# Patient Record
Sex: MALE | Race: WHITE | NOT HISPANIC OR LATINO | URBAN - METROPOLITAN AREA
[De-identification: names, ages, dates, MRNs, and addresses within clinical notes are randomized per-mention and may not be internally consistent; named-entity substitution may affect disease eponyms.]

---

## 2017-07-16 ENCOUNTER — OUTPATIENT (OUTPATIENT)
Dept: OUTPATIENT SERVICES | Facility: HOSPITAL | Age: 72
LOS: 1 days | Discharge: HOME | End: 2017-07-16

## 2017-07-16 DIAGNOSIS — N28.1 CYST OF KIDNEY, ACQUIRED: ICD-10-CM

## 2019-10-11 ENCOUNTER — OUTPATIENT (OUTPATIENT)
Dept: OUTPATIENT SERVICES | Facility: HOSPITAL | Age: 74
LOS: 1 days | Discharge: HOME | End: 2019-10-11
Payer: MEDICARE

## 2019-10-11 DIAGNOSIS — M79.604 PAIN IN RIGHT LEG: ICD-10-CM

## 2019-10-11 DIAGNOSIS — S80.922A UNSPECIFIED SUPERFICIAL INJURY OF LEFT LOWER LEG, INITIAL ENCOUNTER: ICD-10-CM

## 2019-10-11 DIAGNOSIS — M79.605 PAIN IN LEFT LEG: ICD-10-CM

## 2019-10-11 DIAGNOSIS — M25.561 PAIN IN RIGHT KNEE: ICD-10-CM

## 2019-10-11 PROCEDURE — 93971 EXTREMITY STUDY: CPT | Mod: 26,LT

## 2019-11-11 ENCOUNTER — OUTPATIENT (OUTPATIENT)
Dept: OUTPATIENT SERVICES | Facility: HOSPITAL | Age: 74
LOS: 1 days | Discharge: HOME | End: 2019-11-11
Payer: MEDICARE

## 2019-11-11 DIAGNOSIS — M25.562 PAIN IN LEFT KNEE: ICD-10-CM

## 2019-11-11 PROCEDURE — 73721 MRI JNT OF LWR EXTRE W/O DYE: CPT | Mod: 26,LT

## 2019-11-27 ENCOUNTER — OUTPATIENT (OUTPATIENT)
Dept: OUTPATIENT SERVICES | Facility: HOSPITAL | Age: 74
LOS: 1 days | Discharge: HOME | End: 2019-11-27
Payer: MEDICARE

## 2019-11-27 DIAGNOSIS — R97.20 ELEVATED PROSTATE SPECIFIC ANTIGEN [PSA]: ICD-10-CM

## 2019-11-27 DIAGNOSIS — N28.1 CYST OF KIDNEY, ACQUIRED: ICD-10-CM

## 2019-11-27 PROCEDURE — 72196 MRI PELVIS W/DYE: CPT | Mod: 26

## 2019-11-27 PROCEDURE — 76775 US EXAM ABDO BACK WALL LIM: CPT | Mod: 26

## 2020-03-30 ENCOUNTER — INPATIENT (INPATIENT)
Facility: HOSPITAL | Age: 75
LOS: 35 days | Discharge: NURSING, REHAB OR LONG-TERM CARE | End: 2020-05-05
Attending: INTERNAL MEDICINE | Admitting: INTERNAL MEDICINE
Payer: MEDICARE

## 2020-03-30 VITALS
SYSTOLIC BLOOD PRESSURE: 128 MMHG | HEART RATE: 104 BPM | DIASTOLIC BLOOD PRESSURE: 58 MMHG | OXYGEN SATURATION: 95 % | TEMPERATURE: 99 F | RESPIRATION RATE: 20 BRPM

## 2020-03-30 DIAGNOSIS — R74.0 NONSPECIFIC ELEVATION OF LEVELS OF TRANSAMINASE AND LACTIC ACID DEHYDROGENASE [LDH]: ICD-10-CM

## 2020-03-30 DIAGNOSIS — Z88.1 ALLERGY STATUS TO OTHER ANTIBIOTIC AGENTS STATUS: ICD-10-CM

## 2020-03-30 LAB
ALBUMIN SERPL ELPH-MCNC: 3.8 G/DL — SIGNIFICANT CHANGE UP (ref 3.5–5.2)
ALP SERPL-CCNC: 73 U/L — SIGNIFICANT CHANGE UP (ref 30–115)
ALT FLD-CCNC: 109 U/L — HIGH (ref 0–41)
ANION GAP SERPL CALC-SCNC: 18 MMOL/L — HIGH (ref 7–14)
APPEARANCE UR: CLEAR — SIGNIFICANT CHANGE UP
AST SERPL-CCNC: 131 U/L — HIGH (ref 0–41)
BACTERIA # UR AUTO: NEGATIVE — SIGNIFICANT CHANGE UP
BASE EXCESS BLDV CALC-SCNC: -0.1 MMOL/L — SIGNIFICANT CHANGE UP (ref -2–2)
BASOPHILS # BLD AUTO: 0.01 K/UL — SIGNIFICANT CHANGE UP (ref 0–0.2)
BASOPHILS NFR BLD AUTO: 0.1 % — SIGNIFICANT CHANGE UP (ref 0–1)
BILIRUB SERPL-MCNC: 0.8 MG/DL — SIGNIFICANT CHANGE UP (ref 0.2–1.2)
BILIRUB UR-MCNC: NEGATIVE — SIGNIFICANT CHANGE UP
BUN SERPL-MCNC: 24 MG/DL — HIGH (ref 10–20)
CA-I SERPL-SCNC: 1.18 MMOL/L — SIGNIFICANT CHANGE UP (ref 1.12–1.3)
CALCIUM SERPL-MCNC: 9.2 MG/DL — SIGNIFICANT CHANGE UP (ref 8.5–10.1)
CHLORIDE SERPL-SCNC: 98 MMOL/L — SIGNIFICANT CHANGE UP (ref 98–110)
CO2 SERPL-SCNC: 21 MMOL/L — SIGNIFICANT CHANGE UP (ref 17–32)
COLOR SPEC: YELLOW — SIGNIFICANT CHANGE UP
CREAT SERPL-MCNC: 1.2 MG/DL — SIGNIFICANT CHANGE UP (ref 0.7–1.5)
DIFF PNL FLD: ABNORMAL
EOSINOPHIL # BLD AUTO: 0 K/UL — SIGNIFICANT CHANGE UP (ref 0–0.7)
EOSINOPHIL NFR BLD AUTO: 0 % — SIGNIFICANT CHANGE UP (ref 0–8)
EPI CELLS # UR: 22 /HPF — HIGH (ref 0–5)
GAS PNL BLDV: 136 MMOL/L — SIGNIFICANT CHANGE UP (ref 136–145)
GAS PNL BLDV: SIGNIFICANT CHANGE UP
GLUCOSE SERPL-MCNC: 169 MG/DL — HIGH (ref 70–99)
GLUCOSE UR QL: NEGATIVE — SIGNIFICANT CHANGE UP
HCO3 BLDV-SCNC: 25 MMOL/L — SIGNIFICANT CHANGE UP (ref 22–29)
HCT VFR BLD CALC: 50.2 % — SIGNIFICANT CHANGE UP (ref 42–52)
HCT VFR BLDA CALC: 51.9 % — HIGH (ref 34–44)
HGB BLD CALC-MCNC: 16.9 G/DL — SIGNIFICANT CHANGE UP (ref 14–18)
HGB BLD-MCNC: 16.6 G/DL — SIGNIFICANT CHANGE UP (ref 14–18)
HYALINE CASTS # UR AUTO: 24 /LPF — HIGH (ref 0–7)
IMM GRANULOCYTES NFR BLD AUTO: 0.4 % — HIGH (ref 0.1–0.3)
KETONES UR-MCNC: SIGNIFICANT CHANGE UP
LACTATE BLDV-MCNC: 3.1 MMOL/L — HIGH (ref 0.5–1.6)
LEUKOCYTE ESTERASE UR-ACNC: NEGATIVE — SIGNIFICANT CHANGE UP
LYMPHOCYTES # BLD AUTO: 0.92 K/UL — LOW (ref 1.2–3.4)
LYMPHOCYTES # BLD AUTO: 12.1 % — LOW (ref 20.5–51.1)
MAGNESIUM SERPL-MCNC: 2.1 MG/DL — SIGNIFICANT CHANGE UP (ref 1.8–2.4)
MCHC RBC-ENTMCNC: 29.1 PG — SIGNIFICANT CHANGE UP (ref 27–31)
MCHC RBC-ENTMCNC: 33.1 G/DL — SIGNIFICANT CHANGE UP (ref 32–37)
MCV RBC AUTO: 87.9 FL — SIGNIFICANT CHANGE UP (ref 80–94)
MONOCYTES # BLD AUTO: 0.52 K/UL — SIGNIFICANT CHANGE UP (ref 0.1–0.6)
MONOCYTES NFR BLD AUTO: 6.8 % — SIGNIFICANT CHANGE UP (ref 1.7–9.3)
NEUTROPHILS # BLD AUTO: 6.14 K/UL — SIGNIFICANT CHANGE UP (ref 1.4–6.5)
NEUTROPHILS NFR BLD AUTO: 80.6 % — HIGH (ref 42.2–75.2)
NITRITE UR-MCNC: NEGATIVE — SIGNIFICANT CHANGE UP
NRBC # BLD: 0 /100 WBCS — SIGNIFICANT CHANGE UP (ref 0–0)
PCO2 BLDV: 41 MMHG — SIGNIFICANT CHANGE UP (ref 41–51)
PH BLDV: 7.39 — SIGNIFICANT CHANGE UP (ref 7.26–7.43)
PH UR: 6 — SIGNIFICANT CHANGE UP (ref 5–8)
PLATELET # BLD AUTO: 262 K/UL — SIGNIFICANT CHANGE UP (ref 130–400)
PO2 BLDV: 20 MMHG — SIGNIFICANT CHANGE UP (ref 20–40)
POTASSIUM BLDV-SCNC: 4.2 MMOL/L — SIGNIFICANT CHANGE UP (ref 3.3–5.6)
POTASSIUM SERPL-MCNC: 4.4 MMOL/L — SIGNIFICANT CHANGE UP (ref 3.5–5)
POTASSIUM SERPL-SCNC: 4.4 MMOL/L — SIGNIFICANT CHANGE UP (ref 3.5–5)
PROT SERPL-MCNC: 7.1 G/DL — SIGNIFICANT CHANGE UP (ref 6–8)
PROT UR-MCNC: ABNORMAL
RBC # BLD: 5.71 M/UL — SIGNIFICANT CHANGE UP (ref 4.7–6.1)
RBC # FLD: 13.7 % — SIGNIFICANT CHANGE UP (ref 11.5–14.5)
RBC CASTS # UR COMP ASSIST: 10 /HPF — HIGH (ref 0–4)
SAO2 % BLDV: 29 % — SIGNIFICANT CHANGE UP
SODIUM SERPL-SCNC: 137 MMOL/L — SIGNIFICANT CHANGE UP (ref 135–146)
SP GR SPEC: 1.03 — HIGH (ref 1.01–1.02)
TROPONIN T SERPL-MCNC: <0.01 NG/ML — SIGNIFICANT CHANGE UP
UROBILINOGEN FLD QL: SIGNIFICANT CHANGE UP
WBC # BLD: 7.62 K/UL — SIGNIFICANT CHANGE UP (ref 4.8–10.8)
WBC # FLD AUTO: 7.62 K/UL — SIGNIFICANT CHANGE UP (ref 4.8–10.8)
WBC UR QL: 29 /HPF — HIGH (ref 0–5)

## 2020-03-30 PROCEDURE — 71045 X-RAY EXAM CHEST 1 VIEW: CPT | Mod: 26

## 2020-03-30 PROCEDURE — 99223 1ST HOSP IP/OBS HIGH 75: CPT

## 2020-03-30 PROCEDURE — 99497 ADVNCD CARE PLAN 30 MIN: CPT | Mod: 25

## 2020-03-30 PROCEDURE — 99285 EMERGENCY DEPT VISIT HI MDM: CPT | Mod: GC

## 2020-03-30 PROCEDURE — 93010 ELECTROCARDIOGRAM REPORT: CPT

## 2020-03-30 RX ORDER — ACETAMINOPHEN 500 MG
650 TABLET ORAL EVERY 6 HOURS
Refills: 0 | Status: DISCONTINUED | OUTPATIENT
Start: 2020-03-30 | End: 2020-03-31

## 2020-03-30 RX ORDER — GUAIFENESIN/DEXTROMETHORPHAN 600MG-30MG
10 TABLET, EXTENDED RELEASE 12 HR ORAL EVERY 4 HOURS
Refills: 0 | Status: DISCONTINUED | OUTPATIENT
Start: 2020-03-30 | End: 2020-04-06

## 2020-03-30 RX ORDER — APIXABAN 2.5 MG/1
5 TABLET, FILM COATED ORAL
Refills: 0 | Status: DISCONTINUED | OUTPATIENT
Start: 2020-03-30 | End: 2020-04-14

## 2020-03-30 RX ORDER — CHLORHEXIDINE GLUCONATE 213 G/1000ML
1 SOLUTION TOPICAL
Refills: 0 | Status: DISCONTINUED | OUTPATIENT
Start: 2020-03-30 | End: 2020-04-21

## 2020-03-30 RX ORDER — HYDROXYCHLOROQUINE SULFATE 200 MG
TABLET ORAL
Refills: 0 | Status: DISCONTINUED | OUTPATIENT
Start: 2020-03-30 | End: 2020-03-31

## 2020-03-30 RX ORDER — HYDROXYCHLOROQUINE SULFATE 200 MG
400 TABLET ORAL EVERY 12 HOURS
Refills: 0 | Status: DISCONTINUED | OUTPATIENT
Start: 2020-03-30 | End: 2020-03-30

## 2020-03-30 RX ORDER — HYDROXYCHLOROQUINE SULFATE 200 MG
400 TABLET ORAL EVERY 12 HOURS
Refills: 0 | Status: COMPLETED | OUTPATIENT
Start: 2020-03-30 | End: 2020-03-31

## 2020-03-30 RX ORDER — LISINOPRIL 2.5 MG/1
40 TABLET ORAL DAILY
Refills: 0 | Status: DISCONTINUED | OUTPATIENT
Start: 2020-03-30 | End: 2020-03-31

## 2020-03-30 RX ORDER — HYDROXYCHLOROQUINE SULFATE 200 MG
TABLET ORAL
Refills: 0 | Status: DISCONTINUED | OUTPATIENT
Start: 2020-03-30 | End: 2020-03-30

## 2020-03-30 RX ORDER — ZINC SULFATE TAB 220 MG (50 MG ZINC EQUIVALENT) 220 (50 ZN) MG
220 TAB ORAL DAILY
Refills: 0 | Status: DISCONTINUED | OUTPATIENT
Start: 2020-03-30 | End: 2020-04-20

## 2020-03-30 RX ORDER — SODIUM CHLORIDE 9 MG/ML
1000 INJECTION INTRAMUSCULAR; INTRAVENOUS; SUBCUTANEOUS
Refills: 0 | Status: DISCONTINUED | OUTPATIENT
Start: 2020-03-30 | End: 2020-03-31

## 2020-03-30 RX ORDER — DILTIAZEM HCL 120 MG
120 CAPSULE, EXT RELEASE 24 HR ORAL DAILY
Refills: 0 | Status: DISCONTINUED | OUTPATIENT
Start: 2020-03-30 | End: 2020-03-31

## 2020-03-30 RX ADMIN — SODIUM CHLORIDE 75 MILLILITER(S): 9 INJECTION INTRAMUSCULAR; INTRAVENOUS; SUBCUTANEOUS at 20:36

## 2020-03-30 RX ADMIN — Medication 400 MILLIGRAM(S): at 20:38

## 2020-03-30 NOTE — GOALS OF CARE CONVERSATION - ADVANCED CARE PLANNING - CONVERSATION DETAILS
Discussed diagnosis and treatment as well as GOC. Goals of care discussed in light of patient's presenting symptoms of comorbidities, patient wishes to be full code.

## 2020-03-30 NOTE — ED PROVIDER NOTE - NS ED ROS FT
Constitutional: (+) fever (-) vomiting  Eyes/ENT: (-) vision changes, (-) hearing changes  Cardiovascular: (-) chest pain, (+) sob  Respiratory: (+) cough, (+) shortness of breath  Gastrointestinal: (-) vomiting, (+) diarrhea, (-) abdominal pain  : (-) dysuria   Musculoskeletal: (-) back pain  Integumentary: (-) rash, (-) edema  Neurological: (-)loc  Allergic/Immunologic: (-) pruritus  Endocrine: No history of thyroid disease or diabetes.

## 2020-03-30 NOTE — H&P ADULT - NSHPPHYSICALEXAM_GEN_ALL_CORE
Vital Signs Last 24 Hrs  T(C): 37.2 (30 Mar 2020 14:08), Max: 37.2 (30 Mar 2020 14:08)  T(F): 98.9 (30 Mar 2020 14:08), Max: 98.9 (30 Mar 2020 14:08)  HR: 109 (30 Mar 2020 16:22) (98 - 109)  BP: 140/83 (30 Mar 2020 16:22) (118/56 - 148/78)  BP(mean): --  RR: 20 (30 Mar 2020 16:22) (20 - 22)  SpO2: 95% (30 Mar 2020 16:22) (94% - 96%) Vital Signs Last 24 Hrs  T(C): 37.2 (30 Mar 2020 14:08), Max: 37.2 (30 Mar 2020 14:08)  T(F): 98.9 (30 Mar 2020 14:08), Max: 98.9 (30 Mar 2020 14:08)  HR: 109 (30 Mar 2020 16:22) (98 - 109)  BP: 140/83 (30 Mar 2020 16:22) (118/56 - 148/78)  BP(mean): --  RR: 20 (30 Mar 2020 16:22) (20 - 22)  SpO2: 95% (30 Mar 2020 16:22) (94% - 96%)    GEN: NAD  HEENT: NCAT  CV: irregular  RESP: CTAB  ABD: NTND, soft  EXT: no C/C/E  NEURO: A&O x4

## 2020-03-30 NOTE — GOALS OF CARE CONVERSATION - ADVANCED CARE PLANNING - CONVERSATION/DISCUSSION
CARLOSST Discussed/Treatment Options/Diagnosis Diagnosis/MOLST Discussed/Treatment Options/Prognosis

## 2020-03-30 NOTE — H&P ADULT - ATTENDING COMMENTS
At patient's request, spoke in detail length of patient's case with wife, Rebecca at [phone] at 21:16.    PHYSICAL EXAM:    CONSTITUTIONAL: NAD   ENMT: EOMI, PERRLA, No tonsillar erythema, exudates, or enlargement, neck supple, No JVD  PSYCH: Alert & Oriented X3  RESPIRATORY: Decreased bilateral air entry, bibasilar rales   CARDIOVASCULAR: Regular rate and rhythm; No murmurs, rubs, or gallops, negative edema  GASTROINTESTINAL: Soft, Nontender, Nondistended; Bowel sounds present  EXTREMITIES:  2+ Peripheral Pulses, No clubbing, cyanosis  SKIN: No rashes or lesions    75 yo M with PMHx of AFIB on Eliquis, CAD s/p PCI, DM II, BPH, presented with complaint of fever, nonproductive cough and worsening shortness of breath for one week's duration. Patient endorses positive COVID-19 sick contact during family wedding 2 weeks ago, denies nausea, vomiting, chest pain, abdominal pain. In ED, patient complained of inability to urinate, santana placed with initial output of 600cc as per RN.     #Suspected Viral URI, Acute Hypoxemic Respiratory Failure: COVID-19 sent, patient initially sating in low 80s in field, currently 95% on 100% nonrebreather, patient initially resistant to start initial dose of  hydroxychlorquine, resident physician contacted and spoke with Cardiologist who agreed on initiation, f/u AM EKG before continuation of medication, QTc elevated at 499, awaiting ID evaluation, incentive spirometry, pulmonary toilet, antipyretic, f/u CRP, procalcitonin, CXR reviewed revealing of bilateral diffuse infiltrates    #AFIB/CAD: Continue home medications     #DM: Monitor fingersticks, start basal bolus insulin if greater than 180     #BPH/Urinary Retention: will proceed with trial of void, f/u UA, urine culture, will need follow up with Urology as outpatient    #Transaminitis likely secondary to viral infection: f/u repeat LFTs     Disposition: Home when medically stable.

## 2020-03-30 NOTE — ED PROVIDER NOTE - ATTENDING CONTRIBUTION TO CARE
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

## 2020-03-30 NOTE — ED PROVIDER NOTE - DISPOSITION TYPE
ADMIT Repair Performed By Another Provider Text (Leave Blank If You Do Not Want): After obtaining clear surgical margins the defect was repaired by another provider.

## 2020-03-30 NOTE — ED ADULT NURSE NOTE - OBJECTIVE STATEMENT
pt 73 y/o male presents to ED c/o cough, fever, and SOB . pt states at home his O2 was in the 80s on room air. pt denies pain. pt states he called 911 because of low O2 , pt currently on 6LNC .

## 2020-03-30 NOTE — ED PROVIDER NOTE - CARE PLAN
Principal Discharge DX:	Acute hypoxemic respiratory failure  Secondary Diagnosis:	Pulmonary infiltrates on CXR

## 2020-03-30 NOTE — H&P ADULT - NSICDXPASTMEDICALHX_GEN_ALL_CORE_FT
PAST MEDICAL HISTORY:  Afib     BPH (benign prostatic hyperplasia)     CAD (coronary artery disease)     DM (diabetes mellitus)

## 2020-03-30 NOTE — H&P ADULT - ASSESSMENT
AHRF r/o COVID  -f/u COVID PCR  -f/u BCX and UCX  -start HCQ  -QTc borderline, repeat in AM and monitor 73 yo M w/ hx of Afib on Eliquis, CAD s/p PCI, BPH, presents with 1 week of fever, cough, and progressive SOB.    AHRF r/o COVID  -f/u COVID PCR  -f/u BCX and UCX  -patient refusing Plaquenil for now, he would like to have okay from Dr. Stokes  -QTc borderline, repeat in AM and monitor  -check procalcitonin and CRP  -transaminitis possibly 2/2 COVID, monitor for now  -zinc  -antitussive prn    #Afib on Eliquis  #CAD s/p PCI  -continue CCB and Eliquis  -continue ACE-i  -ECG shows signs of ischemia, no prior ECG for comparison  -check trop  -hold atorvastatin for transaminitis    #DM - hold metformin, monitor blood glucose for now, start insulin if consistently >180  #BPH - s/p biopsy, follow up outpatient    #Misc  -full code, MOLST in chart needs attending signature 73 yo M w/ hx of Afib on Eliquis, CAD s/p PCI, DM, BPH, presents with 1 week of fever, cough, and progressive SOB.    AHRF r/o COVID  -f/u COVID PCR  -f/u BCX and UCX  -patient refusing Plaquenil for now, he would like to have okay from Dr. Stokes  -QTc borderline, repeat in AM and monitor  -check procalcitonin and CRP  -transaminitis possibly 2/2 COVID, monitor for now  -zinc  -antitussive prn  -IVF  -repeat lactate in AM    #Afib on Eliquis  #CAD s/p PCI  -continue CCB and Eliquis  -continue ACE-i  -ECG shows ischemic findings, no prior ECG for comparison, given patient asymptomatic and recent normal stress test, will monitor ECG for now  -maintain mag > 2  -check trop  -hold atorvastatin for transaminitis    #DM - hold metformin, monitor blood glucose for now, start insulin if consistently >180  #BPH - s/p biopsy, follow up outpatient    #Misc  -full code, MOLST in chart needs attending signature 73 yo M w/ hx of Afib on Eliquis, CAD s/p PCI, DM, BPH, presents with 1 week of fever, cough, and progressive SOB.    AHRF r/o COVID  -f/u COVID PCR  -f/u BCX and UCX  -patient refusing Plaquenil for now, he would like to have okay from Dr. Sotkes  -QTc borderline, repeat in AM and monitor  -check procalcitonin and CRP  -transaminitis possibly 2/2 COVID, monitor for now  -zinc  -antitussive prn  -IVF  -repeat lactate in AM    #Afib on Eliquis  #CAD s/p PCI  -continue CCB and Eliquis  -continue ACE-i  -ECG shows ischemic findings, no prior ECG for comparison, given patient asymptomatic and recent normal stress test, will check trop and monitor ECG for now  -maintain mag > 2  -hold atorvastatin for transaminitis    #DM - hold metformin, monitor blood glucose for now, start insulin if consistently >180  #BPH - s/p biopsy, follow up outpatient    #Misc  -full code, MOLST in chart needs attending signature 73 yo M w/ hx of Afib on Eliquis, CAD s/p PCI, DM, BPH, presents with 1 week of fever, cough, and progressive SOB.    AHRF r/o COVID  -f/u COVID PCR  -f/u BCX and UCX  -presentation consistent with COVID19, start Plaquenil, spoke with Dr. Stokes, who agrees  -QTc borderline, repeat in AM and monitor  -check procalcitonin and CRP  -transaminitis possibly 2/2 COVID, monitor for now  -zinc  -antitussive prn  -IVF  -repeat lactate in AM    #Afib on Eliquis  #CAD s/p PCI  -continue CCB and Eliquis  -continue ACE-i  -ECG shows ischemic findings, no prior ECG for comparison, given patient asymptomatic and recent normal stress test, will check trop and monitor ECG for now  -maintain mag > 2  -hold atorvastatin for transaminitis    #DM - hold metformin, monitor blood glucose for now, start insulin if consistently >180  #BPH - s/p biopsy, follow up outpatient, patient demands Evans removed, he prefers straight cath if needed    #Misc  -full code, MOLST in chart needs attending signature

## 2020-03-30 NOTE — H&P ADULT - NSHPLABSRESULTS_GEN_ALL_CORE
LABS                        16.6   7.62  )-----------( 262      ( 03-30 @ 11:33 )             50.2     03-30    137  |  98  |  24<H>  ----------------------------<  169<H>  4.4   |  21  |  1.2    Ca    9.2      30 Mar 2020 11:33    TPro  7.1  /  Alb  3.8  /  TBili  0.8  /  DBili  x   /  AST  131<H>  /  ALT  109<H>  /  AlkPhos  73  03-30

## 2020-03-30 NOTE — ED PROVIDER NOTE - OBJECTIVE STATEMENT
73yo M pmhx a fib on eiqlius and ccb, covid testing pending presents CC about 1 week of fever, cough, progressive sob. pt sating in 80s in the field per EMS, upon ed evaluation pt reports sob, slight tachypnea and is sating 90-95% on 6L NC. no chest pain. +diarrhea.

## 2020-03-30 NOTE — H&P ADULT - HISTORY OF PRESENT ILLNESS
75 yo M w/ hx of Afib on Eliqius, CAD, prostate CA, presents with 1 week of fever, cough, and progressive SOB. Admits to congestion and diarrhea. Denies chest pain. 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 Pt pulse ox in 80s in the field per EMS.    COVID PCR sent, labs show transaminitis, b/l lung opacity, satting 95% on 6L, needed NRB for standing up to urinate 75 yo M w/ hx of Afib on Eliquis, CAD s/p PCI, BPH, presents with 1 week of fever, cough, and progressive SOB. Admits to watery diarrhea for 3 days. Denies chest pain. 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 He has exposure to COVID positive family member at the wedding. No travel hx. Pt pulse ox in 80s in the field per EMS. Of note, per patient he had recent normal stress test recently.    COVID PCR sent, labs show transaminitis, CXR shows b/l lung opacity, requriring NRB 75 yo M w/ hx of Afib on Eliquis, CAD s/p PCI, DM, BPH, presents with 1 week of fever, cough, and progressive SOB. Admits to watery diarrhea for 3 days. Denies chest pain. 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 He has exposure to COVID positive family member at the wedding. No travel hx. Pt pulse ox in 80s in the field per EMS. Of note, per patient he had recent normal stress test recently.    COVID PCR sent, labs show transaminitis, CXR shows b/l lung opacity, requriring NRB 73 yo M w/ hx of Afib on Eliquis, CAD s/p PCI, DM, BPH, presents with 1 week of fever, cough, and progressive SOB. Admits to watery diarrhea for 3 days. Denies chest pain. 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 He has exposure to COVID positive family member at the wedding. No travel hx. Pt pulse ox in 80s in the field per EMS. Of note, per patient he had recent normal stress test recently.    COVID PCR sent, labs show lymphopenia and transaminitis, CXR shows b/l lung opacity, requiring NRB

## 2020-03-30 NOTE — ED PROVIDER NOTE - PHYSICAL EXAMINATION
CONSTITUTIONAL: Well-developed; well-nourished; in no acute distress, +tachypnea   SKIN: warm, dry  HEAD: Normocephalic; atraumatic  EYES: PERRL, EOMI, no conjunctival erythema  ENT: No nasal discharge; airway clear, mucous membranes moist  NECK: Supple; non tender, FROM  CARD: irreg irreg, radial 2+  RESP: +tachypnea, +bibasilar crackles  ABD: soft ntnd, no rebound, no guarding, no rigidity, neg murphys  EXT: moves all extremities, No clubbing, cyanosis or edema.   NEURO: Alert, oriented, grossly unremarkable, no focal deficits, cn ii-xii grossly intact  PSYCH: Cooperative, appropriate

## 2020-03-31 LAB
ALBUMIN SERPL ELPH-MCNC: 3 G/DL — LOW (ref 3.5–5.2)
ALP SERPL-CCNC: 73 U/L — SIGNIFICANT CHANGE UP (ref 30–115)
ALT FLD-CCNC: 167 U/L — HIGH (ref 0–41)
ANION GAP SERPL CALC-SCNC: 14 MMOL/L — SIGNIFICANT CHANGE UP (ref 7–14)
AST SERPL-CCNC: 214 U/L — HIGH (ref 0–41)
BASOPHILS # BLD AUTO: 0.01 K/UL — SIGNIFICANT CHANGE UP (ref 0–0.2)
BASOPHILS NFR BLD AUTO: 0.1 % — SIGNIFICANT CHANGE UP (ref 0–1)
BILIRUB SERPL-MCNC: 0.9 MG/DL — SIGNIFICANT CHANGE UP (ref 0.2–1.2)
BUN SERPL-MCNC: 23 MG/DL — HIGH (ref 10–20)
CALCIUM SERPL-MCNC: 8.2 MG/DL — LOW (ref 8.5–10.1)
CHLORIDE SERPL-SCNC: 105 MMOL/L — SIGNIFICANT CHANGE UP (ref 98–110)
CO2 SERPL-SCNC: 20 MMOL/L — SIGNIFICANT CHANGE UP (ref 17–32)
CREAT SERPL-MCNC: 0.9 MG/DL — SIGNIFICANT CHANGE UP (ref 0.7–1.5)
CRP SERPL-MCNC: 24.83 MG/DL — HIGH (ref 0–0.4)
CULTURE RESULTS: NO GROWTH — SIGNIFICANT CHANGE UP
CULTURE RESULTS: NO GROWTH — SIGNIFICANT CHANGE UP
EOSINOPHIL # BLD AUTO: 0 K/UL — SIGNIFICANT CHANGE UP (ref 0–0.7)
EOSINOPHIL NFR BLD AUTO: 0 % — SIGNIFICANT CHANGE UP (ref 0–8)
GLUCOSE BLDC GLUCOMTR-MCNC: 136 MG/DL — HIGH (ref 70–99)
GLUCOSE BLDC GLUCOMTR-MCNC: 177 MG/DL — HIGH (ref 70–99)
GLUCOSE SERPL-MCNC: 143 MG/DL — HIGH (ref 70–99)
HCT VFR BLD CALC: 44.5 % — SIGNIFICANT CHANGE UP (ref 42–52)
HCV AB S/CO SERPL IA: 0.03 COI — SIGNIFICANT CHANGE UP
HCV AB SERPL-IMP: SIGNIFICANT CHANGE UP
HGB BLD-MCNC: 15.1 G/DL — SIGNIFICANT CHANGE UP (ref 14–18)
IMM GRANULOCYTES NFR BLD AUTO: 0.5 % — HIGH (ref 0.1–0.3)
LACTATE SERPL-SCNC: 1.5 MMOL/L — SIGNIFICANT CHANGE UP (ref 0.7–2)
LYMPHOCYTES # BLD AUTO: 0.63 K/UL — LOW (ref 1.2–3.4)
LYMPHOCYTES # BLD AUTO: 7.6 % — LOW (ref 20.5–51.1)
MAGNESIUM SERPL-MCNC: 2.1 MG/DL — SIGNIFICANT CHANGE UP (ref 1.8–2.4)
MCHC RBC-ENTMCNC: 30.5 PG — SIGNIFICANT CHANGE UP (ref 27–31)
MCHC RBC-ENTMCNC: 33.9 G/DL — SIGNIFICANT CHANGE UP (ref 32–37)
MCV RBC AUTO: 89.9 FL — SIGNIFICANT CHANGE UP (ref 80–94)
MONOCYTES # BLD AUTO: 0.58 K/UL — SIGNIFICANT CHANGE UP (ref 0.1–0.6)
MONOCYTES NFR BLD AUTO: 7 % — SIGNIFICANT CHANGE UP (ref 1.7–9.3)
NEUTROPHILS # BLD AUTO: 7.03 K/UL — HIGH (ref 1.4–6.5)
NEUTROPHILS NFR BLD AUTO: 84.8 % — HIGH (ref 42.2–75.2)
NRBC # BLD: 0 /100 WBCS — SIGNIFICANT CHANGE UP (ref 0–0)
PLATELET # BLD AUTO: 262 K/UL — SIGNIFICANT CHANGE UP (ref 130–400)
POTASSIUM SERPL-MCNC: 4.9 MMOL/L — SIGNIFICANT CHANGE UP (ref 3.5–5)
POTASSIUM SERPL-SCNC: 4.9 MMOL/L — SIGNIFICANT CHANGE UP (ref 3.5–5)
PROCALCITONIN SERPL-MCNC: 0.23 NG/ML — HIGH (ref 0.02–0.1)
PROT SERPL-MCNC: 5.7 G/DL — LOW (ref 6–8)
RBC # BLD: 4.95 M/UL — SIGNIFICANT CHANGE UP (ref 4.7–6.1)
RBC # FLD: 13.9 % — SIGNIFICANT CHANGE UP (ref 11.5–14.5)
SARS-COV-2 RNA SPEC QL NAA+PROBE: SIGNIFICANT CHANGE UP
SODIUM SERPL-SCNC: 139 MMOL/L — SIGNIFICANT CHANGE UP (ref 135–146)
SPECIMEN SOURCE: SIGNIFICANT CHANGE UP
SPECIMEN SOURCE: SIGNIFICANT CHANGE UP
WBC # BLD: 8.29 K/UL — SIGNIFICANT CHANGE UP (ref 4.8–10.8)
WBC # FLD AUTO: 8.29 K/UL — SIGNIFICANT CHANGE UP (ref 4.8–10.8)

## 2020-03-31 PROCEDURE — 99291 CRITICAL CARE FIRST HOUR: CPT

## 2020-03-31 PROCEDURE — 99233 SBSQ HOSP IP/OBS HIGH 50: CPT

## 2020-03-31 PROCEDURE — 93010 ELECTROCARDIOGRAM REPORT: CPT

## 2020-03-31 PROCEDURE — 71045 X-RAY EXAM CHEST 1 VIEW: CPT | Mod: 26,76

## 2020-03-31 RX ORDER — AZITHROMYCIN 500 MG/1
250 TABLET, FILM COATED ORAL DAILY
Refills: 0 | Status: COMPLETED | OUTPATIENT
Start: 2020-04-01 | End: 2020-04-04

## 2020-03-31 RX ORDER — PROPOFOL 10 MG/ML
10 INJECTION, EMULSION INTRAVENOUS
Qty: 1000 | Refills: 0 | Status: DISCONTINUED | OUTPATIENT
Start: 2020-03-31 | End: 2020-04-01

## 2020-03-31 RX ORDER — INSULIN HUMAN 100 [IU]/ML
INJECTION, SOLUTION SUBCUTANEOUS EVERY 4 HOURS
Refills: 0 | Status: DISCONTINUED | OUTPATIENT
Start: 2020-03-31 | End: 2020-04-01

## 2020-03-31 RX ORDER — SODIUM CHLORIDE 9 MG/ML
1000 INJECTION, SOLUTION INTRAVENOUS
Refills: 0 | Status: DISCONTINUED | OUTPATIENT
Start: 2020-03-31 | End: 2020-04-06

## 2020-03-31 RX ORDER — NOREPINEPHRINE BITARTRATE/D5W 8 MG/250ML
0.05 PLASTIC BAG, INJECTION (ML) INTRAVENOUS
Qty: 8 | Refills: 0 | Status: DISCONTINUED | OUTPATIENT
Start: 2020-03-31 | End: 2020-04-01

## 2020-03-31 RX ORDER — DILTIAZEM HCL 120 MG
120 CAPSULE, EXT RELEASE 24 HR ORAL DAILY
Refills: 0 | Status: DISCONTINUED | OUTPATIENT
Start: 2020-03-31 | End: 2020-04-01

## 2020-03-31 RX ORDER — ACETAMINOPHEN 500 MG
650 TABLET ORAL EVERY 6 HOURS
Refills: 0 | Status: DISCONTINUED | OUTPATIENT
Start: 2020-03-31 | End: 2020-05-05

## 2020-03-31 RX ORDER — SENNA PLUS 8.6 MG/1
2 TABLET ORAL AT BEDTIME
Refills: 0 | Status: DISCONTINUED | OUTPATIENT
Start: 2020-03-31 | End: 2020-05-05

## 2020-03-31 RX ORDER — AZITHROMYCIN 500 MG/1
500 TABLET, FILM COATED ORAL ONCE
Refills: 0 | Status: COMPLETED | OUTPATIENT
Start: 2020-03-31 | End: 2020-04-01

## 2020-03-31 RX ORDER — PANTOPRAZOLE SODIUM 20 MG/1
40 TABLET, DELAYED RELEASE ORAL DAILY
Refills: 0 | Status: DISCONTINUED | OUTPATIENT
Start: 2020-03-31 | End: 2020-04-18

## 2020-03-31 RX ADMIN — PROPOFOL 5.7 MICROGRAM(S)/KG/MIN: 10 INJECTION, EMULSION INTRAVENOUS at 20:10

## 2020-03-31 RX ADMIN — Medication 120 MILLIGRAM(S): at 06:08

## 2020-03-31 RX ADMIN — Medication 8.91 MICROGRAM(S)/KG/MIN: at 14:15

## 2020-03-31 RX ADMIN — LISINOPRIL 40 MILLIGRAM(S): 2.5 TABLET ORAL at 06:08

## 2020-03-31 RX ADMIN — PROPOFOL 5.7 MICROGRAM(S)/KG/MIN: 10 INJECTION, EMULSION INTRAVENOUS at 14:55

## 2020-03-31 RX ADMIN — Medication 400 MILLIGRAM(S): at 06:06

## 2020-03-31 RX ADMIN — Medication 8.91 MICROGRAM(S)/KG/MIN: at 19:40

## 2020-03-31 RX ADMIN — Medication 650 MILLIGRAM(S): at 12:48

## 2020-03-31 RX ADMIN — Medication 60 MILLIGRAM(S): at 22:00

## 2020-03-31 RX ADMIN — CHLORHEXIDINE GLUCONATE 1 APPLICATION(S): 213 SOLUTION TOPICAL at 06:12

## 2020-03-31 RX ADMIN — SODIUM CHLORIDE 100 MILLILITER(S): 9 INJECTION, SOLUTION INTRAVENOUS at 22:01

## 2020-03-31 RX ADMIN — APIXABAN 5 MILLIGRAM(S): 2.5 TABLET, FILM COATED ORAL at 06:07

## 2020-03-31 RX ADMIN — Medication 650 MILLIGRAM(S): at 06:06

## 2020-03-31 NOTE — AIRWAY PLACEMENT NOTE ADULT - POST AIRWAY PLACEMENT ASSESSMENT:
breath sounds bilateral/positive end tidal CO2 noted/CXR pending/breath sounds equal/chest excursion noted/skin color improved
breath sounds bilateral/positive end tidal CO2 noted/skin color improved/breath sounds equal/chest excursion noted/CXR pending

## 2020-03-31 NOTE — CONSULT NOTE ADULT - ASSESSMENT
ASSESSMENT:  74 year old male with PMH of HTN DM2 CAD s/p PCI Afib presents with respiratory distress, suspected COVID    PLAN:     NEURO/PSYCH  Sedation;   - continue, Propofol gtt titrate to RASS -1  Pain Control;  - continue, Acetaminophen 650mg PO q6 PRN    RESP  Acute Hypoxemic Respiratory Failure;  Mode: AC/ CMV (Assist Control/ Continuous Mandatory Ventilation)  RR (machine): 18 TV (machine): 450 FiO2: 100 PEEP: 15 ITime: 1 MAP: 15 PIP: 18   - continue, ABG qd  COVID-19/Viral Pneumonia;  - negative SARS-CoV-2; lymphopenia, elevated CRP, fits COVID19 picture clinically  - possibly false negative, second nasal swab sent, may need bronchoscopy  - Pulmonary following, recommended:        - Methylprednisolone 60mg q12  - stable diffuse bilateral opacities  - continue, Guaifenisen/DM    CARD/VASC  HR: 74 (03-31-20 @ 18:00) (74 - 118)  BP: 109/56 (03-31-20 @ 18:00) (106/62 - 148/78)  AFib; chronic  - continue, Apixaban 5mg PO bid  - continue, Diltiazem 120mg PO  - conintue, EKG qd  CAD  - continue, Atorvastatin 40mg PO qhs  HTN  - hold, Lisinopril     GI/NUTR  - NPO    /Renal  BUN/Cr; stable  03-31 @ 08:29  BUN  23     Creatinine  0.9   03-30 @ 11:33  BUN  24     Creatinine  1.2   - monitor, I&Os  Fluids  - monitor I&Os  - santana in place  Electrolytes;  - monitor e-  BPH; chronic     HEME/ONC  - monitor H/H    ID  Fever;  - continue, Acetaminophen 650mg PO q6  COVID-19, suspected;  - suspect false negative, repeat swab pending  - ID following, recommended:       - Hydroxychloroquine 400mg PO q12h x 2 doses, then 200mg BID PO x 4 days       - Azithromycin 500mg PO once, then 250mg PO qd x 4 days       - SARS-CoV-2 (repeat), nasal MRSA, urine Legionella & Strep    ENDO  POCT Blood Glucose.: 177 mg/dL (03-31-20 @ 11:45)  POCT Blood Glucose.: 136 mg/dL (03-31-20 @ 07:55)  DM2;  - holding, Metformin  - continue, Insulin SS  - continue, POCT q1    MSK/DERM  - no active issues    DVT PPx  - on Apixaban     GI PPx  - continue, Pantoprazole 40mg PO qac  - continue, Senna 17.2mg PO qhs    Disposition  -  SICU    --------------------------------------------------------------------------------------    Critical Care Diagnoses:

## 2020-03-31 NOTE — PROGRESS NOTE ADULT - ASSESSMENT
73 yo M w/ hx of Afib on Eliquis, CAD s/p PCI, DM, BPH, presents with 1 week of fever, cough, and progressive SOB. Admits to watery diarrhea for 3 days. Denies chest pain. 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 He has exposure to COVID positive family member at the wedding. No travel hx. Pt pulse ox in 80s in the field per EMS. Of note, per patient he had recent normal stress test recently.    #Sepsis POA  # Acute Hypoxic respiratory failure sec to viral pneumonia probably sec to COVID -19  - positive contact history for COVID -19  -  Xray Chest 1 View-PORTABLE IMMEDIATE (03.30.20 @ 14:05) >Bilateral patchy airspace opacities.  - lymphopenia, transaminitis  - evaluated by ID:  Start PLAQUENIL 400mg PO q12h x 2 doses, then 200mg BID PO x 4 days (Pt is not to be discharged on plaquenil)  - Monitor QTC with EKG daily  -  Start Azithro 500mg PO x1, then 250mg PO daily x 4 days   -F/u COVID-19, procal, CRP, ferritin, LDH, D DIMERS  - symptomatic treatment  - maintain oxygen sat>95  - ICU eval    # Chronic afib, H/o CAD s/p PCI  - Troponin T, Serum: <0.01 ng/mL (03.30.20 @ 22:04)  - 12 Lead ECG (03.31.20 @ 08:36) >Atrial fibrillation. Left ventricular hypertrophy with QRS widening  - c/w diltiazem, eliquis  - c/w lisinopril  -cardiac eval  - statin held sec to transaminitis    # DM type 2  - monitor FS    # Full code    #Pending: ICU eval, cardiac eval  # Discussed plan of care with patient  # Disposition: ACute

## 2020-03-31 NOTE — CONSULT NOTE ADULT - ASSESSMENT
73 yo M w/ hx of Afib on Eliquis, CAD s/p PCI, DM, BPH, presents with 1 week of fever, cough, and progressive SOB. Admits to watery diarrhea for 3 days. Denies chest pain. 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 He has exposure to COVID positive family member at the wedding. No travel hx. Pt pulse ox in 80s in the field per EMS. Of note, per patient he had recent normal stress test recently.    IMPRESSION:  COVID-19 with sepsis ( T 101.4, /m ) with moderate illness  -given NRB pt could ewll progress in the inflammatory phase   -CXR with opacities bibasilar  -transaminitis secondary to COVID    RECOMMENDATIONS:  - Obtain EKG --> If QTC <500, Start PLAQUENIL 400mg PO q12h x 2 doses, then 200mg BID PO x 4 days (Pt is not to be discharged on plaquenil)  - Monitor QTC with EKG daily  - Supportive care - Start Azithro 500mg PO x1, then 250mg PO daily x 4 days   -procal, CRP, ferritin, LDH, D DIMERS

## 2020-03-31 NOTE — CONSULT NOTE ADULT - ASSESSMENT
Assessment:    Acute hypoxemic respiratory failure secondary to SARS-COV-2 infection requiring invasive mechanical ventilation  Bilateral pulmonary Infiltrates secondary to SARS-COV-2  Likely falsely -ve PCR     PLAN:    CNS: Sedation with   propofol   to RASS of - 2, morphine for pain    HEENT:  Oral care    PULMONARY:  HOB @ 45 degrees, Check RVP/FluA/FLUB/RSV, recheck COVID PCR(send endotracheal aspirate) trend procalcitonin, LDH/CK/ferritin/CRP  Start solumedrol 60q12(monitor FS), Vent setting Vt=6 /PBW      Peep=12     Yxv9=737%     RR=20  , monitor Plateau and Driving pressure          Check ABGs in 30 mins    CARDIOVASCULAR: Check 2d ECHO, LE duplex, trend trops, NT-probnp, Check QTC. keep i=os  Levophed to keep map>65, if tachy switch to vasopressin.  GI: GI prophylaxis Protonix                                         Feeding: Tube feeds    RENAL:  F/u  lytes.  Correct as needed. accurate I/O    INFECTIOUS DISEASE: IV abx ceftraixone/doxy, Continue HCQ(monitor Qtc), trend Prolcalcitonin, Check nasal MRSA,urine legionella, urine strept,     HEMATOLOGICAL:  DVT prophylaxis.    ENDOCRINE:  Follow up FS.  Insulin protocol if needed.    MUSCULOSKELETAL: Bed rest for now    CODE STATUS: FULL CODE    DISPOSITION: Patient require continued monitoring in MICU Assessment:    Acute hypoxemic respiratory failure   ARDS   First COVID test negative (False negative?)      PLAN:    CNS: Sedation with   propofol   to RASS of - 2, morphine for pain    HEENT:  Oral care    PULMONARY:  HOB @ 45 degrees, ARDS network MV.  FU PPL.  Wean O2.  Solumderol 60 mg Q12.  If repeat COVID negative again, might need bronchoscopy.      CARDIOVASCULAR: I=O.  BNP.      GI: GI prophylaxis Protonix                                         Feeding: Tube feeds    RENAL:  F/u  lytes.  Correct as needed. accurate I/O    INFECTIOUS DISEASE: IV ABX Ceftriaxone / Doxy, (monitor Qtc), Prolcalcitonin, Check nasal MRSA, urine legionella, urine strept,     HEMATOLOGICAL:  DVT prophylaxis.    ENDOCRINE:  Follow up FS.  Insulin protocol if needed.  Rheum screen if COVID negative     MUSCULOSKELETAL: Bed rest for now    CODE STATUS: FULL CODE    DISPOSITION: Patient require continued monitoring in MICU

## 2020-03-31 NOTE — CONSULT NOTE ADULT - SUBJECTIVE AND OBJECTIVE BOX
Patient is a 74y old  Male who presents with a chief complaint of suspected COVID-19 (31 Mar 2020 09:56)      HPI:  75 yo M w/ hx of Afib on Eliquis, CAD s/p PCI, DM, BPH, presents with 1 week of fever, cough, and progressive SOB. Admits to watery diarrhea for 3 days. Denies chest pain. 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 He has exposure to COVID positive family member at the wedding. No travel hx. Pt pulse ox in 80s in the field per EMS. Of note, per patient he had recent normal stress test recently.    COVID PCR sent, labs show lymphopenia and transaminitis, CXR shows b/l lung opacity, requiring NRB (30 Mar 2020 16:35)      PAST MEDICAL & SURGICAL HISTORY:  Afib  DM (diabetes mellitus)  BPH (benign prostatic hyperplasia)  CAD (coronary artery disease)      SOCIAL HX:   Smoking  -ve                       ETOH         -ve                   Other  -ve    FAMILY HISTORY:  :  No known cardiovacular family hisotry     Review Of Systems:     CONSTITUTIONAL:   +ve fever   no chills.  no weight gain   no weight loss    EYES:   no discharge,   no pain  no redness,   no visual changes.    ENT:   Ears: no ear pain and no hearing problems.  Nose: no nasal congestion and no nasal drainage.  Mouth/Throat: no dysphagia,  no hoarseness and no throat pain.  Neck: no lumps, no pain, no stiffness and no swollen glands.     CARDIOVASCULAR:   no chest pain,   no swelling  no palpitaions  no syncope    RESPIRATORY:  +Ve SOB   intubated    GASTROINTESTINAL:   no abdominal pain,   no constipation,   no diarrhea,   no vomiting.    GENITOURINARY:  no dysuria,   no frequency,   no urgency  no hematuria.    MUSCULOSKELETAL:   no back pain,   no musculoskeletal pain,  no weakness.    SKIN:   no jaundice,   no lesions,   no pruritis,   no rashes.    NEURO:   no loss of consciousness,   no gait abnormality,   no headache,   no sensory deficits,   no weakness.    PSYCHIATRIC:   no known mental health issues  no anxiety  no depression    ALLERGIC/IMMUNOLOGIC:   No active allergic or immunologic issues      Allergies    Bactrim (Unknown)    Intolerances          PHYSICAL EXAM    ICU Vital Signs Last 24 Hrs  T(C): 36.7 (31 Mar 2020 08:00), Max: 38.6 (31 Mar 2020 00:40)  T(F): 98 (31 Mar 2020 08:00), Max: 101.4 (31 Mar 2020 00:40)  HR: 104 (31 Mar 2020 08:00) (98 - 118)  BP: 106/62 (31 Mar 2020 08:00) (106/62 - 145/67)  RR: 20 (31 Mar 2020 08:00) (20 - 20)  SpO2: 92% (31 Mar 2020 08:00) (92% - 95%)      CONSTITUTIONAL:   Ill appearing.  Well nourished.  NAD    ENT:   + ET   Airway patent,   Mouth with normal mucosa.   No thrush    EYES:   pupils equal,   round and reactive to light.    CARDIAC:   tachy,   irregular rhythm.    Heart sounds S1, S2.   No edema      Vascular:   normal systolic impulse  no bruits    RESPIRATORY:   Intubated  B/l BS    GASTROINTESTINAL:  Abdomen soft   Non-tender,   No guarding,   + BS      MUSCULOSKELETAL:   Range of motion is not limited,  No clubbing, cyanosis    NEUROLOGICAL:   Alert and oriented   No motor or sensory deficits.  Pertinent DTRs normal    SKIN:   Skin normal color for race,   Warm and dry  No evidence of rash.    PSYCHIATRIC:   Normal mood and affect.   No apparent risk to self or others.    HEME LYMPH:   No splenomegaly.  No cervical  lymphadenopathy.  No inguinal lymphadenopathy              LABS:                          15.1   8.29  )-----------( 262      ( 31 Mar 2020 08:29 )             44.5                                               03-31    139  |  105  |  23<H>  ----------------------------<  143<H>  4.9   |  20  |  0.9    Ca    8.2<L>      31 Mar 2020 08:29  Mg     2.1     0331    TPro  5.7<L>  /  Alb  3.0<L>  /  TBili  0.9  /  DBili  x   /  AST  214<H>  /  ALT  167<H>  /  AlkPhos  73  03-31                                             Urinalysis Basic - ( 30 Mar 2020 22:00 )    Color: Yellow / Appearance: Clear / S.031 / pH: x  Gluc: x / Ketone: Trace  / Bili: Negative / Urobili: <2 mg/dL   Blood: x / Protein: 300 mg/dL / Nitrite: Negative   Leuk Esterase: Negative / RBC: 10 /HPF / WBC 29 /HPF   Sq Epi: x / Non Sq Epi: 22 /HPF / Bacteria: Negative        CARDIAC MARKERS ( 30 Mar 2020 22:04 )  x     / <0.01 ng/mL / x     / x     / x                                                LIVER FUNCTIONS - ( 31 Mar 2020 08:29 )  Alb: 3.0 g/dL / Pro: 5.7 g/dL / ALK PHOS: 73 U/L / ALT: 167 U/L / AST: 214 U/L / GGT: x                                                                                                                                       X-Rays reviewed:                                                                                    ECHO    CXR interpreted by me:    MEDICATIONS  (STANDING):  apixaban 5 milliGRAM(s) Oral two times a day  chlorhexidine 4% Liquid 1 Application(s) Topical <User Schedule>  diltiazem    milliGRAM(s) Oral daily  lisinopril 40 milliGRAM(s) Oral daily  norepinephrine Infusion 0.05 MICROgram(s)/kG/Min (8.91 mL/Hr) IV Continuous <Continuous>  sodium chloride 0.9%. 1000 milliLiter(s) (75 mL/Hr) IV Continuous <Continuous>  zinc sulfate 220 milliGRAM(s) Oral daily    MEDICATIONS  (PRN):  acetaminophen   Tablet .. 650 milliGRAM(s) Oral every 6 hours PRN Temp greater or equal to 38.5C (101.3F)  guaifenesin/dextromethorphan  Syrup 10 milliLiter(s) Oral every 4 hours PRN Cough Patient is a 74y old  Male who presents with a chief complaint of suspected COVID-19 (31 Mar 2020 09:56)      HPI:  75 yo M w/ hx of Afib on Eliquis, CAD s/p PCI, DM, BPH, presents with 1 week of fever, cough, and progressive SOB. Admits to watery diarrhea for 3 days. Denies chest pain. 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 He has exposure to COVID positive family member at the wedding. No travel hx. Pt pulse ox in 80s in the field per EMS. Of note, per patient he had recent normal stress test recently.    COVID PCR sent, labs show lymphopenia and transaminitis, CXR shows b/l lung opacity, requiring NRB (30 Mar 2020 16:35)      PAST MEDICAL & SURGICAL HISTORY:  Afib  DM (diabetes mellitus)  BPH (benign prostatic hyperplasia)  CAD (coronary artery disease)      SOCIAL HX:   Smoking  -ve                       ETOH         -ve                   Other  -ve    FAMILY HISTORY:  :  No known cardiovacular family hisotry     Review Of Systems:     CONSTITUTIONAL:   +ve fever   no chills.  no weight gain   no weight loss    EYES:   no discharge,   no pain  no redness,   no visual changes.    ENT:   Ears: no ear pain and no hearing problems.  Nose: no nasal congestion and no nasal drainage.  Mouth/Throat: no dysphagia,  no hoarseness and no throat pain.  Neck: no lumps, no pain, no stiffness and no swollen glands.     CARDIOVASCULAR:   no chest pain,   no swelling  no palpitaions  no syncope    RESPIRATORY:  +Ve SOB   intubated    GASTROINTESTINAL:   no abdominal pain,   no constipation,   no diarrhea,   no vomiting.    GENITOURINARY:  no dysuria,   no frequency,   no urgency  no hematuria.    MUSCULOSKELETAL:   no back pain,   no musculoskeletal pain,  no weakness.    SKIN:   no jaundice,   no lesions,   no pruritis,   no rashes.    NEURO:   no loss of consciousness,   no gait abnormality,   no headache,   no sensory deficits,   no weakness.    PSYCHIATRIC:   no known mental health issues  no anxiety  no depression    ALLERGIC/IMMUNOLOGIC:   No active allergic or immunologic issues      Allergies    Bactrim (Unknown)    Intolerances          PHYSICAL EXAM    ICU Vital Signs Last 24 Hrs  T(C): 36.7 (31 Mar 2020 08:00), Max: 38.6 (31 Mar 2020 00:40)  T(F): 98 (31 Mar 2020 08:00), Max: 101.4 (31 Mar 2020 00:40)  HR: 104 (31 Mar 2020 08:00) (98 - 118)  BP: 106/62 (31 Mar 2020 08:00) (106/62 - 145/67)  RR: 20 (31 Mar 2020 08:00) (20 - 20)  SpO2: 92% (31 Mar 2020 08:00) (92% - 95%)      CONSTITUTIONAL:   Ill appearing.  Well nourished.  NAD    ENT:   + ET   Airway patent,   Mouth with normal mucosa.   No thrush    EYES:   pupils equal,   round and reactive to light.    CARDIAC:   tachy,   irregular rhythm.    Heart sounds S1, S2.   No edema      Vascular:   normal systolic impulse  no bruits    RESPIRATORY:   Intubated  B/l BS    GASTROINTESTINAL:  Abdomen soft   Non-tender,   No guarding,   + BS      MUSCULOSKELETAL:   Range of motion is not limited,  No clubbing, cyanosis    NEUROLOGICAL:   Alert and oriented   No motor or sensory deficits.  Pertinent DTRs normal    SKIN:   Skin normal color for race,   Warm and dry  No evidence of rash.    PSYCHIATRIC:   Normal mood and affect.   No apparent risk to self or others.    HEME LYMPH:   No cervical  lymphadenopathy.  No inguinal lymphadenopathy              LABS:                          15.1   8.29  )-----------( 262      ( 31 Mar 2020 08:29 )             44.5                                               03-31    139  |  105  |  23<H>  ----------------------------<  143<H>  4.9   |  20  |  0.9    Ca    8.2<L>      31 Mar 2020 08:29  Mg     2.1         TPro  5.7<L>  /  Alb  3.0<L>  /  TBili  0.9  /  DBili  x   /  AST  214<H>  /  ALT  167<H>  /  AlkPhos  73  03-31                                             Urinalysis Basic - ( 30 Mar 2020 22:00 )    Color: Yellow / Appearance: Clear / S.031 / pH: x  Gluc: x / Ketone: Trace  / Bili: Negative / Urobili: <2 mg/dL   Blood: x / Protein: 300 mg/dL / Nitrite: Negative   Leuk Esterase: Negative / RBC: 10 /HPF / WBC 29 /HPF   Sq Epi: x / Non Sq Epi: 22 /HPF / Bacteria: Negative        CARDIAC MARKERS ( 30 Mar 2020 22:04 )  x     / <0.01 ng/mL / x     / x     / x                                                LIVER FUNCTIONS - ( 31 Mar 2020 08:29 )  Alb: 3.0 g/dL / Pro: 5.7 g/dL / ALK PHOS: 73 U/L / ALT: 167 U/L / AST: 214 U/L / GGT: x                                                                                                                                       X-Rays reviewed:                                                                                    ECHO    CXR interpreted by me:  WENDY OK.  Bilateral infiltrates     MEDICATIONS  (STANDING):  apixaban 5 milliGRAM(s) Oral two times a day  chlorhexidine 4% Liquid 1 Application(s) Topical <User Schedule>  diltiazem    milliGRAM(s) Oral daily  lisinopril 40 milliGRAM(s) Oral daily  norepinephrine Infusion 0.05 MICROgram(s)/kG/Min (8.91 mL/Hr) IV Continuous <Continuous>  sodium chloride 0.9%. 1000 milliLiter(s) (75 mL/Hr) IV Continuous <Continuous>  zinc sulfate 220 milliGRAM(s) Oral daily    MEDICATIONS  (PRN):  acetaminophen   Tablet .. 650 milliGRAM(s) Oral every 6 hours PRN Temp greater or equal to 38.5C (101.3F)  guaifenesin/dextromethorphan  Syrup 10 milliLiter(s) Oral every 4 hours PRN Cough

## 2020-03-31 NOTE — CONSULT NOTE ADULT - SUBJECTIVE AND OBJECTIVE BOX
KONSTANTIN WYATT  74y, Male  Allergy: Bactrim (Unknown)      All historical available data reviewed.    HPI:  73 yo M w/ hx of Afib on Eliquis, CAD s/p PCI, DM, BPH, presents with 1 week of fever, cough, and progressive SOB. Admits to watery diarrhea for 3 days. Denies chest pain. 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 He has exposure to COVID positive family member at the wedding. No travel hx. Pt pulse ox in 80s in the field per EMS. Of note, per patient he had recent normal stress test recently.    COVID PCR sent, labs show lymphopenia and transaminitis, CXR shows b/l lung opacity, requiring NRB (30 Mar 2020 16:35)    FAMILY HISTORY:    PAST MEDICAL & SURGICAL HISTORY:  Afib  DM (diabetes mellitus)  BPH (benign prostatic hyperplasia)  CAD (coronary artery disease)        VITALS:  T(F): 101.4, Max: 101.4 (20 @ 00:40)  HR: 118  BP: 145/67  RR: 20Vital Signs Last 24 Hrs  T(C): 38.6 (31 Mar 2020 00:40), Max: 38.6 (31 Mar 2020 00:40)  T(F): 101.4 (31 Mar 2020 00:40), Max: 101.4 (31 Mar 2020 00:40)  HR: 118 (31 Mar 2020 00:40) (98 - 118)  BP: 145/67 (31 Mar 2020 00:40) (118/56 - 148/78)  BP(mean): --  RR: 20 (31 Mar 2020 00:40) (20 - 22)  SpO2: 95% (30 Mar 2020 16:22) (94% - 96%)    TESTS & MEASUREMENTS:                        16.6   7.62  )-----------( 262      ( 30 Mar 2020 11:33 )             50.2     03-30    137  |  98  |  24<H>  ----------------------------<  169<H>  4.4   |  21  |  1.2    Ca    9.2      30 Mar 2020 11:33  Mg     2.1     -30    TPro  7.1  /  Alb  3.8  /  TBili  0.8  /  DBili  x   /  AST  131<H>  /  ALT  109<H>  /  AlkPhos  73  03-30    LIVER FUNCTIONS - ( 30 Mar 2020 11:33 )  Alb: 3.8 g/dL / Pro: 7.1 g/dL / ALK PHOS: 73 U/L / ALT: 109 U/L / AST: 131 U/L / GGT: x             Urinalysis Basic - ( 30 Mar 2020 22:00 )    Color: Yellow / Appearance: Clear / S.031 / pH: x  Gluc: x / Ketone: Trace  / Bili: Negative / Urobili: <2 mg/dL   Blood: x / Protein: 300 mg/dL / Nitrite: Negative   Leuk Esterase: Negative / RBC: 10 /HPF / WBC 29 /HPF   Sq Epi: x / Non Sq Epi: 22 /HPF / Bacteria: Negative          RADIOLOGY & ADDITIONAL TESTS:  Personal review of radiological diagnostics performed  Echo and EKG results noted when applicable.     MEDICATIONS:  acetaminophen   Tablet .. 650 milliGRAM(s) Oral every 6 hours PRN  apixaban 5 milliGRAM(s) Oral two times a day  chlorhexidine 4% Liquid 1 Application(s) Topical <User Schedule>  diltiazem    milliGRAM(s) Oral daily  guaifenesin/dextromethorphan  Syrup 10 milliLiter(s) Oral every 4 hours PRN  lisinopril 40 milliGRAM(s) Oral daily  sodium chloride 0.9%. 1000 milliLiter(s) IV Continuous <Continuous>  zinc sulfate 220 milliGRAM(s) Oral daily      ANTIBIOTICS:

## 2020-03-31 NOTE — CONSULT NOTE ADULT - SUBJECTIVE AND OBJECTIVE BOX
SICU Consultation Note  =====================================================  HPI: 74y Male  HPI:  73 yo M w/ hx of Afib on Eliquis, CAD s/p PCI, DM, BPH, presents with 1 week of fever, cough, and progressive SOB. Admits to watery diarrhea for 3 days. Denies chest pain. 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 He has exposure to COVID positive family member at the wedding. No travel hx. Pt pulse ox in 80s in the field per EMS. Of note, per patient he had recent normal stress test recently.    COVID PCR sent, labs show lymphopenia and transaminitis, CXR shows b/l lung opacity, requiring NRB (30 Mar 2020 16:35)      Surgery Information  OR time:      EBL:          IV Fluids:       Blood Products:   UOP:          PAST MEDICAL & SURGICAL HISTORY:  Afib  DM (diabetes mellitus)  BPH (benign prostatic hyperplasia)  CAD (coronary artery disease)    Home Meds: Home Medications:  Cartia  mg/24 hours oral capsule, extended release: 1 cap(s) orally once a day (30 Mar 2020 18:04)  Eliquis 5 mg oral tablet: 1 tab(s) orally 2 times a day (30 Mar 2020 18:04)  Lipitor 40 mg oral tablet: 1 tab(s) orally once a day (30 Mar 2020 18:04)  lisinopril 40 mg oral tablet: 1 tab(s) orally once a day (30 Mar 2020 18:04)  metFORMIN 750 mg oral tablet, extended release: 1 tab(s) orally once a day (30 Mar 2020 18:04)    Allergies: Allergies    Bactrim (Unknown)    Intolerances      Soc: No Tobacco, Alcohol, Recreational Drugs  Advanced Directives: Presumed Full Code     ROS:    REVIEW OF SYSTEMS    [ ] A ten-point review of systems was otherwise negative except as noted.  [ ] Due to altered mental status/intubation, subjective information were not able to be obtained from the patient. History was obtained, to the extent possible, from review of the chart and collateral sources of information.      CURRENT MEDICATIONS:   --------------------------------------------------------------------------------------  Neurologic Medications  acetaminophen   Tablet .. 650 milliGRAM(s) Oral every 6 hours PRN Temp greater or equal to 38.5C (101.3F)  propofol Infusion 10 MICROgram(s)/kG/Min IV Continuous <Continuous>    Respiratory Medications  guaifenesin/dextromethorphan  Syrup 10 milliLiter(s) Oral every 4 hours PRN Cough    Cardiovascular Medications  norepinephrine Infusion 0.05 MICROgram(s)/kG/Min IV Continuous <Continuous>    Gastrointestinal Medications  zinc sulfate 220 milliGRAM(s) Oral daily    Genitourinary Medications    Hematologic/Oncologic Medications  apixaban 5 milliGRAM(s) Oral two times a day    Antimicrobial/Immunologic Medications    Endocrine/Metabolic Medications    Topical/Other Medications  chlorhexidine 4% Liquid 1 Application(s) Topical <User Schedule>    --------------------------------------------------------------------------------------    VITAL SIGNS, INS/OUTS (last 24 hours):  --------------------------------------------------------------------------------------  ICU Vital Signs Last 24 Hrs  T(C): 36.9 (31 Mar 2020 17:00), Max: 38.6 (31 Mar 2020 00:40)  T(F): 98.5 (31 Mar 2020 17:00), Max: 101.4 (31 Mar 2020 00:40)  HR: 95 (31 Mar 2020 17:30) (93 - 118)  BP: 116/58 (31 Mar 2020 17:30) (106/62 - 145/67)  BP(mean): --  ABP: --  ABP(mean): --  RR: 20 (31 Mar 2020 17:30) (20 - 24)  SpO2: 98% (31 Mar 2020 17:30) (92% - 98%)    I&O's Summary    --------------------------------------------------------------------------------------    PHYSICAL EXAM    General: sedated  Skin: warm and dry, normal color, normal skin turgor, moist mucous membranes  HEENT: head: atraumatic, normocephalic, no tenderness, normal scalp; PERRL  Respiratory: intubated, mechanically ventilated, breathing spontaneously, symmetric chest rise, bilateral crepitations  Cardiovascular: normal rate, irregular rhythm, no rub, systolic murmur, no gallops  Gastrointestinal: no distension normal bowel sounds, non tympanic, soft  : normal external genitalia, santana catheter in place  Musculoskeletal: no bony deformities, inflammation  Neurological: alert oriented x 3    LABS  --------------------------------------------------------------------------------------  Labs:  CAPILLARY BLOOD GLUCOSE      POCT Blood Glucose.: 177 mg/dL (31 Mar 2020 11:45)  POCT Blood Glucose.: 136 mg/dL (31 Mar 2020 07:55)                          15.1   8.29  )-----------( 262      ( 31 Mar 2020 08:29 )             44.5       Auto Neutrophil %: 84.8 % (20 @ 08:29)  Auto Immature Granulocyte %: 0.5 % (20 @ 08:29)        139  |  105  |  23<H>  ----------------------------<  143<H>  4.9   |  20  |  0.9      Calcium, Total Serum: 8.2 mg/dL (20 @ 08:29)      LFTs:             5.7  | 0.9  | 214      ------------------[73      ( 31 Mar 2020 08:29 )  3.0  | x    | 167         Lipase:x      Amylase:x         Lactate, Blood: 1.5 mmol/L (20 @ 08:29)  Blood Gas Venous - Lactate: 3.1 mmoL/L (20 @ 11:48)    Coags:    CARDIAC MARKERS ( 30 Mar 2020 22:04 )  x     / <0.01 ng/mL / x     / x     / x          Urinalysis Basic - ( 30 Mar 2020 22:00 )    Color: Yellow / Appearance: Clear / S.031 / pH: x  Gluc: x / Ketone: Trace  / Bili: Negative / Urobili: <2 mg/dL   Blood: x / Protein: 300 mg/dL / Nitrite: Negative   Leuk Esterase: Negative / RBC: 10 /HPF / WBC 29 /HPF   Sq Epi: x / Non Sq Epi: 22 /HPF / Bacteria: Negative    --------------------------------------------------------------------------------------    IMAGING RESULTS    XR CHEST PORTABLE IMMED 1V          PROCEDURE DATE:  2020    Impression:  Bilateral patchy airspace opacities.    PROCEDURE DATE:  2020    Impression:  Worsened bilateral diffuse airspace opacities.  Endotracheal tube with tip 4 cm above the clavicles. Recommend advancement by at least 7 cm.

## 2020-03-31 NOTE — PROGRESS NOTE ADULT - SUBJECTIVE AND OBJECTIVE BOX
Patient is a 74y old  Male who presents with a chief complaint of suspected COVID-19 (31 Mar 2020 09:19)    Patient was seen and examined.  Pt Febrile this AM  C/o dry cough, sob   Pt on non rebreather    PAST MEDICAL & SURGICAL HISTORY:  Afib  DM (diabetes mellitus)  BPH (benign prostatic hyperplasia)  CAD (coronary artery disease)    Allergies  Bactrim (Unknown)    MEDICATIONS  (STANDING):  apixaban 5 milliGRAM(s) Oral two times a day  chlorhexidine 4% Liquid 1 Application(s) Topical <User Schedule>  diltiazem    milliGRAM(s) Oral daily  lisinopril 40 milliGRAM(s) Oral daily  sodium chloride 0.9%. 1000 milliLiter(s) (75 mL/Hr) IV Continuous <Continuous>  zinc sulfate 220 milliGRAM(s) Oral daily    MEDICATIONS  (PRN):  acetaminophen   Tablet .. 650 milliGRAM(s) Oral every 6 hours PRN Temp greater or equal to 38.5C (101.3F)  guaifenesin/dextromethorphan  Syrup 10 milliLiter(s) Oral every 4 hours PRN Cough    Vital Signs Last 24 Hrs  T(C): 36.7  T(F): 98  HR: 104  BP: 106/62  BP(mean): --  RR: 20  SpO2: 92%    O/E:  Awake, alert  HEENT: atraumatic, EOMI.  Chest: decreased breath sounds B/l  CVS: SIS2 +,  irregular, tachycardic, no murmur.  P/A: Soft, BS+  CNS: non focal.  Ext: no edema feet.  Skin: no rash, no ulcers.  All systems reviewed positive findings as above.    POCT Blood Glucose.: 177 mg/dL (31 Mar 2020 11:45)  POCT Blood Glucose.: 136 mg/dL (31 Mar 2020 07:55)                          15.1   8.29  )-----------( 262      ( 31 Mar 2020 08:29 )             44.5                         16.6   7.62  )-----------( 262      ( 30 Mar 2020 11:33 )             50.2     03-31    139  |  105  |  23<H>  ----------------------------<  143<H>  4.9   |  20  |  0.9      137  |  98  |  24<H>  ----------------------------<  169<H>  4.4   |  21  |  1.2    Ca    8.2<L>      31 Mar 2020 08:29  Ca    9.2      30 Mar 2020 11:33  Mg     2.1         TPro  5.7<L>  /  Alb  3.0<L>  /  TBili  0.9  /  DBili  x   /  AST  214<H>  /  ALT  167<H>  /  AlkPhos  73    TPro  7.1  /  Alb  3.8  /  TBili  0.8  /  DBili  x   /  AST  131<H>  /  ALT  109<H>  /  AlkPhos  73        CARDIAC MARKERS ( 30 Mar 2020 22:04 )  x     / <0.01 ng/mL / x     / x     / x            Urinalysis Basic - ( 30 Mar 2020 22:00 )    Color: Yellow / Appearance: Clear / S.031 / pH: x  Gluc: x / Ketone: Trace  / Bili: Negative / Urobili: <2 mg/dL   Blood: x / Protein: 300 mg/dL / Nitrite: Negative   Leuk Esterase: Negative / RBC: 10 /HPF / WBC 29 /HPF   Sq Epi: x / Non Sq Epi: 22 /HPF / Bacteria: Negative

## 2020-03-31 NOTE — PROGRESS NOTE ADULT - SUBJECTIVE AND OBJECTIVE BOX
Patient is a 74y old  Male who presents with a chief complaint of suspected COVID-19     Patient was seen and examined this am.  Pt Febrile this AM  C/o dry cough, sob   Pt on non rebreather    PAST MEDICAL & SURGICAL HISTORY:  Afib  DM (diabetes mellitus)  BPH (benign prostatic hyperplasia)  CAD (coronary artery disease)    Allergies  Bactrim (Unknown)    MEDICATIONS  (STANDING):  apixaban 5 milliGRAM(s) Oral two times a day  chlorhexidine 4% Liquid 1 Application(s) Topical <User Schedule>  diltiazem    milliGRAM(s) Oral daily  lisinopril 40 milliGRAM(s) Oral daily  sodium chloride 0.9%. 1000 milliLiter(s) (75 mL/Hr) IV Continuous <Continuous>  zinc sulfate 220 milliGRAM(s) Oral daily    MEDICATIONS  (PRN):  acetaminophen   Tablet .. 650 milliGRAM(s) Oral every 6 hours PRN Temp greater or equal to 38.5C (101.3F)  guaifenesin/dextromethorphan  Syrup 10 milliLiter(s) Oral every 4 hours PRN Cough    O/E:  Awake, alert  HEENT: atraumatic, EOMI.  Chest: decreased breath sounds B/l  CVS: SIS2 +,  irregular, tachycardic, no murmur.  P/A: Soft, BS+  CNS: non focal.  Ext: no edema feet.  Skin: no rash, no ulcers.  All systems reviewed positive findings as above.    POCT Blood Glucose.: 177 mg/dL (31 Mar 2020 11:45)  POCT Blood Glucose.: 136 mg/dL (31 Mar 2020 07:55)                          15.1   8.29  )-----------( 262      ( 31 Mar 2020 08:29 )             44.5                         16.6   7.62  )-----------( 262      ( 30 Mar 2020 11:33 )             50.2     03-31    139  |  105  |  23<H>  ----------------------------<  143<H>  4.9   |  20  |  0.9  03-30    137  |  98  |  24<H>  ----------------------------<  169<H>  4.4   |  21  |  1.2    Ca    8.2<L>      31 Mar 2020 08:29  Ca    9.2      30 Mar 2020 11:33  Mg     2.1     -    TPro  5.7<L>  /  Alb  3.0<L>  /  TBili  0.9  /  DBili  x   /  AST  214<H>  /  ALT  167<H>  /  AlkPhos  73  03-  TPro  7.1  /  Alb  3.8  /  TBili  0.8  /  DBili  x   /  AST  131<H>  /  ALT  109<H>  /  AlkPhos  73  03-30      CARDIAC MARKERS ( 30 Mar 2020 22:04 )  x     / <0.01 ng/mL / x     / x     / x            Urinalysis Basic - ( 30 Mar 2020 22:00 )    Color: Yellow / Appearance: Clear / S.031 / pH: x  Gluc: x / Ketone: Trace  / Bili: Negative / Urobili: <2 mg/dL   Blood: x / Protein: 300 mg/dL / Nitrite: Negative   Leuk Esterase: Negative / RBC: 10 /HPF / WBC 29 /HPF   Sq Epi: x / Non Sq Epi: 22 /HPF / Bacteria: Negative

## 2020-03-31 NOTE — ANESTHESIA FOLLOW-UP NOTE - NSEVALATIONFT_GEN_ALL_CORE
Called to secure airway patient with hypoxia.  Propofol 100 mg succinyl choline 100 mg given.  Glidescope 4 udv .  7.5 ett placed secured at 23cm at teeth.  171/86 120 vent spo2 94%. propofol infusion and morphine 4 mg started

## 2020-03-31 NOTE — PROGRESS NOTE ADULT - ASSESSMENT
75 yo M w/ hx of Afib on Eliquis, CAD s/p PCI, DM, BPH, presents with 1 week of fever, cough, and progressive SOB. Admits to watery diarrhea for 3 days. Denies chest pain. 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 He has exposure to COVID positive family member at the wedding. No travel hx. Pt pulse ox in 80s in the field per EMS. Of note, per patient he had recent normal stress test recently.    #Sepsis POA  # Acute Hypoxic respiratory failure sec to viral pneumonia probably sec to COVID -19  - positive contact history for COVID -19  -  Xray Chest 1 View-PORTABLE IMMEDIATE (03.30.20 @ 14:05) >Bilateral patchy airspace opacities.  - lymphopenia, transaminitis  - evaluated by ID:  Start PLAQUENIL 400mg PO q12h x 2 doses, then 200mg BID PO x 4 days (Pt is not to be discharged on plaquenil)  - Monitor QTC with EKG daily  -  Start Azithro 500mg PO x1, then 250mg PO daily x 4 days   -F/u COVID-19, procal, CRP, ferritin, LDH, D DIMERS  - symptomatic treatment  - maintain oxygen sat>95  - ICU eval    Patient began to desat to 79%-81% with NRB, pt using accessory muscles and has labored breathing. Rapid response called and took over. Pt intubated and transferred to PACU.    # Full code

## 2020-04-01 LAB
ALBUMIN SERPL ELPH-MCNC: 2.8 G/DL — LOW (ref 3.5–5.2)
ALBUMIN SERPL ELPH-MCNC: 3 G/DL — LOW (ref 3.5–5.2)
ALBUMIN SERPL ELPH-MCNC: 3.3 G/DL — LOW (ref 3.5–5.2)
ALP SERPL-CCNC: 77 U/L — SIGNIFICANT CHANGE UP (ref 30–115)
ALP SERPL-CCNC: 83 U/L — SIGNIFICANT CHANGE UP (ref 30–115)
ALP SERPL-CCNC: 84 U/L — SIGNIFICANT CHANGE UP (ref 30–115)
ALT FLD-CCNC: 114 U/L — HIGH (ref 0–41)
ALT FLD-CCNC: 124 U/L — HIGH (ref 0–41)
ALT FLD-CCNC: 140 U/L — HIGH (ref 0–41)
ANION GAP SERPL CALC-SCNC: 14 MMOL/L — SIGNIFICANT CHANGE UP (ref 7–14)
ANION GAP SERPL CALC-SCNC: 16 MMOL/L — HIGH (ref 7–14)
ANION GAP SERPL CALC-SCNC: 17 MMOL/L — HIGH (ref 7–14)
ANION GAP SERPL CALC-SCNC: 17 MMOL/L — HIGH (ref 7–14)
APTT BLD: 28.7 SEC — SIGNIFICANT CHANGE UP (ref 27–39.2)
APTT BLD: 31.3 SEC — SIGNIFICANT CHANGE UP (ref 27–39.2)
AST SERPL-CCNC: 103 U/L — HIGH (ref 0–41)
AST SERPL-CCNC: 117 U/L — HIGH (ref 0–41)
AST SERPL-CCNC: 89 U/L — HIGH (ref 0–41)
BASE EXCESS BLDA CALC-SCNC: -9 MMOL/L — LOW (ref -2–2)
BASOPHILS # BLD AUTO: 0.02 K/UL — SIGNIFICANT CHANGE UP (ref 0–0.2)
BASOPHILS NFR BLD AUTO: 0.1 % — SIGNIFICANT CHANGE UP (ref 0–1)
BILIRUB SERPL-MCNC: 0.8 MG/DL — SIGNIFICANT CHANGE UP (ref 0.2–1.2)
BILIRUB SERPL-MCNC: 1 MG/DL — SIGNIFICANT CHANGE UP (ref 0.2–1.2)
BILIRUB SERPL-MCNC: 1.2 MG/DL — SIGNIFICANT CHANGE UP (ref 0.2–1.2)
BUN SERPL-MCNC: 33 MG/DL — HIGH (ref 10–20)
BUN SERPL-MCNC: 42 MG/DL — HIGH (ref 10–20)
BUN SERPL-MCNC: 48 MG/DL — HIGH (ref 10–20)
BUN SERPL-MCNC: 52 MG/DL — HIGH (ref 10–20)
CALCIUM SERPL-MCNC: 7.9 MG/DL — LOW (ref 8.5–10.1)
CALCIUM SERPL-MCNC: 8 MG/DL — LOW (ref 8.5–10.1)
CALCIUM SERPL-MCNC: 8.1 MG/DL — LOW (ref 8.5–10.1)
CALCIUM SERPL-MCNC: 8.1 MG/DL — LOW (ref 8.5–10.1)
CHLORIDE SERPL-SCNC: 104 MMOL/L — SIGNIFICANT CHANGE UP (ref 98–110)
CHLORIDE SERPL-SCNC: 105 MMOL/L — SIGNIFICANT CHANGE UP (ref 98–110)
CHLORIDE SERPL-SCNC: 108 MMOL/L — SIGNIFICANT CHANGE UP (ref 98–110)
CHLORIDE SERPL-SCNC: 108 MMOL/L — SIGNIFICANT CHANGE UP (ref 98–110)
CK SERPL-CCNC: 152 U/L — SIGNIFICANT CHANGE UP (ref 0–225)
CO2 SERPL-SCNC: 15 MMOL/L — LOW (ref 17–32)
CO2 SERPL-SCNC: 15 MMOL/L — LOW (ref 17–32)
CO2 SERPL-SCNC: 18 MMOL/L — SIGNIFICANT CHANGE UP (ref 17–32)
CO2 SERPL-SCNC: 20 MMOL/L — SIGNIFICANT CHANGE UP (ref 17–32)
CREAT SERPL-MCNC: 2.1 MG/DL — HIGH (ref 0.7–1.5)
CREAT SERPL-MCNC: 3 MG/DL — HIGH (ref 0.7–1.5)
CREAT SERPL-MCNC: 3.5 MG/DL — HIGH (ref 0.7–1.5)
CREAT SERPL-MCNC: 3.8 MG/DL — HIGH (ref 0.7–1.5)
CRP SERPL-MCNC: 30.8 MG/DL — HIGH (ref 0–0.4)
D DIMER BLD IA.RAPID-MCNC: HIGH NG/ML DDU (ref 0–230)
EOSINOPHIL # BLD AUTO: 0 K/UL — SIGNIFICANT CHANGE UP (ref 0–0.7)
EOSINOPHIL NFR BLD AUTO: 0 % — SIGNIFICANT CHANGE UP (ref 0–8)
ERYTHROCYTE [SEDIMENTATION RATE] IN BLOOD: 66 MM/HR — HIGH (ref 0–10)
ESTIMATED AVERAGE GLUCOSE: 174 MG/DL — HIGH (ref 68–114)
FIBRINOGEN PPP-MCNC: >700 MG/DL — HIGH (ref 204.4–570.6)
GAS PNL BLDA: SIGNIFICANT CHANGE UP
GLUCOSE BLDC GLUCOMTR-MCNC: 110 MG/DL — HIGH (ref 70–99)
GLUCOSE BLDC GLUCOMTR-MCNC: 127 MG/DL — HIGH (ref 70–99)
GLUCOSE BLDC GLUCOMTR-MCNC: 146 MG/DL — HIGH (ref 70–99)
GLUCOSE BLDC GLUCOMTR-MCNC: 168 MG/DL — HIGH (ref 70–99)
GLUCOSE BLDC GLUCOMTR-MCNC: 209 MG/DL — HIGH (ref 70–99)
GLUCOSE BLDC GLUCOMTR-MCNC: 219 MG/DL — HIGH (ref 70–99)
GLUCOSE BLDC GLUCOMTR-MCNC: 219 MG/DL — HIGH (ref 70–99)
GLUCOSE BLDC GLUCOMTR-MCNC: 242 MG/DL — HIGH (ref 70–99)
GLUCOSE BLDC GLUCOMTR-MCNC: 272 MG/DL — HIGH (ref 70–99)
GLUCOSE BLDC GLUCOMTR-MCNC: 301 MG/DL — HIGH (ref 70–99)
GLUCOSE BLDC GLUCOMTR-MCNC: 89 MG/DL — SIGNIFICANT CHANGE UP (ref 70–99)
GLUCOSE SERPL-MCNC: 176 MG/DL — HIGH (ref 70–99)
GLUCOSE SERPL-MCNC: 207 MG/DL — HIGH (ref 70–99)
GLUCOSE SERPL-MCNC: 265 MG/DL — HIGH (ref 70–99)
GLUCOSE SERPL-MCNC: 310 MG/DL — HIGH (ref 70–99)
HBA1C BLD-MCNC: 7.7 % — HIGH (ref 4–5.6)
HCO3 BLDA-SCNC: 18 MMOL/L — LOW (ref 23–27)
HCT VFR BLD CALC: 40.7 % — LOW (ref 42–52)
HCT VFR BLD CALC: 43.6 % — SIGNIFICANT CHANGE UP (ref 42–52)
HCT VFR BLD CALC: 45 % — SIGNIFICANT CHANGE UP (ref 42–52)
HCT VFR BLD CALC: 45.5 % — SIGNIFICANT CHANGE UP (ref 42–52)
HGB BLD-MCNC: 13.2 G/DL — LOW (ref 14–18)
HGB BLD-MCNC: 14.2 G/DL — SIGNIFICANT CHANGE UP (ref 14–18)
HGB BLD-MCNC: 14.9 G/DL — SIGNIFICANT CHANGE UP (ref 14–18)
HGB BLD-MCNC: 15 G/DL — SIGNIFICANT CHANGE UP (ref 14–18)
HOROWITZ INDEX BLDA+IHG-RTO: 80 — SIGNIFICANT CHANGE UP
IMM GRANULOCYTES NFR BLD AUTO: 0.5 % — HIGH (ref 0.1–0.3)
INR BLD: 1.44 RATIO — HIGH (ref 0.65–1.3)
INR BLD: 1.53 RATIO — HIGH (ref 0.65–1.3)
LDH SERPL L TO P-CCNC: 812 U/L — HIGH (ref 50–242)
LYMPHOCYTES # BLD AUTO: 0.32 K/UL — LOW (ref 1.2–3.4)
LYMPHOCYTES # BLD AUTO: 2.2 % — LOW (ref 20.5–51.1)
MAGNESIUM SERPL-MCNC: 2.1 MG/DL — SIGNIFICANT CHANGE UP (ref 1.8–2.4)
MAGNESIUM SERPL-MCNC: 2.4 MG/DL — SIGNIFICANT CHANGE UP (ref 1.8–2.4)
MAGNESIUM SERPL-MCNC: 2.4 MG/DL — SIGNIFICANT CHANGE UP (ref 1.8–2.4)
MCHC RBC-ENTMCNC: 29.4 PG — SIGNIFICANT CHANGE UP (ref 27–31)
MCHC RBC-ENTMCNC: 30.1 PG — SIGNIFICANT CHANGE UP (ref 27–31)
MCHC RBC-ENTMCNC: 30.7 PG — SIGNIFICANT CHANGE UP (ref 27–31)
MCHC RBC-ENTMCNC: 30.9 PG — SIGNIFICANT CHANGE UP (ref 27–31)
MCHC RBC-ENTMCNC: 32.4 G/DL — SIGNIFICANT CHANGE UP (ref 32–37)
MCHC RBC-ENTMCNC: 32.6 G/DL — SIGNIFICANT CHANGE UP (ref 32–37)
MCHC RBC-ENTMCNC: 33 G/DL — SIGNIFICANT CHANGE UP (ref 32–37)
MCHC RBC-ENTMCNC: 33.1 G/DL — SIGNIFICANT CHANGE UP (ref 32–37)
MCV RBC AUTO: 90.6 FL — SIGNIFICANT CHANGE UP (ref 80–94)
MCV RBC AUTO: 92.4 FL — SIGNIFICANT CHANGE UP (ref 80–94)
MCV RBC AUTO: 92.6 FL — SIGNIFICANT CHANGE UP (ref 80–94)
MCV RBC AUTO: 93.6 FL — SIGNIFICANT CHANGE UP (ref 80–94)
MONOCYTES # BLD AUTO: 0.75 K/UL — HIGH (ref 0.1–0.6)
MONOCYTES NFR BLD AUTO: 5.1 % — SIGNIFICANT CHANGE UP (ref 1.7–9.3)
NEUTROPHILS # BLD AUTO: 13.5 K/UL — HIGH (ref 1.4–6.5)
NEUTROPHILS NFR BLD AUTO: 92.1 % — HIGH (ref 42.2–75.2)
NRBC # BLD: 0 /100 WBCS — SIGNIFICANT CHANGE UP (ref 0–0)
PCO2 BLDA: 40 MMHG — SIGNIFICANT CHANGE UP (ref 38–42)
PH BLDA: 7.25 — LOW (ref 7.38–7.42)
PHOSPHATE SERPL-MCNC: 4.1 MG/DL — SIGNIFICANT CHANGE UP (ref 2.1–4.9)
PHOSPHATE SERPL-MCNC: 4.5 MG/DL — SIGNIFICANT CHANGE UP (ref 2.1–4.9)
PHOSPHATE SERPL-MCNC: 4.9 MG/DL — SIGNIFICANT CHANGE UP (ref 2.1–4.9)
PLATELET # BLD AUTO: 213 K/UL — SIGNIFICANT CHANGE UP (ref 130–400)
PLATELET # BLD AUTO: 223 K/UL — SIGNIFICANT CHANGE UP (ref 130–400)
PLATELET # BLD AUTO: 227 K/UL — SIGNIFICANT CHANGE UP (ref 130–400)
PLATELET # BLD AUTO: 261 K/UL — SIGNIFICANT CHANGE UP (ref 130–400)
PO2 BLDA: 199 MMHG — HIGH (ref 78–95)
POTASSIUM SERPL-MCNC: 5 MMOL/L — SIGNIFICANT CHANGE UP (ref 3.5–5)
POTASSIUM SERPL-MCNC: 5.1 MMOL/L — HIGH (ref 3.5–5)
POTASSIUM SERPL-MCNC: 5.4 MMOL/L — HIGH (ref 3.5–5)
POTASSIUM SERPL-MCNC: 5.6 MMOL/L — HIGH (ref 3.5–5)
POTASSIUM SERPL-SCNC: 5 MMOL/L — SIGNIFICANT CHANGE UP (ref 3.5–5)
POTASSIUM SERPL-SCNC: 5.1 MMOL/L — HIGH (ref 3.5–5)
POTASSIUM SERPL-SCNC: 5.4 MMOL/L — HIGH (ref 3.5–5)
POTASSIUM SERPL-SCNC: 5.6 MMOL/L — HIGH (ref 3.5–5)
PROT SERPL-MCNC: 5.5 G/DL — LOW (ref 6–8)
PROT SERPL-MCNC: 5.9 G/DL — LOW (ref 6–8)
PROT SERPL-MCNC: 6 G/DL — SIGNIFICANT CHANGE UP (ref 6–8)
PROTHROM AB SERPL-ACNC: 16.6 SEC — HIGH (ref 9.95–12.87)
PROTHROM AB SERPL-ACNC: 17.6 SEC — HIGH (ref 9.95–12.87)
RBC # BLD: 4.49 M/UL — LOW (ref 4.7–6.1)
RBC # BLD: 4.72 M/UL — SIGNIFICANT CHANGE UP (ref 4.7–6.1)
RBC # BLD: 4.86 M/UL — SIGNIFICANT CHANGE UP (ref 4.7–6.1)
RBC # BLD: 4.86 M/UL — SIGNIFICANT CHANGE UP (ref 4.7–6.1)
RBC # FLD: 14.1 % — SIGNIFICANT CHANGE UP (ref 11.5–14.5)
RBC # FLD: 14.2 % — SIGNIFICANT CHANGE UP (ref 11.5–14.5)
RBC # FLD: 14.3 % — SIGNIFICANT CHANGE UP (ref 11.5–14.5)
RBC # FLD: 14.4 % — SIGNIFICANT CHANGE UP (ref 11.5–14.5)
SAO2 % BLDA: 99 % — HIGH (ref 94–98)
SODIUM SERPL-SCNC: 137 MMOL/L — SIGNIFICANT CHANGE UP (ref 135–146)
SODIUM SERPL-SCNC: 138 MMOL/L — SIGNIFICANT CHANGE UP (ref 135–146)
SODIUM SERPL-SCNC: 140 MMOL/L — SIGNIFICANT CHANGE UP (ref 135–146)
SODIUM SERPL-SCNC: 142 MMOL/L — SIGNIFICANT CHANGE UP (ref 135–146)
WBC # BLD: 11.97 K/UL — HIGH (ref 4.8–10.8)
WBC # BLD: 12.03 K/UL — HIGH (ref 4.8–10.8)
WBC # BLD: 12.62 K/UL — HIGH (ref 4.8–10.8)
WBC # BLD: 14.67 K/UL — HIGH (ref 4.8–10.8)
WBC # FLD AUTO: 11.97 K/UL — HIGH (ref 4.8–10.8)
WBC # FLD AUTO: 12.03 K/UL — HIGH (ref 4.8–10.8)
WBC # FLD AUTO: 12.62 K/UL — HIGH (ref 4.8–10.8)
WBC # FLD AUTO: 14.67 K/UL — HIGH (ref 4.8–10.8)

## 2020-04-01 PROCEDURE — 71045 X-RAY EXAM CHEST 1 VIEW: CPT | Mod: 26

## 2020-04-01 PROCEDURE — 71045 X-RAY EXAM CHEST 1 VIEW: CPT | Mod: 26,77

## 2020-04-01 PROCEDURE — 93010 ELECTROCARDIOGRAM REPORT: CPT

## 2020-04-01 PROCEDURE — 99291 CRITICAL CARE FIRST HOUR: CPT

## 2020-04-01 RX ORDER — CEFEPIME 1 G/1
1000 INJECTION, POWDER, FOR SOLUTION INTRAMUSCULAR; INTRAVENOUS EVERY 24 HOURS
Refills: 0 | Status: DISCONTINUED | OUTPATIENT
Start: 2020-04-02 | End: 2020-04-09

## 2020-04-01 RX ORDER — DEXTROSE 50 % IN WATER 50 %
25 SYRINGE (ML) INTRAVENOUS ONCE
Refills: 0 | Status: COMPLETED | OUTPATIENT
Start: 2020-04-01 | End: 2020-04-01

## 2020-04-01 RX ORDER — ZINC SULFATE TAB 220 MG (50 MG ZINC EQUIVALENT) 220 (50 ZN) MG
220 TAB ORAL DAILY
Refills: 0 | Status: DISCONTINUED | OUTPATIENT
Start: 2020-04-01 | End: 2020-04-02

## 2020-04-01 RX ORDER — CALCIUM GLUCONATE 100 MG/ML
1 VIAL (ML) INTRAVENOUS ONCE
Refills: 0 | Status: COMPLETED | OUTPATIENT
Start: 2020-04-01 | End: 2020-04-01

## 2020-04-01 RX ORDER — INSULIN HUMAN 100 [IU]/ML
1 INJECTION, SOLUTION SUBCUTANEOUS
Qty: 100 | Refills: 0 | Status: DISCONTINUED | OUTPATIENT
Start: 2020-04-01 | End: 2020-04-04

## 2020-04-01 RX ORDER — GUAIFENESIN/DEXTROMETHORPHAN 600MG-30MG
10 TABLET, EXTENDED RELEASE 12 HR ORAL EVERY 8 HOURS
Refills: 0 | Status: DISCONTINUED | OUTPATIENT
Start: 2020-04-01 | End: 2020-04-19

## 2020-04-01 RX ORDER — PROPOFOL 10 MG/ML
10 INJECTION, EMULSION INTRAVENOUS
Qty: 1000 | Refills: 0 | Status: DISCONTINUED | OUTPATIENT
Start: 2020-04-01 | End: 2020-04-06

## 2020-04-01 RX ORDER — DILTIAZEM HCL 120 MG
30 CAPSULE, EXT RELEASE 24 HR ORAL EVERY 6 HOURS
Refills: 0 | Status: DISCONTINUED | OUTPATIENT
Start: 2020-04-01 | End: 2020-04-04

## 2020-04-01 RX ORDER — ASCORBIC ACID 60 MG
500 TABLET,CHEWABLE ORAL EVERY 8 HOURS
Refills: 0 | Status: DISCONTINUED | OUTPATIENT
Start: 2020-04-01 | End: 2020-04-20

## 2020-04-01 RX ORDER — SODIUM CHLORIDE 9 MG/ML
500 INJECTION, SOLUTION INTRAVENOUS ONCE
Refills: 0 | Status: COMPLETED | OUTPATIENT
Start: 2020-04-01 | End: 2020-04-01

## 2020-04-01 RX ORDER — NOREPINEPHRINE BITARTRATE/D5W 8 MG/250ML
0.05 PLASTIC BAG, INJECTION (ML) INTRAVENOUS
Qty: 8 | Refills: 0 | Status: DISCONTINUED | OUTPATIENT
Start: 2020-04-01 | End: 2020-04-06

## 2020-04-01 RX ORDER — CEFEPIME 1 G/1
INJECTION, POWDER, FOR SOLUTION INTRAMUSCULAR; INTRAVENOUS
Refills: 0 | Status: DISCONTINUED | OUTPATIENT
Start: 2020-04-01 | End: 2020-04-09

## 2020-04-01 RX ORDER — CEFEPIME 1 G/1
1000 INJECTION, POWDER, FOR SOLUTION INTRAMUSCULAR; INTRAVENOUS ONCE
Refills: 0 | Status: COMPLETED | OUTPATIENT
Start: 2020-04-01 | End: 2020-04-01

## 2020-04-01 RX ORDER — INSULIN HUMAN 100 [IU]/ML
INJECTION, SOLUTION SUBCUTANEOUS EVERY 4 HOURS
Refills: 0 | Status: DISCONTINUED | OUTPATIENT
Start: 2020-04-01 | End: 2020-04-01

## 2020-04-01 RX ORDER — HYDROXYCHLOROQUINE SULFATE 200 MG
200 TABLET ORAL EVERY 12 HOURS
Refills: 0 | Status: COMPLETED | OUTPATIENT
Start: 2020-04-01 | End: 2020-04-05

## 2020-04-01 RX ORDER — INSULIN HUMAN 100 [IU]/ML
10 INJECTION, SOLUTION SUBCUTANEOUS ONCE
Refills: 0 | Status: COMPLETED | OUTPATIENT
Start: 2020-04-01 | End: 2020-04-01

## 2020-04-01 RX ADMIN — Medication 8.91 MICROGRAM(S)/KG/MIN: at 06:40

## 2020-04-01 RX ADMIN — CEFEPIME 100 MILLIGRAM(S): 1 INJECTION, POWDER, FOR SOLUTION INTRAMUSCULAR; INTRAVENOUS at 13:50

## 2020-04-01 RX ADMIN — INSULIN HUMAN 1 UNIT(S)/HR: 100 INJECTION, SOLUTION SUBCUTANEOUS at 22:06

## 2020-04-01 RX ADMIN — Medication 100 GRAM(S): at 04:35

## 2020-04-01 RX ADMIN — SODIUM CHLORIDE 100 MILLILITER(S): 9 INJECTION, SOLUTION INTRAVENOUS at 07:10

## 2020-04-01 RX ADMIN — SODIUM CHLORIDE 1500 MILLILITER(S): 9 INJECTION, SOLUTION INTRAVENOUS at 22:15

## 2020-04-01 RX ADMIN — PROPOFOL 5.7 MICROGRAM(S)/KG/MIN: 10 INJECTION, EMULSION INTRAVENOUS at 00:20

## 2020-04-01 RX ADMIN — APIXABAN 5 MILLIGRAM(S): 2.5 TABLET, FILM COATED ORAL at 18:15

## 2020-04-01 RX ADMIN — SODIUM CHLORIDE 1000 MILLILITER(S): 9 INJECTION, SOLUTION INTRAVENOUS at 16:00

## 2020-04-01 RX ADMIN — Medication 200 MILLIGRAM(S): at 18:15

## 2020-04-01 RX ADMIN — Medication 8.91 MICROGRAM(S)/KG/MIN: at 01:30

## 2020-04-01 RX ADMIN — AZITHROMYCIN 250 MILLIGRAM(S): 500 TABLET, FILM COATED ORAL at 13:33

## 2020-04-01 RX ADMIN — INSULIN HUMAN 1 UNIT(S)/HR: 100 INJECTION, SOLUTION SUBCUTANEOUS at 09:29

## 2020-04-01 RX ADMIN — INSULIN HUMAN 4: 100 INJECTION, SOLUTION SUBCUTANEOUS at 02:10

## 2020-04-01 RX ADMIN — ZINC SULFATE TAB 220 MG (50 MG ZINC EQUIVALENT) 220 MILLIGRAM(S): 220 (50 ZN) TAB at 13:33

## 2020-04-01 RX ADMIN — Medication 30 MILLIGRAM(S): at 22:05

## 2020-04-01 RX ADMIN — Medication 30 MILLIGRAM(S): at 13:33

## 2020-04-01 RX ADMIN — INSULIN HUMAN 10 UNIT(S): 100 INJECTION, SOLUTION SUBCUTANEOUS at 04:30

## 2020-04-01 RX ADMIN — Medication 25 MILLILITER(S): at 04:30

## 2020-04-01 RX ADMIN — Medication 60 MILLIGRAM(S): at 05:25

## 2020-04-01 RX ADMIN — Medication 10 MILLILITER(S): at 13:33

## 2020-04-01 RX ADMIN — Medication 10 MILLILITER(S): at 22:05

## 2020-04-01 RX ADMIN — Medication 30 MILLIGRAM(S): at 18:15

## 2020-04-01 RX ADMIN — AZITHROMYCIN 500 MILLIGRAM(S): 500 TABLET, FILM COATED ORAL at 18:15

## 2020-04-01 RX ADMIN — INSULIN HUMAN 0: 100 INJECTION, SOLUTION SUBCUTANEOUS at 05:53

## 2020-04-01 RX ADMIN — PROPOFOL 5.7 MICROGRAM(S)/KG/MIN: 10 INJECTION, EMULSION INTRAVENOUS at 18:14

## 2020-04-01 RX ADMIN — Medication 60 MILLIGRAM(S): at 18:14

## 2020-04-01 RX ADMIN — SENNA PLUS 2 TABLET(S): 8.6 TABLET ORAL at 22:05

## 2020-04-01 RX ADMIN — PANTOPRAZOLE SODIUM 40 MILLIGRAM(S): 20 TABLET, DELAYED RELEASE ORAL at 13:20

## 2020-04-01 RX ADMIN — Medication 650 MILLIGRAM(S): at 00:15

## 2020-04-01 RX ADMIN — Medication 500 MILLIGRAM(S): at 22:05

## 2020-04-01 RX ADMIN — Medication 8.91 MICROGRAM(S)/KG/MIN: at 18:14

## 2020-04-01 NOTE — PROGRESS NOTE ADULT - SUBJECTIVE AND OBJECTIVE BOX
KONSTANTIN WYATT  74y, Male    All available historical data reviewed    OVERNIGHT EVENTS:  none    ROS:  unable to obtain history secondary to patient's mental status and/or sedation     VITALS:  T(F): 99, Max: 102.2 (20 @ 20:00)  HR: 88  BP: 163/72  RR: 18Vital Signs Last 24 Hrs  T(C): 37.2 (2020 07:00), Max: 39 (31 Mar 2020 20:00)  T(F): 99 (2020 07:00), Max: 102.2 (31 Mar 2020 20:00)  HR: 88 (2020 07:00) (74 - 102)  BP: 163/72 (2020 07:00) (109/56 - 163/72)  BP(mean): 88 (2020 05:00) (79 - 92)  RR: 18 (2020 07:00) (18 - 24)  SpO2: 99% (2020 07:00) (95% - 99%)    TESTS & MEASUREMENTS:                        14.2   12.62 )-----------( 227      ( 31 Mar 2020 23:50 )             43.6         138  |  104  |  33<H>  ----------------------------<  207<H>  5.6<H>   |  20  |  2.1<H>    Ca    7.9<L>      31 Mar 2020 23:50  Phos  4.5       Mg     2.1         TPro  5.7<L>  /  Alb  3.0<L>  /  TBili  0.9  /  DBili  x   /  AST  214<H>  /  ALT  167<H>  /  AlkPhos  73      LIVER FUNCTIONS - ( 31 Mar 2020 08:29 )  Alb: 3.0 g/dL / Pro: 5.7 g/dL / ALK PHOS: 73 U/L / ALT: 167 U/L / AST: 214 U/L / GGT: x             Culture - Urine (collected 20 @ 22:20)  Source: .Urine Catheterized  Final Report (20 @ 23:16):    No growth    Culture - Urine (collected 20 @ 14:55)  Source: .Urine Clean Catch (Midstream)  Final Report (20 @ 18:20):    No growth    Culture - Blood (collected 20 @ 12:14)  Source: .Blood Blood-Peripheral  Preliminary Report (20 @ 22:02):    No growth to date.    Culture - Blood (collected 20 @ 12:14)  Source: .Blood Blood-Peripheral  Preliminary Report (20 @ 22:02):    No growth to date.      Urinalysis Basic - ( 30 Mar 2020 22:00 )    Color: Yellow / Appearance: Clear / S.031 / pH: x  Gluc: x / Ketone: Trace  / Bili: Negative / Urobili: <2 mg/dL   Blood: x / Protein: 300 mg/dL / Nitrite: Negative   Leuk Esterase: Negative / RBC: 10 /HPF / WBC 29 /HPF   Sq Epi: x / Non Sq Epi: 22 /HPF / Bacteria: Negative          RADIOLOGY & ADDITIONAL TESTS:  Personal review of radiological diagnostics performed  Echo and EKG results noted when applicable.     MEDICATIONS:  acetaminophen  Suppository .. 650 milliGRAM(s) Rectal every 6 hours PRN  apixaban 5 milliGRAM(s) Oral two times a day  azithromycin   Tablet 500 milliGRAM(s) Oral once  azithromycin   Tablet 250 milliGRAM(s) Oral daily  chlorhexidine 4% Liquid 1 Application(s) Topical <User Schedule>  diltiazem    milliGRAM(s) Oral daily  guaifenesin/dextromethorphan  Syrup 10 milliLiter(s) Oral every 4 hours PRN  insulin regular Infusion 1 Unit(s)/Hr IV Continuous <Continuous>  lactated ringers. 1000 milliLiter(s) IV Continuous <Continuous>  methylPREDNISolone sodium succinate Injectable 60 milliGRAM(s) IV Push every 12 hours  norepinephrine Infusion 0.05 MICROgram(s)/kG/Min IV Continuous <Continuous>  pantoprazole  Injectable 40 milliGRAM(s) IV Push daily  propofol Infusion 10 MICROgram(s)/kG/Min IV Continuous <Continuous>  senna 2 Tablet(s) Oral at bedtime  zinc sulfate 220 milliGRAM(s) Oral daily      ANTIBIOTICS:  azithromycin   Tablet 500 milliGRAM(s) Oral once  azithromycin   Tablet 250 milliGRAM(s) Oral daily

## 2020-04-01 NOTE — PROGRESS NOTE ADULT - ASSESSMENT
· Assessment		  73 yo M w/ hx of Afib on Eliquis, CAD s/p PCI, DM, BPH, presents with 1 week of fever, cough, and progressive SOB. Admits to watery diarrhea for 3 days. Denies chest pain. 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 He has exposure to COVID positive family member at the wedding. No travel hx. Pt pulse ox in 80s in the field per EMS. Of note, per patient he had recent normal stress test recently.    IMPRESSION:  Hypoxemia O2 <93% and CXR with b/l opacities   -CXR with opacities bibasilar  -transaminitis secondary to COVID  -BCx NG  -COVID19 NG. Given presentation will presume false positive    RECOMMENDATIONS:  -repeat Rt-PCR in 48h  - Obtain EKG --> If QTC <500, Start PLAQUENIL 400mg PO q12h x 2 doses, then 200mg BID PO x 4 days (Pt is not to be discharged on plaquenil)  - Monitor QTC with EKG daily  - Supportive care - Start Azithro 500mg PO x1, then 250mg PO daily x 4 days   -procal, CRP, ferritin, LDH, D DIMERS

## 2020-04-01 NOTE — PROGRESS NOTE ADULT - SUBJECTIVE AND OBJECTIVE BOX
KONSTANTIN WYATT  [id number]  74y Male    Indication for ICU admission:  Acute Hypoxemic Respiratory Failure  clinical COVID19 (false negatvie)    Admit Date:03-30-20  ICU Date: 03-31-20  OR Date:    Bactrim (Unknown)    PAST MEDICAL & SURGICAL HISTORY:  Afib  DM (diabetes mellitus)  BPH (benign prostatic hyperplasia)  CAD (coronary artery disease)    Home Medications:  Cartia  mg/24 hours oral capsule, extended release: 1 cap(s) orally once a day (30 Mar 2020 18:04)  Eliquis 5 mg oral tablet: 1 tab(s) orally 2 times a day (30 Mar 2020 18:04)  Lipitor 40 mg oral tablet: 1 tab(s) orally once a day (30 Mar 2020 18:04)  lisinopril 40 mg oral tablet: 1 tab(s) orally once a day (30 Mar 2020 18:04)  metFORMIN 750 mg oral tablet, extended release: 1 tab(s) orally once a day (30 Mar 2020 18:04)      24HRS EVENT:    NEURO/PSYCH  Sedation;   - Propofol gtt titrate to RASS -1  Pain Control;  -  Acetaminophen 650mg PO q6 PRN    RESP  Acute Hypoxemic Respiratory Failure;  - /18/100/15  - ABG 7.28/44/150/21  - COVID-19 nevgative, clinically positive   - Methylprednisolone 60mg q12  - cxr stable diffuse bilateral opacities  - Guaifenisen/DM    CARD/VASC  HR: 74 (03-31-20 @ 18:00) (74 - 118)  BP: 109/56 (03-31-20 @ 18:00) (106/62 - 148/78)  AFib; chronic  - Apixaban 5mg PO bid  - Diltiazem 120mg PO  CAD  - Atorvastatin 40mg PO qhs  HTN  - hold, Lisinopril     GI/NUTR  - NPO  -PPI/ Senna     /Renal  - LIZZIE BUN/Cr 33/2.1  - LR @ 100cc/hr   - Evans UOP 40cc/hr   Na 138/ K 5.6/ Mg 2.1/ Phos 4.5   - HyperK, treated   -BPH     HEME/ONC  - Eliquis   - monitor H/H 14.2/43.6    ID  Fever;  - 101.4 @00:40 (3/31)  - Acetaminophen 650mg PO q6  COVID-19, suspected;  - suspect false negative, repeat swab pending  - ID following, recommended:       - Hydroxychloroquine 400mg PO q12h x 2 doses, then 200mg BID PO x 4 days       - Azithromycin 500mg PO once, then 250mg PO qd x 4 days       - SARS-CoV-2 (repeat), nasal MRSA, urine Legionella & Strep    ENDO  DM2;  - holding, Metformin  - Insulin gtt FS >300   - POCT q4    MSK/DERM  - no active issues    DVT PPx  - on Apixaban     GI PPx  - Pantoprazole 40mg PO qac  - Senna 17.2mg PO qhs    Disposition  -  SICU    ***Tubes/Lines/Drains  ***  Peripheral IV  Central Venous Line     	Date   Arterial Line		                Date:03-31-20  [] PICC:         	[] Midline		[] Mediport             Urinary Catheter		Indication: Strict I&O    Date Placed:       REVIEW OF SYSTEMS    [ ] A ten-point review of systems was otherwise negative except as noted.  [ ] Due to altered mental status/intubation, subjective information were not able to be obtained from the patient. History was obtained, to the extent possible, from review of the chart and collateral sources of informati KONSTANTIN WYATT  [id number]  74y Male    Indication for ICU admission:  Acute Hypoxemic Respiratory Failure  clinical COVID19 (false negatvie)    Admit Date:20  ICU Date: 20  OR Date:    Bactrim (Unknown)    PAST MEDICAL & SURGICAL HISTORY:  Afib  DM (diabetes mellitus)  BPH (benign prostatic hyperplasia)  CAD (coronary artery disease)    Home Medications:  Cartia  mg/24 hours oral capsule, extended release: 1 cap(s) orally once a day (30 Mar 2020 18:04)  Eliquis 5 mg oral tablet: 1 tab(s) orally 2 times a day (30 Mar 2020 18:04)  Lipitor 40 mg oral tablet: 1 tab(s) orally once a day (30 Mar 2020 18:04)  lisinopril 40 mg oral tablet: 1 tab(s) orally once a day (30 Mar 2020 18:04)  metFORMIN 750 mg oral tablet, extended release: 1 tab(s) orally once a day (30 Mar 2020 18:04)      24HRS EVENT:    NEURO/PSYCH  Sedation;   - Propofol gtt titrate to RASS -1  Pain Control;  -  Acetaminophen 650mg PO q6 PRN    RESP  Acute Hypoxemic Respiratory Failure;  - /18/100/15  - ABG 7.28/44/150/21  - COVID-19 nevgative, clinically positive   - Methylprednisolone 60mg q12  - cxr stable diffuse bilateral opacities  - Guaifenisen/DM    CARD/VASC  HR: 74 (20 @ 18:00) (74 - 118)  BP: 109/56 (20 @ 18:00) (106/62 - 148/78)  AFib; chronic  - Apixaban 5mg PO bid  - Diltiazem 120mg PO  CAD  - Atorvastatin 40mg PO qhs  HTN  - hold, Lisinopril     GI/NUTR  - NPO  -PPI/ Senna     /Renal  - LIZZIE BUN/Cr 33/2.1  - LR @ 100cc/hr   - Evans UOP 40cc/hr   Na 138/ K 5.6/ Mg 2.1/ Phos 4.5   - HyperK, treated   -BPH     HEME/ONC  - Eliquis   - monitor H/H 14.2/43.6    ID  Fever;  - 101.4 @00:40 (3/31)  - Acetaminophen 650mg PO q6  COVID-19, suspected;  - suspect false negative, repeat swab pending  - ID following, recommended:       - Hydroxychloroquine 400mg PO q12h x 2 doses, then 200mg BID PO x 4 days       - Azithromycin 500mg PO once, then 250mg PO qd x 4 days       - SARS-CoV-2 (repeat), nasal MRSA, urine Legionella & Strep    ENDO  DM2;  - holding, Metformin  - Insulin gtt FS >300   - POCT q4    MSK/DERM  - no active issues    DVT PPx  - on Apixaban     GI PPx  - Pantoprazole 40mg PO qac  - Senna 17.2mg PO qhs    Disposition  -  SICU    ***Tubes/Lines/Drains  ***  Peripheral IV  Central Venous Line     	Date   Arterial Line		                Date:20  [] PICC:         	[] Midline		[] Mediport             Urinary Catheter		Indication: Strict I&O    Date Placed:       REVIEW OF SYSTEMS    [ ] A ten-point review of systems was otherwise negative except as noted.  [ ] Due to altered mental status/intubation, subjective information were not able to be obtained from the patient. History was obtained, to the extent possible, from review of the chart and collateral sources of information     Daily     Daily     Diet, NPO:   Except Medications (20 @ 22:47)      CURRENT MEDS:  Neurologic Medications  acetaminophen  Suppository .. 650 milliGRAM(s) Rectal every 6 hours PRN Temp greater or equal to 38.5C (101.3F)  propofol Infusion 10 MICROgram(s)/kG/Min IV Continuous <Continuous>    Respiratory Medications  guaifenesin/dextromethorphan  Syrup 10 milliLiter(s) Oral every 4 hours PRN Cough    Cardiovascular Medications  diltiazem    milliGRAM(s) Oral daily  norepinephrine Infusion 0.05 MICROgram(s)/kG/Min IV Continuous <Continuous>    Gastrointestinal Medications  lactated ringers. 1000 milliLiter(s) IV Continuous <Continuous>  pantoprazole  Injectable 40 milliGRAM(s) IV Push daily  senna 2 Tablet(s) Oral at bedtime  zinc sulfate 220 milliGRAM(s) Oral daily    Genitourinary Medications    Hematologic/Oncologic Medications  apixaban 5 milliGRAM(s) Oral two times a day    Antimicrobial/Immunologic Medications  azithromycin   Tablet 500 milliGRAM(s) Oral once  azithromycin   Tablet 250 milliGRAM(s) Oral daily    Endocrine/Metabolic Medications  insulin regular Infusion 1 Unit(s)/Hr IV Continuous <Continuous>  methylPREDNISolone sodium succinate Injectable 60 milliGRAM(s) IV Push every 12 hours    Topical/Other Medications  chlorhexidine 4% Liquid 1 Application(s) Topical <User Schedule>      ICU Vital Signs Last 24 Hrs  T(F): 99.6 , Max: 102.2   HR: 91  BP: 133/61   BP(mean): 88   ABP: 124/62  ABP(mean): 87   RR: 18   SpO2: 98%         Mode: AC/ CMV (Assist Control/ Continuous Mandatory Ventilation)  RR (machine): 18  TV (machine): 450  FiO2: 100  PEEP: 15  ITime: 1  MAP: 21  PIP: 25    ABG - ( 2020 02:27 )  pH, Arterial: 7.17  pH, Blood: x     /  pCO2: 51    /  pO2: 304   / HCO3: 19    / Base Excess: -10.1 /  SaO2: 98                  I&O's Summary    31 Mar 2020 07:01  -  2020 06:27  --------------------------------------------------------  IN: 1183.2 mL / OUT: 94 mL / NET: 1089.2 mL      I&O's Detail    31 Mar 2020 07:01  -  2020 06:27  --------------------------------------------------------  IN:    lactated ringers.: 600 mL    norepinephrine Infusion: 480.6 mL    propofol Infusion: 102.6 mL  Total IN: 1183.2 mL    OUT:    Indwelling Catheter - Urethral: 94 mL  Total OUT: 94 mL    Total NET: 1089.2 mL          LABS:  CAPILLARY BLOOD GLUCOSE      POCT Blood Glucose.: 301 mg/dL (2020 05:38)  POCT Blood Glucose.: 209 mg/dL (2020 01:47)  POCT Blood Glucose.: 177 mg/dL (31 Mar 2020 11:45)  POCT Blood Glucose.: 136 mg/dL (31 Mar 2020 07:55)                          14.2   12.62 )-----------( 227      ( 31 Mar 2020 23:50 )             43.6       03-31    138  |  104  |  33<H>  ----------------------------<  207<H>  5.6<H>   |  20  |  2.1<H>    Ca    7.9<L>      31 Mar 2020 23:50  Phos  4.5     03-31  Mg     2.1     03-31    TPro  5.7<L>  /  Alb  3.0<L>  /  TBili  0.9  /  DBili  x   /  AST  214<H>  /  ALT  167<H>  /  AlkPhos  73  03-31      PT/INR - ( 31 Mar 2020 23:50 )   PT: 17.60 sec;   INR: 1.53 ratio         PTT - ( 31 Mar 2020 23:50 )  PTT:28.7 sec  CARDIAC MARKERS ( 30 Mar 2020 22:04 )  x     / <0.01 ng/mL / x     / x     / x          Urinalysis Basic - ( 30 Mar 2020 22:00 )    Color: Yellow / Appearance: Clear / S.031 / pH: x  Gluc: x / Ketone: Trace  / Bili: Negative / Urobili: <2 mg/dL   Blood: x / Protein: 300 mg/dL / Nitrite: Negative   Leuk Esterase: Negative / RBC: 10 /HPF / WBC 29 /HPF   Sq Epi: x / Non Sq Epi: 22 /HPF / Bacteria: Negative        Culture - Urine (collected 30 Mar 2020 22:20)  Source: .Urine Catheterized  Final Report (31 Mar 2020 23:16):    No growth    Culture - Urine (collected 30 Mar 2020 14:55)  Source: .Urine Clean Catch (Midstream)  Final Report (31 Mar 2020 18:20):    No growth    Culture - Blood (collected 30 Mar 2020 12:14)  Source: .Blood Blood-Peripheral  Preliminary Report (31 Mar 2020 22:02):    No growth to date.    Culture - Blood (collected 30 Mar 2020 12:14)  Source: .Blood Blood-Peripheral  Preliminary Report (31 Mar 2020 22:02):    No growth to date.

## 2020-04-01 NOTE — PROGRESS NOTE ADULT - ASSESSMENT
ASSESSMENT:  74 year old male with PMH of HTN DM2 CAD s/p PCI Afib presents with respiratory distress, suspected COVID    PLAN:     NEURO/PSYCH  Sedation;   - continue, Propofol gtt titrate to RASS -1  Pain Control;  - continue, Acetaminophen 650mg PO q6 PRN    RESP  Acute Hypoxemic Respiratory Failure;  Mode: AC/ CMV (Assist Control/ Continuous Mandatory Ventilation)  RR (machine): 18 TV (machine): 450 FiO2: 100 PEEP: 15 ITime: 1 MAP: 15 PIP: 18   - continue, ABG qd  COVID-19/Viral Pneumonia;  - negative SARS-CoV-2; lymphopenia, elevated CRP, fits COVID19 picture clinically  - possibly false negative, second nasal swab sent, may need bronchoscopy  - Pulmonary following, recommended:        - Methylprednisolone 60mg q12  - stable diffuse bilateral opacities  - continue, Guaifenisen/DM    CARD/VASC  HR: 74 (03-31-20 @ 18:00) (74 - 118)  BP: 109/56 (03-31-20 @ 18:00) (106/62 - 148/78)  AFib; chronic  - continue, Apixaban 5mg PO bid  - continue, Diltiazem 120mg PO  - conintue, EKG qd  CAD  - continue, Atorvastatin 40mg PO qhs  HTN  - hold, Lisinopril     GI/NUTR  - NPO    /Renal  BUN/Cr; stable  03-31 @ 08:29  BUN  23     Creatinine  0.9   03-30 @ 11:33  BUN  24     Creatinine  1.2   - monitor, I&Os  Fluids  - monitor I&Os  - santana in place  Electrolytes;  - monitor e-  BPH; chronic     HEME/ONC  - monitor H/H    ID  Fever;  - continue, Acetaminophen 650mg PO q6  COVID-19, suspected;  - suspect false negative, repeat swab pending  - ID following, recommended:       - Hydroxychloroquine 400mg PO q12h x 2 doses, then 200mg BID PO x 4 days       - Azithromycin 500mg PO once, then 250mg PO qd x 4 days       - SARS-CoV-2 (repeat), nasal MRSA, urine Legionella & Strep    ENDO  POCT Blood Glucose.: 177 mg/dL (03-31-20 @ 11:45)  POCT Blood Glucose.: 136 mg/dL (03-31-20 @ 07:55)  DM2;  - holding, Metformin  - continue, Insulin gtt   - continue, POCT q2     MSK/DERM  - no active issues    DVT PPx  - on Apixaban     GI PPx  - continue, Pantoprazole 40mg PO qac  - continue, Senna 17.2mg PO qhs    Disposition  -  SICU    D/W Dr garcia

## 2020-04-02 LAB
4/8 RATIO: 1.76 RATIO — SIGNIFICANT CHANGE UP (ref 0.86–4.14)
ABS CD8: 67 /UL — LOW (ref 90–775)
ALBUMIN SERPL ELPH-MCNC: 2.6 G/DL — LOW (ref 3.5–5.2)
ALP SERPL-CCNC: 71 U/L — SIGNIFICANT CHANGE UP (ref 30–115)
ALT FLD-CCNC: 93 U/L — HIGH (ref 0–41)
ANION GAP SERPL CALC-SCNC: 14 MMOL/L — SIGNIFICANT CHANGE UP (ref 7–14)
ANION GAP SERPL CALC-SCNC: 17 MMOL/L — HIGH (ref 7–14)
APTT BLD: 27.6 SEC — SIGNIFICANT CHANGE UP (ref 27–39.2)
AST SERPL-CCNC: 73 U/L — HIGH (ref 0–41)
BASE EXCESS BLDA CALC-SCNC: -12 MMOL/L — LOW (ref -2–2)
BASOPHILS # BLD AUTO: 0 K/UL — SIGNIFICANT CHANGE UP (ref 0–0.2)
BASOPHILS NFR BLD AUTO: 0 % — SIGNIFICANT CHANGE UP (ref 0–1)
BILIRUB SERPL-MCNC: 0.8 MG/DL — SIGNIFICANT CHANGE UP (ref 0.2–1.2)
BUN SERPL-MCNC: 56 MG/DL — HIGH (ref 10–20)
BUN SERPL-MCNC: 71 MG/DL — CRITICAL HIGH (ref 10–20)
CALCIUM SERPL-MCNC: 7.8 MG/DL — LOW (ref 8.5–10.1)
CALCIUM SERPL-MCNC: 8 MG/DL — LOW (ref 8.5–10.1)
CD16+CD56+ CELLS NFR BLD: 22 % — SIGNIFICANT CHANGE UP (ref 7–27)
CD16+CD56+ CELLS NFR SPEC: 75 /UL — LOW (ref 80–426)
CD19 BLASTS SPEC-ACNC: 18 % — SIGNIFICANT CHANGE UP (ref 4–18)
CD19 BLASTS SPEC-ACNC: 63 /UL — SIGNIFICANT CHANGE UP (ref 32–326)
CD3 BLASTS SPEC-ACNC: 200 /UL — LOW (ref 396–2024)
CD3 BLASTS SPEC-ACNC: 59 % — SIGNIFICANT CHANGE UP (ref 58–84)
CD4 %: 35 % — SIGNIFICANT CHANGE UP (ref 30–56)
CD8 %: 20 % — SIGNIFICANT CHANGE UP (ref 11–43)
CHLORIDE SERPL-SCNC: 109 MMOL/L — SIGNIFICANT CHANGE UP (ref 98–110)
CHLORIDE SERPL-SCNC: 110 MMOL/L — SIGNIFICANT CHANGE UP (ref 98–110)
CK SERPL-CCNC: 103 U/L — SIGNIFICANT CHANGE UP (ref 0–225)
CO2 SERPL-SCNC: 15 MMOL/L — LOW (ref 17–32)
CO2 SERPL-SCNC: 15 MMOL/L — LOW (ref 17–32)
CREAT SERPL-MCNC: 4.1 MG/DL — CRITICAL HIGH (ref 0.7–1.5)
CREAT SERPL-MCNC: 4.8 MG/DL — CRITICAL HIGH (ref 0.7–1.5)
CRP SERPL-MCNC: 31.95 MG/DL — HIGH (ref 0–0.4)
CRP SERPL-MCNC: 37.39 MG/DL — HIGH (ref 0–0.4)
D DIMER BLD IA.RAPID-MCNC: 9850 NG/ML DDU — HIGH (ref 0–230)
EOSINOPHIL # BLD AUTO: 0 K/UL — SIGNIFICANT CHANGE UP (ref 0–0.7)
EOSINOPHIL NFR BLD AUTO: 0 % — SIGNIFICANT CHANGE UP (ref 0–8)
ERYTHROCYTE [SEDIMENTATION RATE] IN BLOOD: 70 MM/HR — HIGH (ref 0–10)
FERRITIN SERPL-MCNC: 2532 NG/ML — HIGH (ref 30–400)
FERRITIN SERPL-MCNC: 3025 NG/ML — HIGH (ref 30–400)
GAS PNL BLDA: SIGNIFICANT CHANGE UP
GIANT PLATELETS BLD QL SMEAR: PRESENT — SIGNIFICANT CHANGE UP
GLUCOSE BLDC GLUCOMTR-MCNC: 123 MG/DL — HIGH (ref 70–99)
GLUCOSE BLDC GLUCOMTR-MCNC: 153 MG/DL — HIGH (ref 70–99)
GLUCOSE BLDC GLUCOMTR-MCNC: 160 MG/DL — HIGH (ref 70–99)
GLUCOSE BLDC GLUCOMTR-MCNC: 165 MG/DL — HIGH (ref 70–99)
GLUCOSE BLDC GLUCOMTR-MCNC: 166 MG/DL — HIGH (ref 70–99)
GLUCOSE BLDC GLUCOMTR-MCNC: 178 MG/DL — HIGH (ref 70–99)
GLUCOSE BLDC GLUCOMTR-MCNC: 191 MG/DL — HIGH (ref 70–99)
GLUCOSE SERPL-MCNC: 183 MG/DL — HIGH (ref 70–99)
GLUCOSE SERPL-MCNC: 208 MG/DL — HIGH (ref 70–99)
HCO3 BLDA-SCNC: 12 MMOL/L — LOW (ref 23–27)
HOROWITZ INDEX BLDA+IHG-RTO: 70 — SIGNIFICANT CHANGE UP
INR BLD: 1.43 RATIO — HIGH (ref 0.65–1.3)
LDH SERPL L TO P-CCNC: 640 U/L — HIGH (ref 50–242)
LYMPHOCYTES # BLD AUTO: 0.2 K/UL — LOW (ref 1.2–3.4)
LYMPHOCYTES # BLD AUTO: 1.7 % — LOW (ref 20.5–51.1)
MAGNESIUM SERPL-MCNC: 2.2 MG/DL — SIGNIFICANT CHANGE UP (ref 1.8–2.4)
MAGNESIUM SERPL-MCNC: 2.3 MG/DL — SIGNIFICANT CHANGE UP (ref 1.8–2.4)
MANUAL SMEAR VERIFICATION: SIGNIFICANT CHANGE UP
MONOCYTES # BLD AUTO: 0.63 K/UL — HIGH (ref 0.1–0.6)
MONOCYTES NFR BLD AUTO: 5.3 % — SIGNIFICANT CHANGE UP (ref 1.7–9.3)
NEUTROPHILS # BLD AUTO: 11.13 K/UL — HIGH (ref 1.4–6.5)
NEUTROPHILS NFR BLD AUTO: 92.1 % — HIGH (ref 42.2–75.2)
NEUTS BAND # BLD: 0.9 % — SIGNIFICANT CHANGE UP (ref 0–6)
PCO2 BLDA: 21 MMHG — LOW (ref 38–42)
PH BLDA: 7.34 — LOW (ref 7.38–7.42)
PHOSPHATE SERPL-MCNC: 4.4 MG/DL — SIGNIFICANT CHANGE UP (ref 2.1–4.9)
PHOSPHATE SERPL-MCNC: 5.4 MG/DL — HIGH (ref 2.1–4.9)
PLAT MORPH BLD: NORMAL — SIGNIFICANT CHANGE UP
PO2 BLDA: 69 MMHG — LOW (ref 78–95)
POIKILOCYTOSIS BLD QL AUTO: SIGNIFICANT CHANGE UP
POTASSIUM SERPL-MCNC: 4.9 MMOL/L — SIGNIFICANT CHANGE UP (ref 3.5–5)
POTASSIUM SERPL-MCNC: 5 MMOL/L — SIGNIFICANT CHANGE UP (ref 3.5–5)
POTASSIUM SERPL-SCNC: 4.9 MMOL/L — SIGNIFICANT CHANGE UP (ref 3.5–5)
POTASSIUM SERPL-SCNC: 5 MMOL/L — SIGNIFICANT CHANGE UP (ref 3.5–5)
PROCALCITONIN SERPL-MCNC: 3.11 NG/ML — HIGH (ref 0.02–0.1)
PROCALCITONIN SERPL-MCNC: 3.25 NG/ML — HIGH (ref 0.02–0.1)
PROT SERPL-MCNC: 5.2 G/DL — LOW (ref 6–8)
PROTHROM AB SERPL-ACNC: 16.4 SEC — HIGH (ref 9.95–12.87)
RBC BLD AUTO: NORMAL — SIGNIFICANT CHANGE UP
SAO2 % BLDA: 93 % — LOW (ref 94–98)
SARS-COV-2 RNA SPEC QL NAA+PROBE: DETECTED
SODIUM SERPL-SCNC: 139 MMOL/L — SIGNIFICANT CHANGE UP (ref 135–146)
SODIUM SERPL-SCNC: 141 MMOL/L — SIGNIFICANT CHANGE UP (ref 135–146)
T-CELL CD4 SUBSET PNL BLD: 118 /UL — LOW (ref 325–1251)
TROPONIN T SERPL-MCNC: 0.03 NG/ML — CRITICAL HIGH

## 2020-04-02 PROCEDURE — 71045 X-RAY EXAM CHEST 1 VIEW: CPT | Mod: 26

## 2020-04-02 PROCEDURE — 99291 CRITICAL CARE FIRST HOUR: CPT

## 2020-04-02 PROCEDURE — 93010 ELECTROCARDIOGRAM REPORT: CPT

## 2020-04-02 RX ORDER — SPIRONOLACTONE 25 MG/1
25 TABLET, FILM COATED ORAL ONCE
Refills: 0 | Status: COMPLETED | OUTPATIENT
Start: 2020-04-02 | End: 2020-04-02

## 2020-04-02 RX ORDER — MORPHINE SULFATE 50 MG/1
2 CAPSULE, EXTENDED RELEASE ORAL
Qty: 100 | Refills: 0 | Status: DISCONTINUED | OUTPATIENT
Start: 2020-04-02 | End: 2020-04-06

## 2020-04-02 RX ORDER — MIDAZOLAM HYDROCHLORIDE 1 MG/ML
0.05 INJECTION, SOLUTION INTRAMUSCULAR; INTRAVENOUS
Qty: 100 | Refills: 0 | Status: DISCONTINUED | OUTPATIENT
Start: 2020-04-02 | End: 2020-04-06

## 2020-04-02 RX ADMIN — CEFEPIME 100 MILLIGRAM(S): 1 INJECTION, POWDER, FOR SOLUTION INTRAMUSCULAR; INTRAVENOUS at 12:14

## 2020-04-02 RX ADMIN — Medication 500 MILLIGRAM(S): at 22:40

## 2020-04-02 RX ADMIN — SODIUM CHLORIDE 100 MILLILITER(S): 9 INJECTION, SOLUTION INTRAVENOUS at 20:00

## 2020-04-02 RX ADMIN — MORPHINE SULFATE 2 MG/HR: 50 CAPSULE, EXTENDED RELEASE ORAL at 17:31

## 2020-04-02 RX ADMIN — Medication 10 MILLILITER(S): at 08:42

## 2020-04-02 RX ADMIN — Medication 200 MILLIGRAM(S): at 17:24

## 2020-04-02 RX ADMIN — Medication 500 MILLIGRAM(S): at 08:53

## 2020-04-02 RX ADMIN — PROPOFOL 5.7 MICROGRAM(S)/KG/MIN: 10 INJECTION, EMULSION INTRAVENOUS at 20:00

## 2020-04-02 RX ADMIN — Medication 10 MILLILITER(S): at 22:40

## 2020-04-02 RX ADMIN — Medication 30 MILLIGRAM(S): at 17:24

## 2020-04-02 RX ADMIN — Medication 60 MILLIGRAM(S): at 08:40

## 2020-04-02 RX ADMIN — APIXABAN 5 MILLIGRAM(S): 2.5 TABLET, FILM COATED ORAL at 08:39

## 2020-04-02 RX ADMIN — SENNA PLUS 2 TABLET(S): 8.6 TABLET ORAL at 22:40

## 2020-04-02 RX ADMIN — MIDAZOLAM HYDROCHLORIDE 5 MG/KG/HR: 1 INJECTION, SOLUTION INTRAMUSCULAR; INTRAVENOUS at 17:36

## 2020-04-02 RX ADMIN — MIDAZOLAM HYDROCHLORIDE 5 MG/KG/HR: 1 INJECTION, SOLUTION INTRAMUSCULAR; INTRAVENOUS at 20:00

## 2020-04-02 RX ADMIN — MORPHINE SULFATE 2 MG/HR: 50 CAPSULE, EXTENDED RELEASE ORAL at 20:00

## 2020-04-02 RX ADMIN — SPIRONOLACTONE 25 MILLIGRAM(S): 25 TABLET, FILM COATED ORAL at 12:13

## 2020-04-02 RX ADMIN — Medication 30 MILLIGRAM(S): at 08:39

## 2020-04-02 RX ADMIN — Medication 500 MILLIGRAM(S): at 13:59

## 2020-04-02 RX ADMIN — Medication 8.91 MICROGRAM(S)/KG/MIN: at 13:51

## 2020-04-02 RX ADMIN — INSULIN HUMAN 1 UNIT(S)/HR: 100 INJECTION, SOLUTION SUBCUTANEOUS at 22:00

## 2020-04-02 RX ADMIN — Medication 10 MILLILITER(S): at 13:51

## 2020-04-02 RX ADMIN — APIXABAN 5 MILLIGRAM(S): 2.5 TABLET, FILM COATED ORAL at 17:24

## 2020-04-02 RX ADMIN — Medication 200 MILLIGRAM(S): at 08:53

## 2020-04-02 RX ADMIN — Medication 8.91 MICROGRAM(S)/KG/MIN: at 20:00

## 2020-04-02 RX ADMIN — CHLORHEXIDINE GLUCONATE 1 APPLICATION(S): 213 SOLUTION TOPICAL at 10:54

## 2020-04-02 RX ADMIN — ZINC SULFATE TAB 220 MG (50 MG ZINC EQUIVALENT) 220 MILLIGRAM(S): 220 (50 ZN) TAB at 12:13

## 2020-04-02 RX ADMIN — PANTOPRAZOLE SODIUM 40 MILLIGRAM(S): 20 TABLET, DELAYED RELEASE ORAL at 12:12

## 2020-04-02 RX ADMIN — Medication 30 MILLIGRAM(S): at 12:13

## 2020-04-02 RX ADMIN — Medication 60 MILLIGRAM(S): at 17:24

## 2020-04-02 RX ADMIN — AZITHROMYCIN 250 MILLIGRAM(S): 500 TABLET, FILM COATED ORAL at 12:13

## 2020-04-02 NOTE — DIETITIAN INITIAL EVALUATION ADULT. - CONTINUE CURRENT NUTRITION CARE PLAN
pt to meet and tolerate >85% of estimated kcal/protein via TF upon f/u in 4 days when Tube feed is initiated. Enteral nutrition. RD to monitor diet order, energy intake, body composition, NFPF (EN tolerance, Renal profile, glucose profile).

## 2020-04-02 NOTE — DIETITIAN INITIAL EVALUATION ADULT. - PERTINENT LABORATORY DATA
4/1: h/h 13.2/40.7, BUN 56, Cr 4.1, Glucose 208, Ca 8.0, Albumin 2.6, LFT elevated, GFR 13, HgbA1c 7.7

## 2020-04-02 NOTE — DIETITIAN INITIAL EVALUATION ADULT. - ADD RECOMMEND
When medically feasible to initiate feeding, may start with Osmolite 1.0 at 20cc/hr, increase by 10cc to GOAL of 50cc/hr with No-carb prosource TF packet x3/day. This regimen gives a total of 1370 kcal/ 85g protein/ 1880mg K/ 1008mL free water, additional flushes per SICU team.  (2) Initiate an insulin regimen. When medically feasible to initiate feeding, may start with Osmolite 1.0 at 20cc/hr, increase by 10cc to GOAL of 50cc/hr with No-carb prosource TF packet x3/day. This regimen gives a total of 1370 kcal (+150 kcal from propofol)/ 85g protein/ 1880mg K/ 1008mL free water, additional flushes per SICU team.  (2) Initiate an insulin regimen.

## 2020-04-02 NOTE — DIETITIAN INITIAL EVALUATION ADULT. - ENERGY NEEDS
6230-2288 kcal/day (11-14 kcal/kg of ABW) - Aim towards the high end for now to focus on hypocaloric feeding while intubated, this range is from RD judgement.   g/day (0.9-1.1 g/kg of ABW) - due to lack of BMI and considered poor renal status at this time.   Fluid per SICU team

## 2020-04-02 NOTE — PROGRESS NOTE ADULT - ASSESSMENT
ASSESSMENT:  74 year old male with PMH of HTN DM2 CAD s/p PCI Afib presents with respiratory distress, suspected COVID    PLAN:     NEURO/PSYCH  Sedation;   - continue, Propofol gtt titrate to RASS -1  Pain Control;  - continue, Acetaminophen 650mg PO q6 PRN    RESP  Acute Hypoxemic Respiratory Failure;  Mode: AC/ CMV (Assist Control/ Continuous Mandatory Ventilation)  RR (machine): 18 TV (machine): 450 FiO2: 100 PEEP: 15 ITime: 1 MAP: 15 PIP: 18   - continue, ABG qd  COVID-19/Viral Pneumonia;  - negative SARS-CoV-2; lymphopenia, elevated CRP, fits COVID19 picture clinically  - possibly false negative, second nasal swab sent, may need bronchoscopy  - Pulmonary following, recommended:        - Methylprednisolone 60mg q12  - stable diffuse bilateral opacities  - continue, Guaifenisen/DM    CARD/VASC  HR: 74 (03-31-20 @ 18:00) (74 - 118)  BP: 109/56 (03-31-20 @ 18:00) (106/62 - 148/78)  AFib; chronic  - continue, Apixaban 5mg PO bid  - continue, Diltiazem 120mg PO  - conintue, EKG qd  CAD  - continue, Atorvastatin 40mg PO qhs  HTN  - hold, Lisinopril     GI/NUTR  - NPO    /Renal  BUN/Cr; stable  03-31 @ 08:29  BUN  23     Creatinine  0.9   03-30 @ 11:33  BUN  24     Creatinine  1.2   - monitor, I&Os  Fluids  - monitor I&Os  - santana in place  Electrolytes;  - monitor e-  BPH; chronic     HEME/ONC  - monitor H/H    ID  Fever;  - continue, Acetaminophen 650mg PO q6  COVID-19, suspected;  - suspect false negative, repeat swab pending  - ID following, recommended:       - Hydroxychloroquine 400mg PO q12h x 2 doses, then 200mg BID PO x 4 days       - Azithromycin 500mg PO once, then 250mg PO qd x 4 days       - SARS-CoV-2 (repeat), nasal MRSA, urine Legionella & Strep       - Cefepime started    ENDO  POCT Blood Glucose.: 177 mg/dL (03-31-20 @ 11:45)  POCT Blood Glucose.: 136 mg/dL (03-31-20 @ 07:55)  DM2;  - holding, Metformin  - continue, Insulin gtt   - continue, POCT q2     MSK/DERM  - no active issues    DVT PPx  - on Apixaban     GI PPx  - continue, Pantoprazole 40mg PO qac  - continue, Senna 17.2mg PO qhs    Disposition  -  SICU    D/W Dr garcia ASSESSMENT:  74 year old male with PMH of HTN DM2 CAD s/p PCI Afib presents with respiratory distress, suspected COVID    PLAN:     NEURO/PSYCH  Sedation;   - continue, Propofol gtt titrate to RASS -1  Pain Control;  - continue, Acetaminophen 650mg PO q6 PRN    RESP  Acute Hypoxemic Respiratory Failure;  Mode: AC/ CMV (Assist Control/ Continuous Mandatory Ventilation)  RR (machine): 22 TV (machine): 450 FiO2: 70 PEEP: 15 MAP: 21 PIP: 34   02 Apr 2020 02:52 pH: 7.26<L> /  pCO2: 39    /  pO2: 231<H> / HCO3: 18<L> / Base Excess: -8.7<L>  01 Apr 2020 15:45 pH: 7.25<L> /  pCO2: 40    /  pO2: 199<H> / HCO3: 18<L> / Base Excess: -9.0<L>   - FiO2 decreased to 70%  - continue, ABG qd  COVID-19/Viral Pneumonia;  - positive SARS-CoV-2; previous false negative  - continue, Methylprednisolone 60mg q12  - worsening diffuse bilateral opacities  - continue, Guaifenisen/DM    CARD/VASC  HR: 79 (04-02-20 @ 11:00) (74 - 118)  BP: 128/87 (04-02-20 @ 11:00) (106/62 - 163/72)  AFib; chronic  - continue, Apixaban 5mg PO bid  - continue, Diltiazem 120mg PO  - conintue, EKG qd  CAD  - continue, Atorvastatin 40mg PO qhs  HTN  - hold, Lisinopril     GI/NUTR  - NPO    /Renal  LIZZIE; worsening  04-01 @ 23:35  BUN  56     Creatinine  4.1   04-01 @ 16:46  BUN  52     Creatinine  3.8   04-01 @ 12:40  BUN  48     Creatinine  3.5  UOP;  - approx 25mL/hr  - give, Spironolactone 25mg OGT   - monitor, I&Os  Fluids  - continue, LR 100mL/hr  - monitor I&Os  - santana in place  Electrolytes;  Hyperkalemia; resolved  - s/p insulin and D50  - monitor e-  BPH; chronic     HEME/ONC  - stable H/H  - monitor H/H    ID  Fever;  - continue, Acetaminophen 650mg PO q6  COVID-19, suspected;  - positive repeat swab  - ID following, recommended:       - continue, Hydroxychloroquine 400mg PO q12h x 2 doses, then 200mg BID PO x 4 days       - continue, Azithromycin 500mg PO once, then 250mg PO qd x 4 day       - continue, Cefepime        - nasal MRSA, urine Legionella & Strep pending     ENDO  POCT Blood Glucose.: 191 mg/dL (04-02-20 @ 11:04)  POCT Blood Glucose.: 153 mg/dL (04-02-20 @ 05:51)  DM2;  - continue, Insulin gtt   - continue, POCT q2  - holding, Metformin    MSK/DERM  - no active issues    DVT PPx  - on Apixaban     GI PPx  - continue, Pantoprazole 40mg PO qac  - continue, Senna 17.2mg PO qhs    Disposition  -  SICU    Discussed with Dr. Leone

## 2020-04-02 NOTE — DIETITIAN INITIAL EVALUATION ADULT. - REASON INDICATOR FOR ASSESSMENT
Intubated to ventilator (since 3/31) - Currently with NGT. LBM unknown. No edema. Skin intact. /63, MAP 84, Propofol at 5.7cc/hr (150kcal), LR, insulin, and Ve 13.6, Temp 36.2c.  Tried contacting Rebecca (wife) at [phone] at 9:41am to obtain pt's height but unsuccessful.

## 2020-04-02 NOTE — PROGRESS NOTE ADULT - SUBJECTIVE AND OBJECTIVE BOX
KONSTANTIN WYATT  [id number]  74y Male    Indication for ICU admission:  Acute Hypoxemic Respiratory Failure  clinical COVID19 (false negatvie)    Admit Date:03-30-20  ICU Date: 03-31-20  OR Date:    Bactrim (Unknown)    PAST MEDICAL & SURGICAL HISTORY:  Afib  DM (diabetes mellitus)  BPH (benign prostatic hyperplasia)  CAD (coronary artery disease)    Home Medications:  Cartia  mg/24 hours oral capsule, extended release: 1 cap(s) orally once a day (30 Mar 2020 18:04)  Eliquis 5 mg oral tablet: 1 tab(s) orally 2 times a day (30 Mar 2020 18:04)  Lipitor 40 mg oral tablet: 1 tab(s) orally once a day (30 Mar 2020 18:04)  lisinopril 40 mg oral tablet: 1 tab(s) orally once a day (30 Mar 2020 18:04)  metFORMIN 750 mg oral tablet, extended release: 1 tab(s) orally once a day (30 Mar 2020 18:04)      24HRS EVENT:    NEURO/PSYCH  Sedation;   - Propofol gtt titrate to RASS -1  -Pain Control with Acetaminophen 650mg PO q6 PRN    RESP  Vent day #2 (intubated 3/31)  Acute Hypoxemic Respiratory Failure;  - /18/80/15  - ABG:   - COVID-19 nevgative, clinically positive   Needs repeat PCR on 4/3/20  - Methylprednisolone 60mg q12  - cxr stable diffuse bilateral opacities  - Guaifenisen DM    CARD/VASC: on Levo 0.1 mcg/kg/min to target MAP>75  AFib; chronic  - Apixaban 5mg PO bid  - Diltiazem PO 30 q 6  CAD  - Atorvastatin 40mg PO qhs  HTN  - hold, Lisinopril     GI/NUTR:   - still NPO  -PPI/ Senna     /Renal:  Bolused 500cc x 2  - LIZZIE BUN/Cr 33/2.1>>  48/3.5  - LR @ 100cc/hr   - Evans UOP 10-15 cc/hr, no change after 2nd bolus  - hyperkalemia tx'd  K 5.1  -BPH   - Na 141/ K 5.0/ Mg 2.2/ IP 4.4    HEME/ONC  - Eliquis   - monitor H/H 13.2/40.7    ID  Fever;  - 101.4 @00:40 (3/31)>>  now AFebrile  - Acetaminophen 650mg PO q6  - ID following, recommended:       - Hydroxychloroquine 400mg PO q12h x 2 doses, then 200mg BID PO x 4 days (should be complete by 4/4)       - Azithromycin 500mg PO once, then 250mg PO qd x 4 days       - SARS-CoV-2 (repeat), nasal MRSA, urine Legionella & Strep  COVID-19, suspected;  - suspect false negative, repeat swab pending from 4/1/20  - started on Cefepime 4/1      ENDO  DM2;  - holding, Metformin   on Insulin gtt FS >300     MSK/DERM  - no active issues    DVT PPx  - on Apixaban     GI PPx  - still NPO  - Pantoprazole 40mg PO qac  - Senna 17.2mg PO qhs    Disposition  -  SICU    ***Tubes/Lines/Drains  ***  Peripheral IV  Central Venous Line  Right IJ TLC  	Date 3/31/20  Arterial Line  Right radial A-line		                Date: 3/31/20           Urinary Catheter		Indication: Strict I&O    Date Placed: 3/31/20      REVIEW OF SYSTEMS    [ ] A ten-point review of systems was otherwise negative except as noted.  [x ] Due to altered mental status/intubation, subjective information were not able to be obtained from the patient. History was obtained, to the extent possible, from review of the chart and collateral sources of information KONSTANTIN WYATT  [id number]  74y Male    Indication for ICU admission:  Acute Hypoxemic Respiratory Failure  clinical COVID19 (false negatvie)    Admit Date:03-30-20  ICU Date: 03-31-20  OR Date:    Bactrim (Unknown)    PAST MEDICAL & SURGICAL HISTORY:  Afib  DM (diabetes mellitus)  BPH (benign prostatic hyperplasia)  CAD (coronary artery disease)    Home Medications:  Cartia  mg/24 hours oral capsule, extended release: 1 cap(s) orally once a day (30 Mar 2020 18:04)  Eliquis 5 mg oral tablet: 1 tab(s) orally 2 times a day (30 Mar 2020 18:04)  Lipitor 40 mg oral tablet: 1 tab(s) orally once a day (30 Mar 2020 18:04)  lisinopril 40 mg oral tablet: 1 tab(s) orally once a day (30 Mar 2020 18:04)  metFORMIN 750 mg oral tablet, extended release: 1 tab(s) orally once a day (30 Mar 2020 18:04)      24HRS EVENT:    NEURO/PSYCH  Sedation;   - Propofol gtt titrate to RASS -1  -Pain Control with Acetaminophen 650mg PO q6 PRN    RESP  Vent day #2 (intubated 3/31)  Acute Hypoxemic Respiratory Failure;  - /18/80/15  - ABG:   - COVID-19 nevgative, clinically positive   Needs repeat PCR on 4/3/20  - Methylprednisolone 60mg q12  - cxr stable diffuse bilateral opacities  - Guaifenisen DM    CARD/VASC: on Levo 0.1 mcg/kg/min to target MAP>75  AFib; chronic  - Apixaban 5mg PO bid  - Diltiazem PO 30 q 6  CAD  - Atorvastatin 40mg PO qhs  HTN  - hold, Lisinopril     GI/NUTR:   - still NPO  -PPI/ Senna     /Renal:  Bolused 500cc x 2  - LIZZIE BUN/Cr 33/2.1>>  48/3.5  - LR @ 100cc/hr   - Santana UOP 10-15 cc/hr, no change after 2nd bolus  - hyperkalemia tx'd  K 5.1  -BPH   - Na 141/ K 5.0/ Mg 2.2/ IP 4.4    HEME/ONC  - Eliquis   - monitor H/H 13.2/40.7    ID  Fever;  - 101.4 @00:40 (3/31)>>  now AFebrile  - Acetaminophen 650mg PO q6  - ID following, recommended:       - Hydroxychloroquine 400mg PO q12h x 2 doses, then 200mg BID PO x 4 days (should be complete by 4/4)       - Azithromycin 500mg PO once, then 250mg PO qd x 4 days       - SARS-CoV-2 (repeat), nasal MRSA, urine Legionella & Strep  COVID-19, suspected;  - suspect false negative, repeat swab pending from 4/1/20  - started on Cefepime 4/1      ENDO  DM2;  - holding, Metformin   on Insulin gtt FS >300     MSK/DERM  - no active issues    DVT PPx  - on Apixaban     GI PPx  - still NPO  - Pantoprazole 40mg PO qac  - Senna 17.2mg PO qhs    Disposition  -  SICU    ***Tubes/Lines/Drains  ***  Peripheral IV  Central Venous Line  Right IJ TLC  	Date 3/31/20  Arterial Line  Right radial A-line		                Date: 3/31/20  Urinary Catheter		Indication: Strict I&O    Date Placed: 3/31/20      REVIEW OF SYSTEMS    [ ] A ten-point review of systems was otherwise negative except as noted.  [x ] Due to altered mental status/intubation, subjective information were not able to be obtained from the patient. History was obtained, to the extent possible, from review of the chart and collateral sources of information    PHYSICAL EXAM    General: sedated, intubated  Skin: warm and dry, normal color, normal skin turgor, moist mucous membranes  HEENT: head: atraumatic, normocephalic, no tenderness, normal scalp; PERRL  Respiratory: intubated, mechanically ventilated, symmetric chest rise, bilateral rales  Cardiovascular: normal rate, regular rhythm, no rub, systolic murmur, no gallops  Gastrointestinal: OGT in place, no distension normal bowel sounds, non tympanic, soft  : normal external genitalia, santana catheter in place  Musculoskeletal: no bony deformities, inflammation  Neurological:  sedated, RASS:-1, not following commands

## 2020-04-02 NOTE — DIETITIAN INITIAL EVALUATION ADULT. - PERTINENT MEDS FT
apixaban, abx, plaquenil, LR, insulin, vitamin C< levophed, propofol, acetaminophen, senna, zinc sulfate 3/30.

## 2020-04-03 LAB
4/8 RATIO: 5.26 RATIO — HIGH (ref 0.86–4.14)
ABS CD8: 35 /UL — LOW (ref 90–775)
ALBUMIN SERPL ELPH-MCNC: 2.5 G/DL — LOW (ref 3.5–5.2)
ALBUMIN SERPL ELPH-MCNC: 2.6 G/DL — LOW (ref 3.5–5.2)
ALP SERPL-CCNC: 71 U/L — SIGNIFICANT CHANGE UP (ref 30–115)
ALP SERPL-CCNC: 75 U/L — SIGNIFICANT CHANGE UP (ref 30–115)
ALT FLD-CCNC: 62 U/L — HIGH (ref 0–41)
ALT FLD-CCNC: 70 U/L — HIGH (ref 0–41)
ANION GAP SERPL CALC-SCNC: 17 MMOL/L — HIGH (ref 7–14)
ANION GAP SERPL CALC-SCNC: 17 MMOL/L — HIGH (ref 7–14)
APTT BLD: 27.9 SEC — SIGNIFICANT CHANGE UP (ref 27–39.2)
AST SERPL-CCNC: 34 U/L — SIGNIFICANT CHANGE UP (ref 0–41)
AST SERPL-CCNC: 48 U/L — HIGH (ref 0–41)
BASE EXCESS BLDA CALC-SCNC: -7.9 MMOL/L — LOW (ref -2–2)
BASOPHILS # BLD AUTO: 0.01 K/UL — SIGNIFICANT CHANGE UP (ref 0–0.2)
BASOPHILS NFR BLD AUTO: 0.1 % — SIGNIFICANT CHANGE UP (ref 0–1)
BILIRUB SERPL-MCNC: 0.7 MG/DL — SIGNIFICANT CHANGE UP (ref 0.2–1.2)
BILIRUB SERPL-MCNC: 0.8 MG/DL — SIGNIFICANT CHANGE UP (ref 0.2–1.2)
BUN SERPL-MCNC: 76 MG/DL — CRITICAL HIGH (ref 10–20)
BUN SERPL-MCNC: 87 MG/DL — CRITICAL HIGH (ref 10–20)
CALCIUM SERPL-MCNC: 7.9 MG/DL — LOW (ref 8.5–10.1)
CALCIUM SERPL-MCNC: 8.1 MG/DL — LOW (ref 8.5–10.1)
CD16+CD56+ CELLS NFR BLD: 16 % — SIGNIFICANT CHANGE UP (ref 7–27)
CD16+CD56+ CELLS NFR SPEC: 54 /UL — LOW (ref 80–426)
CD19 BLASTS SPEC-ACNC: 14 % — SIGNIFICANT CHANGE UP (ref 4–18)
CD19 BLASTS SPEC-ACNC: 47 /UL — SIGNIFICANT CHANGE UP (ref 32–326)
CD3 BLASTS SPEC-ACNC: 227 /UL — LOW (ref 396–2024)
CD3 BLASTS SPEC-ACNC: 68 % — SIGNIFICANT CHANGE UP (ref 58–84)
CD4 %: 56 % — SIGNIFICANT CHANGE UP (ref 30–56)
CD8 %: 11 % — SIGNIFICANT CHANGE UP (ref 11–43)
CHLORIDE SERPL-SCNC: 107 MMOL/L — SIGNIFICANT CHANGE UP (ref 98–110)
CHLORIDE SERPL-SCNC: 108 MMOL/L — SIGNIFICANT CHANGE UP (ref 98–110)
CK SERPL-CCNC: 53 U/L — SIGNIFICANT CHANGE UP (ref 0–225)
CO2 SERPL-SCNC: 16 MMOL/L — LOW (ref 17–32)
CO2 SERPL-SCNC: 19 MMOL/L — SIGNIFICANT CHANGE UP (ref 17–32)
CREAT SERPL-MCNC: 5.3 MG/DL — CRITICAL HIGH (ref 0.7–1.5)
CREAT SERPL-MCNC: 5.4 MG/DL — CRITICAL HIGH (ref 0.7–1.5)
CULTURE RESULTS: SIGNIFICANT CHANGE UP
CULTURE RESULTS: SIGNIFICANT CHANGE UP
D DIMER BLD IA.RAPID-MCNC: HIGH NG/ML DDU (ref 0–230)
EOSINOPHIL # BLD AUTO: 0 K/UL — SIGNIFICANT CHANGE UP (ref 0–0.7)
EOSINOPHIL NFR BLD AUTO: 0 % — SIGNIFICANT CHANGE UP (ref 0–8)
ERYTHROCYTE [SEDIMENTATION RATE] IN BLOOD: 1 MM/HR — SIGNIFICANT CHANGE UP (ref 0–10)
GAS PNL BLDA: SIGNIFICANT CHANGE UP
GLUCOSE BLDC GLUCOMTR-MCNC: 109 MG/DL — HIGH (ref 70–99)
GLUCOSE BLDC GLUCOMTR-MCNC: 133 MG/DL — HIGH (ref 70–99)
GLUCOSE BLDC GLUCOMTR-MCNC: 133 MG/DL — HIGH (ref 70–99)
GLUCOSE BLDC GLUCOMTR-MCNC: 144 MG/DL — HIGH (ref 70–99)
GLUCOSE BLDC GLUCOMTR-MCNC: 175 MG/DL — HIGH (ref 70–99)
GLUCOSE BLDC GLUCOMTR-MCNC: 177 MG/DL — HIGH (ref 70–99)
GLUCOSE BLDC GLUCOMTR-MCNC: 180 MG/DL — HIGH (ref 70–99)
GLUCOSE BLDC GLUCOMTR-MCNC: 190 MG/DL — HIGH (ref 70–99)
GLUCOSE BLDC GLUCOMTR-MCNC: 194 MG/DL — HIGH (ref 70–99)
GLUCOSE BLDC GLUCOMTR-MCNC: 196 MG/DL — HIGH (ref 70–99)
GLUCOSE BLDC GLUCOMTR-MCNC: 196 MG/DL — HIGH (ref 70–99)
GLUCOSE BLDC GLUCOMTR-MCNC: 206 MG/DL — HIGH (ref 70–99)
GLUCOSE BLDC GLUCOMTR-MCNC: 217 MG/DL — HIGH (ref 70–99)
GLUCOSE BLDC GLUCOMTR-MCNC: 224 MG/DL — HIGH (ref 70–99)
GLUCOSE BLDC GLUCOMTR-MCNC: 230 MG/DL — HIGH (ref 70–99)
GLUCOSE BLDC GLUCOMTR-MCNC: 243 MG/DL — HIGH (ref 70–99)
GLUCOSE BLDC GLUCOMTR-MCNC: 99 MG/DL — SIGNIFICANT CHANGE UP (ref 70–99)
GLUCOSE SERPL-MCNC: 116 MG/DL — HIGH (ref 70–99)
GLUCOSE SERPL-MCNC: 209 MG/DL — HIGH (ref 70–99)
HCO3 BLDA-SCNC: 20 MMOL/L — LOW (ref 21–29)
HCT VFR BLD CALC: 39.2 % — LOW (ref 42–52)
HCT VFR BLD CALC: 39.6 % — LOW (ref 42–52)
HGB BLD-MCNC: 12.7 G/DL — LOW (ref 14–18)
HGB BLD-MCNC: 12.9 G/DL — LOW (ref 14–18)
HOROWITZ INDEX BLDA+IHG-RTO: 90 — SIGNIFICANT CHANGE UP
IMM GRANULOCYTES NFR BLD AUTO: 0.4 % — HIGH (ref 0.1–0.3)
INR BLD: 1.19 RATIO — SIGNIFICANT CHANGE UP (ref 0.65–1.3)
LDH SERPL L TO P-CCNC: 614 U/L — HIGH (ref 50–242)
LYMPHOCYTES # BLD AUTO: 0.36 K/UL — LOW (ref 1.2–3.4)
LYMPHOCYTES # BLD AUTO: 2.5 % — LOW (ref 20.5–51.1)
MAGNESIUM SERPL-MCNC: 2.4 MG/DL — SIGNIFICANT CHANGE UP (ref 1.8–2.4)
MAGNESIUM SERPL-MCNC: 2.6 MG/DL — HIGH (ref 1.8–2.4)
MCHC RBC-ENTMCNC: 29.4 PG — SIGNIFICANT CHANGE UP (ref 27–31)
MCHC RBC-ENTMCNC: 30.1 PG — SIGNIFICANT CHANGE UP (ref 27–31)
MCHC RBC-ENTMCNC: 32.1 G/DL — SIGNIFICANT CHANGE UP (ref 32–37)
MCHC RBC-ENTMCNC: 32.9 G/DL — SIGNIFICANT CHANGE UP (ref 32–37)
MCV RBC AUTO: 91.6 FL — SIGNIFICANT CHANGE UP (ref 80–94)
MCV RBC AUTO: 91.7 FL — SIGNIFICANT CHANGE UP (ref 80–94)
MONOCYTES # BLD AUTO: 0.54 K/UL — SIGNIFICANT CHANGE UP (ref 0.1–0.6)
MONOCYTES NFR BLD AUTO: 3.8 % — SIGNIFICANT CHANGE UP (ref 1.7–9.3)
NEUTROPHILS # BLD AUTO: 13.28 K/UL — HIGH (ref 1.4–6.5)
NEUTROPHILS NFR BLD AUTO: 93.2 % — HIGH (ref 42.2–75.2)
NRBC # BLD: 0 /100 WBCS — SIGNIFICANT CHANGE UP (ref 0–0)
NRBC # BLD: 0 /100 WBCS — SIGNIFICANT CHANGE UP (ref 0–0)
PCO2 BLDA: 52 MMHG — HIGH (ref 38–42)
PH BLDA: 7.2 — LOW (ref 7.38–7.42)
PHOSPHATE SERPL-MCNC: 7 MG/DL — HIGH (ref 2.1–4.9)
PHOSPHATE SERPL-MCNC: 7.3 MG/DL — HIGH (ref 2.1–4.9)
PLATELET # BLD AUTO: 273 K/UL — SIGNIFICANT CHANGE UP (ref 130–400)
PLATELET # BLD AUTO: 273 K/UL — SIGNIFICANT CHANGE UP (ref 130–400)
PO2 BLDA: 224 MMHG — HIGH (ref 78–95)
POTASSIUM SERPL-MCNC: 5.1 MMOL/L — HIGH (ref 3.5–5)
POTASSIUM SERPL-MCNC: 5.8 MMOL/L — HIGH (ref 3.5–5)
POTASSIUM SERPL-SCNC: 5.1 MMOL/L — HIGH (ref 3.5–5)
POTASSIUM SERPL-SCNC: 5.8 MMOL/L — HIGH (ref 3.5–5)
PROCALCITONIN SERPL-MCNC: 2.55 NG/ML — HIGH (ref 0.02–0.1)
PROT SERPL-MCNC: 5.1 G/DL — LOW (ref 6–8)
PROT SERPL-MCNC: 5.3 G/DL — LOW (ref 6–8)
PROTHROM AB SERPL-ACNC: 13.7 SEC — HIGH (ref 9.95–12.87)
RBC # BLD: 4.28 M/UL — LOW (ref 4.7–6.1)
RBC # BLD: 4.32 M/UL — LOW (ref 4.7–6.1)
RBC # FLD: 14.7 % — HIGH (ref 11.5–14.5)
RBC # FLD: 14.7 % — HIGH (ref 11.5–14.5)
SAO2 % BLDA: 98 % — HIGH (ref 92–96)
SODIUM SERPL-SCNC: 141 MMOL/L — SIGNIFICANT CHANGE UP (ref 135–146)
SODIUM SERPL-SCNC: 143 MMOL/L — SIGNIFICANT CHANGE UP (ref 135–146)
SPECIMEN SOURCE: SIGNIFICANT CHANGE UP
SPECIMEN SOURCE: SIGNIFICANT CHANGE UP
T-CELL CD4 SUBSET PNL BLD: 184 /UL — LOW (ref 325–1251)
WBC # BLD: 14.02 K/UL — HIGH (ref 4.8–10.8)
WBC # BLD: 14.25 K/UL — HIGH (ref 4.8–10.8)
WBC # FLD AUTO: 14.02 K/UL — HIGH (ref 4.8–10.8)
WBC # FLD AUTO: 14.25 K/UL — HIGH (ref 4.8–10.8)

## 2020-04-03 PROCEDURE — 71045 X-RAY EXAM CHEST 1 VIEW: CPT | Mod: 26

## 2020-04-03 PROCEDURE — 93010 ELECTROCARDIOGRAM REPORT: CPT

## 2020-04-03 PROCEDURE — 99291 CRITICAL CARE FIRST HOUR: CPT

## 2020-04-03 RX ORDER — CALCIUM GLUCONATE 100 MG/ML
1 VIAL (ML) INTRAVENOUS ONCE
Refills: 0 | Status: COMPLETED | OUTPATIENT
Start: 2020-04-03 | End: 2020-04-03

## 2020-04-03 RX ORDER — SODIUM BICARBONATE 1 MEQ/ML
150 SYRINGE (ML) INTRAVENOUS
Refills: 0 | Status: DISCONTINUED | OUTPATIENT
Start: 2020-04-03 | End: 2020-04-03

## 2020-04-03 RX ORDER — INSULIN HUMAN 100 [IU]/ML
10 INJECTION, SOLUTION SUBCUTANEOUS ONCE
Refills: 0 | Status: COMPLETED | OUTPATIENT
Start: 2020-04-03 | End: 2020-04-03

## 2020-04-03 RX ORDER — SPIRONOLACTONE 25 MG/1
25 TABLET, FILM COATED ORAL DAILY
Refills: 0 | Status: DISCONTINUED | OUTPATIENT
Start: 2020-04-03 | End: 2020-04-06

## 2020-04-03 RX ORDER — SODIUM BICARBONATE 1 MEQ/ML
100 SYRINGE (ML) INTRAVENOUS ONCE
Refills: 0 | Status: COMPLETED | OUTPATIENT
Start: 2020-04-03 | End: 2020-04-03

## 2020-04-03 RX ORDER — INSULIN HUMAN 100 [IU]/ML
10 INJECTION, SOLUTION SUBCUTANEOUS ONCE
Refills: 0 | Status: DISCONTINUED | OUTPATIENT
Start: 2020-04-03 | End: 2020-04-03

## 2020-04-03 RX ORDER — DEXTROSE 50 % IN WATER 50 %
50 SYRINGE (ML) INTRAVENOUS ONCE
Refills: 0 | Status: DISCONTINUED | OUTPATIENT
Start: 2020-04-03 | End: 2020-04-03

## 2020-04-03 RX ADMIN — INSULIN HUMAN 1 UNIT(S)/HR: 100 INJECTION, SOLUTION SUBCUTANEOUS at 13:28

## 2020-04-03 RX ADMIN — Medication 500 MILLIGRAM(S): at 07:21

## 2020-04-03 RX ADMIN — PANTOPRAZOLE SODIUM 40 MILLIGRAM(S): 20 TABLET, DELAYED RELEASE ORAL at 13:27

## 2020-04-03 RX ADMIN — AZITHROMYCIN 250 MILLIGRAM(S): 500 TABLET, FILM COATED ORAL at 13:26

## 2020-04-03 RX ADMIN — Medication 30 MILLIGRAM(S): at 18:39

## 2020-04-03 RX ADMIN — PROPOFOL 5.7 MICROGRAM(S)/KG/MIN: 10 INJECTION, EMULSION INTRAVENOUS at 18:34

## 2020-04-03 RX ADMIN — CEFEPIME 100 MILLIGRAM(S): 1 INJECTION, POWDER, FOR SOLUTION INTRAMUSCULAR; INTRAVENOUS at 13:27

## 2020-04-03 RX ADMIN — SENNA PLUS 2 TABLET(S): 8.6 TABLET ORAL at 21:39

## 2020-04-03 RX ADMIN — Medication 30 MILLIGRAM(S): at 13:27

## 2020-04-03 RX ADMIN — SPIRONOLACTONE 25 MILLIGRAM(S): 25 TABLET, FILM COATED ORAL at 13:28

## 2020-04-03 RX ADMIN — ZINC SULFATE TAB 220 MG (50 MG ZINC EQUIVALENT) 220 MILLIGRAM(S): 220 (50 ZN) TAB at 13:28

## 2020-04-03 RX ADMIN — Medication 200 MILLIGRAM(S): at 18:40

## 2020-04-03 RX ADMIN — Medication 30 MILLIGRAM(S): at 01:37

## 2020-04-03 RX ADMIN — Medication 10 MILLILITER(S): at 13:27

## 2020-04-03 RX ADMIN — APIXABAN 5 MILLIGRAM(S): 2.5 TABLET, FILM COATED ORAL at 07:21

## 2020-04-03 RX ADMIN — CHLORHEXIDINE GLUCONATE 1 APPLICATION(S): 213 SOLUTION TOPICAL at 06:09

## 2020-04-03 RX ADMIN — Medication 60 MILLIGRAM(S): at 06:47

## 2020-04-03 RX ADMIN — Medication 100 MILLIEQUIVALENT(S): at 08:11

## 2020-04-03 RX ADMIN — Medication 60 MILLIGRAM(S): at 18:41

## 2020-04-03 RX ADMIN — PROPOFOL 5.7 MICROGRAM(S)/KG/MIN: 10 INJECTION, EMULSION INTRAVENOUS at 13:28

## 2020-04-03 RX ADMIN — Medication 200 MILLIGRAM(S): at 06:48

## 2020-04-03 RX ADMIN — APIXABAN 5 MILLIGRAM(S): 2.5 TABLET, FILM COATED ORAL at 18:47

## 2020-04-03 RX ADMIN — Medication 10 MILLILITER(S): at 21:40

## 2020-04-03 RX ADMIN — Medication 500 MILLIGRAM(S): at 21:40

## 2020-04-03 RX ADMIN — Medication 10 MILLILITER(S): at 06:48

## 2020-04-03 RX ADMIN — Medication 100 GRAM(S): at 03:30

## 2020-04-03 RX ADMIN — Medication 30 MILLIGRAM(S): at 06:51

## 2020-04-03 RX ADMIN — Medication 500 MILLIGRAM(S): at 13:27

## 2020-04-03 NOTE — PROGRESS NOTE ADULT - SUBJECTIVE AND OBJECTIVE BOX
24 Hour events    Overnight patient was hypothermic, place on bear-hugger. Potassium: 5.8, treated with 10units of insulin and D10% 250mL.    --------------------------------------------------------------------------------------  VITAL SIGNS, INS/OUTS (last 24 hours):  --------------------------------------------------------------------------------------  ICU Vital Signs Last 24 Hrs  T(C): 36.3 (03 Apr 2020 08:00), Max: 36.3 (02 Apr 2020 16:00)  T(F): 97.3 (03 Apr 2020 08:00), Max: 97.3 (02 Apr 2020 16:00)  HR: 85 (03 Apr 2020 12:00) (70 - 102)  BP: 141/67 (03 Apr 2020 12:00) (84/55 - 141/67)  BP(mean): 91 (03 Apr 2020 08:00) (65 - 93)  ABP: 143/57 (03 Apr 2020 12:00) (82/48 - 143/57)  ABP(mean): 82 (03 Apr 2020 12:00) (58 - 86)  RR: 25 (03 Apr 2020 12:00) (22 - 31)  SpO2: 100% (03 Apr 2020 12:00) (86% - 100%)    I&O's Summary    02 Apr 2020 07:01  -  03 Apr 2020 07:00  --------------------------------------------------------  IN: 3600.6 mL / OUT: 600 mL / NET: 3000.6 mL    03 Apr 2020 07:01  -  03 Apr 2020 13:05  --------------------------------------------------------  IN: 1017.5 mL / OUT: 135 mL / NET: 882.5 mL      --------------------------------------------------------------------------------------  PHYSICAL EXAM  General: sedated   Skin: warm and dry, normal color, normal skin turgor, moist mucous membranes  HEENT: head: atraumatic, normocephalic, no tenderness, normal scalp; pupils pinpoint and sluggish  Respiratory: intubated, mechanically ventilated symmetric chest rise, bilateral rales on auscultation  Cardiovascular: normal rate, irregular rhythm, no rub, systolic murmur, no gallops  Gastrointestinal: OGT in place, no distension normal bowel sounds, non tympanic, soft  : normal external genitalia, santana catheter in place  Musculoskeletal: no bony deformities, inflammation  Neurological: RASS-3    Tubes/Lines/Drains  ***  [x] Peripheral IV  [X] Central Venous Line     	[] R	[] L	[] IJ   	[] Fem	[] SC        Type:	    Date Placed:   [X] Arterial Line		        [] R	[] L	[] Fem	[] Rad	[] Ax	                    Date Placed:   [] PICC:         	[] Midline		[] Mediport           [X] Urinary Catheter		Date Placed:     LABS  --------------------------------------------------------------------------------------  Labs:  CAPILLARY BLOOD GLUCOSE      POCT Blood Glucose.: 180 mg/dL (03 Apr 2020 11:48)  POCT Blood Glucose.: 217 mg/dL (03 Apr 2020 10:37)  POCT Blood Glucose.: 230 mg/dL (03 Apr 2020 08:52)  POCT Blood Glucose.: 224 mg/dL (03 Apr 2020 07:24)  POCT Blood Glucose.: 243 mg/dL (03 Apr 2020 05:53)  POCT Blood Glucose.: 206 mg/dL (03 Apr 2020 03:14)  POCT Blood Glucose.: 196 mg/dL (03 Apr 2020 00:32)  POCT Blood Glucose.: 123 mg/dL (02 Apr 2020 18:51)  POCT Blood Glucose.: 160 mg/dL (02 Apr 2020 14:30)                          12.7   14.25 )-----------( 273      ( 03 Apr 2020 00:05 )             39.6       Auto Neutrophil %: 93.2 % (04-03-20 @ 00:05)  Auto Immature Granulocyte %: 0.4 % (04-03-20 @ 00:05)    04-03    141  |  108  |  76<HH>  ----------------------------<  209<H>  5.8<H>   |  16<L>  |  5.3<HH>      Calcium, Total Serum: 7.9 mg/dL (04-03-20 @ 00:05)      LFTs:             5.1  | 0.8  | 48       ------------------[75      ( 03 Apr 2020 00:05 )  2.5  | x    | 70          Lipase:x      Amylase:x         Blood Gas Arterial, Lactate: 1.1 mmoL/L (04-03-20 @ 03:48)  Blood Gas Arterial, Lactate: 1.8 mmoL/L (04-02-20 @ 13:18)  Blood Gas Arterial, Lactate: 1.2 mmoL/L (04-02-20 @ 02:52)  Blood Gas Arterial, Lactate: 1.6 mmoL/L (04-01-20 @ 15:45)  Blood Gas Arterial, Lactate: 1.5 mmoL/L (04-01-20 @ 02:27)  Blood Gas Arterial, Lactate: 1.9 mmoL/L (03-31-20 @ 20:39)    ABG - ( 03 Apr 2020 03:48 )  pH: 7.15  /  pCO2: 51    /  pO2: 307   / HCO3: 18    / Base Excess: -11.1 /  SaO2: 99        ABG - ( 02 Apr 2020 13:18 )  pH: 7.34  /  pCO2: 21    /  pO2: 69    / HCO3: 12    / Base Excess: -12.0 /  SaO2: 93        ABG - ( 02 Apr 2020 02:52 )  pH: 7.26  /  pCO2: 39    /  pO2: 231   / HCO3: 18    / Base Excess: -8.7  /  SaO2: 99        Coags:     13.70  ----< 1.19    ( 03 Apr 2020 06:50 )     27.9        CARDIAC MARKERS ( 03 Apr 2020 06:50 )  x     / x     / 53 U/L / x     / x      CARDIAC MARKERS ( 01 Apr 2020 23:35 )  x     / 0.03 ng/mL / 103 U/L / x     / x        < from: Xray Chest 1 View- PORTABLE-Routine (04.03.20 @ 03:46) >    EXAM:  XR CHEST PORTABLE ROUTINE 1V            PROCEDURE DATE:  04/03/2020        INTERPRETATION:  CLINICAL INDICATION:  Pneumonia    COMPARISON: Chest radiograph dated 4/2/2020     TECHNIQUE: Frontal radiograph the chest.    FINDINGS:    Support devices: ET tube, enteric tube and right IJ central venous catheters were stable..     Cardiac/mediastinum/hilum: Stable.    Lung parenchyma/Pleura: Slight worsening in the bilateral opacities. There is no pneumothorax.    Skeleton/soft tissues: Stable.    IMPRESSION:     Slight worsening in the bilateral opacities.                    JOEY GARCÍA M.D., ATTENDING RADIOLOGIST  This document has been electronically signed. Apr  3 2020  8:10AM                < end of copied text >

## 2020-04-03 NOTE — PROGRESS NOTE ADULT - SUBJECTIVE AND OBJECTIVE BOX
KONSTANTIN WYATT  74y, Male    All available historical data reviewed    OVERNIGHT EVENTS:    fio2 100 %  ROS:  unable to obtain history secondary to patient's mental status and/or sedation     VITALS:  T(F): 97.3, Max: 97.3 (04-03-20 @ 08:00)  HR: 85  BP: 141/67  RR: 25Vital Signs Last 24 Hrs  T(C): 36.3 (03 Apr 2020 08:00), Max: 36.3 (03 Apr 2020 08:00)  T(F): 97.3 (03 Apr 2020 08:00), Max: 97.3 (03 Apr 2020 08:00)  HR: 85 (03 Apr 2020 12:00) (70 - 90)  BP: 141/67 (03 Apr 2020 12:00) (84/55 - 141/67)  BP(mean): 91 (03 Apr 2020 08:00) (65 - 91)  RR: 25 (03 Apr 2020 12:00) (22 - 25)  SpO2: 100% (03 Apr 2020 12:00) (96% - 100%)    TESTS & MEASUREMENTS:                        12.7   14.25 )-----------( 273      ( 03 Apr 2020 00:05 )             39.6     04-03    141  |  108  |  76<HH>  ----------------------------<  209<H>  5.8<H>   |  16<L>  |  5.3<HH>    Ca    7.9<L>      03 Apr 2020 00:05  Phos  7.0     04-03  Mg     2.4     04-03    TPro  5.1<L>  /  Alb  2.5<L>  /  TBili  0.8  /  DBili  x   /  AST  48<H>  /  ALT  70<H>  /  AlkPhos  75  04-03    LIVER FUNCTIONS - ( 03 Apr 2020 00:05 )  Alb: 2.5 g/dL / Pro: 5.1 g/dL / ALK PHOS: 75 U/L / ALT: 70 U/L / AST: 48 U/L / GGT: x             Culture - Urine (collected 03-30-20 @ 22:20)  Source: .Urine Catheterized  Final Report (03-31-20 @ 23:16):    No growth    Culture - Urine (collected 03-30-20 @ 14:55)  Source: .Urine Clean Catch (Midstream)  Final Report (03-31-20 @ 18:20):    No growth    Culture - Blood (collected 03-30-20 @ 12:14)  Source: .Blood Blood-Peripheral  Preliminary Report (03-31-20 @ 22:02):    No growth to date.    Culture - Blood (collected 03-30-20 @ 12:14)  Source: .Blood Blood-Peripheral  Preliminary Report (03-31-20 @ 22:02):    No growth to date.            RADIOLOGY & ADDITIONAL TESTS:  Personal review of radiological diagnostics performed  Echo and EKG results noted when applicable.     MEDICATIONS:  acetaminophen  Suppository .. 650 milliGRAM(s) Rectal every 6 hours PRN  apixaban 5 milliGRAM(s) Oral two times a day  ascorbic acid 500 milliGRAM(s) Oral every 8 hours  azithromycin   Tablet 250 milliGRAM(s) Oral daily  cefepime   IVPB      cefepime   IVPB 1000 milliGRAM(s) IV Intermittent every 24 hours  chlorhexidine 4% Liquid 1 Application(s) Topical <User Schedule>  dextrose 50% Injectable 50 milliLiter(s) IV Push once  diltiazem    Tablet 30 milliGRAM(s) Oral every 6 hours  guaifenesin/dextromethorphan  Syrup 10 milliLiter(s) Oral every 4 hours PRN  guaifenesin/dextromethorphan  Syrup 10 milliLiter(s) Oral every 8 hours  hydroxychloroquine 200 milliGRAM(s) Oral every 12 hours  insulin regular Infusion 1 Unit(s)/Hr IV Continuous <Continuous>  lactated ringers. 1000 milliLiter(s) IV Continuous <Continuous>  methylPREDNISolone sodium succinate Injectable 60 milliGRAM(s) IV Push every 12 hours  midazolam Infusion 0.053 mG/kG/Hr IV Continuous <Continuous>  morphine  Infusion 2 mG/Hr IV Continuous <Continuous>  norepinephrine Infusion 0.05 MICROgram(s)/kG/Min IV Continuous <Continuous>  pantoprazole  Injectable 40 milliGRAM(s) IV Push daily  propofol Infusion 10 MICROgram(s)/kG/Min IV Continuous <Continuous>  senna 2 Tablet(s) Oral at bedtime  spironolactone 25 milliGRAM(s) Oral daily  zinc sulfate 220 milliGRAM(s) Oral daily      ANTIBIOTICS:  azithromycin   Tablet 250 milliGRAM(s) Oral daily  cefepime   IVPB      cefepime   IVPB 1000 milliGRAM(s) IV Intermittent every 24 hours  hydroxychloroquine 200 milliGRAM(s) Oral every 12 hours

## 2020-04-03 NOTE — PROGRESS NOTE ADULT - ASSESSMENT
IMPRESSION:  COVID19 with septic shock requiring pressors, mechanical ventilation with ARDS with fio2 100 % secondary to Cytokine Release Syndrome  -end organ damage with acute renal failure, increased transaminitis, metabolic encephalopathy  -inflammatory markers significantly elevated   -d Dimers 55447/ CRP 31.9  associated with increased mortality  -Blood & Urine cxs NGTD     RECOMMENDATIONS:  -HCQ/azithro for 7 days  -deep ET cultures  -Cefepime 1 gm iv q24h

## 2020-04-03 NOTE — PROGRESS NOTE ADULT - ASSESSMENT
NEURO/PSYCH  Sedation;   - continue, Propofol gtt titrate to RASS -3  - started Midazolam gtt 5mg/hr  - started Morphine gtt 2mg/hr  Pain Control;  - continue, Acetaminophen 650mg PO q6 PRN    RESP  Acute Hypoxemic Respiratory Failure;  Mode: AC/ CMV (Assist Control/ Continuous Mandatory Ventilation)  RR (machine): 25 TV (machine): 450 FiO2: 90 PEEP: 15 ITime: 2 MAP: 23 PIP: 35   RR: 25 (04-03-20 @ 12:00) (18 - 31) SpO2: 100% (04-03-20 @ 12:00) (86% - 100%)  ABG - ( 03 Apr 2020 03:48 ) pH: 7.15<LL> /  pCO2: 51<H> /  pO2: 307<H> / HCO3: 18<L> / Base Excess: -11.1<L>  ABG - ( 02 Apr 2020 13:18 ) pH: 7.34<L> /  pCO2: 21<L> /  pO2: 69<L> / HCO3: 12<L> / Base Excess: -12.0<L>   Respiratory Acidosis;  - NaHCO3 x 2  - increased RR  - FiO2 decreased to 90%  - continue, ABG qd  COVID-19/Viral Pneumonia;  - positive SARS-CoV-2; previous false negative  - continue, Methylprednisolone 60mg q12  - worsening diffuse bilateral opacities  - continue, Guaifenisen/DM  - continue, cxr qam    CARD/VASC  AFib; chronic  - continue, Apixaban 5mg PO bid  - continue, Diltiazem 120mg PO  - conintue, EKG qd  CAD  - continue, Atorvastatin 40mg PO qhs  HTN  - hold, Lisinopril     GI/NUTR  - NPO  - TF @ 30mL/hr  - increase to 75mL/hr    /Renal  LIZZIE; worsening  04-03 @ 00:05  BUN  76     Creatinine  5.3   04-02 @ 15:30  BUN  71     Creatinine  4.8   04-01 @ 23:35  BUN  56     Creatinine  4.1   UOP; responding to spironolactone  02 Apr 2020 07:01  -  03 Apr 2020 07:00 NET: 3000.6 mL  03 Apr 2020 07:01  -  03 Apr 2020 12:57 NET: 882.5 mL  - continue, Spironolactone 25mg OGT qd  - monitor, I&Os  Fluids  - continue, LR 100mL/hr  - monitor I&Os  - santana in place  Electrolytes;  Sodium/Potassium/Magnesium/Phosphate  04-03 @ 00:05  Sodium  141  Potassium 5.8  Magnesium 2.4  Phosphorus  7.0  Hyperkalemia;  - treated with insulin & D10  - repeat CMP in the pm  - monitor e-  BPH; chronic     HEME/ONC  04-03-20 @ 00:05 Hb 12.7  Hct  39.6  04-01-20 @ 23:35 Hb 13.2  Hct  40.7  03-31-20 @ 23:50 Hb 14.2  Hct  43.6  - stable H/H  - monitor H/H    ID  Fever;  - continue, Acetaminophen 650mg PO q6  COVID-19, suspected;  - positive repeat swab  - continue, Hydroxychloroquine (complete 4/5)  - continue, Azithromycin (complete 4/4)  - continue, Cefepime   - nasal MRSA, urine Legionella & Strep pending     ENDO  DM2;  - continue, Insulin gtt   - continue, POCT q1  - holding, Metformin    MSK/DERM  - no active issues    DVT PPx  - on Apixaban     GI PPx  - continue, Pantoprazole 40mg PO qac  - continue, Senna 17.2mg PO qhs    Disposition  -  SICU

## 2020-04-04 LAB
ALBUMIN SERPL ELPH-MCNC: 2.5 G/DL — LOW (ref 3.5–5.2)
ALBUMIN SERPL ELPH-MCNC: 2.5 G/DL — LOW (ref 3.5–5.2)
ALP SERPL-CCNC: 75 U/L — SIGNIFICANT CHANGE UP (ref 30–115)
ALP SERPL-CCNC: 80 U/L — SIGNIFICANT CHANGE UP (ref 30–115)
ALT FLD-CCNC: 49 U/L — HIGH (ref 0–41)
ALT FLD-CCNC: 55 U/L — HIGH (ref 0–41)
ANION GAP SERPL CALC-SCNC: 15 MMOL/L — HIGH (ref 7–14)
ANION GAP SERPL CALC-SCNC: 16 MMOL/L — HIGH (ref 7–14)
APTT BLD: 27 SEC — SIGNIFICANT CHANGE UP (ref 27–39.2)
AST SERPL-CCNC: 29 U/L — SIGNIFICANT CHANGE UP (ref 0–41)
AST SERPL-CCNC: 30 U/L — SIGNIFICANT CHANGE UP (ref 0–41)
BASE EXCESS BLDA CALC-SCNC: -7.1 MMOL/L — LOW (ref -2–2)
BASE EXCESS BLDA CALC-SCNC: -7.3 MMOL/L — LOW (ref -2–2)
BASOPHILS # BLD AUTO: 0.01 K/UL — SIGNIFICANT CHANGE UP (ref 0–0.2)
BASOPHILS NFR BLD AUTO: 0.1 % — SIGNIFICANT CHANGE UP (ref 0–1)
BILIRUB SERPL-MCNC: 0.6 MG/DL — SIGNIFICANT CHANGE UP (ref 0.2–1.2)
BILIRUB SERPL-MCNC: 0.7 MG/DL — SIGNIFICANT CHANGE UP (ref 0.2–1.2)
BUN SERPL-MCNC: 90 MG/DL — CRITICAL HIGH (ref 10–20)
BUN SERPL-MCNC: 99 MG/DL — CRITICAL HIGH (ref 10–20)
CALCIUM SERPL-MCNC: 8 MG/DL — LOW (ref 8.5–10.1)
CALCIUM SERPL-MCNC: 8 MG/DL — LOW (ref 8.5–10.1)
CHLORIDE SERPL-SCNC: 105 MMOL/L — SIGNIFICANT CHANGE UP (ref 98–110)
CHLORIDE SERPL-SCNC: 106 MMOL/L — SIGNIFICANT CHANGE UP (ref 98–110)
CO2 SERPL-SCNC: 17 MMOL/L — SIGNIFICANT CHANGE UP (ref 17–32)
CO2 SERPL-SCNC: 18 MMOL/L — SIGNIFICANT CHANGE UP (ref 17–32)
CREAT SERPL-MCNC: 5.5 MG/DL — CRITICAL HIGH (ref 0.7–1.5)
CREAT SERPL-MCNC: 5.9 MG/DL — CRITICAL HIGH (ref 0.7–1.5)
CRP SERPL-MCNC: 7.35 MG/DL — HIGH (ref 0–0.4)
D DIMER BLD IA.RAPID-MCNC: HIGH NG/ML DDU (ref 0–230)
EOSINOPHIL # BLD AUTO: 0 K/UL — SIGNIFICANT CHANGE UP (ref 0–0.7)
EOSINOPHIL NFR BLD AUTO: 0 % — SIGNIFICANT CHANGE UP (ref 0–8)
ERYTHROCYTE [SEDIMENTATION RATE] IN BLOOD: 61 MM/HR — HIGH (ref 0–10)
FERRITIN SERPL-MCNC: 1250 NG/ML — HIGH (ref 30–400)
FERRITIN SERPL-MCNC: 1603 NG/ML — HIGH (ref 30–400)
GAS PNL BLDA: SIGNIFICANT CHANGE UP
GLUCOSE BLDC GLUCOMTR-MCNC: 134 MG/DL — HIGH (ref 70–99)
GLUCOSE BLDC GLUCOMTR-MCNC: 139 MG/DL — HIGH (ref 70–99)
GLUCOSE BLDC GLUCOMTR-MCNC: 141 MG/DL — HIGH (ref 70–99)
GLUCOSE BLDC GLUCOMTR-MCNC: 141 MG/DL — HIGH (ref 70–99)
GLUCOSE BLDC GLUCOMTR-MCNC: 143 MG/DL — HIGH (ref 70–99)
GLUCOSE BLDC GLUCOMTR-MCNC: 152 MG/DL — HIGH (ref 70–99)
GLUCOSE BLDC GLUCOMTR-MCNC: 154 MG/DL — HIGH (ref 70–99)
GLUCOSE BLDC GLUCOMTR-MCNC: 158 MG/DL — HIGH (ref 70–99)
GLUCOSE BLDC GLUCOMTR-MCNC: 159 MG/DL — HIGH (ref 70–99)
GLUCOSE BLDC GLUCOMTR-MCNC: 165 MG/DL — HIGH (ref 70–99)
GLUCOSE BLDC GLUCOMTR-MCNC: 168 MG/DL — HIGH (ref 70–99)
GLUCOSE BLDC GLUCOMTR-MCNC: 170 MG/DL — HIGH (ref 70–99)
GLUCOSE BLDC GLUCOMTR-MCNC: 188 MG/DL — HIGH (ref 70–99)
GLUCOSE BLDC GLUCOMTR-MCNC: 189 MG/DL — HIGH (ref 70–99)
GLUCOSE BLDC GLUCOMTR-MCNC: 193 MG/DL — HIGH (ref 70–99)
GLUCOSE SERPL-MCNC: 177 MG/DL — HIGH (ref 70–99)
GLUCOSE SERPL-MCNC: 216 MG/DL — HIGH (ref 70–99)
HCO3 BLDA-SCNC: 19 MMOL/L — LOW (ref 23–27)
HCO3 BLDA-SCNC: 20 MMOL/L — LOW (ref 21–29)
HCT VFR BLD CALC: 37.6 % — LOW (ref 42–52)
HCT VFR BLD CALC: 37.8 % — LOW (ref 42–52)
HGB BLD-MCNC: 12.1 G/DL — LOW (ref 14–18)
HGB BLD-MCNC: 12.8 G/DL — LOW (ref 14–18)
HOROWITZ INDEX BLDA+IHG-RTO: 60 — SIGNIFICANT CHANGE UP
IMM GRANULOCYTES NFR BLD AUTO: 0.7 % — HIGH (ref 0.1–0.3)
INR BLD: 1.2 RATIO — SIGNIFICANT CHANGE UP (ref 0.65–1.3)
LDH SERPL L TO P-CCNC: 571 U/L — HIGH (ref 50–242)
LYMPHOCYTES # BLD AUTO: 0.2 K/UL — LOW (ref 1.2–3.4)
LYMPHOCYTES # BLD AUTO: 1.6 % — LOW (ref 20.5–51.1)
MAGNESIUM SERPL-MCNC: 2.5 MG/DL — HIGH (ref 1.8–2.4)
MAGNESIUM SERPL-MCNC: 2.6 MG/DL — HIGH (ref 1.8–2.4)
MCHC RBC-ENTMCNC: 28.8 PG — SIGNIFICANT CHANGE UP (ref 27–31)
MCHC RBC-ENTMCNC: 30.8 PG — SIGNIFICANT CHANGE UP (ref 27–31)
MCHC RBC-ENTMCNC: 32 G/DL — SIGNIFICANT CHANGE UP (ref 32–37)
MCHC RBC-ENTMCNC: 34 G/DL — SIGNIFICANT CHANGE UP (ref 32–37)
MCV RBC AUTO: 90 FL — SIGNIFICANT CHANGE UP (ref 80–94)
MCV RBC AUTO: 90.4 FL — SIGNIFICANT CHANGE UP (ref 80–94)
MONOCYTES # BLD AUTO: 0.43 K/UL — SIGNIFICANT CHANGE UP (ref 0.1–0.6)
MONOCYTES NFR BLD AUTO: 3.4 % — SIGNIFICANT CHANGE UP (ref 1.7–9.3)
NEUTROPHILS # BLD AUTO: 11.78 K/UL — HIGH (ref 1.4–6.5)
NEUTROPHILS NFR BLD AUTO: 94.2 % — HIGH (ref 42.2–75.2)
NRBC # BLD: 0 /100 WBCS — SIGNIFICANT CHANGE UP (ref 0–0)
NRBC # BLD: 0 /100 WBCS — SIGNIFICANT CHANGE UP (ref 0–0)
PCO2 BLDA: 40 MMHG — SIGNIFICANT CHANGE UP (ref 38–42)
PCO2 BLDA: 44 MMHG — HIGH (ref 38–42)
PH BLDA: 7.26 — LOW (ref 7.38–7.42)
PH BLDA: 7.3 — LOW (ref 7.38–7.42)
PHOSPHATE SERPL-MCNC: 7.2 MG/DL — HIGH (ref 2.1–4.9)
PHOSPHATE SERPL-MCNC: 8.1 MG/DL — HIGH (ref 2.1–4.9)
PLATELET # BLD AUTO: 269 K/UL — SIGNIFICANT CHANGE UP (ref 130–400)
PLATELET # BLD AUTO: 282 K/UL — SIGNIFICANT CHANGE UP (ref 130–400)
PO2 BLDA: 171 MMHG — HIGH (ref 78–95)
PO2 BLDA: 244 MMHG — HIGH (ref 78–95)
POTASSIUM SERPL-MCNC: 5.3 MMOL/L — HIGH (ref 3.5–5)
POTASSIUM SERPL-MCNC: 5.4 MMOL/L — HIGH (ref 3.5–5)
POTASSIUM SERPL-SCNC: 5.3 MMOL/L — HIGH (ref 3.5–5)
POTASSIUM SERPL-SCNC: 5.4 MMOL/L — HIGH (ref 3.5–5)
PROCALCITONIN SERPL-MCNC: 2.33 NG/ML — HIGH (ref 0.02–0.1)
PROT SERPL-MCNC: 5 G/DL — LOW (ref 6–8)
PROT SERPL-MCNC: 5.1 G/DL — LOW (ref 6–8)
PROTHROM AB SERPL-ACNC: 13.8 SEC — HIGH (ref 9.95–12.87)
RBC # BLD: 4.16 M/UL — LOW (ref 4.7–6.1)
RBC # BLD: 4.2 M/UL — LOW (ref 4.7–6.1)
RBC # FLD: 14.7 % — HIGH (ref 11.5–14.5)
RBC # FLD: 14.9 % — HIGH (ref 11.5–14.5)
SAO2 % BLDA: 98 % — HIGH (ref 92–96)
SAO2 % BLDA: 99 % — HIGH (ref 94–98)
SODIUM SERPL-SCNC: 138 MMOL/L — SIGNIFICANT CHANGE UP (ref 135–146)
SODIUM SERPL-SCNC: 139 MMOL/L — SIGNIFICANT CHANGE UP (ref 135–146)
WBC # BLD: 12.51 K/UL — HIGH (ref 4.8–10.8)
WBC # BLD: 12.81 K/UL — HIGH (ref 4.8–10.8)
WBC # FLD AUTO: 12.51 K/UL — HIGH (ref 4.8–10.8)
WBC # FLD AUTO: 12.81 K/UL — HIGH (ref 4.8–10.8)

## 2020-04-04 PROCEDURE — 99291 CRITICAL CARE FIRST HOUR: CPT

## 2020-04-04 PROCEDURE — 71045 X-RAY EXAM CHEST 1 VIEW: CPT | Mod: 26

## 2020-04-04 PROCEDURE — 71045 X-RAY EXAM CHEST 1 VIEW: CPT | Mod: 26,77

## 2020-04-04 PROCEDURE — 93010 ELECTROCARDIOGRAM REPORT: CPT

## 2020-04-04 RX ORDER — INSULIN HUMAN 100 [IU]/ML
1 INJECTION, SOLUTION SUBCUTANEOUS
Qty: 100 | Refills: 0 | Status: DISCONTINUED | OUTPATIENT
Start: 2020-04-04 | End: 2020-04-06

## 2020-04-04 RX ORDER — DILTIAZEM HCL 120 MG
30 CAPSULE, EXT RELEASE 24 HR ORAL EVERY 12 HOURS
Refills: 0 | Status: DISCONTINUED | OUTPATIENT
Start: 2020-04-04 | End: 2020-04-07

## 2020-04-04 RX ORDER — LACTULOSE 10 G/15ML
30 SOLUTION ORAL DAILY
Refills: 0 | Status: DISCONTINUED | OUTPATIENT
Start: 2020-04-04 | End: 2020-04-10

## 2020-04-04 RX ORDER — DEXTROSE 50 % IN WATER 50 %
50 SYRINGE (ML) INTRAVENOUS
Refills: 0 | Status: DISCONTINUED | OUTPATIENT
Start: 2020-04-04 | End: 2020-04-06

## 2020-04-04 RX ADMIN — Medication 500 MILLIGRAM(S): at 21:53

## 2020-04-04 RX ADMIN — Medication 60 MILLIGRAM(S): at 06:27

## 2020-04-04 RX ADMIN — Medication 200 MILLIGRAM(S): at 20:11

## 2020-04-04 RX ADMIN — CHLORHEXIDINE GLUCONATE 1 APPLICATION(S): 213 SOLUTION TOPICAL at 06:29

## 2020-04-04 RX ADMIN — Medication 500 MILLIGRAM(S): at 06:28

## 2020-04-04 RX ADMIN — INSULIN HUMAN 1 UNIT(S)/HR: 100 INJECTION, SOLUTION SUBCUTANEOUS at 22:38

## 2020-04-04 RX ADMIN — CEFEPIME 100 MILLIGRAM(S): 1 INJECTION, POWDER, FOR SOLUTION INTRAMUSCULAR; INTRAVENOUS at 15:22

## 2020-04-04 RX ADMIN — PANTOPRAZOLE SODIUM 40 MILLIGRAM(S): 20 TABLET, DELAYED RELEASE ORAL at 15:22

## 2020-04-04 RX ADMIN — Medication 30 MILLIGRAM(S): at 06:28

## 2020-04-04 RX ADMIN — AZITHROMYCIN 250 MILLIGRAM(S): 500 TABLET, FILM COATED ORAL at 15:21

## 2020-04-04 RX ADMIN — Medication 10 MILLILITER(S): at 15:32

## 2020-04-04 RX ADMIN — APIXABAN 5 MILLIGRAM(S): 2.5 TABLET, FILM COATED ORAL at 20:12

## 2020-04-04 RX ADMIN — Medication 30 MILLIGRAM(S): at 06:29

## 2020-04-04 RX ADMIN — SENNA PLUS 2 TABLET(S): 8.6 TABLET ORAL at 21:53

## 2020-04-04 RX ADMIN — SPIRONOLACTONE 25 MILLIGRAM(S): 25 TABLET, FILM COATED ORAL at 06:29

## 2020-04-04 RX ADMIN — Medication 10 MILLILITER(S): at 21:53

## 2020-04-04 RX ADMIN — Medication 500 MILLIGRAM(S): at 15:22

## 2020-04-04 RX ADMIN — SODIUM CHLORIDE 100 MILLILITER(S): 9 INJECTION, SOLUTION INTRAVENOUS at 22:39

## 2020-04-04 RX ADMIN — ZINC SULFATE TAB 220 MG (50 MG ZINC EQUIVALENT) 220 MILLIGRAM(S): 220 (50 ZN) TAB at 15:24

## 2020-04-04 RX ADMIN — LACTULOSE 30 GRAM(S): 10 SOLUTION ORAL at 15:21

## 2020-04-04 RX ADMIN — Medication 10 MILLILITER(S): at 06:28

## 2020-04-04 RX ADMIN — Medication 200 MILLIGRAM(S): at 06:26

## 2020-04-04 RX ADMIN — APIXABAN 5 MILLIGRAM(S): 2.5 TABLET, FILM COATED ORAL at 06:27

## 2020-04-04 NOTE — PROGRESS NOTE ADULT - SUBJECTIVE AND OBJECTIVE BOX
24 Hour events    Overnight patient was hypothermic, place on bear-hugger. Potassium: 5.8, treated with 10units of insulin and D10% 250mL.    --------------------------------------------------------------------------------------  VITAL SIGNS, INS/OUTS (last 24 hours):  --------------------------------------------------------------------------------------  ICU Vital Signs Last 24 Hrs  T(C): 36.3 (03 Apr 2020 08:00), Max: 36.3 (02 Apr 2020 16:00)  T(F): 97.3 (03 Apr 2020 08:00), Max: 97.3 (02 Apr 2020 16:00)  HR: 85 (03 Apr 2020 12:00) (70 - 102)  BP: 141/67 (03 Apr 2020 12:00) (84/55 - 141/67)  BP(mean): 91 (03 Apr 2020 08:00) (65 - 93)  ABP: 143/57 (03 Apr 2020 12:00) (82/48 - 143/57)  ABP(mean): 82 (03 Apr 2020 12:00) (58 - 86)  RR: 25 (03 Apr 2020 12:00) (22 - 31)  SpO2: 100% (03 Apr 2020 12:00) (86% - 100%)    I&O's Summary    02 Apr 2020 07:01  -  03 Apr 2020 07:00  --------------------------------------------------------  IN: 3600.6 mL / OUT: 600 mL / NET: 3000.6 mL    03 Apr 2020 07:01  -  03 Apr 2020 13:05  --------------------------------------------------------  IN: 1017.5 mL / OUT: 135 mL / NET: 882.5 mL      --------------------------------------------------------------------------------------  PHYSICAL EXAM  General: sedated   Skin: warm and dry, normal color, normal skin turgor, moist mucous membranes  HEENT: head: atraumatic, normocephalic, no tenderness, normal scalp; pupils pinpoint and sluggish  Respiratory: intubated, mechanically ventilated symmetric chest rise, bilateral rales on auscultation  Cardiovascular: normal rate, irregular rhythm, no rub, systolic murmur, no gallops  Gastrointestinal: OGT in place, no distension normal bowel sounds, non tympanic, soft  : normal external genitalia, santana catheter in place  Musculoskeletal: no bony deformities, inflammation  Neurological: RASS-3    Tubes/Lines/Drains  ***  [x] Peripheral IV  [X] Central Venous Line     	[] R	[] L	[] IJ   	[] Fem	[] SC        Type:	    Date Placed:   [X] Arterial Line		        [] R	[] L	[] Fem	[] Rad	[] Ax	                    Date Placed:   [] PICC:         	[] Midline		[] Mediport           [X] Urinary Catheter		Date Placed:     LABS  --------------------------------------------------------------------------------------  Labs:  CAPILLARY BLOOD GLUCOSE      POCT Blood Glucose.: 180 mg/dL (03 Apr 2020 11:48)  POCT Blood Glucose.: 217 mg/dL (03 Apr 2020 10:37)  POCT Blood Glucose.: 230 mg/dL (03 Apr 2020 08:52)  POCT Blood Glucose.: 224 mg/dL (03 Apr 2020 07:24)  POCT Blood Glucose.: 243 mg/dL (03 Apr 2020 05:53)  POCT Blood Glucose.: 206 mg/dL (03 Apr 2020 03:14)  POCT Blood Glucose.: 196 mg/dL (03 Apr 2020 00:32)  POCT Blood Glucose.: 123 mg/dL (02 Apr 2020 18:51)  POCT Blood Glucose.: 160 mg/dL (02 Apr 2020 14:30)                          12.7   14.25 )-----------( 273      ( 03 Apr 2020 00:05 )             39.6       Auto Neutrophil %: 93.2 % (04-03-20 @ 00:05)  Auto Immature Granulocyte %: 0.4 % (04-03-20 @ 00:05)    04-03    141  |  108  |  76<HH>  ----------------------------<  209<H>  5.8<H>   |  16<L>  |  5.3<HH>      Calcium, Total Serum: 7.9 mg/dL (04-03-20 @ 00:05)      LFTs:             5.1  | 0.8  | 48       ------------------[75      ( 03 Apr 2020 00:05 )  2.5  | x    | 70          Lipase:x      Amylase:x         Blood Gas Arterial, Lactate: 1.1 mmoL/L (04-03-20 @ 03:48)  Blood Gas Arterial, Lactate: 1.8 mmoL/L (04-02-20 @ 13:18)  Blood Gas Arterial, Lactate: 1.2 mmoL/L (04-02-20 @ 02:52)  Blood Gas Arterial, Lactate: 1.6 mmoL/L (04-01-20 @ 15:45)  Blood Gas Arterial, Lactate: 1.5 mmoL/L (04-01-20 @ 02:27)  Blood Gas Arterial, Lactate: 1.9 mmoL/L (03-31-20 @ 20:39)    ABG - ( 03 Apr 2020 03:48 )  pH: 7.15  /  pCO2: 51    /  pO2: 307   / HCO3: 18    / Base Excess: -11.1 /  SaO2: 99        ABG - ( 02 Apr 2020 13:18 )  pH: 7.34  /  pCO2: 21    /  pO2: 69    / HCO3: 12    / Base Excess: -12.0 /  SaO2: 93        ABG - ( 02 Apr 2020 02:52 )  pH: 7.26  /  pCO2: 39    /  pO2: 231   / HCO3: 18    / Base Excess: -8.7  /  SaO2: 99        Coags:     13.70  ----< 1.19    ( 03 Apr 2020 06:50 )     27.9        CARDIAC MARKERS ( 03 Apr 2020 06:50 )  x     / x     / 53 U/L / x     / x      CARDIAC MARKERS ( 01 Apr 2020 23:35 )  x     / 0.03 ng/mL / 103 U/L / x     / x        < from: Xray Chest 1 View- PORTABLE-Routine (04.03.20 @ 03:46) >    EXAM:  XR CHEST PORTABLE ROUTINE 1V            PROCEDURE DATE:  04/03/2020        INTERPRETATION:  CLINICAL INDICATION:  Pneumonia    COMPARISON: Chest radiograph dated 4/2/2020     TECHNIQUE: Frontal radiograph the chest.    FINDINGS:    Support devices: ET tube, enteric tube and right IJ central venous catheters were stable..     Cardiac/mediastinum/hilum: Stable.    Lung parenchyma/Pleura: Slight worsening in the bilateral opacities. There is no pneumothorax.    Skeleton/soft tissues: Stable.    IMPRESSION:     Slight worsening in the bilateral opacities.                    JOEY GARCÍA M.D., ATTENDING RADIOLOGIST  This document has been electronically signed. Apr  3 2020  8:10AM                < end of copied text > 24 Hour events    Overnight patient was hypothermic, place [id number]    --------------------------------------------------------------------------------------  VITAL SIGNS, INS/OUTS (last 24 hours):  --------------------------------------------------------------------------------------  ICU Vital Signs Last 24 Hrs  T(C): 36.3 (03 Apr 2020 08:00), Max: 36.3 (02 Apr 2020 16:00)  T(F): 97.3 (03 Apr 2020 08:00), Max: 97.3 (02 Apr 2020 16:00)  HR: 85 (03 Apr 2020 12:00) (70 - 102)  BP: 141/67 (03 Apr 2020 12:00) (84/55 - 141/67)  BP(mean): 91 (03 Apr 2020 08:00) (65 - 93)  ABP: 143/57 (03 Apr 2020 12:00) (82/48 - 143/57)  ABP(mean): 82 (03 Apr 2020 12:00) (58 - 86)  RR: 25 (03 Apr 2020 12:00) (22 - 31)  SpO2: 100% (03 Apr 2020 12:00) (86% - 100%)    I&O's Summary    02 Apr 2020 07:01  -  03 Apr 2020 07:00  --------------------------------------------------------  IN: 3600.6 mL / OUT: 600 mL / NET: 3000.6 mL    03 Apr 2020 07:01  -  03 Apr 2020 13:05  --------------------------------------------------------  IN: 1017.5 mL / OUT: 135 mL / NET: 882.5 mL      --------------------------------------------------------------------------------------  PHYSICAL EXAM  General: sedated   Skin: warm and dry, normal color, normal skin turgor, moist mucous membranes  HEENT: head: atraumatic, normocephalic, no tenderness, normal scalp; pupils pinpoint and sluggish  Respiratory: intubated, mechanically ventilated symmetric chest rise, bilateral rales on auscultation  Cardiovascular: normal rate, irregular rhythm, no rub, systolic murmur, no gallops  Gastrointestinal: OGT in place, no distension normal bowel sounds, non tympanic, soft  : normal external genitalia, santana catheter in place  Musculoskeletal: no bony deformities, inflammation  Neurological: RASS-3    Tubes/Lines/Drains  ***  [x] Peripheral IV  [X] Central Venous Line     	[] R	[] L	[] IJ   	[] Fem	[] SC        Type:	    Date Placed:   [X] Arterial Line		        [] R	[] L	[] Fem	[] Rad	[] Ax	                    Date Placed:   [] PICC:         	[] Midline		[] Mediport           [X] Urinary Catheter		Date Placed:     LABS  --------------------------------------------------------------------------------------  Labs:  CAPILLARY BLOOD GLUCOSE      POCT Blood Glucose.: 180 mg/dL (03 Apr 2020 11:48)  POCT Blood Glucose.: 217 mg/dL (03 Apr 2020 10:37)  POCT Blood Glucose.: 230 mg/dL (03 Apr 2020 08:52)  POCT Blood Glucose.: 224 mg/dL (03 Apr 2020 07:24)  POCT Blood Glucose.: 243 mg/dL (03 Apr 2020 05:53)  POCT Blood Glucose.: 206 mg/dL (03 Apr 2020 03:14)  POCT Blood Glucose.: 196 mg/dL (03 Apr 2020 00:32)  POCT Blood Glucose.: 123 mg/dL (02 Apr 2020 18:51)  POCT Blood Glucose.: 160 mg/dL (02 Apr 2020 14:30)                          12.7   14.25 )-----------( 273      ( 03 Apr 2020 00:05 )             39.6       Auto Neutrophil %: 93.2 % (04-03-20 @ 00:05)  Auto Immature Granulocyte %: 0.4 % (04-03-20 @ 00:05)    04-03    141  |  108  |  76<HH>  ----------------------------<  209<H>  5.8<H>   |  16<L>  |  5.3<HH>      Calcium, Total Serum: 7.9 mg/dL (04-03-20 @ 00:05)      LFTs:             5.1  | 0.8  | 48       ------------------[75      ( 03 Apr 2020 00:05 )  2.5  | x    | 70          Lipase:x      Amylase:x         Blood Gas Arterial, Lactate: 1.1 mmoL/L (04-03-20 @ 03:48)  Blood Gas Arterial, Lactate: 1.8 mmoL/L (04-02-20 @ 13:18)  Blood Gas Arterial, Lactate: 1.2 mmoL/L (04-02-20 @ 02:52)  Blood Gas Arterial, Lactate: 1.6 mmoL/L (04-01-20 @ 15:45)  Blood Gas Arterial, Lactate: 1.5 mmoL/L (04-01-20 @ 02:27)  Blood Gas Arterial, Lactate: 1.9 mmoL/L (03-31-20 @ 20:39)    ABG - ( 03 Apr 2020 03:48 )  pH: 7.15  /  pCO2: 51    /  pO2: 307   / HCO3: 18    / Base Excess: -11.1 /  SaO2: 99        ABG - ( 02 Apr 2020 13:18 )  pH: 7.34  /  pCO2: 21    /  pO2: 69    / HCO3: 12    / Base Excess: -12.0 /  SaO2: 93        ABG - ( 02 Apr 2020 02:52 )  pH: 7.26  /  pCO2: 39    /  pO2: 231   / HCO3: 18    / Base Excess: -8.7  /  SaO2: 99        Coags:     13.70  ----< 1.19    ( 03 Apr 2020 06:50 )     27.9        CARDIAC MARKERS ( 03 Apr 2020 06:50 )  x     / x     / 53 U/L / x     / x      CARDIAC MARKERS ( 01 Apr 2020 23:35 )  x     / 0.03 ng/mL / 103 U/L / x     / x        < from: Xray Chest 1 View- PORTABLE-Routine (04.03.20 @ 03:46) >    EXAM:  XR CHEST PORTABLE ROUTINE 1V            PROCEDURE DATE:  04/03/2020        INTERPRETATION:  CLINICAL INDICATION:  Pneumonia    COMPARISON: Chest radiograph dated 4/2/2020     TECHNIQUE: Frontal radiograph the chest.    FINDINGS:    Support devices: ET tube, enteric tube and right IJ central venous catheters were stable..     Cardiac/mediastinum/hilum: Stable.    Lung parenchyma/Pleura: Slight worsening in the bilateral opacities. There is no pneumothorax.    Skeleton/soft tissues: Stable.    IMPRESSION:     Slight worsening in the bilateral opacities.                    JOEY GARCÍA M.D., ATTENDING RADIOLOGIST  This document has been electronically signed. Apr  3 2020  8:10AM                < end of copied text >

## 2020-04-04 NOTE — PROGRESS NOTE ADULT - ASSESSMENT
NEURO/PSYCH  Sedation;   - continue, Propofol gtt titrate to RASS -3  - started Midazolam gtt 5mg/hr  - started Morphine gtt 2mg/hr  Pain Control;  - continue, Acetaminophen 650mg PO q6 PRN    RESP  Acute Hypoxemic Respiratory Failure;  Mode: AC/ CMV (Assist Control/ Continuous Mandatory Ventilation)  RR (machine): 25 TV (machine): 450 FiO2: 90 PEEP: 15  MAP: 20 PIP: 35   RR: 30 (04-04-20 @ 04:00) (18 - 31) SpO2: 100% (04-04-20 @ 04:00) (86% - 100%)  ABG - ( 04 Apr 2020 04:00 ) pH: 7.30<L> /  pCO2: 40    /  pO2: 244<H> / HCO3: 19<L>  ABG - ( 03 Apr 2020 13:56 )  pH: 7.20<L> /  pCO2: 52<H> /  pO2: 224<H> / HCO3: 20<L>  - Improving oxygenation  - FiO2 lowered to 70%  COVID-19/Viral Pneumonia;  - positive SARS-CoV-2; previous false negative  - continue, Methylprednisolone 60mg q12  - worsening diffuse bilateral opacities  - continue, Guaifenisen/DM  - daily cxr qam    CARD/VASC  Bradycardia;  - change Diltiazem from 30mg PO tid to bid  AFib; chronic  - continue, Apixaban 5mg PO bid  - continue, Diltiazem 30mg PO bid   - conintue, EKG qd  CAD    - continue, Atorvastatin 40mg PO qhs  HTN  - hold, Lisinopril     GI/NUTR  - NPO  - continue TF 75mL/hr  - no stools, add Lactulose     /Renal  LIZZIE; steadily worsening   BUN/Creatinine  04-04 @ 00:00  BUN  90     Creatinine  5.5   04-03 @ 16:00  BUN  87     Creatinine  5.4   04-02 @ 15:30  BUN  71     Creatinine  4.8   UOP; responding to spironolactone    - continue, Spironolactone 25mg OGT qd  - monitor, I&Os  Fluids  - continue, LR 100mL/hr  - monitor I&Os  - santana in place  Electrolytes;  Sodium/Potassium/Magnesium/Phosphate  04-03 @ 00:05  Sodium  141  Potassium 5.8  Magnesium 2.4  Phosphorus  7.0  Hyperkalemia;  - treated with insulin & D10  - repeat CMP in the pm  - monitor e-  BPH; chronic     HEME/ONC  04-03-20 @ 00:05 Hb 12.7  Hct  39.6  04-01-20 @ 23:35 Hb 13.2  Hct  40.7  03-31-20 @ 23:50 Hb 14.2  Hct  43.6  - stable H/H  - monitor H/H    ID  Fever;  - continue, Acetaminophen 650mg PO q6  COVID-19, suspected;  - positive repeat swab  - continue, Hydroxychloroquine (complete 4/5)  - continue, Azithromycin (complete 4/4)  - continue, Cefepime   - nasal MRSA, urine Legionella & Strep pending     ENDO  DM2;  - continue, Insulin gtt   - continue, POCT q1  - holding, Metformin    MSK/DERM  - no active issues    DVT PPx  - on Apixaban     GI PPx  - continue, Pantoprazole 40mg PO qac  - continue, Senna 17.2mg PO qhs    Disposition  -  SICU NEURO/PSYCH  Sedation;   - continue, Propofol gtt titrate to RASS -3  - started Midazolam gtt 5mg/hr  - started Morphine gtt 2mg/hr  Pain Control;  - continue, Acetaminophen 650mg PO q6 PRN    RESP  Acute Hypoxemic Respiratory Failure;  Mode: AC/ CMV (Assist Control/ Continuous Mandatory Ventilation)  RR (machine): 25 TV (machine): 450 FiO2: 90 PEEP: 15  MAP: 20 PIP: 35   RR: 30 (04-04-20 @ 04:00) (18 - 31) SpO2: 100% (04-04-20 @ 04:00) (86% - 100%)  ABG - ( 04 Apr 2020 04:00 ) pH: 7.30<L> /  pCO2: 40    /  pO2: 244<H> / HCO3: 19<L>  ABG - ( 03 Apr 2020 13:56 )  pH: 7.20<L> /  pCO2: 52<H> /  pO2: 224<H> / HCO3: 20<L>  - Improving oxygenation  - FiO2 lowered to 70%  COVID-19/Viral Pneumonia;  - positive SARS-CoV-2; previous false negative  - continue, Methylprednisolone 60mg q12  - worsening diffuse bilateral opacities  - continue, Guaifenisen/DM  - daily cxr qam    CARD/VASC  HR: 43 (04-04-20 @ 11:35) (43 - 102)  BP: 151/81 (04-03-20 @ 18:00) (84/55 - 151/81)  Bradycardia;  - change Diltiazem from 30mg PO tid to bid  AFib; chronic  - continue, Apixaban 5mg PO bid  - continue, Diltiazem 30mg PO bid   - conintue, EKG qd  CAD    - continue, Atorvastatin 40mg PO qhs  HTN  - hold, Lisinopril     GI/NUTR  - NPO  - continue TF 75mL/hr  - no stools, add Lactulose     /Renal  LIZZIE; steadily worsening   BUN/Creatinine  BUN/Creatinine  04-04 @ 00:00  BUN  90     Creatinine  5.5   04-03 @ 00:05  BUN  76     Creatinine  5.3   04-02 @ 15:30  BUN  71     Creatinine  4.8   UOP; adequate  - averaging 50mL/hr  - continue, Spironolactone 25mg OGT qd  - monitor, I&Os  Fluids  - continue, LR 100mL/hr  - monitor I&Os  - santana in place  Electrolytes;  Sodium/Potassium/Magnesium/Phosphate  Sodium/Potassium/Magnesium/Phosphate  04-04 @ 00:00  Sodium  139  Potassium 5.3  Magnesium 2.5  Phosphorus  7.2   04-03 @ 00:05  Sodium  141  Potassium 5.8  Magnesium 2.4  Phosphorus  7.0   Hyperkalemia; mild  - no EKG changes  - repeat CMP in the pm  - monitor e-  BPH; chronic     HEME/ONC  04-04-20 @ 00:00 Hb 12.1  Hct  37.8  04-03-20 @ 16:00 Hb 12.9  Hct  39.2  - stable H/H  - monitor H/H    ID  Fever;  - continue, Acetaminophen 650mg PO q6  COVID-19, suspected;  - positive repeat swab  - continue, Hydroxychloroquine (complete 4/5)  - continue, Azithromycin (complete 4/4)  - continue, Cefepime   - nasal MRSA, urine Legionella & Strep pending     ENDO  DM2;  - continue, Insulin gtt   - continue, POCT q1  - holding, Metformin    MSK/DERM  - no active issues    DVT PPx  - on Apixaban     GI PPx  - continue, Pantoprazole 40mg PO qac  - continue, Senna 17.2mg PO qhs    Disposition  -  SICU

## 2020-04-05 LAB
ALBUMIN SERPL ELPH-MCNC: 2.4 G/DL — LOW (ref 3.5–5.2)
ALBUMIN SERPL ELPH-MCNC: 2.5 G/DL — LOW (ref 3.5–5.2)
ALP SERPL-CCNC: 67 U/L — SIGNIFICANT CHANGE UP (ref 30–115)
ALP SERPL-CCNC: 70 U/L — SIGNIFICANT CHANGE UP (ref 30–115)
ALT FLD-CCNC: 48 U/L — HIGH (ref 0–41)
ALT FLD-CCNC: 59 U/L — HIGH (ref 0–41)
ANION GAP SERPL CALC-SCNC: 16 MMOL/L — HIGH (ref 7–14)
ANION GAP SERPL CALC-SCNC: 16 MMOL/L — HIGH (ref 7–14)
ANION GAP SERPL CALC-SCNC: 17 MMOL/L — HIGH (ref 7–14)
APTT BLD: 26.8 SEC — LOW (ref 27–39.2)
APTT BLD: 27.2 SEC — SIGNIFICANT CHANGE UP (ref 27–39.2)
AST SERPL-CCNC: 29 U/L — SIGNIFICANT CHANGE UP (ref 0–41)
AST SERPL-CCNC: 48 U/L — HIGH (ref 0–41)
BASE EXCESS BLDA CALC-SCNC: -7.4 MMOL/L — LOW (ref -2–2)
BASE EXCESS BLDA CALC-SCNC: -7.7 MMOL/L — LOW (ref -2–2)
BASOPHILS # BLD AUTO: 0 K/UL — SIGNIFICANT CHANGE UP (ref 0–0.2)
BASOPHILS # BLD AUTO: 0.01 K/UL — SIGNIFICANT CHANGE UP (ref 0–0.2)
BASOPHILS NFR BLD AUTO: 0 % — SIGNIFICANT CHANGE UP (ref 0–1)
BASOPHILS NFR BLD AUTO: 0.1 % — SIGNIFICANT CHANGE UP (ref 0–1)
BILIRUB SERPL-MCNC: 0.5 MG/DL — SIGNIFICANT CHANGE UP (ref 0.2–1.2)
BILIRUB SERPL-MCNC: 0.6 MG/DL — SIGNIFICANT CHANGE UP (ref 0.2–1.2)
BUN SERPL-MCNC: 101 MG/DL — CRITICAL HIGH (ref 10–20)
BUN SERPL-MCNC: 109 MG/DL — CRITICAL HIGH (ref 10–20)
BURR CELLS BLD QL SMEAR: PRESENT — SIGNIFICANT CHANGE UP
CALCIUM SERPL-MCNC: 8 MG/DL — LOW (ref 8.5–10.1)
CALCIUM SERPL-MCNC: 8.1 MG/DL — LOW (ref 8.5–10.1)
CALCIUM SERPL-MCNC: 8.1 MG/DL — LOW (ref 8.5–10.1)
CHLORIDE SERPL-SCNC: 107 MMOL/L — SIGNIFICANT CHANGE UP (ref 98–110)
CHLORIDE SERPL-SCNC: 109 MMOL/L — SIGNIFICANT CHANGE UP (ref 98–110)
CHLORIDE SERPL-SCNC: 109 MMOL/L — SIGNIFICANT CHANGE UP (ref 98–110)
CK SERPL-CCNC: 499 U/L — HIGH (ref 0–225)
CO2 SERPL-SCNC: 17 MMOL/L — SIGNIFICANT CHANGE UP (ref 17–32)
CO2 SERPL-SCNC: 17 MMOL/L — SIGNIFICANT CHANGE UP (ref 17–32)
CO2 SERPL-SCNC: 18 MMOL/L — SIGNIFICANT CHANGE UP (ref 17–32)
CREAT SERPL-MCNC: 5.6 MG/DL — CRITICAL HIGH (ref 0.7–1.5)
CREAT SERPL-MCNC: 5.9 MG/DL — CRITICAL HIGH (ref 0.7–1.5)
D DIMER BLD IA.RAPID-MCNC: HIGH NG/ML DDU (ref 0–230)
EOSINOPHIL # BLD AUTO: 0 K/UL — SIGNIFICANT CHANGE UP (ref 0–0.7)
EOSINOPHIL # BLD AUTO: 0 K/UL — SIGNIFICANT CHANGE UP (ref 0–0.7)
EOSINOPHIL NFR BLD AUTO: 0 % — SIGNIFICANT CHANGE UP (ref 0–8)
EOSINOPHIL NFR BLD AUTO: 0 % — SIGNIFICANT CHANGE UP (ref 0–8)
ERYTHROCYTE [SEDIMENTATION RATE] IN BLOOD: 60 MM/HR — HIGH (ref 0–10)
FIBRINOGEN PPP-MCNC: 477 MG/DL — SIGNIFICANT CHANGE UP (ref 204.4–570.6)
GIANT PLATELETS BLD QL SMEAR: PRESENT — SIGNIFICANT CHANGE UP
GLUCOSE BLDC GLUCOMTR-MCNC: 143 MG/DL — HIGH (ref 70–99)
GLUCOSE BLDC GLUCOMTR-MCNC: 146 MG/DL — HIGH (ref 70–99)
GLUCOSE BLDC GLUCOMTR-MCNC: 148 MG/DL — HIGH (ref 70–99)
GLUCOSE BLDC GLUCOMTR-MCNC: 150 MG/DL — HIGH (ref 70–99)
GLUCOSE BLDC GLUCOMTR-MCNC: 162 MG/DL — HIGH (ref 70–99)
GLUCOSE BLDC GLUCOMTR-MCNC: 162 MG/DL — HIGH (ref 70–99)
GLUCOSE BLDC GLUCOMTR-MCNC: 165 MG/DL — HIGH (ref 70–99)
GLUCOSE BLDC GLUCOMTR-MCNC: 171 MG/DL — HIGH (ref 70–99)
GLUCOSE BLDC GLUCOMTR-MCNC: 172 MG/DL — HIGH (ref 70–99)
GLUCOSE BLDC GLUCOMTR-MCNC: 174 MG/DL — HIGH (ref 70–99)
GLUCOSE BLDC GLUCOMTR-MCNC: 175 MG/DL — HIGH (ref 70–99)
GLUCOSE BLDC GLUCOMTR-MCNC: 182 MG/DL — HIGH (ref 70–99)
GLUCOSE SERPL-MCNC: 148 MG/DL — HIGH (ref 70–99)
GLUCOSE SERPL-MCNC: 172 MG/DL — HIGH (ref 70–99)
GLUCOSE SERPL-MCNC: 178 MG/DL — HIGH (ref 70–99)
HCO3 BLDA-SCNC: 19 MMOL/L — LOW (ref 21–29)
HCO3 BLDA-SCNC: 19 MMOL/L — LOW (ref 21–29)
HCT VFR BLD CALC: 36 % — LOW (ref 42–52)
HCT VFR BLD CALC: 36.5 % — LOW (ref 42–52)
HCT VFR BLD CALC: 37.5 % — LOW (ref 42–52)
HGB BLD-MCNC: 12 G/DL — LOW (ref 14–18)
HGB BLD-MCNC: 12.5 G/DL — LOW (ref 14–18)
HGB BLD-MCNC: 12.7 G/DL — LOW (ref 14–18)
HOROWITZ INDEX BLDA+IHG-RTO: 21 — SIGNIFICANT CHANGE UP
HOROWITZ INDEX BLDA+IHG-RTO: 40 — SIGNIFICANT CHANGE UP
IMM GRANULOCYTES NFR BLD AUTO: 0.8 % — HIGH (ref 0.1–0.3)
INR BLD: 1.17 RATIO — SIGNIFICANT CHANGE UP (ref 0.65–1.3)
INR BLD: 1.18 RATIO — SIGNIFICANT CHANGE UP (ref 0.65–1.3)
LDH SERPL L TO P-CCNC: 510 U/L — HIGH (ref 50–242)
LDH SERPL L TO P-CCNC: 519 U/L — HIGH (ref 50–242)
LYMPHOCYTES # BLD AUTO: 0 % — LOW (ref 20.5–51.1)
LYMPHOCYTES # BLD AUTO: 0 K/UL — LOW (ref 1.2–3.4)
LYMPHOCYTES # BLD AUTO: 0.19 K/UL — LOW (ref 1.2–3.4)
LYMPHOCYTES # BLD AUTO: 1.8 % — LOW (ref 20.5–51.1)
MAGNESIUM SERPL-MCNC: 2.7 MG/DL — HIGH (ref 1.8–2.4)
MAGNESIUM SERPL-MCNC: 2.7 MG/DL — HIGH (ref 1.8–2.4)
MAGNESIUM SERPL-MCNC: 2.8 MG/DL — HIGH (ref 1.8–2.4)
MANUAL SMEAR VERIFICATION: SIGNIFICANT CHANGE UP
MCHC RBC-ENTMCNC: 30.2 PG — SIGNIFICANT CHANGE UP (ref 27–31)
MCHC RBC-ENTMCNC: 30.5 PG — SIGNIFICANT CHANGE UP (ref 27–31)
MCHC RBC-ENTMCNC: 30.7 PG — SIGNIFICANT CHANGE UP (ref 27–31)
MCHC RBC-ENTMCNC: 33.3 G/DL — SIGNIFICANT CHANGE UP (ref 32–37)
MCHC RBC-ENTMCNC: 33.9 G/DL — SIGNIFICANT CHANGE UP (ref 32–37)
MCHC RBC-ENTMCNC: 34.2 G/DL — SIGNIFICANT CHANGE UP (ref 32–37)
MCV RBC AUTO: 89.7 FL — SIGNIFICANT CHANGE UP (ref 80–94)
MCV RBC AUTO: 90.1 FL — SIGNIFICANT CHANGE UP (ref 80–94)
MCV RBC AUTO: 90.5 FL — SIGNIFICANT CHANGE UP (ref 80–94)
MONOCYTES # BLD AUTO: 0.45 K/UL — SIGNIFICANT CHANGE UP (ref 0.1–0.6)
MONOCYTES # BLD AUTO: 0.63 K/UL — HIGH (ref 0.1–0.6)
MONOCYTES NFR BLD AUTO: 4.4 % — SIGNIFICANT CHANGE UP (ref 1.7–9.3)
MONOCYTES NFR BLD AUTO: 6.1 % — SIGNIFICANT CHANGE UP (ref 1.7–9.3)
NEUTROPHILS # BLD AUTO: 9.57 K/UL — HIGH (ref 1.4–6.5)
NEUTROPHILS # BLD AUTO: 9.65 K/UL — HIGH (ref 1.4–6.5)
NEUTROPHILS NFR BLD AUTO: 92.9 % — HIGH (ref 42.2–75.2)
NEUTROPHILS NFR BLD AUTO: 93.9 % — HIGH (ref 42.2–75.2)
NRBC # BLD: 0 /100 WBCS — SIGNIFICANT CHANGE UP (ref 0–0)
NRBC # BLD: 0 /100 WBCS — SIGNIFICANT CHANGE UP (ref 0–0)
NRBC # BLD: 2 /100 — HIGH (ref 0–0)
NRBC # BLD: SIGNIFICANT CHANGE UP /100 WBCS (ref 0–0)
OVALOCYTES BLD QL SMEAR: SLIGHT — SIGNIFICANT CHANGE UP
PCO2 BLDA: 41 MMHG — SIGNIFICANT CHANGE UP (ref 38–42)
PCO2 BLDA: 42 MMHG — SIGNIFICANT CHANGE UP (ref 38–42)
PH BLDA: 7.27 — LOW (ref 7.38–7.42)
PH BLDA: 7.27 — LOW (ref 7.38–7.42)
PHOSPHATE SERPL-MCNC: 8.3 MG/DL — HIGH (ref 2.1–4.9)
PHOSPHATE SERPL-MCNC: 8.3 MG/DL — HIGH (ref 2.1–4.9)
PHOSPHATE SERPL-MCNC: 9.1 MG/DL — HIGH (ref 2.1–4.9)
PLAT MORPH BLD: NORMAL — SIGNIFICANT CHANGE UP
PLATELET # BLD AUTO: 270 K/UL — SIGNIFICANT CHANGE UP (ref 130–400)
PLATELET # BLD AUTO: 281 K/UL — SIGNIFICANT CHANGE UP (ref 130–400)
PLATELET # BLD AUTO: 295 K/UL — SIGNIFICANT CHANGE UP (ref 130–400)
PO2 BLDA: 130 MMHG — HIGH (ref 78–95)
PO2 BLDA: 132 MMHG — HIGH (ref 78–95)
POIKILOCYTOSIS BLD QL AUTO: SIGNIFICANT CHANGE UP
POTASSIUM SERPL-MCNC: 5.1 MMOL/L — HIGH (ref 3.5–5)
POTASSIUM SERPL-MCNC: 5.4 MMOL/L — HIGH (ref 3.5–5)
POTASSIUM SERPL-MCNC: 5.6 MMOL/L — HIGH (ref 3.5–5)
POTASSIUM SERPL-SCNC: 5.1 MMOL/L — HIGH (ref 3.5–5)
POTASSIUM SERPL-SCNC: 5.4 MMOL/L — HIGH (ref 3.5–5)
POTASSIUM SERPL-SCNC: 5.6 MMOL/L — HIGH (ref 3.5–5)
PROT SERPL-MCNC: 4.9 G/DL — LOW (ref 6–8)
PROT SERPL-MCNC: 5.1 G/DL — LOW (ref 6–8)
PROTHROM AB SERPL-ACNC: 13.5 SEC — HIGH (ref 9.95–12.87)
PROTHROM AB SERPL-ACNC: 13.6 SEC — HIGH (ref 9.95–12.87)
RBC # BLD: 3.98 M/UL — LOW (ref 4.7–6.1)
RBC # BLD: 4.07 M/UL — LOW (ref 4.7–6.1)
RBC # BLD: 4.16 M/UL — LOW (ref 4.7–6.1)
RBC # FLD: 14.9 % — HIGH (ref 11.5–14.5)
RBC # FLD: 14.9 % — HIGH (ref 11.5–14.5)
RBC # FLD: 15 % — HIGH (ref 11.5–14.5)
RBC BLD AUTO: ABNORMAL
SAO2 % BLDA: 98 % — HIGH (ref 92–96)
SAO2 % BLDA: 98 % — HIGH (ref 92–96)
SODIUM SERPL-SCNC: 140 MMOL/L — SIGNIFICANT CHANGE UP (ref 135–146)
SODIUM SERPL-SCNC: 143 MMOL/L — SIGNIFICANT CHANGE UP (ref 135–146)
SODIUM SERPL-SCNC: 143 MMOL/L — SIGNIFICANT CHANGE UP (ref 135–146)
WBC # BLD: 10.28 K/UL — SIGNIFICANT CHANGE UP (ref 4.8–10.8)
WBC # BLD: 10.3 K/UL — SIGNIFICANT CHANGE UP (ref 4.8–10.8)
WBC # BLD: 12.06 K/UL — HIGH (ref 4.8–10.8)
WBC # FLD AUTO: 10.28 K/UL — SIGNIFICANT CHANGE UP (ref 4.8–10.8)
WBC # FLD AUTO: 10.3 K/UL — SIGNIFICANT CHANGE UP (ref 4.8–10.8)
WBC # FLD AUTO: 12.06 K/UL — HIGH (ref 4.8–10.8)

## 2020-04-05 PROCEDURE — 93010 ELECTROCARDIOGRAM REPORT: CPT

## 2020-04-05 PROCEDURE — 99291 CRITICAL CARE FIRST HOUR: CPT

## 2020-04-05 PROCEDURE — 71045 X-RAY EXAM CHEST 1 VIEW: CPT | Mod: 26,77

## 2020-04-05 PROCEDURE — 71045 X-RAY EXAM CHEST 1 VIEW: CPT | Mod: 26

## 2020-04-05 RX ADMIN — APIXABAN 5 MILLIGRAM(S): 2.5 TABLET, FILM COATED ORAL at 05:10

## 2020-04-05 RX ADMIN — Medication 10 MILLILITER(S): at 21:43

## 2020-04-05 RX ADMIN — Medication 60 MILLIGRAM(S): at 05:10

## 2020-04-05 RX ADMIN — Medication 500 MILLIGRAM(S): at 21:43

## 2020-04-05 RX ADMIN — PANTOPRAZOLE SODIUM 40 MILLIGRAM(S): 20 TABLET, DELAYED RELEASE ORAL at 12:18

## 2020-04-05 RX ADMIN — Medication 60 MILLIGRAM(S): at 17:47

## 2020-04-05 RX ADMIN — Medication 500 MILLIGRAM(S): at 05:10

## 2020-04-05 RX ADMIN — SPIRONOLACTONE 25 MILLIGRAM(S): 25 TABLET, FILM COATED ORAL at 05:10

## 2020-04-05 RX ADMIN — Medication 200 MILLIGRAM(S): at 05:10

## 2020-04-05 RX ADMIN — ZINC SULFATE TAB 220 MG (50 MG ZINC EQUIVALENT) 220 MILLIGRAM(S): 220 (50 ZN) TAB at 12:17

## 2020-04-05 RX ADMIN — Medication 10 MILLILITER(S): at 05:10

## 2020-04-05 RX ADMIN — CEFEPIME 100 MILLIGRAM(S): 1 INJECTION, POWDER, FOR SOLUTION INTRAMUSCULAR; INTRAVENOUS at 12:18

## 2020-04-05 RX ADMIN — Medication 10 MILLILITER(S): at 12:18

## 2020-04-05 RX ADMIN — LACTULOSE 30 GRAM(S): 10 SOLUTION ORAL at 12:18

## 2020-04-05 RX ADMIN — SODIUM CHLORIDE 100 MILLILITER(S): 9 INJECTION, SOLUTION INTRAVENOUS at 06:56

## 2020-04-05 RX ADMIN — INSULIN HUMAN 1 UNIT(S)/HR: 100 INJECTION, SOLUTION SUBCUTANEOUS at 20:35

## 2020-04-05 RX ADMIN — CHLORHEXIDINE GLUCONATE 1 APPLICATION(S): 213 SOLUTION TOPICAL at 06:00

## 2020-04-05 RX ADMIN — Medication 500 MILLIGRAM(S): at 12:18

## 2020-04-05 RX ADMIN — Medication 30 MILLIGRAM(S): at 05:10

## 2020-04-05 RX ADMIN — APIXABAN 5 MILLIGRAM(S): 2.5 TABLET, FILM COATED ORAL at 17:48

## 2020-04-05 RX ADMIN — SENNA PLUS 2 TABLET(S): 8.6 TABLET ORAL at 21:43

## 2020-04-05 NOTE — CHART NOTE - NSCHARTNOTEFT_GEN_A_CORE
Spoke to family member, Rebecca, gave updates and explained treatment plan in detail. She verbalized understanding and agreement.

## 2020-04-05 NOTE — PROGRESS NOTE ADULT - SUBJECTIVE AND OBJECTIVE BOX
KONSTANTIN WYATT  [id number]  74y Male    Indication for ICU admission:  Acute Hypoxemic Respiratory Failure  clinical COVID19 (false negatvie)    Admit Date:20  ICU Date: 20  OR Date:    Bactrim (Unknown)    PAST MEDICAL & SURGICAL HISTORY:  Afib  DM (diabetes mellitus)  BPH (benign prostatic hyperplasia)  CAD (coronary artery disease)    Home Medications:  Cartia  mg/24 hours oral capsule, extended release: 1 cap(s) orally once a day (30 Mar 2020 18:04)  Eliquis 5 mg oral tablet: 1 tab(s) orally 2 times a day (30 Mar 2020 18:04)  Lipitor 40 mg oral tablet: 1 tab(s) orally once a day (30 Mar 2020 18:04)  lisinopril 40 mg oral tablet: 1 tab(s) orally once a day (30 Mar 2020 18:04)  metFORMIN 750 mg oral tablet, extended release: 1 tab(s) orally once a day (30 Mar 2020 18:04)      24HRS EVENT:  Overnight: No acute events. Attempted to come off Levo, MAP dropped, Levo restarted & maintained at low dose. Hyperkalemic 5.4, EKG w/o changes.    NEURO/PSYCH  Sedation; RASS -3   - Propofol gtt, Midazolam gtt  Pain Control: Morphine gtt  - Acetaminophen 650mg PO q6 PRN    RESP  Acute Hypoxemic Respiratory Failure;  - Intubated: /30/40/15  - AB.27/42/132/19 --> decr FiO2 to 40 from 45  COVID-19/Viral Pneumonia;  - positive SARS-CoV-2;  - CXR: unchanged diffuse bilateral opacities  - continue Guaifenisen    CARD/VASC  Bradycardic to 40s  - decreased Diltiazem to bid from tid   Hypotension, on Norepinephrine 0.02  Hx of Afib  - continue, Apixaban 5mg PO bid  - continue, Diltiazem 120mg PO  daily EKG - QTc ___  Hx of CAD  - continue, Atorvastatin 40mg PO qhs  Hx of HTN  - hold, Lisinopril     GI/NUTR  - Diet:TF @ 75mL/hr  - PPI  - Senna  - Lactulose     /Renal  LIZZIE; worsening - BUN/Cr 99/5.9 > 101/5.4  Evans, UOP: 100-150cc/hr  - on, Spironolactone 25mg  Fluids: LR @ 100mL/hr  Electrolytes: Sodium  140  Potassium 5.4  Magnesium 2.7  Phosphorus  8.3  Hx of BPH    HEME/ONC  Hgb 12, stable    ID  Hypothermic 94.1, wareming blanket  WBC 12, lateral  COVID-19 +  - Hydroxychloroquine (complete )  - Azithromycin (complete /)  - Cefepime   - Ascorbic acid  - Zinc    ENDO  DM2; uncontrolled  (on Solumedrol)  - continue, Insulin gtt   - holding, Metformin    MSK/DERM  - no active issues    DVT PPx  - on Apixaban     GI PPx  - continue, Pantoprazole 40mg PO qac  - continue, Senna 17.2mg PO qhs    Disposition  -  SICU    ***Tubes/Lines/Drains  ***  Peripheral IV  Central Venous Line  Right IJ TLC  	Date 3/31/20  Arterial Line  Right radial A-line		                Date: 3/31/20  Urnary Catheter		Indication: Strict I&O    Date Placed: 3/31/20

## 2020-04-05 NOTE — PROGRESS NOTE ADULT - ASSESSMENT
NEURO/PSYCH  Sedation;   - continue, Propofol gtt titrate to RASS -3  - started Midazolam gtt 5mg/hr  - started Morphine gtt 2mg/hr  Pain Control;  - continue, Acetaminophen 650mg PO q6 PRN    RESP  Acute Hypoxemic Respiratory Failure;  Mode: AC/ CMV (Assist Control/ Continuous Mandatory Ventilation)  RR (machine): 30 TV (machine): 450 FiO2: 40 PEEP: 15 ITime: 1 MAP: 20 PIP: 38, plat pressure 29  ABG - ( 05 Apr 2020 05:17 ) pH, Arterial: 7.27  pH, Blood: x     /  pCO2: 42    /  pO2: 132   / HCO3: 19    / Base Excess: -7.4  /  SaO2: 98      - Improving oxygenation  - FiO2 lowered to 40%  COVID-19/Viral Pneumonia;  - positive SARS-CoV-2  - continue, Methylprednisolone 60mg q12  - worsening diffuse bilateral opacities  - continue, Guaifenisen/DM  - daily cxr qam    CARD/VASC  BP remains labile on levo  Bradycardia;  - Diltiazem from 30mg PO bid  AFib; chronic  - continue, Apixaban 5mg PO bid  - continue, Diltiazem 30mg PO bid   - continue, EKG qd  CAD    - Atorvastatin 40mg PO qhs  HTN  - holding Lisinopril     GI/NUTR  - NPO  - continue TF 75mL/hr  - remains on Lactulose     /Renal  LIZZIE; steadily worsening   101/5.6  <--, 99/5.9  <--, 90/5.5  <--BUN/Creatinine  UOP; adequate  - averaging 100-1500mL/hr  - continue, Spironolactone 25mg OGT qd  - monitor, I&Os  Fluids  - LR 100mL/hr  - monitor I&Os  - santana in place  Electrolytes;  Lytes-04-05 @ 00:30 Na 140   K 5.4   Phos 8.3    Mg 2.7  04-04 @ 17:04 Na 138   K 5.4   Phos 8.1    Mg 2.6  Hyperkalemia; mild  - no EKG changes  - repeat CMP in the pm  - monitor e-  BPH; chronic     HEME/ONC  Serial Hemoglobin:   12.7 (04-05 @ 00:30)  12.8 (04-04 @ 17:04)  12.1 (04-04 @ 00:00)  12.9 (04-03 @ 16:00)  12.7 (04-03 @ 00:05)    - stable H/H  - monitor H/H    ID  Fever;  - continue, Acetaminophen 650mg PO q6  COVID-19, suspected;  - positive repeat swab  - Hydroxychloroquine (complete 4/5)  - Azithromycin (complete 4/4)  - continue Cefepime   - nasal MRSA, urine Legionella & Strep pending     ENDO  DM2;  - continue, Insulin gtt   - continue POCT q1  - holding, Metformin    MSK/DERM  - no active issues    DVT PPx  - on Apixaban     GI PPx  - continue Pantoprazole 40mg PO qac  - continue Senna 17.2mg PO qhs  - continue lactulose    Disposition  -  SICU

## 2020-04-06 LAB
4/8 RATIO: 1.8 RATIO — SIGNIFICANT CHANGE UP (ref 0.86–4.14)
4/8 RATIO: 3.74 RATIO — SIGNIFICANT CHANGE UP (ref 0.86–4.14)
ABS CD8: 27 /UL — LOW (ref 90–775)
ABS CD8: 41 /UL — LOW (ref 90–775)
ANION GAP SERPL CALC-SCNC: 18 MMOL/L — HIGH (ref 7–14)
BUN SERPL-MCNC: 115 MG/DL — CRITICAL HIGH (ref 10–20)
BUN SERPL-MCNC: 131 MG/DL — CRITICAL HIGH (ref 10–20)
CALCIUM SERPL-MCNC: 7.8 MG/DL — LOW (ref 8.5–10.1)
CD16+CD56+ CELLS NFR BLD: 17 % — SIGNIFICANT CHANGE UP (ref 7–27)
CD16+CD56+ CELLS NFR BLD: 20 % — SIGNIFICANT CHANGE UP (ref 7–27)
CD16+CD56+ CELLS NFR SPEC: 35 /UL — LOW (ref 80–426)
CD16+CD56+ CELLS NFR SPEC: 38 /UL — LOW (ref 80–426)
CD19 BLASTS SPEC-ACNC: 14 % — SIGNIFICANT CHANGE UP (ref 4–18)
CD19 BLASTS SPEC-ACNC: 18 % — SIGNIFICANT CHANGE UP (ref 4–18)
CD19 BLASTS SPEC-ACNC: 27 /UL — LOW (ref 32–326)
CD19 BLASTS SPEC-ACNC: 39 /UL — SIGNIFICANT CHANGE UP (ref 32–326)
CD3 BLASTS SPEC-ACNC: 125 /UL — LOW (ref 396–2024)
CD3 BLASTS SPEC-ACNC: 134 /UL — LOW (ref 396–2024)
CD3 BLASTS SPEC-ACNC: 64 % — SIGNIFICANT CHANGE UP (ref 58–84)
CD3 BLASTS SPEC-ACNC: 65 % — SIGNIFICANT CHANGE UP (ref 58–84)
CD4 %: 38 % — SIGNIFICANT CHANGE UP (ref 30–56)
CD4 %: 48 % — SIGNIFICANT CHANGE UP (ref 30–56)
CD8 %: 13 % — SIGNIFICANT CHANGE UP (ref 11–43)
CD8 %: 21 % — SIGNIFICANT CHANGE UP (ref 11–43)
CHLORIDE SERPL-SCNC: 107 MMOL/L — SIGNIFICANT CHANGE UP (ref 98–110)
CO2 SERPL-SCNC: 16 MMOL/L — LOW (ref 17–32)
CREAT SERPL-MCNC: 5.6 MG/DL — CRITICAL HIGH (ref 0.7–1.5)
CREAT SERPL-MCNC: 5.7 MG/DL — CRITICAL HIGH (ref 0.7–1.5)
FERRITIN SERPL-MCNC: 1085 NG/ML — HIGH (ref 30–400)
GAS PNL BLDA: SIGNIFICANT CHANGE UP
GAS PNL BLDA: SIGNIFICANT CHANGE UP
GLUCOSE BLDC GLUCOMTR-MCNC: 134 MG/DL — HIGH (ref 70–99)
GLUCOSE BLDC GLUCOMTR-MCNC: 137 MG/DL — HIGH (ref 70–99)
GLUCOSE BLDC GLUCOMTR-MCNC: 140 MG/DL — HIGH (ref 70–99)
GLUCOSE BLDC GLUCOMTR-MCNC: 150 MG/DL — HIGH (ref 70–99)
GLUCOSE BLDC GLUCOMTR-MCNC: 150 MG/DL — HIGH (ref 70–99)
GLUCOSE BLDC GLUCOMTR-MCNC: 153 MG/DL — HIGH (ref 70–99)
GLUCOSE BLDC GLUCOMTR-MCNC: 171 MG/DL — HIGH (ref 70–99)
GLUCOSE BLDC GLUCOMTR-MCNC: 180 MG/DL — HIGH (ref 70–99)
GLUCOSE BLDC GLUCOMTR-MCNC: 193 MG/DL — HIGH (ref 70–99)
GLUCOSE BLDC GLUCOMTR-MCNC: 202 MG/DL — HIGH (ref 70–99)
GLUCOSE BLDC GLUCOMTR-MCNC: 216 MG/DL — HIGH (ref 70–99)
GLUCOSE BLDC GLUCOMTR-MCNC: 234 MG/DL — HIGH (ref 70–99)
GLUCOSE BLDC GLUCOMTR-MCNC: 234 MG/DL — HIGH (ref 70–99)
GLUCOSE BLDC GLUCOMTR-MCNC: 236 MG/DL — HIGH (ref 70–99)
GLUCOSE SERPL-MCNC: 250 MG/DL — HIGH (ref 70–99)
MAGNESIUM SERPL-MCNC: 2.8 MG/DL — HIGH (ref 1.8–2.4)
PHOSPHATE SERPL-MCNC: 8.2 MG/DL — HIGH (ref 2.1–4.9)
POTASSIUM SERPL-MCNC: 5.1 MMOL/L — HIGH (ref 3.5–5)
POTASSIUM SERPL-SCNC: 5.1 MMOL/L — HIGH (ref 3.5–5)
PROCALCITONIN SERPL-MCNC: 0.91 NG/ML — HIGH (ref 0.02–0.1)
SODIUM SERPL-SCNC: 141 MMOL/L — SIGNIFICANT CHANGE UP (ref 135–146)
T-CELL CD4 SUBSET PNL BLD: 101 /UL — LOW (ref 325–1251)
T-CELL CD4 SUBSET PNL BLD: 74 /UL — LOW (ref 325–1251)
TROPONIN T SERPL-MCNC: 0.04 NG/ML — CRITICAL HIGH

## 2020-04-06 PROCEDURE — 71045 X-RAY EXAM CHEST 1 VIEW: CPT | Mod: 26

## 2020-04-06 PROCEDURE — 99291 CRITICAL CARE FIRST HOUR: CPT

## 2020-04-06 PROCEDURE — 93010 ELECTROCARDIOGRAM REPORT: CPT

## 2020-04-06 RX ORDER — DEXTROSE 50 % IN WATER 50 %
25 SYRINGE (ML) INTRAVENOUS ONCE
Refills: 0 | Status: DISCONTINUED | OUTPATIENT
Start: 2020-04-06 | End: 2020-04-08

## 2020-04-06 RX ORDER — DEXTROSE 50 % IN WATER 50 %
12.5 SYRINGE (ML) INTRAVENOUS ONCE
Refills: 0 | Status: DISCONTINUED | OUTPATIENT
Start: 2020-04-06 | End: 2020-04-13

## 2020-04-06 RX ORDER — DEXTROSE 50 % IN WATER 50 %
15 SYRINGE (ML) INTRAVENOUS ONCE
Refills: 0 | Status: DISCONTINUED | OUTPATIENT
Start: 2020-04-06 | End: 2020-04-13

## 2020-04-06 RX ORDER — INSULIN HUMAN 100 [IU]/ML
1 INJECTION, SOLUTION SUBCUTANEOUS
Qty: 100 | Refills: 0 | Status: DISCONTINUED | OUTPATIENT
Start: 2020-04-06 | End: 2020-04-07

## 2020-04-06 RX ORDER — SODIUM CHLORIDE 9 MG/ML
1000 INJECTION, SOLUTION INTRAVENOUS
Refills: 0 | Status: DISCONTINUED | OUTPATIENT
Start: 2020-04-06 | End: 2020-04-13

## 2020-04-06 RX ORDER — INSULIN HUMAN 100 [IU]/ML
INJECTION, SOLUTION SUBCUTANEOUS EVERY 6 HOURS
Refills: 0 | Status: DISCONTINUED | OUTPATIENT
Start: 2020-04-06 | End: 2020-04-06

## 2020-04-06 RX ORDER — DEXTROSE 50 % IN WATER 50 %
50 SYRINGE (ML) INTRAVENOUS
Refills: 0 | Status: DISCONTINUED | OUTPATIENT
Start: 2020-04-06 | End: 2020-04-08

## 2020-04-06 RX ORDER — SODIUM CHLORIDE 9 MG/ML
1000 INJECTION INTRAMUSCULAR; INTRAVENOUS; SUBCUTANEOUS
Refills: 0 | Status: DISCONTINUED | OUTPATIENT
Start: 2020-04-06 | End: 2020-04-07

## 2020-04-06 RX ORDER — GLUCAGON INJECTION, SOLUTION 0.5 MG/.1ML
1 INJECTION, SOLUTION SUBCUTANEOUS ONCE
Refills: 0 | Status: DISCONTINUED | OUTPATIENT
Start: 2020-04-06 | End: 2020-04-08

## 2020-04-06 RX ADMIN — LACTULOSE 30 GRAM(S): 10 SOLUTION ORAL at 11:45

## 2020-04-06 RX ADMIN — SODIUM CHLORIDE 100 MILLILITER(S): 9 INJECTION INTRAMUSCULAR; INTRAVENOUS; SUBCUTANEOUS at 21:35

## 2020-04-06 RX ADMIN — CEFEPIME 100 MILLIGRAM(S): 1 INJECTION, POWDER, FOR SOLUTION INTRAMUSCULAR; INTRAVENOUS at 11:44

## 2020-04-06 RX ADMIN — Medication 60 MILLIGRAM(S): at 05:46

## 2020-04-06 RX ADMIN — INSULIN HUMAN 6: 100 INJECTION, SOLUTION SUBCUTANEOUS at 13:14

## 2020-04-06 RX ADMIN — APIXABAN 5 MILLIGRAM(S): 2.5 TABLET, FILM COATED ORAL at 17:14

## 2020-04-06 RX ADMIN — PANTOPRAZOLE SODIUM 40 MILLIGRAM(S): 20 TABLET, DELAYED RELEASE ORAL at 11:44

## 2020-04-06 RX ADMIN — ZINC SULFATE TAB 220 MG (50 MG ZINC EQUIVALENT) 220 MILLIGRAM(S): 220 (50 ZN) TAB at 13:15

## 2020-04-06 RX ADMIN — Medication 30 MILLIGRAM(S): at 17:14

## 2020-04-06 RX ADMIN — Medication 10 MILLILITER(S): at 05:46

## 2020-04-06 RX ADMIN — APIXABAN 5 MILLIGRAM(S): 2.5 TABLET, FILM COATED ORAL at 05:46

## 2020-04-06 RX ADMIN — SODIUM CHLORIDE 100 MILLILITER(S): 9 INJECTION, SOLUTION INTRAVENOUS at 02:14

## 2020-04-06 RX ADMIN — Medication 30 MILLIGRAM(S): at 07:46

## 2020-04-06 RX ADMIN — SENNA PLUS 2 TABLET(S): 8.6 TABLET ORAL at 21:40

## 2020-04-06 RX ADMIN — CHLORHEXIDINE GLUCONATE 1 APPLICATION(S): 213 SOLUTION TOPICAL at 05:49

## 2020-04-06 RX ADMIN — INSULIN HUMAN 1 UNIT(S)/HR: 100 INJECTION, SOLUTION SUBCUTANEOUS at 15:53

## 2020-04-06 RX ADMIN — Medication 500 MILLIGRAM(S): at 21:40

## 2020-04-06 RX ADMIN — Medication 500 MILLIGRAM(S): at 13:31

## 2020-04-06 RX ADMIN — SPIRONOLACTONE 25 MILLIGRAM(S): 25 TABLET, FILM COATED ORAL at 05:48

## 2020-04-06 RX ADMIN — Medication 500 MILLIGRAM(S): at 05:46

## 2020-04-06 RX ADMIN — Medication 10 MILLILITER(S): at 13:31

## 2020-04-06 RX ADMIN — Medication 10 MILLILITER(S): at 21:39

## 2020-04-06 NOTE — PROGRESS NOTE ADULT - SUBJECTIVE AND OBJECTIVE BOX
KONSTANTIN WYATT  [id number]  74y Male    Indication for ICU admission:  Admit Date:20  ICU Date: 20      Bactrim (Unknown)    PAST MEDICAL & SURGICAL HISTORY:  Afib  DM (diabetes mellitus)  BPH (benign prostatic hyperplasia)  CAD (coronary artery disease)    Home Medications:  Cartia  mg/24 hours oral capsule, extended release: 1 cap(s) orally once a day (30 Mar 2020 18:04)  Eliquis 5 mg oral tablet: 1 tab(s) orally 2 times a day (30 Mar 2020 18:04)  Lipitor 40 mg oral tablet: 1 tab(s) orally once a day (30 Mar 2020 18:04)  lisinopril 40 mg oral tablet: 1 tab(s) orally once a day (30 Mar 2020 18:04)  metFORMIN 750 mg oral tablet, extended release: 1 tab(s) orally once a day (30 Mar 2020 18:04)        24HRS EVENT:  Overnight: No acute events. Decr PEEP from 10 to 8 (for abg 7.3//103/18)      NEURO/PSYCH  Sedation; RASS -3   - Propofol gtt, weaning Midazolam gtt  Pain Control: Acetaminophen 650mg PO q6 PRN    RESP  Acute Hypoxemic Respiratory Failure;  - Intubated: /30/40/8 (decreased PEEP from 10 for abg below)  - AB.3///18    COVID-19/Viral Pneumonia;  - positive SARS-CoV-2;  - CXR: unchanged diffuse bilateral opacities  - on Guaifenesin    CARD/VASC  Bradycardic to 40s  Hx of Afib - on Apixaban, Diltiazem (decreased to BID)  Off pressors since  @ 1pm  Hx of CAD - Atorvastatin 40mg PO qhs  Hx of HTN - hold Lisinopril   daily EKG - QTc 416 20    GI/NUTR  - Diet: TF (glucerna)@ 50mL/hr  - PPI  - Senna, Lactulose     /Renal  LIZZIE; worsening - BUN/Cr 109/5.9 115 / 5.6  Fluids: LR @ 100mL/hr  Evans, UOP: 50-75cc/hr  - on Spironolactone 25mg  Electrolytes: Na 143 / K 5.1 / Mg 2.8 / Ph 8.3  Hx of BPH    HEME/ONC  Hgb 12, stable    ID  Hypothermic 94.1, warming blanket  WBC 10  COVID-19 +  - completed course of Hydroxychloroquine & Azithromycin  - abx: Cefepime   - Ascorbic acid, Zinc  - D-dimer downtrending 11,529 (16,000), , ESR 60    ENDO  DM2; uncontrolled, holding home Metformin  - on Insulin gtt   - on Solumedrol    MSK/DERM  - no active issues    DVT PPx  - on Apixaban     GI PPx  -  Pantoprazole 40mg PO qac        DVT Prophylaxis: apixaban 5 milliGRAM(s) Oral two times a day      GI Prophylaxis:lactulose Syrup 30 Gram(s) Oral daily  pantoprazole  Injectable 40 milliGRAM(s) IV Push daily  senna 2 Tablet(s) Oral at bedtime      ***Tubes/Lines/Drains  ***  Peripheral IV  Arterial Line		                  Central Line                            Urinary Catheter		Indication: Strict I&O          [X] A ten-point review of systems was otherwise negative except as noted above.  [  ] Due to altered mental status/intubation, subjective information was not attained from the patient. History was obtained, to the extent possible, from review of the chart and collateral sources of information.       Daily     Diet, NPO:   NPO for Procedure/Test     NPO Start Date: 2020,   NPO Start Time: 08:40 (20 @ 08:40)      CURRENT MEDS:  Neurologic Medications  acetaminophen  Suppository .. 650 milliGRAM(s) Rectal every 6 hours PRN Temp greater or equal to 38.5C (101.3F)  midazolam Infusion 0.053 mG/kG/Hr IV Continuous <Continuous>  propofol Infusion 10 MICROgram(s)/kG/Min IV Continuous <Continuous>    Respiratory Medications  guaifenesin/dextromethorphan  Syrup 10 milliLiter(s) Oral every 4 hours PRN Cough  guaifenesin/dextromethorphan  Syrup 10 milliLiter(s) Oral every 8 hours    Cardiovascular Medications  diltiazem    Tablet 30 milliGRAM(s) Enteral Tube every 12 hours  norepinephrine Infusion 0.05 MICROgram(s)/kG/Min IV Continuous <Continuous>  spironolactone 25 milliGRAM(s) Oral daily    Gastrointestinal Medications  ascorbic acid 500 milliGRAM(s) Oral every 8 hours  dextrose 5%. 1000 milliLiter(s) IV Continuous <Continuous>  lactated ringers. 1000 milliLiter(s) IV Continuous <Continuous>  lactulose Syrup 30 Gram(s) Oral daily  pantoprazole  Injectable 40 milliGRAM(s) IV Push daily  senna 2 Tablet(s) Oral at bedtime  zinc sulfate 220 milliGRAM(s) Oral daily    Genitourinary Medications    Hematologic/Oncologic Medications  apixaban 5 milliGRAM(s) Oral two times a day    Antimicrobial/Immunologic Medications  cefepime   IVPB      cefepime   IVPB 1000 milliGRAM(s) IV Intermittent every 24 hours    Endocrine/Metabolic Medications  dextrose 40% Gel 15 Gram(s) Oral once PRN Blood Glucose LESS THAN 70 milliGRAM(s)/deciliter  dextrose 50% Injectable 12.5 Gram(s) IV Push once  dextrose 50% Injectable 25 Gram(s) IV Push once  dextrose 50% Injectable 25 Gram(s) IV Push once  glucagon  Injectable 1 milliGRAM(s) IntraMuscular once PRN Glucose LESS THAN 70 milligrams/deciliter  insulin regular  human corrective regimen sliding scale   SubCutaneous every 6 hours    Topical/Other Medications  chlorhexidine 4% Liquid 1 Application(s) Topical <User Schedule>      ICU Vital Signs Last 24 Hrs  T(C): 36.2 (2020 08:00), Max: 36.5 (2020 04:00)  T(F): 97.1 (2020 08:00), Max: 97.7 (2020 04:00)  HR: 65 (2020 10:00) (48 - 65)  BP: --  BP(mean): --  ABP: 127/50 (2020 10:00) (122/50 - 154/60)  ABP(mean): 72 (2020 10:00) (71 - 87)  RR: 30 (2020 10:00) (30 - 30)  SpO2: 100% (2020 10:44) (99% - 100%)        Mode: AC/ CMV (Assist Control/ Continuous Mandatory Ventilation)  RR (machine): 30  TV (machine): 450  FiO2: 40  PEEP: 5  MAP: 16  PIP: 29    ABG - ( 2020 01:58 )  pH, Arterial: 7.30  pH, Blood: x     /  pCO2: 37    /  pO2: 103   / HCO3: 18    / Base Excess: -7.5  /  SaO2: 97                  I&O's Summary    2020 07:01  -  2020 07:00  --------------------------------------------------------  IN: 2904.8 mL / OUT: 1150 mL / NET: 1754.8 mL    2020 07:  -  2020 11:19  --------------------------------------------------------  IN: 315 mL / OUT: 155 mL / NET: 160 mL      I&O's Detail    2020 07:  -  2020 07:00  --------------------------------------------------------  IN:    Glucerna: 570 mL    insulin regular Infusion: 68 mL    IV PiggyBack: 101 mL    lactated ringers.: 1900 mL    midazolam Infusion: 85 mL    morphine  Infusion: 34 mL    norepinephrine Infusion: 10.8 mL    propofol Infusion: 136 mL  Total IN: 2904.8 mL    OUT:    Indwelling Catheter - Urethral: 1150 mL  Total OUT: 1150 mL    Total NET: 1754.8 mL      2020 07:  -  2020 11:19  --------------------------------------------------------  IN:    insulin regular Infusion: 2 mL    lactated ringers.: 300 mL    midazolam Infusion: 13 mL  Total IN: 315 mL    OUT:    Indwelling Catheter - Urethral: 155 mL  Total OUT: 155 mL    Total NET: 160 mL            CXR:       LABS:  CAPILLARY BLOOD GLUCOSE      POCT Blood Glucose.: 193 mg/dL (2020 10:51)  POCT Blood Glucose.: 137 mg/dL (2020 07:59)  POCT Blood Glucose.: 140 mg/dL (2020 06:25)  POCT Blood Glucose.: 134 mg/dL (2020 04:34)  POCT Blood Glucose.: 153 mg/dL (2020 02:11)  POCT Blood Glucose.: 150 mg/dL (2020 00:16)  POCT Blood Glucose.: 162 mg/dL (2020 22:08)  POCT Blood Glucose.: 146 mg/dL (2020 19:58)  POCT Blood Glucose.: 174 mg/dL (2020 17:36)  POCT Blood Glucose.: 148 mg/dL (2020 14:13)  POCT Blood Glucose.: 143 mg/dL (2020 11:26)                          12.5   10.30 )-----------( 281      ( 2020 23:00 )             36.5       04-05    143  |  109  |  115<HH>  ----------------------------<  148<H>  5.1<H>   |  17  |  5.6<HH>    Ca    8.1<L>      :00  Phos  8.3     04-05  Mg     2.8     04-05    TPro  4.9<L>  /  Alb  2.4<L>  /  TBili  0.5  /  DBili  x   /  AST  48<H>  /  ALT  59<H>  /  AlkPhos  67  04-05      PT/INR - ( : )   PT: 13.60 sec;   INR: 1.18 ratio         PTT - ( : )  PTT:26.8 sec  CARDIAC MARKERS (  )  x     / 0.04 ng/mL / 499 U/L / x     / x

## 2020-04-06 NOTE — CHART NOTE - NSCHARTNOTEFT_GEN_A_CORE
Registered Dietitian Follow-Up     Patient Profile Reviewed                           Yes [x]   No []     Nutrition History Previously Obtained        Yes []  No [x]-unable to obtain at this time as pt is intubated        Pertinent Medical Interventions: LIZZIE steadily worsening. Per rounds this am, pt to be weaned off versed for possible extubation today.      Diet order: currently NPO, was previously receiving Glucerna 1.2 @ 30ml/hr (1709kcal, 43gm pro, 580ml free water, includes additional kcal from propofol). MAP 72.     Anthropometrics:  - Ht. 69"  - Wt. 104.2kg (4/4)--wt trending upwards, will monitor   (4/3): 103.3kg   (4/2): 99kg   (3/31): 95kg   - BMI: 31.0   - IBW: 160#      Pertinent Lab Data: (4/5): H/H 12.5/36.5, K+ 5.1, , Cr. 5.6, Gluc 148, elevated LFTs, GFR 9, Mg 2.8, POCT 193      Pertinent Meds: eliquis, abx, lactated ringers, insulin, cardizem, lactulose, aldactone, versed, Vit C, levophed, protonix, senna, zinc sulfate (D/C between 4/9-4/13), propofol @ 32ml/hr (provides additional 845kcal)     Physical Findings:  - Appearance: intubated/ventilated; no edema noted   - GI function: abd soft/nontender, LBM unknown--on bowel regimen   - Tubes: OGT   - Oral/Mouth cavity: NPO  - Skin: intact      Nutrition Requirements  Weight Used: lowest wt this adm: 95kg, Ve: 13.2, Tmax: 36.5, [id number]: 2055     Estimated Energy Needs: ~2699-6259 kcal/day obtained by comparing the following 1233-1439kcal (60-70% of [id number]) vs. 1045-1330kcal (11-14 kcal/kg ABW) vs. 1606-1825kcal (22-25 kcal/kg IBW)  Estimated Protein Needs: 110-146 gm/day (1.2-1.5 gm/kg IBW)--impaired renal fxn noted, will monitor and adjust prn   Estimated Fluid Needs: per SICU team      Nutrient Intake: not meeting kcal/pro needs d/t NPO status      Previous Nutrition Diagnosis: Inadequate protein-energy intake (ongoing)      Nutrition Intervention: enteral nutrition, collaboration of care     Recommendations:  1. If pt to be extubated, recommend speech and swallow eval; diet per SLP recs + CHO consistent/HS diet modifier   2. If unable to extubate, recommend the following TF regimen: Vital HP @ 30m/hr + 3 pckts of No-Carb Prosource TF, which provides a total of 720ml TV, 1697kcal, 96gm pro, 602ml free water (includes additional kcal provided from propofol). Additional fluids per LIP.     **Recommend low-fat formula such as Vital HP, as pt is receiving significant amount of kcal from propofol at this time.**     Goal/Expected Outcome: Pt to meet % of estimated nutrient needs within 3 days      Indicator/Monitoring: RD to monitor diet order, energy intake, body composition, renal/glucose/liver profiles, NFPF

## 2020-04-06 NOTE — PROGRESS NOTE ADULT - ASSESSMENT
Assessment & Plan    74y Male  with acute hypoxemic respiratory failure 2/2 Covid+ pneumonia    NEURO/PSYCH  Sedation;   - continue, Propofol gtt titrate to RASS -3  - started Midazolam gtt 5mg/hr  - started Morphine gtt 2mg/hr  Pain Control;  - continue, Acetaminophen 650mg PO q6 PRN    RESP  Acute Hypoxemic Respiratory Failure;  Mode: AC/ CMV (Assist Control/ Continuous Mandatory Ventilation)  RR (machine): 30 TV (machine): 450 FiO2: 40 PEEP: 5 ITime: 1 MAP: 16 PIP: 29,  - planning for SBT  - Improving oxygenation  COVID-19/Viral Pneumonia;  - positive SARS-CoV-2  - diffuse bilateral opacities  - continue, Guaifenisen/DM  - daily cxr qam    CARD/VASC  Bradycardia;  - Diltiazem 30mg PO bid  AFib; chronic  - continue, Apixaban 5mg PO bid  - continue, Diltiazem 30mg PO bid   - continue, EKG qd  CAD    - Atorvastatin 40mg PO qhs  HTN  - holding Lisinopril     GI/NUTR  - NPO  - continue TF 50mL/hr  - remains on Lactulose     /Renal  LIZZIE; steadily worsening   101/5.6  <--, 99/5.9  <--, 90/5.5  <--BUN/Creatinine  UOP; adequate  - averaging 50-75mL/hr  - continue, Spironolactone 25mg OGT qd  - monitor I&Os  Fluids LR 100mL/hr  - santana in place  Electrolytes;  04-05 @23:30 Na 143   K 5.1  Phos  8.3    Mg     2.8       Lytes-04-05 @ 00:30 Na 140   K 5.4   Phos 8.3    Mg 2.7  04-04 @ 17:04 Na 138   K 5.4   Phos 8.1    Mg 2.6    Hyperkalemia; mild  - no EKG changes  - repeat BMP in the pm  - monitor electrolytes  BPH; chronic     HEME/ONC  Serial Hemoglobin:   12.7 (04-05 @ 00:30)  12.8 (04-04 @ 17:04)  12.1 (04-04 @ 00:00)  12.9 (04-03 @ 16:00)  12.7 (04-03 @ 00:05)    - stable H/H  - monitor H/H    ID  Fever;  - continue Acetaminophen 650mg PO q6  - COVID-19, suspected; positive repeat swab  - Hydroxychloroquine (completed 4/5)  - Azithromycin (completed 4/4)  - continue Cefepime   - nasal MRSA, urine Legionella & Strep pending     ENDO  DM2;  - off Insulin gtt, now on ISS  - continue POCT q1  - holding home Metformin    MSK/DERM  - no active issues    DVT PPx  - on Apixaban     GI PPx  - continue Pantoprazole 40mg PO qac  - continue Senna 17.2mg PO qhs  - continue lactulose    Disposition  -  SICU

## 2020-04-06 NOTE — PROGRESS NOTE ADULT - ASSESSMENT
· Assessment		  IMPRESSION:  COVID19 with septic shock requiring pressors, mechanical ventilation with ARDS with fio2 100 % secondary to Cytokine Release Syndrome  -end organ damage with acute renal failure, increased transaminitis, metabolic encephalopathy  -inflammatory markers significantly elevated but are decreasing  -d Dimers 64496/ CRP 31.9  associated with increased mortality  -Blood & Urine cxs NGTD     RECOMMENDATIONS:  -HCQ/azithro for 7 days  -deep ET cultures  -Cefepime 1 gm iv q24h

## 2020-04-06 NOTE — PROGRESS NOTE ADULT - SUBJECTIVE AND OBJECTIVE BOX
KONSTANTIN WYATT  74y, Male    All available historical data reviewed    OVERNIGHT EVENTS:    fio2 60%  ROS:  unable to obtain history secondary to patient's mental status and/or sedation     VITALS:  T(F): 97.1, Max: 97.7 (04-06-20 @ 04:00)  HR: 65  BP: --  RR: 30Vital Signs Last 24 Hrs  T(C): 36.2 (06 Apr 2020 08:00), Max: 36.5 (06 Apr 2020 04:00)  T(F): 97.1 (06 Apr 2020 08:00), Max: 97.7 (06 Apr 2020 04:00)  HR: 65 (06 Apr 2020 10:00) (48 - 65)  BP: --  BP(mean): --  RR: 30 (06 Apr 2020 10:00) (30 - 30)  SpO2: 100% (06 Apr 2020 10:44) (99% - 100%)    TESTS & MEASUREMENTS:                        12.5   10.30 )-----------( 281      ( 05 Apr 2020 23:00 )             36.5     04-05    143  |  109  |  115<HH>  ----------------------------<  148<H>  5.1<H>   |  17  |  5.6<HH>    Ca    8.1<L>      05 Apr 2020 23:00  Phos  8.3     04-05  Mg     2.8     04-05    TPro  4.9<L>  /  Alb  2.4<L>  /  TBili  0.5  /  DBili  x   /  AST  48<H>  /  ALT  59<H>  /  AlkPhos  67  04-05    LIVER FUNCTIONS - ( 05 Apr 2020 23:00 )  Alb: 2.4 g/dL / Pro: 4.9 g/dL / ALK PHOS: 67 U/L / ALT: 59 U/L / AST: 48 U/L / GGT: x             Culture - Urine (collected 03-30-20 @ 22:20)  Source: .Urine Catheterized  Final Report (03-31-20 @ 23:16):    No growth    Culture - Urine (collected 03-30-20 @ 14:55)  Source: .Urine Clean Catch (Midstream)  Final Report (03-31-20 @ 18:20):    No growth    Culture - Blood (collected 03-30-20 @ 12:14)  Source: .Blood Blood-Peripheral  Final Report (04-03-20 @ 22:00):    No Growth Final    Culture - Blood (collected 03-30-20 @ 12:14)  Source: .Blood Blood-Peripheral  Final Report (04-03-20 @ 22:00):    No Growth Final            RADIOLOGY & ADDITIONAL TESTS:  Personal review of radiological diagnostics performed  Echo and EKG results noted when applicable.     MEDICATIONS:  acetaminophen  Suppository .. 650 milliGRAM(s) Rectal every 6 hours PRN  apixaban 5 milliGRAM(s) Oral two times a day  ascorbic acid 500 milliGRAM(s) Oral every 8 hours  cefepime   IVPB      cefepime   IVPB 1000 milliGRAM(s) IV Intermittent every 24 hours  chlorhexidine 4% Liquid 1 Application(s) Topical <User Schedule>  dextrose 40% Gel 15 Gram(s) Oral once PRN  dextrose 5%. 1000 milliLiter(s) IV Continuous <Continuous>  dextrose 50% Injectable 12.5 Gram(s) IV Push once  dextrose 50% Injectable 25 Gram(s) IV Push once  dextrose 50% Injectable 25 Gram(s) IV Push once  diltiazem    Tablet 30 milliGRAM(s) Enteral Tube every 12 hours  glucagon  Injectable 1 milliGRAM(s) IntraMuscular once PRN  guaifenesin/dextromethorphan  Syrup 10 milliLiter(s) Oral every 4 hours PRN  guaifenesin/dextromethorphan  Syrup 10 milliLiter(s) Oral every 8 hours  insulin regular  human corrective regimen sliding scale   SubCutaneous every 6 hours  lactated ringers. 1000 milliLiter(s) IV Continuous <Continuous>  lactulose Syrup 30 Gram(s) Oral daily  midazolam Infusion 0.053 mG/kG/Hr IV Continuous <Continuous>  norepinephrine Infusion 0.05 MICROgram(s)/kG/Min IV Continuous <Continuous>  pantoprazole  Injectable 40 milliGRAM(s) IV Push daily  propofol Infusion 10 MICROgram(s)/kG/Min IV Continuous <Continuous>  senna 2 Tablet(s) Oral at bedtime  spironolactone 25 milliGRAM(s) Oral daily  zinc sulfate 220 milliGRAM(s) Oral daily      ANTIBIOTICS:  cefepime   IVPB      cefepime   IVPB 1000 milliGRAM(s) IV Intermittent every 24 hours

## 2020-04-07 LAB
ANION GAP SERPL CALC-SCNC: 16 MMOL/L — HIGH (ref 7–14)
ANION GAP SERPL CALC-SCNC: 20 MMOL/L — HIGH (ref 7–14)
APTT BLD: 26.4 SEC — LOW (ref 27–39.2)
BASE EXCESS BLDA CALC-SCNC: -7.3 MMOL/L — LOW (ref -2–2)
BASE EXCESS BLDA CALC-SCNC: -9.4 MMOL/L — LOW (ref -2–2)
BASOPHILS # BLD AUTO: 0.01 K/UL — SIGNIFICANT CHANGE UP (ref 0–0.2)
BASOPHILS NFR BLD AUTO: 0.1 % — SIGNIFICANT CHANGE UP (ref 0–1)
BUN SERPL-MCNC: 143 MG/DL — CRITICAL HIGH (ref 10–20)
BUN SERPL-MCNC: 150 MG/DL — CRITICAL HIGH (ref 10–20)
CALCIUM SERPL-MCNC: 7.9 MG/DL — LOW (ref 8.5–10.1)
CALCIUM SERPL-MCNC: 8.1 MG/DL — LOW (ref 8.5–10.1)
CHLORIDE SERPL-SCNC: 109 MMOL/L — SIGNIFICANT CHANGE UP (ref 98–110)
CHLORIDE SERPL-SCNC: 109 MMOL/L — SIGNIFICANT CHANGE UP (ref 98–110)
CO2 SERPL-SCNC: 14 MMOL/L — LOW (ref 17–32)
CO2 SERPL-SCNC: 17 MMOL/L — SIGNIFICANT CHANGE UP (ref 17–32)
CREAT SERPL-MCNC: 5.9 MG/DL — CRITICAL HIGH (ref 0.7–1.5)
CREAT SERPL-MCNC: 6 MG/DL — CRITICAL HIGH (ref 0.7–1.5)
CRP SERPL-MCNC: 3.45 MG/DL — HIGH (ref 0–0.4)
EOSINOPHIL # BLD AUTO: 0 K/UL — SIGNIFICANT CHANGE UP (ref 0–0.7)
EOSINOPHIL NFR BLD AUTO: 0 % — SIGNIFICANT CHANGE UP (ref 0–8)
GLUCOSE BLDC GLUCOMTR-MCNC: 114 MG/DL — HIGH (ref 70–99)
GLUCOSE BLDC GLUCOMTR-MCNC: 116 MG/DL — HIGH (ref 70–99)
GLUCOSE BLDC GLUCOMTR-MCNC: 120 MG/DL — HIGH (ref 70–99)
GLUCOSE BLDC GLUCOMTR-MCNC: 141 MG/DL — HIGH (ref 70–99)
GLUCOSE BLDC GLUCOMTR-MCNC: 141 MG/DL — HIGH (ref 70–99)
GLUCOSE BLDC GLUCOMTR-MCNC: 164 MG/DL — HIGH (ref 70–99)
GLUCOSE BLDC GLUCOMTR-MCNC: 176 MG/DL — HIGH (ref 70–99)
GLUCOSE BLDC GLUCOMTR-MCNC: 94 MG/DL — SIGNIFICANT CHANGE UP (ref 70–99)
GLUCOSE BLDC GLUCOMTR-MCNC: 97 MG/DL — SIGNIFICANT CHANGE UP (ref 70–99)
GLUCOSE SERPL-MCNC: 156 MG/DL — HIGH (ref 70–99)
GLUCOSE SERPL-MCNC: 168 MG/DL — HIGH (ref 70–99)
HCO3 BLDA-SCNC: 16 MMOL/L — LOW (ref 21–29)
HCO3 BLDA-SCNC: 18 MMOL/L — LOW (ref 21–29)
HCT VFR BLD CALC: 35.7 % — LOW (ref 42–52)
HCT VFR BLD CALC: 37.9 % — LOW (ref 42–52)
HGB BLD-MCNC: 11.8 G/DL — LOW (ref 14–18)
HGB BLD-MCNC: 12.9 G/DL — LOW (ref 14–18)
HOROWITZ INDEX BLDA+IHG-RTO: 21 — SIGNIFICANT CHANGE UP
HOROWITZ INDEX BLDA+IHG-RTO: 40 — SIGNIFICANT CHANGE UP
IMM GRANULOCYTES NFR BLD AUTO: 0.9 % — HIGH (ref 0.1–0.3)
INR BLD: 1.21 RATIO — SIGNIFICANT CHANGE UP (ref 0.65–1.3)
LYMPHOCYTES # BLD AUTO: 0.26 K/UL — LOW (ref 1.2–3.4)
LYMPHOCYTES # BLD AUTO: 1.8 % — LOW (ref 20.5–51.1)
MAGNESIUM SERPL-MCNC: 2.9 MG/DL — HIGH (ref 1.8–2.4)
MAGNESIUM SERPL-MCNC: 2.9 MG/DL — HIGH (ref 1.8–2.4)
MCHC RBC-ENTMCNC: 29.3 PG — SIGNIFICANT CHANGE UP (ref 27–31)
MCHC RBC-ENTMCNC: 30.4 PG — SIGNIFICANT CHANGE UP (ref 27–31)
MCHC RBC-ENTMCNC: 33.1 G/DL — SIGNIFICANT CHANGE UP (ref 32–37)
MCHC RBC-ENTMCNC: 34 G/DL — SIGNIFICANT CHANGE UP (ref 32–37)
MCV RBC AUTO: 88.6 FL — SIGNIFICANT CHANGE UP (ref 80–94)
MCV RBC AUTO: 89.2 FL — SIGNIFICANT CHANGE UP (ref 80–94)
MONOCYTES # BLD AUTO: 1.3 K/UL — HIGH (ref 0.1–0.6)
MONOCYTES NFR BLD AUTO: 9.2 % — SIGNIFICANT CHANGE UP (ref 1.7–9.3)
NEUTROPHILS # BLD AUTO: 12.4 K/UL — HIGH (ref 1.4–6.5)
NEUTROPHILS NFR BLD AUTO: 88 % — HIGH (ref 42.2–75.2)
NRBC # BLD: 0 /100 WBCS — SIGNIFICANT CHANGE UP (ref 0–0)
NRBC # BLD: 0 /100 WBCS — SIGNIFICANT CHANGE UP (ref 0–0)
PCO2 BLDA: 34 MMHG — LOW (ref 38–42)
PCO2 BLDA: 34 MMHG — LOW (ref 38–42)
PH BLDA: 7.28 — LOW (ref 7.38–7.42)
PH BLDA: 7.33 — LOW (ref 7.38–7.42)
PHOSPHATE SERPL-MCNC: 7.5 MG/DL — HIGH (ref 2.1–4.9)
PHOSPHATE SERPL-MCNC: 8.2 MG/DL — HIGH (ref 2.1–4.9)
PLATELET # BLD AUTO: 299 K/UL — SIGNIFICANT CHANGE UP (ref 130–400)
PLATELET # BLD AUTO: 321 K/UL — SIGNIFICANT CHANGE UP (ref 130–400)
PO2 BLDA: 54 MMHG — LOW (ref 78–95)
PO2 BLDA: 81 MMHG — SIGNIFICANT CHANGE UP (ref 78–95)
POTASSIUM SERPL-MCNC: 5 MMOL/L — SIGNIFICANT CHANGE UP (ref 3.5–5)
POTASSIUM SERPL-MCNC: 5.3 MMOL/L — HIGH (ref 3.5–5)
POTASSIUM SERPL-SCNC: 5 MMOL/L — SIGNIFICANT CHANGE UP (ref 3.5–5)
POTASSIUM SERPL-SCNC: 5.3 MMOL/L — HIGH (ref 3.5–5)
PROTHROM AB SERPL-ACNC: 13.9 SEC — HIGH (ref 9.95–12.87)
RBC # BLD: 4.03 M/UL — LOW (ref 4.7–6.1)
RBC # BLD: 4.25 M/UL — LOW (ref 4.7–6.1)
RBC # FLD: 14.7 % — HIGH (ref 11.5–14.5)
RBC # FLD: 14.9 % — HIGH (ref 11.5–14.5)
SAO2 % BLDA: 82 % — LOW (ref 92–96)
SAO2 % BLDA: 96 % — SIGNIFICANT CHANGE UP (ref 92–96)
SODIUM SERPL-SCNC: 142 MMOL/L — SIGNIFICANT CHANGE UP (ref 135–146)
SODIUM SERPL-SCNC: 143 MMOL/L — SIGNIFICANT CHANGE UP (ref 135–146)
WBC # BLD: 14.09 K/UL — HIGH (ref 4.8–10.8)
WBC # BLD: 16.16 K/UL — HIGH (ref 4.8–10.8)
WBC # FLD AUTO: 14.09 K/UL — HIGH (ref 4.8–10.8)
WBC # FLD AUTO: 16.16 K/UL — HIGH (ref 4.8–10.8)

## 2020-04-07 PROCEDURE — 93010 ELECTROCARDIOGRAM REPORT: CPT | Mod: 76

## 2020-04-07 PROCEDURE — 99291 CRITICAL CARE FIRST HOUR: CPT

## 2020-04-07 PROCEDURE — 71045 X-RAY EXAM CHEST 1 VIEW: CPT | Mod: 26

## 2020-04-07 RX ORDER — DILTIAZEM HCL 120 MG
10 CAPSULE, EXT RELEASE 24 HR ORAL ONCE
Refills: 0 | Status: COMPLETED | OUTPATIENT
Start: 2020-04-07 | End: 2020-04-07

## 2020-04-07 RX ORDER — DEXMEDETOMIDINE HYDROCHLORIDE IN 0.9% SODIUM CHLORIDE 4 UG/ML
0.2 INJECTION INTRAVENOUS
Qty: 200 | Refills: 0 | Status: DISCONTINUED | OUTPATIENT
Start: 2020-04-07 | End: 2020-04-07

## 2020-04-07 RX ORDER — DILTIAZEM HCL 120 MG
30 CAPSULE, EXT RELEASE 24 HR ORAL ONCE
Refills: 0 | Status: COMPLETED | OUTPATIENT
Start: 2020-04-07 | End: 2020-04-07

## 2020-04-07 RX ORDER — ACETAZOLAMIDE 250 MG/1
250 TABLET ORAL
Refills: 0 | Status: DISCONTINUED | OUTPATIENT
Start: 2020-04-07 | End: 2020-04-09

## 2020-04-07 RX ORDER — SODIUM CHLORIDE 9 MG/ML
250 INJECTION INTRAMUSCULAR; INTRAVENOUS; SUBCUTANEOUS ONCE
Refills: 0 | Status: COMPLETED | OUTPATIENT
Start: 2020-04-07 | End: 2020-04-07

## 2020-04-07 RX ORDER — INSULIN HUMAN 100 [IU]/ML
INJECTION, SOLUTION SUBCUTANEOUS EVERY 4 HOURS
Refills: 0 | Status: DISCONTINUED | OUTPATIENT
Start: 2020-04-07 | End: 2020-04-11

## 2020-04-07 RX ORDER — NOREPINEPHRINE BITARTRATE/D5W 8 MG/250ML
0.05 PLASTIC BAG, INJECTION (ML) INTRAVENOUS
Qty: 8 | Refills: 0 | Status: DISCONTINUED | OUTPATIENT
Start: 2020-04-07 | End: 2020-04-09

## 2020-04-07 RX ORDER — DILTIAZEM HCL 120 MG
30 CAPSULE, EXT RELEASE 24 HR ORAL EVERY 8 HOURS
Refills: 0 | Status: DISCONTINUED | OUTPATIENT
Start: 2020-04-07 | End: 2020-04-08

## 2020-04-07 RX ORDER — DILTIAZEM HCL 120 MG
10 CAPSULE, EXT RELEASE 24 HR ORAL
Qty: 125 | Refills: 0 | Status: DISCONTINUED | OUTPATIENT
Start: 2020-04-07 | End: 2020-04-10

## 2020-04-07 RX ADMIN — ACETAZOLAMIDE 105 MILLIGRAM(S): 250 TABLET ORAL at 13:04

## 2020-04-07 RX ADMIN — SENNA PLUS 2 TABLET(S): 8.6 TABLET ORAL at 21:36

## 2020-04-07 RX ADMIN — LACTULOSE 30 GRAM(S): 10 SOLUTION ORAL at 11:23

## 2020-04-07 RX ADMIN — Medication 500 MILLIGRAM(S): at 04:42

## 2020-04-07 RX ADMIN — Medication 10 MILLIGRAM(S): at 20:25

## 2020-04-07 RX ADMIN — APIXABAN 5 MILLIGRAM(S): 2.5 TABLET, FILM COATED ORAL at 17:56

## 2020-04-07 RX ADMIN — Medication 10 MILLILITER(S): at 21:35

## 2020-04-07 RX ADMIN — Medication 30 MILLIGRAM(S): at 21:37

## 2020-04-07 RX ADMIN — Medication 500 MILLIGRAM(S): at 21:34

## 2020-04-07 RX ADMIN — SODIUM CHLORIDE 750 MILLILITER(S): 9 INJECTION INTRAMUSCULAR; INTRAVENOUS; SUBCUTANEOUS at 15:06

## 2020-04-07 RX ADMIN — Medication 500 MILLIGRAM(S): at 13:38

## 2020-04-07 RX ADMIN — Medication 10 MG/HR: at 22:51

## 2020-04-07 RX ADMIN — Medication 30 MILLIGRAM(S): at 04:42

## 2020-04-07 RX ADMIN — DEXMEDETOMIDINE HYDROCHLORIDE IN 0.9% SODIUM CHLORIDE 4.75 MICROGRAM(S)/KG/HR: 4 INJECTION INTRAVENOUS at 14:34

## 2020-04-07 RX ADMIN — Medication 30 MILLIGRAM(S): at 19:58

## 2020-04-07 RX ADMIN — Medication 8.91 MICROGRAM(S)/KG/MIN: at 15:00

## 2020-04-07 RX ADMIN — Medication 8.91 MICROGRAM(S)/KG/MIN: at 20:00

## 2020-04-07 RX ADMIN — ACETAZOLAMIDE 105 MILLIGRAM(S): 250 TABLET ORAL at 17:56

## 2020-04-07 RX ADMIN — Medication 30 MILLIGRAM(S): at 17:56

## 2020-04-07 RX ADMIN — Medication 10 MILLILITER(S): at 13:38

## 2020-04-07 RX ADMIN — INSULIN HUMAN 2: 100 INJECTION, SOLUTION SUBCUTANEOUS at 21:49

## 2020-04-07 RX ADMIN — Medication 10 MILLILITER(S): at 04:41

## 2020-04-07 RX ADMIN — CHLORHEXIDINE GLUCONATE 1 APPLICATION(S): 213 SOLUTION TOPICAL at 04:42

## 2020-04-07 RX ADMIN — PANTOPRAZOLE SODIUM 40 MILLIGRAM(S): 20 TABLET, DELAYED RELEASE ORAL at 11:22

## 2020-04-07 RX ADMIN — ZINC SULFATE TAB 220 MG (50 MG ZINC EQUIVALENT) 220 MILLIGRAM(S): 220 (50 ZN) TAB at 11:22

## 2020-04-07 RX ADMIN — APIXABAN 5 MILLIGRAM(S): 2.5 TABLET, FILM COATED ORAL at 04:42

## 2020-04-07 RX ADMIN — INSULIN HUMAN 2: 100 INJECTION, SOLUTION SUBCUTANEOUS at 18:22

## 2020-04-07 RX ADMIN — CEFEPIME 100 MILLIGRAM(S): 1 INJECTION, POWDER, FOR SOLUTION INTRAMUSCULAR; INTRAVENOUS at 11:25

## 2020-04-07 NOTE — PROGRESS NOTE ADULT - ASSESSMENT
· Assessment		  IMPRESSION:  COVID19 with resolved septic shock, off pressors, mechanical ventilation with ARDS with fio2 40 % secondary to Cytokine Release Syndrome  -end organ damage with acute renal failure, transaminitis, metabolic encephalopathy  -inflammatory markers significantly elevated but are decreasing  -d Dimers 80036/ CRP 31.9  associated with increased mortality  -Blood & Urine cxs NGTD     RECOMMENDATIONS:  -HCQ 7 days  -deep ET cultures  -Cefepime 1 gm iv q24h  Consider Lovenox .  -indication: ddimer>1000.   -exclusion: active bleeding, H/o HIT, recent hemorrhagic stroke

## 2020-04-07 NOTE — PROGRESS NOTE ADULT - SUBJECTIVE AND OBJECTIVE BOX
KONSTANTNI WYATT  [id number]  74y Male    Indication for ICU admission:  Acute Hypoxemic Respiratory Failure  clinical COVID19 (false negatvie)    Admit Date:20  ICU Date: 20  OR Date:    Bactrim (Unknown)    PAST MEDICAL & SURGICAL HISTORY:  Afib  DM (diabetes mellitus)  BPH (benign prostatic hyperplasia)  CAD (coronary artery disease)    Home Medications:  Cartia  mg/24 hours oral capsule, extended release: 1 cap(s) orally once a day (30 Mar 2020 18:04)  Eliquis 5 mg oral tablet: 1 tab(s) orally 2 times a day (30 Mar 2020 18:04)  Lipitor 40 mg oral tablet: 1 tab(s) orally once a day (30 Mar 2020 18:04)  lisinopril 40 mg oral tablet: 1 tab(s) orally once a day (30 Mar 2020 18:04)  metFORMIN 750 mg oral tablet, extended release: 1 tab(s) orally once a day (30 Mar 2020 18:04)      24HRS EVENT:  Overnight: had BM,  TF were being held for possbile SBT but patient wasnt awake started TF but had episode of coughing desat to 88%  circuit changed on ETT  tube feeds held  WBC 10>14, LIZZIE worsening 5.7>5.9, off insulin gtt since 3am, RISS     NEURO/PSYCH  off all sedation; RASS -3   - Acetaminophen 650mg PO q6 PRN    RESP  Acute Hypoxemic Respiratory Failure;  - Intubated: /30/40/5   - AB..33, 33, 80.7,18,  95%  COVID-19/Viral Pneumonia;  - positive SARS-CoV-2;  - CXR: unchanged diffuse bilateral opacities  - on Guaifenisen    CARD/VASC  Bradycardic to 40s, resolved, HR in 70-90s  Hx of Afib - on Apixaban, Diltiazem (decreased to BID)  Off pressors since  @ 1pm  Hx of CAD - Atorvastatin 40mg PO qhs  Hx of HTN - hold Lisinopril   daily EKG - QTc 430    GI/NUTR  - Diet: TF (glucerna)@ 50mL/hr, held since MN for SBT  BM 4/6 pm  - PPI  - Senna, Lactulose     /Renal  LIZZIE; worsening - BUN/Cr 115/5.6> 143/5.9  Fluids: NS @ 100mL/hr  Nathan, UOP: 50-85cc/hr  - D/c'd  Spironolactone 25mg due to K 5.1  Electrolytes: Na 142 / K 5 / Mg 2.9 / Ph 8.2  Hx of BPH    HEME/ONC  Hgb 12>11.8,  stable  on Eliquis    ID  Tmax 96.4   WBC 10>14  COVID-19 +  - completed course of Hydroxychloroquine & Azithromycin  - abx: Cefepime   - Ascorbic acid, Zinc  - D-dimer uptrending 11,529 >17068, , ESR 60    ENDO  DM2; uncontrolled, holding home Metformin  - off Insulin gtt since 3 am, RISS, fs q4    MSK/DERM  - no active issues    DVT PPx  - on Apixaban     GI PPx  -  Pantoprazole 40mg PO qac      ***Tubes/Lines/Drains  ***  Peripheral IV  Central Venous Line  Right IJ TLC  	Date 3/31/20  Arterial Line  Right radial A-line		                Date: 3/31/20  Urnary Catheter		Indication: Strict I&O    Date Placed: 3/31/20  ETT  OGT    REVIEW OF SYSTEMS    [ ] A ten-point review of systems was otherwise negative except as noted.  [x ] Due to altered mental status/intubation, subjective information were not able to be obtained from the patient. History was obtained, to the extent possible, from review of the chart and collateral sources of information    Daily     Daily Weight in k.4 (2020 08:00)    Diet, NPO with Tube Feed:   Tube Feeding Modality: Orogastric  Glucerna 1.2 Choco  Total Volume for 24 Hours (mL): 1200  Continuous  Starting Tube Feed Rate mL per Hour: 30  Increase Tube Feed Rate by (mL): 10     Every hour  Until Goal Tube Feed Rate (mL per Hour): 50  Tube Feed Duration (in Hours): 24  Tube Feed Start Time: 13:00 (20 @ 17:56)      CURRENT MEDS:  Neurologic Medications  acetaminophen  Suppository .. 650 milliGRAM(s) Rectal every 6 hours PRN Temp greater or equal to 38.5C (101.3F)    Respiratory Medications  guaifenesin/dextromethorphan  Syrup 10 milliLiter(s) Oral every 8 hours    Cardiovascular Medications  acetaZOLAMIDE  IVPB 250 milliGRAM(s) IV Intermittent two times a day  diltiazem    Tablet 30 milliGRAM(s) Enteral Tube every 12 hours    Gastrointestinal Medications  ascorbic acid 500 milliGRAM(s) Oral every 8 hours  dextrose 5%. 1000 milliLiter(s) IV Continuous <Continuous>  lactulose Syrup 30 Gram(s) Oral daily  pantoprazole  Injectable 40 milliGRAM(s) IV Push daily  senna 2 Tablet(s) Oral at bedtime  zinc sulfate 220 milliGRAM(s) Oral daily    Genitourinary Medications    Hematologic/Oncologic Medications  apixaban 5 milliGRAM(s) Oral two times a day    Antimicrobial/Immunologic Medications  cefepime   IVPB      cefepime   IVPB 1000 milliGRAM(s) IV Intermittent every 24 hours    Endocrine/Metabolic Medications  dextrose 40% Gel 15 Gram(s) Oral once PRN Blood Glucose LESS THAN 70 milliGRAM(s)/deciliter  dextrose 50% Injectable 50 milliLiter(s) IV Push every 15 minutes  dextrose 50% Injectable 12.5 Gram(s) IV Push once  dextrose 50% Injectable 25 Gram(s) IV Push once  dextrose 50% Injectable 25 Gram(s) IV Push once  glucagon  Injectable 1 milliGRAM(s) IntraMuscular once PRN Glucose LESS THAN 70 milligrams/deciliter  insulin regular  human corrective regimen sliding scale   IV Push every 4 hours    Topical/Other Medications  chlorhexidine 4% Liquid 1 Application(s) Topical <User Schedule>      ICU Vital Signs Last 24 Hrs  T(C): 36.3 (2020 12:00), Max: 37.1 (2020 20:00)  T(F): 97.4 (2020 12:00), Max: 98.7 (2020 20:00)  HR: 106 (2020 12:00) (68 - 106)  BP: --  BP(mean): --  ABP: 138/51 (2020 12:00) (119/47 - 140/54)  ABP(mean): 73 (2020 12:00) (65 - 79)  RR: 26 (2020 12:00) (26 - 33)  SpO2: 90% (2020 12:00) (90% - 100%)      Mode: AC/ CMV (Assist Control/ Continuous Mandatory Ventilation)  RR (machine): 20  TV (machine): 450  FiO2: 40  PEEP: 5  ITime: 1  MAP: 15  PIP: 28    ABG - ( 2020 02:53 )  pH, Arterial: 7.33  pH, Blood: x     /  pCO2: 34    /  pO2: 81    / HCO3: 18    / Base Excess: -7.3  /  SaO2: 96                  I&O's Summary    2020 07:01  -  2020 07:00  --------------------------------------------------------  IN: 2088.6 mL / OUT: 1260 mL / NET: 828.6 mL    2020 07:  -  2020 12:27  --------------------------------------------------------  IN: 180 mL / OUT: 260 mL / NET: -80 mL      I&O's Detail    2020 07:  -  2020 07:00  --------------------------------------------------------  IN:    Enteral Tube Flush: 30 mL    Glucerna: 150 mL    insulin regular Infusion: 2 mL    insulin regular Infusion: 28 mL    lactated ringers.: 1300 mL    midazolam Infusion: 18.4 mL    propofol Infusion: 60.2 mL    sodium chloride 0.9%: 500 mL  Total IN: 2088.6 mL    OUT:    Indwelling Catheter - Urethral: 1260 mL  Total OUT: 1260 mL    Total NET: 828.6 mL      2020 07:  -  2020 12:27  --------------------------------------------------------  IN:    Glucerna: 30 mL    Oral Fluid: 50 mL    sodium chloride 0.9%: 100 mL  Total IN: 180 mL    OUT:    Indwelling Catheter - Urethral: 260 mL  Total OUT: 260 mL    Total NET: -80 mL          PHYSICAL EXAM:  Physical Exam:    Reference Recent Physical Exam:  · In accordance with current standards limiting patient contact please refer to the recent:	Inpatient Physical Exam      LABS:  CAPILLARY BLOOD GLUCOSE      POCT Blood Glucose.: 120 mg/dL (2020 10:38)  POCT Blood Glucose.: 114 mg/dL (2020 05:49)  POCT Blood Glucose.: 94 mg/dL (2020 03:51)  POCT Blood Glucose.: 97 mg/dL (2020 02:58)  POCT Blood Glucose.: 116 mg/dL (2020 01:57)  POCT Blood Glucose.: 141 mg/dL (2020 01:05)  POCT Blood Glucose.: 150 mg/dL (2020 23:46)  POCT Blood Glucose.: 171 mg/dL (2020 22:06)  POCT Blood Glucose.: 180 mg/dL (2020 21:15)  POCT Blood Glucose.: 202 mg/dL (2020 20:02)  POCT Blood Glucose.: 234 mg/dL (2020 18:59)  POCT Blood Glucose.: 216 mg/dL (2020 18:46)  POCT Blood Glucose.: 234 mg/dL (2020 16:52)  POCT Blood Glucose.: 236 mg/dL (2020 14:12)                          11.8   14.09 )-----------( 299      ( 2020 00:00 )             35.7       04-07    142  |  109  |  143<HH>  ----------------------------<  156<H>  5.0   |  17  |  5.9<HH>    Ca    7.9<L>      2020 00:00  Phos  8.2     04-07  Mg     2.9     04-07    TPro  4.9<L>  /  Alb  2.4<L>  /  TBili  0.5  /  DBili  x   /  AST  48<H>  /  ALT  59<H>  /  AlkPhos  67  04-05      PT/INR - ( 2020 00:00 )   PT: 13.90 sec;   INR: 1.21 ratio         PTT - ( 2020 00:00 )  PTT:26.4 sec  CARDIAC MARKERS ( 2020 23:00 )  x     / 0.04 ng/mL / 499 U/L / x     / x            ssessment and Plan:   · Assessment	  Assessment & Plan    74y Male  with acute hypoxemic respiratory failure 2/2 Covid+ pneumonia    NEURO/PSYCH  Sedation;   - continue, Propofol gtt titrate to RASS -3  - weaning versed   Pain Control;  - continue, Acetaminophen 650mg PO q6 PRN    RESP  Acute Hypoxemic Respiratory Failure;  Mode: AC/ CMV (Assist Control/ Continuous Mandatory Ventilation)  RR (machine): 30 TV (machine): 450 FiO2: 40 PEEP: 5 ITime: 1 MAP: 16 PIP: 29,  - planning for SBT  - Improving oxygenation  COVID-19/Viral Pneumonia;  - positive SARS-CoV-2  - diffuse bilateral opacities  - continue, Guaifenisen/DM  - daily cxr qam    CARD/VASC  Bradycardia;  - Diltiazem 30mg PO bid  AFib; chronic  - continue, Apixaban 5mg PO bid  - continue, Diltiazem 30mg PO bid   - continue, EKG qd  CAD    - Atorvastatin 40mg PO qhs  HTN  - holding Lisinopril     GI/NUTR  - NPO  - holding feeds   - remains on Lactulose     /Renal  LIZZIE; steadily worsening   101/5.6  <--, 99/5.9  <--, 90/5.5  <--BUN/Creatinine  UOP; adequate  - averaging 50-75mL/hr  - continue, Spironolactone 25mg OGT qd  - monitor I&Os  Fluids LR 100mL/hr  - santana in place  Electrolytes;  Sodium, Serum: 142 mmol/L (20 @ 00:00)    Potassium, Serum: 5.1 mmol/L (20 @ 17:30)  Chloride, Serum: 107 mmol/L (20 @ 17:30)  Phosphorus Level, Serum (20 @ 00:00)    Phosphorus Level, Serum: 8.2 mg/dL    Creatinine, Serum: 5.9: Critical value: mg/dL (20 @ 00:00)       Start Acetazolamide 250 mg IVPB q 12     Hyperkalemia; mild  - no EKG changes  - repeat BMP in the pm  - monitor electrolytes  BPH; chronic     HEME/ONC  Serial Hemoglobin:   12.7 ( @ 00:30)  12.8 ( @ 17:04)  12.1 ( @ 00:00)  12.9 ( @ 16:00)  12.7 ( @ 00:05)    - stable H/H  - monitor H/H    ID  Fever;  - continue Acetaminophen 650mg PO q6  - COVID-19, suspected; positive repeat swab  - Hydroxychloroquine (completed )  - Azithromycin (completed )  - continue Cefepime   - nasal MRSA, urine Legionella & Strep pending     ENDO  DM2;  - off Insulin gtt, now on ISS  - continue POCT q1  - holding home Metformin    MSK/DERM  - no active issues    DVT PPx  - on Apixaban     GI PPx  - continue Pantoprazole 40mg PO qac  - continue Senna 17.2mg PO qhs  - continue lactulose    Disposition  -  SICU

## 2020-04-07 NOTE — PROGRESS NOTE ADULT - SUBJECTIVE AND OBJECTIVE BOX
KONSTANTIN WYATT  74y, Male    All available historical data reviewed    OVERNIGHT EVENTS:    fio2 40%  no pressors  ROS:  unable to obtain history secondary to patient's mental status and/or sedation     VITALS:  T(F): 98.5, Max: 98.7 (04-06-20 @ 20:00)  HR: 74  BP: --  RR: 32Vital Signs Last 24 Hrs  T(C): 36.9 (07 Apr 2020 08:00), Max: 37.1 (06 Apr 2020 20:00)  T(F): 98.5 (07 Apr 2020 08:00), Max: 98.7 (06 Apr 2020 20:00)  HR: 74 (07 Apr 2020 08:00) (65 - 97)  BP: --  BP(mean): --  RR: 32 (07 Apr 2020 08:00) (30 - 33)  SpO2: 98% (07 Apr 2020 08:00) (97% - 100%)    TESTS & MEASUREMENTS:                        11.8   14.09 )-----------( 299      ( 07 Apr 2020 00:00 )             35.7     04-07    142  |  109  |  143<HH>  ----------------------------<  156<H>  5.0   |  17  |  5.9<HH>    Ca    7.9<L>      07 Apr 2020 00:00  Phos  8.2     04-07  Mg     2.9     04-07    TPro  4.9<L>  /  Alb  2.4<L>  /  TBili  0.5  /  DBili  x   /  AST  48<H>  /  ALT  59<H>  /  AlkPhos  67  04-05    LIVER FUNCTIONS - ( 05 Apr 2020 23:00 )  Alb: 2.4 g/dL / Pro: 4.9 g/dL / ALK PHOS: 67 U/L / ALT: 59 U/L / AST: 48 U/L / GGT: x                   RADIOLOGY & ADDITIONAL TESTS:  Personal review of radiological diagnostics performed  Echo and EKG results noted when applicable.     MEDICATIONS:  acetaminophen  Suppository .. 650 milliGRAM(s) Rectal every 6 hours PRN  apixaban 5 milliGRAM(s) Oral two times a day  ascorbic acid 500 milliGRAM(s) Oral every 8 hours  cefepime   IVPB      cefepime   IVPB 1000 milliGRAM(s) IV Intermittent every 24 hours  chlorhexidine 4% Liquid 1 Application(s) Topical <User Schedule>  dextrose 40% Gel 15 Gram(s) Oral once PRN  dextrose 5%. 1000 milliLiter(s) IV Continuous <Continuous>  dextrose 50% Injectable 50 milliLiter(s) IV Push every 15 minutes  dextrose 50% Injectable 12.5 Gram(s) IV Push once  dextrose 50% Injectable 25 Gram(s) IV Push once  dextrose 50% Injectable 25 Gram(s) IV Push once  diltiazem    Tablet 30 milliGRAM(s) Enteral Tube every 12 hours  glucagon  Injectable 1 milliGRAM(s) IntraMuscular once PRN  guaifenesin/dextromethorphan  Syrup 10 milliLiter(s) Oral every 8 hours  insulin regular  human corrective regimen sliding scale   IV Push every 4 hours  lactulose Syrup 30 Gram(s) Oral daily  pantoprazole  Injectable 40 milliGRAM(s) IV Push daily  senna 2 Tablet(s) Oral at bedtime  zinc sulfate 220 milliGRAM(s) Oral daily      ANTIBIOTICS:  cefepime   IVPB      cefepime   IVPB 1000 milliGRAM(s) IV Intermittent every 24 hours

## 2020-04-08 LAB
4/8 RATIO: 1.67 RATIO — SIGNIFICANT CHANGE UP (ref 0.86–4.14)
ABS CD8: 81 /UL — LOW (ref 90–775)
ANION GAP SERPL CALC-SCNC: 17 MMOL/L — HIGH (ref 7–14)
ANION GAP SERPL CALC-SCNC: 18 MMOL/L — HIGH (ref 7–14)
ANION GAP SERPL CALC-SCNC: 19 MMOL/L — HIGH (ref 7–14)
ANION GAP SERPL CALC-SCNC: 9 MMOL/L — SIGNIFICANT CHANGE UP (ref 7–14)
APTT BLD: 26.6 SEC — LOW (ref 27–39.2)
BASE EXCESS BLDA CALC-SCNC: -10.4 MMOL/L — LOW (ref -2–2)
BASE EXCESS BLDA CALC-SCNC: -10.6 MMOL/L — LOW (ref -2–2)
BASOPHILS # BLD AUTO: 0.02 K/UL — SIGNIFICANT CHANGE UP (ref 0–0.2)
BASOPHILS NFR BLD AUTO: 0.1 % — SIGNIFICANT CHANGE UP (ref 0–1)
BUN SERPL-MCNC: 152 MG/DL — CRITICAL HIGH (ref 10–20)
BUN SERPL-MCNC: 158 MG/DL — CRITICAL HIGH (ref 10–20)
BUN SERPL-MCNC: 158 MG/DL — CRITICAL HIGH (ref 10–20)
BUN SERPL-MCNC: 36 MG/DL — HIGH (ref 10–20)
CALCIUM SERPL-MCNC: 8 MG/DL — LOW (ref 8.5–10.1)
CALCIUM SERPL-MCNC: 8 MG/DL — LOW (ref 8.5–10.1)
CALCIUM SERPL-MCNC: 8.2 MG/DL — LOW (ref 8.5–10.1)
CALCIUM SERPL-MCNC: 8.6 MG/DL — SIGNIFICANT CHANGE UP (ref 8.5–10.1)
CD16+CD56+ CELLS NFR BLD: 14 % — SIGNIFICANT CHANGE UP (ref 7–27)
CD16+CD56+ CELLS NFR SPEC: 55 /UL — LOW (ref 80–426)
CD19 BLASTS SPEC-ACNC: 23 % — HIGH (ref 4–18)
CD19 BLASTS SPEC-ACNC: 89 /UL — SIGNIFICANT CHANGE UP (ref 32–326)
CD3 BLASTS SPEC-ACNC: 237 /UL — LOW (ref 396–2024)
CD3 BLASTS SPEC-ACNC: 61 % — SIGNIFICANT CHANGE UP (ref 58–84)
CD4 %: 35 % — SIGNIFICANT CHANGE UP (ref 30–56)
CD8 %: 21 % — SIGNIFICANT CHANGE UP (ref 11–43)
CHLORIDE SERPL-SCNC: 108 MMOL/L — SIGNIFICANT CHANGE UP (ref 98–110)
CHLORIDE SERPL-SCNC: 109 MMOL/L — SIGNIFICANT CHANGE UP (ref 98–110)
CHLORIDE SERPL-SCNC: 109 MMOL/L — SIGNIFICANT CHANGE UP (ref 98–110)
CHLORIDE SERPL-SCNC: 92 MMOL/L — LOW (ref 98–110)
CO2 SERPL-SCNC: 15 MMOL/L — LOW (ref 17–32)
CO2 SERPL-SCNC: 15 MMOL/L — LOW (ref 17–32)
CO2 SERPL-SCNC: 16 MMOL/L — LOW (ref 17–32)
CO2 SERPL-SCNC: 32 MMOL/L — SIGNIFICANT CHANGE UP (ref 17–32)
CREAT SERPL-MCNC: 0.6 MG/DL — LOW (ref 0.7–1.5)
CREAT SERPL-MCNC: 6 MG/DL — CRITICAL HIGH (ref 0.7–1.5)
CREAT SERPL-MCNC: 6.1 MG/DL — CRITICAL HIGH (ref 0.7–1.5)
CREAT SERPL-MCNC: 6.4 MG/DL — CRITICAL HIGH (ref 0.7–1.5)
EOSINOPHIL # BLD AUTO: 0 K/UL — SIGNIFICANT CHANGE UP (ref 0–0.7)
EOSINOPHIL NFR BLD AUTO: 0 % — SIGNIFICANT CHANGE UP (ref 0–8)
FERRITIN SERPL-MCNC: 303 NG/ML — SIGNIFICANT CHANGE UP (ref 30–400)
GAS PNL BLDA: SIGNIFICANT CHANGE UP
GLUCOSE BLDC GLUCOMTR-MCNC: 138 MG/DL — HIGH (ref 70–99)
GLUCOSE BLDC GLUCOMTR-MCNC: 167 MG/DL — HIGH (ref 70–99)
GLUCOSE BLDC GLUCOMTR-MCNC: 187 MG/DL — HIGH (ref 70–99)
GLUCOSE BLDC GLUCOMTR-MCNC: 188 MG/DL — HIGH (ref 70–99)
GLUCOSE BLDC GLUCOMTR-MCNC: 200 MG/DL — HIGH (ref 70–99)
GLUCOSE BLDC GLUCOMTR-MCNC: 227 MG/DL — HIGH (ref 70–99)
GLUCOSE SERPL-MCNC: 162 MG/DL — HIGH (ref 70–99)
GLUCOSE SERPL-MCNC: 192 MG/DL — HIGH (ref 70–99)
GLUCOSE SERPL-MCNC: 242 MG/DL — HIGH (ref 70–99)
GLUCOSE SERPL-MCNC: 275 MG/DL — HIGH (ref 70–99)
HCO3 BLDA-SCNC: 16 MMOL/L — LOW (ref 21–29)
HCO3 BLDA-SCNC: 16 MMOL/L — LOW (ref 23–27)
HCT VFR BLD CALC: 37.3 % — LOW (ref 42–52)
HCT VFR BLD CALC: 38.2 % — LOW (ref 42–52)
HGB BLD-MCNC: 12.1 G/DL — LOW (ref 14–18)
HGB BLD-MCNC: 12.5 G/DL — LOW (ref 14–18)
HOROWITZ INDEX BLDA+IHG-RTO: 21 — SIGNIFICANT CHANGE UP
IMM GRANULOCYTES NFR BLD AUTO: 1.8 % — HIGH (ref 0.1–0.3)
INR BLD: 1.17 RATIO — SIGNIFICANT CHANGE UP (ref 0.65–1.3)
LYMPHOCYTES # BLD AUTO: 0.4 K/UL — LOW (ref 1.2–3.4)
LYMPHOCYTES # BLD AUTO: 2.6 % — LOW (ref 20.5–51.1)
MAGNESIUM SERPL-MCNC: 2.2 MG/DL — SIGNIFICANT CHANGE UP (ref 1.8–2.4)
MAGNESIUM SERPL-MCNC: 2.9 MG/DL — HIGH (ref 1.8–2.4)
MAGNESIUM SERPL-MCNC: 2.9 MG/DL — HIGH (ref 1.8–2.4)
MCHC RBC-ENTMCNC: 29.1 PG — SIGNIFICANT CHANGE UP (ref 27–31)
MCHC RBC-ENTMCNC: 29.5 PG — SIGNIFICANT CHANGE UP (ref 27–31)
MCHC RBC-ENTMCNC: 32.4 G/DL — SIGNIFICANT CHANGE UP (ref 32–37)
MCHC RBC-ENTMCNC: 32.7 G/DL — SIGNIFICANT CHANGE UP (ref 32–37)
MCV RBC AUTO: 89.7 FL — SIGNIFICANT CHANGE UP (ref 80–94)
MCV RBC AUTO: 90.1 FL — SIGNIFICANT CHANGE UP (ref 80–94)
MONOCYTES # BLD AUTO: 0.61 K/UL — HIGH (ref 0.1–0.6)
MONOCYTES NFR BLD AUTO: 4 % — SIGNIFICANT CHANGE UP (ref 1.7–9.3)
NEUTROPHILS # BLD AUTO: 13.96 K/UL — HIGH (ref 1.4–6.5)
NEUTROPHILS NFR BLD AUTO: 91.5 % — HIGH (ref 42.2–75.2)
NRBC # BLD: 0 /100 WBCS — SIGNIFICANT CHANGE UP (ref 0–0)
PCO2 BLDA: 37 MMHG — LOW (ref 38–42)
PCO2 BLDA: 37 MMHG — LOW (ref 38–42)
PH BLDA: 7.24 — LOW (ref 7.38–7.42)
PH BLDA: 7.25 — LOW (ref 7.38–7.42)
PHOSPHATE SERPL-MCNC: 3.8 MG/DL — SIGNIFICANT CHANGE UP (ref 2.1–4.9)
PHOSPHATE SERPL-MCNC: 8.1 MG/DL — HIGH (ref 2.1–4.9)
PHOSPHATE SERPL-MCNC: 8.4 MG/DL — HIGH (ref 2.1–4.9)
PLATELET # BLD AUTO: 299 K/UL — SIGNIFICANT CHANGE UP (ref 130–400)
PLATELET # BLD AUTO: 332 K/UL — SIGNIFICANT CHANGE UP (ref 130–400)
PO2 BLDA: 138 MMHG — HIGH (ref 78–95)
PO2 BLDA: 149 MMHG — HIGH (ref 78–95)
POTASSIUM SERPL-MCNC: 4.5 MMOL/L — SIGNIFICANT CHANGE UP (ref 3.5–5)
POTASSIUM SERPL-MCNC: 5.5 MMOL/L — HIGH (ref 3.5–5)
POTASSIUM SERPL-MCNC: 5.6 MMOL/L — HIGH (ref 3.5–5)
POTASSIUM SERPL-MCNC: 5.6 MMOL/L — HIGH (ref 3.5–5)
POTASSIUM SERPL-SCNC: 4.5 MMOL/L — SIGNIFICANT CHANGE UP (ref 3.5–5)
POTASSIUM SERPL-SCNC: 5.5 MMOL/L — HIGH (ref 3.5–5)
POTASSIUM SERPL-SCNC: 5.6 MMOL/L — HIGH (ref 3.5–5)
POTASSIUM SERPL-SCNC: 5.6 MMOL/L — HIGH (ref 3.5–5)
PROCALCITONIN SERPL-MCNC: 1.17 NG/ML — HIGH (ref 0.02–0.1)
PROTHROM AB SERPL-ACNC: 13.5 SEC — HIGH (ref 9.95–12.87)
RBC # BLD: 4.16 M/UL — LOW (ref 4.7–6.1)
RBC # BLD: 4.24 M/UL — LOW (ref 4.7–6.1)
RBC # FLD: 15.1 % — HIGH (ref 11.5–14.5)
RBC # FLD: 15.2 % — HIGH (ref 11.5–14.5)
SAO2 % BLDA: 98 % — HIGH (ref 92–96)
SAO2 % BLDA: 98 % — SIGNIFICANT CHANGE UP (ref 94–98)
SODIUM SERPL-SCNC: 133 MMOL/L — LOW (ref 135–146)
SODIUM SERPL-SCNC: 142 MMOL/L — SIGNIFICANT CHANGE UP (ref 135–146)
T-CELL CD4 SUBSET PNL BLD: 135 /UL — LOW (ref 325–1251)
WBC # BLD: 14.41 K/UL — HIGH (ref 4.8–10.8)
WBC # BLD: 15.26 K/UL — HIGH (ref 4.8–10.8)
WBC # FLD AUTO: 14.41 K/UL — HIGH (ref 4.8–10.8)
WBC # FLD AUTO: 15.26 K/UL — HIGH (ref 4.8–10.8)

## 2020-04-08 PROCEDURE — 71045 X-RAY EXAM CHEST 1 VIEW: CPT | Mod: 26,77

## 2020-04-08 PROCEDURE — 93010 ELECTROCARDIOGRAM REPORT: CPT

## 2020-04-08 PROCEDURE — 71045 X-RAY EXAM CHEST 1 VIEW: CPT | Mod: 26

## 2020-04-08 PROCEDURE — 99291 CRITICAL CARE FIRST HOUR: CPT

## 2020-04-08 RX ORDER — CALCIUM GLUCONATE 100 MG/ML
1 VIAL (ML) INTRAVENOUS ONCE
Refills: 0 | Status: COMPLETED | OUTPATIENT
Start: 2020-04-08 | End: 2020-04-08

## 2020-04-08 RX ORDER — ETHACRYNIC ACID 25 MG/1
50 TABLET ORAL ONCE
Refills: 0 | Status: COMPLETED | OUTPATIENT
Start: 2020-04-08 | End: 2020-04-08

## 2020-04-08 RX ORDER — INSULIN HUMAN 100 [IU]/ML
10 INJECTION, SOLUTION SUBCUTANEOUS ONCE
Refills: 0 | Status: COMPLETED | OUTPATIENT
Start: 2020-04-08 | End: 2020-04-08

## 2020-04-08 RX ORDER — SODIUM POLYSTYRENE SULFONATE 4.1 MEQ/G
30 POWDER, FOR SUSPENSION ORAL ONCE
Refills: 0 | Status: COMPLETED | OUTPATIENT
Start: 2020-04-08 | End: 2020-04-08

## 2020-04-08 RX ORDER — DEXTROSE 10 % IN WATER 10 %
250 INTRAVENOUS SOLUTION INTRAVENOUS ONCE
Refills: 0 | Status: COMPLETED | OUTPATIENT
Start: 2020-04-08 | End: 2020-04-08

## 2020-04-08 RX ORDER — DEXTROSE 10 % IN WATER 10 %
250 INTRAVENOUS SOLUTION INTRAVENOUS
Refills: 0 | Status: DISCONTINUED | OUTPATIENT
Start: 2020-04-08 | End: 2020-04-13

## 2020-04-08 RX ORDER — DILTIAZEM HCL 120 MG
30 CAPSULE, EXT RELEASE 24 HR ORAL EVERY 6 HOURS
Refills: 0 | Status: DISCONTINUED | OUTPATIENT
Start: 2020-04-08 | End: 2020-04-09

## 2020-04-08 RX ADMIN — ZINC SULFATE TAB 220 MG (50 MG ZINC EQUIVALENT) 220 MILLIGRAM(S): 220 (50 ZN) TAB at 11:26

## 2020-04-08 RX ADMIN — Medication 1000 MILLILITER(S): at 05:48

## 2020-04-08 RX ADMIN — Medication 8.91 MICROGRAM(S)/KG/MIN: at 19:39

## 2020-04-08 RX ADMIN — Medication 500 MILLIGRAM(S): at 13:17

## 2020-04-08 RX ADMIN — Medication 10 MILLILITER(S): at 21:47

## 2020-04-08 RX ADMIN — INSULIN HUMAN 4: 100 INJECTION, SOLUTION SUBCUTANEOUS at 11:55

## 2020-04-08 RX ADMIN — APIXABAN 5 MILLIGRAM(S): 2.5 TABLET, FILM COATED ORAL at 17:28

## 2020-04-08 RX ADMIN — Medication 500 MILLIGRAM(S): at 21:39

## 2020-04-08 RX ADMIN — INSULIN HUMAN 6: 100 INJECTION, SOLUTION SUBCUTANEOUS at 14:05

## 2020-04-08 RX ADMIN — APIXABAN 5 MILLIGRAM(S): 2.5 TABLET, FILM COATED ORAL at 05:11

## 2020-04-08 RX ADMIN — ACETAZOLAMIDE 105 MILLIGRAM(S): 250 TABLET ORAL at 05:14

## 2020-04-08 RX ADMIN — CHLORHEXIDINE GLUCONATE 1 APPLICATION(S): 213 SOLUTION TOPICAL at 05:12

## 2020-04-08 RX ADMIN — Medication 10 MILLILITER(S): at 05:11

## 2020-04-08 RX ADMIN — INSULIN HUMAN 2: 100 INJECTION, SOLUTION SUBCUTANEOUS at 02:10

## 2020-04-08 RX ADMIN — CEFEPIME 100 MILLIGRAM(S): 1 INJECTION, POWDER, FOR SOLUTION INTRAMUSCULAR; INTRAVENOUS at 11:25

## 2020-04-08 RX ADMIN — Medication 30 MILLIGRAM(S): at 05:11

## 2020-04-08 RX ADMIN — INSULIN HUMAN 10 UNIT(S): 100 INJECTION, SOLUTION SUBCUTANEOUS at 06:15

## 2020-04-08 RX ADMIN — Medication 100 GRAM(S): at 06:30

## 2020-04-08 RX ADMIN — ACETAZOLAMIDE 105 MILLIGRAM(S): 250 TABLET ORAL at 17:42

## 2020-04-08 RX ADMIN — Medication 10 MILLILITER(S): at 13:19

## 2020-04-08 RX ADMIN — Medication 30 MILLIGRAM(S): at 17:28

## 2020-04-08 RX ADMIN — Medication 10 MG/HR: at 19:38

## 2020-04-08 RX ADMIN — ETHACRYNIC ACID 100 MILLIGRAM(S): 25 TABLET ORAL at 18:39

## 2020-04-08 RX ADMIN — Medication 500 MILLILITER(S): at 13:47

## 2020-04-08 RX ADMIN — SENNA PLUS 2 TABLET(S): 8.6 TABLET ORAL at 21:39

## 2020-04-08 RX ADMIN — INSULIN HUMAN 10 UNIT(S): 100 INJECTION, SOLUTION SUBCUTANEOUS at 14:29

## 2020-04-08 RX ADMIN — LACTULOSE 30 GRAM(S): 10 SOLUTION ORAL at 11:25

## 2020-04-08 RX ADMIN — Medication 500 MILLIGRAM(S): at 05:11

## 2020-04-08 RX ADMIN — PANTOPRAZOLE SODIUM 40 MILLIGRAM(S): 20 TABLET, DELAYED RELEASE ORAL at 11:25

## 2020-04-08 RX ADMIN — SODIUM POLYSTYRENE SULFONATE 30 GRAM(S): 4.1 POWDER, FOR SUSPENSION ORAL at 14:36

## 2020-04-08 RX ADMIN — Medication 30 MILLIGRAM(S): at 11:26

## 2020-04-08 RX ADMIN — Medication 100 GRAM(S): at 14:25

## 2020-04-08 RX ADMIN — INSULIN HUMAN 4: 100 INJECTION, SOLUTION SUBCUTANEOUS at 17:54

## 2020-04-08 RX ADMIN — Medication 30 MILLIGRAM(S): at 09:51

## 2020-04-08 NOTE — CONSULT NOTE ADULT - SUBJECTIVE AND OBJECTIVE BOX
NEPHROLOGY CONSULTATION NOTE    Pt is intubated on vent. hx taken from chart.  75 yo M w/ hx of Afib on Eliquis, CAD s/p PCI, DM, BPH, presents with 1 week of fever, cough, and progressive SOB. Admits to watery diarrhea for 3 days. Denies chest pain. 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 He has exposure to COVID positive family member at the wedding. No travel hx. Pt pulse ox in 80s in the field per EMS. Of note, per patient he had recent normal stress test recently.    COVID PCR sent, labs show lymphopenia and transaminitis, CXR shows b/l lung opacity, requiring NRB  (3/30/2020)    Hospital course noted for intubation, on vent. on pressor.    PAST MEDICAL & SURGICAL HISTORY:  Afib  DM (diabetes mellitus)  BPH (benign prostatic hyperplasia)  CAD (coronary artery disease)    Allergies:  Bactrim (Unknown)    Home Medications:  Cartia  mg/24 hours oral capsule, extended release: 1 cap(s) orally once a day (30 Mar 2020 18:04)  Eliquis 5 mg oral tablet: 1 tab(s) orally 2 times a day (30 Mar 2020 18:04)  Lipitor 40 mg oral tablet: 1 tab(s) orally once a day (30 Mar 2020 18:04)  lisinopril 40 mg oral tablet: 1 tab(s) orally once a day (30 Mar 2020 18:04)  metFORMIN 750 mg oral tablet, extended release: 1 tab(s) orally once a day (30 Mar 2020 18:04)    Hospital Medications:   MEDICATIONS  (STANDING):  acetaZOLAMIDE  IVPB 250 milliGRAM(s) IV Intermittent two times a day  apixaban 5 milliGRAM(s) Oral two times a day  ascorbic acid 500 milliGRAM(s) Oral every 8 hours  calcium gluconate IVPB 1 Gram(s) IV Intermittent once  cefepime   IVPB      cefepime   IVPB 1000 milliGRAM(s) IV Intermittent every 24 hours  chlorhexidine 4% Liquid 1 Application(s) Topical <User Schedule>  diltiazem    Tablet 30 milliGRAM(s) Oral every 6 hours  diltiazem Infusion 10 mG/Hr (10 mL/Hr) IV Continuous <Continuous>  guaifenesin/dextromethorphan  Syrup 10 milliLiter(s) Oral every 8 hours  insulin regular  human corrective regimen sliding scale   IV Push every 4 hours  insulin regular  human recombinant. 10 Unit(s) IV Push once  lactulose Syrup 30 Gram(s) Oral daily  norepinephrine Infusion 0.05 MICROgram(s)/kG/Min (8.91 mL/Hr) IV Continuous <Continuous>  pantoprazole  Injectable 40 milliGRAM(s) IV Push daily  senna 2 Tablet(s) Oral at bedtime  sodium polystyrene sulfonate Suspension 30 Gram(s) Oral once  zinc sulfate 220 milliGRAM(s) Oral daily      SOCIAL HISTORY:  Denies ETOH,Smoking,   FAMILY HISTORY:        REVIEW OF SYSTEMS:    All other review of systems is negative unless indicated above.    VITALS:  T(F): 98.6 (04-08-20 @ 12:00), Max: 99.1 (04-07-20 @ 16:00)  HR: 99 (04-08-20 @ 12:00)  BP: 142/65 (04-08-20 @ 12:00)  RR: 21 (04-08-20 @ 12:00)  SpO2: 99% (04-08-20 @ 12:00)    04-07 @ 07:01  -  04-08 @ 07:00  --------------------------------------------------------  IN: 1093.5 mL / OUT: 1210 mL / NET: -116.5 mL    04-08 @ 07:01  -  04-08 @ 13:31  --------------------------------------------------------  IN: 392.2 mL / OUT: 330 mL / NET: 62.2 mL          04-07-20 @ 07:01  -  04-08-20 @ 07:00  --------------------------------------------------------  IN: 0 mL / OUT: 1210 mL / NET: -1210 mL    04-08-20 @ 07:01  -  04-08-20 @ 13:31  --------------------------------------------------------  IN: 0 mL / OUT: 330 mL / NET: -330 mL      I&O's Detail    07 Apr 2020 07:01  -  08 Apr 2020 07:00  --------------------------------------------------------  IN:    dextrose 10%.: 250 mL    diltiazem Infusion: 45 mL    Glucerna: 30 mL    IV PiggyBack: 350 mL    norepinephrine Infusion: 193.5 mL    Oral Fluid: 125 mL    sodium chloride 0.9%: 100 mL  Total IN: 1093.5 mL    OUT:    Indwelling Catheter - Urethral: 1210 mL  Total OUT: 1210 mL    Total NET: -116.5 mL      08 Apr 2020 07:01  -  08 Apr 2020 13:31  --------------------------------------------------------  IN:    diltiazem Infusion: 22.5 mL    IV PiggyBack: 250 mL    norepinephrine Infusion: 69.7 mL    Oral Fluid: 50 mL  Total IN: 392.2 mL    OUT:    Indwelling Catheter - Urethral: 330 mL  Total OUT: 330 mL    Total NET: 62.2 mL            PHYSICAL EXAM:  Constitutional: intubated on vent.  Respiratory: on MV, b/l rhonchi  Cardiovascular: S1, S2, RRR  Gastrointestinal: BS+, soft, NT/ND  Extremities: + edema  : + santana.     Vascular Access:    LABS:  04-08    142  |  108  |  158<HH>  ----------------------------<  242<H>  5.5<H>   |  15<L>  |  6.4<HH>    Ca    8.6      08 Apr 2020 07:40  Phos  8.4     04-08  Mg     2.9     04-08      Creatinine Trend: 6.4 <--, 6.0 <--, Results removed <--, 6.0 <--, 5.9 <--, 5.7 <--, 5.6 <--, 5.9 <--, 5.6 <--, 5.9 <--, 5.5 <--, 5.4 <--, 5.3 <--, 4.8 <--, 4.1 <--, 3.8 <--, 3.5 <--, 3.0 <--, 2.1 <--, 0.9 <--, 1.2 <--                        12.5   15.26 )-----------( 332      ( 08 Apr 2020 00:00 )             38.2     Blood Gas Profile - Arterial (04.08.20 @ 04:30)    pH, Arterial: 7.24    pCO2, Arterial: 37 mmHg    pO2, Arterial: 149 mmHg    HCO3, Arterial: 16 mmoL/L    Base Excess, Arterial: -10.6 mmoL/L    Oxygen Saturation, Arterial: 98 %    Blood Gas Source Arterial: Arterial    Creatine Kinase, Serum: 499 U/L (04.05.20 @ 23:00)  Troponin T, Serum: 0.04 ng/mL (04.05.20 @ 23:00)    COVID-19 PCR: Detected (04.01.20 @ 16:53)    Urine Studies:    RADIOLOGY & ADDITIONAL STUDIES:  < from: Xray Chest 1 View- PORTABLE-Routine (04.08.20 @ 09:37) >  IMPRESSION:     Unchanged bilateral opacities.    < end of copied text >

## 2020-04-08 NOTE — PROGRESS NOTE ADULT - SUBJECTIVE AND OBJECTIVE BOX
KONSTANTIN WYATT  [id number]  74y Male    Indication for ICU admission:  Acute Hypoxemic Respiratory Failure  clinical COVID19 (false negatvie)    Admit Date:03-30-20  ICU Date: 03-31-20  OR Date:    Bactrim (Unknown)    PAST MEDICAL & SURGICAL HISTORY:  Afib  DM (diabetes mellitus)  BPH (benign prostatic hyperplasia)  CAD (coronary artery disease)    Home Medications:  Cartia  mg/24 hours oral capsule, extended release: 1 cap(s) orally once a day (30 Mar 2020 18:04)  Eliquis 5 mg oral tablet: 1 tab(s) orally 2 times a day (30 Mar 2020 18:04)  Lipitor 40 mg oral tablet: 1 tab(s) orally once a day (30 Mar 2020 18:04)  lisinopril 40 mg oral tablet: 1 tab(s) orally once a day (30 Mar 2020 18:04)  metFORMIN 750 mg oral tablet, extended release: 1 tab(s) orally once a day (30 Mar 2020 18:04)      24HRS EVENT:  Overnight: Afib 150s, 30mg PO cardizem, 10mg IVx2, levo requirement going up, HR still in 120-130, started cardizem gtt (diltiazem increased to TID); desating to 90s, plateaus in 30s pushed 3cc propofol- resolved, EKG - Afib w/ RVR, , Wt 108.8kg, K 5.6- tx, FIO2 149 in am, decreased fio2 to 50%    Neuro    he was started on percedex 0.2 but he became hypotensive and then d/c'd  -overnight required a 3cc push of propofol due to dyssynchrony  - Acetaminophen 650mg PO q6 PRN    RESP  Acute Hypoxemic Respiratory Failure;  - Intubated:   Vent  450, 30, 60%, 10, increased his FiO2 to 60 and increased PEEP to 10 in the afternoon   -overnight: desated to 90s, dyssynchronous w/ the vent, pushed 3cc of propofol with resolution   ABG 7.24, 37, 149, 16, 98%, decreased Fio2 to 50%    COVID-19/Viral Pneumonia;  - positive SARS-CoV-2;  - CXR: unchanged diffuse bilateral opacities  - on Guaifenisen    CARD/VASC  Bradycardic to 40s, resolved, in PM Afib to 150s, 30mg PO cardizem, 10mg IVx2, levo requirement going up, HR still in 120-130, started cardizem gtt,   -restarted on levo gtt  Hx of Afib - on Apixaban, Diltiazem increased to TID   Hx of CAD - Atorvastatin 40mg PO qhs  Hx of HTN - hold Lisinopril   daily EKG - Afib w/ RVR,     GI/NUTR  - Diet: TF (glucerna)@ 50mL/hr, held since 4/7 due to vomiting around NGT and TF in ETT  - PPI  - Senna, Lactulose     /Renal  LIZZIE; worsening - BUN/Cr 115/5.6> 143/5.9>   Fluids: NS @ 100mL/hr  BRET Evans: 40-50cc   - D/c'd  Spironolactone 25mg 4/7, started acetazolamide 250mg q12  Electrolytes: Na 142 / K 5.6 / Mg 2.9 / Ph 8.4- hyperkalemia tx, repeat   Hx of BPH    HEME/ONC  Hgb 12>11.8>12,  stable  on Eliquis    ID  Tmax 96.4   WBC 10>14>15  COVID-19 +  - completed course of Hydroxychloroquine & Azithromycin  - abx: Cefepime   - Ascorbic acid, Zinc  - D-dimer uptrending 11,529 >83758, , ESR 60    ENDO  DM2; uncontrolled, holding home Metformin  - off Insulin gtt since 4/7, RISS, fs q4    MSK/DERM  - no active issues    DVT PPx  - on Apixaban     GI PPx  -  Pantoprazole 40mg PO qac      ***Tubes/Lines/Drains  ***  Peripheral IV  Central Venous Line  Right IJ TLC  	Date 3/31/20  Arterial Line  Right radial A-line		                Date: 3/31/20  Urnary Catheter		Indication: Strict I&O    Date Placed: 3/31/20  ETT  OGT    REVIEW OF SYSTEMS    [ ] A ten-point review of systems was otherwise negative except as noted.  [x ] Due to altered mental status/intubation, subjective information were not able to be obtained from the patient. History was obtained, to the extent possible, from review of the chart and collateral sources of information. KONSTANTIN WYATT  [id number]  74y Male    Indication for ICU admission:  Acute Hypoxemic Respiratory Failure  clinical COVID19 (false negatvie)    Admit Date:20  ICU Date: 20  OR Date:    Bactrim (Unknown)    PAST MEDICAL & SURGICAL HISTORY:  Afib  DM (diabetes mellitus)  BPH (benign prostatic hyperplasia)  CAD (coronary artery disease)    Home Medications:  Cartia  mg/24 hours oral capsule, extended release: 1 cap(s) orally once a day (30 Mar 2020 18:04)  Eliquis 5 mg oral tablet: 1 tab(s) orally 2 times a day (30 Mar 2020 18:04)  Lipitor 40 mg oral tablet: 1 tab(s) orally once a day (30 Mar 2020 18:04)  lisinopril 40 mg oral tablet: 1 tab(s) orally once a day (30 Mar 2020 18:04)  metFORMIN 750 mg oral tablet, extended release: 1 tab(s) orally once a day (30 Mar 2020 18:04)      24HRS EVENT:  Overnight: Afib 150s, 30mg PO cardizem, 10mg IVx2, levo requirement going up, HR still in 120-130, started cardizem gtt (diltiazem increased to TID); desating to 90s, plateaus in 30s pushed 3cc propofol- resolved, EKG - Afib w/ RVR, , Wt 108.8kg, K 5.6- tx, FIO2 149 in am, decreased fio2 to 50%    Neuro    he was started on percedex 0.2 but he became hypotensive and then d/c'd  -overnight required a 3cc push of propofol due to dyssynchrony  - Acetaminophen 650mg PO q6 PRN    RESP  Acute Hypoxemic Respiratory Failure;  - Intubated:   Vent  450, 30, 60%, 10, increased his FiO2 to 60 and increased PEEP to 10 in the afternoon   -overnight: desated to 90s, dyssynchronous w/ the vent, pushed 3cc of propofol with resolution   ABG 7.24, 37, 149, 16, 98%, decreased Fio2 to 50%    COVID-19/Viral Pneumonia;  - positive SARS-CoV-2;  - CXR: unchanged diffuse bilateral opacities  - on Guaifenisen    CARD/VASC  Bradycardic to 40s, resolved, in PM Afib to 150s, 30mg PO cardizem, 10mg IVx2, levo requirement going up, HR still in 120-130, started cardizem gtt,   -restarted on levo gtt  Hx of Afib - on Apixaban, Diltiazem increased to TID   Hx of CAD - Atorvastatin 40mg PO qhs  Hx of HTN - hold Lisinopril   daily EKG - Afib w/ RVR,     GI/NUTR  - Diet: TF (glucerna)@ 50mL/hr, held since  due to vomiting around NGT and TF in ETT  - PPI  - Senna, Lactulose     /Renal  LIZZIE; worsening - BUN/Cr 115/5.6> 143/5.9>   Fluids: NS @ 100mL/hr  BRET Evans: 40-50cc   - D/c'd  Spironolactone 25mg , started acetazolamide 250mg q12  Electrolytes: Na 142 / K 5.6 / Mg 2.9 / Ph 8.4- hyperkalemia tx, repeat   Hx of BPH    HEME/ONC  Hgb 12>11.8>12,  stable  on Eliquis    ID  Tmax 96.4   WBC 10>14>15  COVID-19 +  - completed course of Hydroxychloroquine & Azithromycin  - abx: Cefepime   - Ascorbic acid, Zinc  - D-dimer uptrending 11,529 >86686, , ESR 60    ENDO  DM2; uncontrolled, holding home Metformin  - off Insulin gtt since , RISS, fs q4    MSK/DERM  - no active issues    DVT PPx  - on Apixaban     GI PPx  -  Pantoprazole 40mg PO qac      ***Tubes/Lines/Drains  ***  Peripheral IV  Central Venous Line  Right IJ TLC  	Date 3/31/20  Arterial Line  Right radial A-line		                Date: 3/31/20  Urnary Catheter		Indication: Strict I&O    Date Placed: 3/31/20  ETT  OGT    REVIEW OF SYSTEMS    [ ] A ten-point review of systems was otherwise negative except as noted.  [x ] Due to altered mental status/intubation, subjective information were not able to be obtained from the patient. History was obtained, to the extent possible, from review of the chart and collateral sources of information.    Daily     Daily Weight in k.8 (2020 00:10)    Diet, NPO with Tube Feed:   Tube Feeding Modality: Orogastric  Glucerna 1.2 Choco  Total Volume for 24 Hours (mL): 1200  Continuous  Starting Tube Feed Rate mL per Hour: 30  Increase Tube Feed Rate by (mL): 10     Every hour  Until Goal Tube Feed Rate (mL per Hour): 50  Tube Feed Duration (in Hours): 24  Tube Feed Start Time: 13:00 (20 @ 17:56)      CURRENT MEDS:  Neurologic Medications  acetaminophen  Suppository .. 650 milliGRAM(s) Rectal every 6 hours PRN Temp greater or equal to 38.5C (101.3F)    Respiratory Medications  guaifenesin/dextromethorphan  Syrup 10 milliLiter(s) Oral every 8 hours    Cardiovascular Medications  acetaZOLAMIDE  IVPB 250 milliGRAM(s) IV Intermittent two times a day  diltiazem    Tablet 30 milliGRAM(s) Oral every 6 hours  diltiazem Infusion 10 mG/Hr IV Continuous <Continuous>  norepinephrine Infusion 0.05 MICROgram(s)/kG/Min IV Continuous <Continuous>    Gastrointestinal Medications  ascorbic acid 500 milliGRAM(s) Oral every 8 hours  calcium gluconate IVPB 1 Gram(s) IV Intermittent once  dextrose 10%. 250 milliLiter(s) IV Continuous <Continuous>  dextrose 5%. 1000 milliLiter(s) IV Continuous <Continuous>  lactulose Syrup 30 Gram(s) Oral daily  pantoprazole  Injectable 40 milliGRAM(s) IV Push daily  senna 2 Tablet(s) Oral at bedtime  zinc sulfate 220 milliGRAM(s) Oral daily    Genitourinary Medications    Hematologic/Oncologic Medications  apixaban 5 milliGRAM(s) Oral two times a day    Antimicrobial/Immunologic Medications  cefepime   IVPB      cefepime   IVPB 1000 milliGRAM(s) IV Intermittent every 24 hours    Endocrine/Metabolic Medications  dextrose 40% Gel 15 Gram(s) Oral once PRN Blood Glucose LESS THAN 70 milliGRAM(s)/deciliter  dextrose 50% Injectable 12.5 Gram(s) IV Push once  insulin regular  human corrective regimen sliding scale   IV Push every 4 hours  insulin regular  human recombinant. 10 Unit(s) IV Push once    Topical/Other Medications  chlorhexidine 4% Liquid 1 Application(s) Topical <User Schedule>  sodium polystyrene sulfonate Suspension 30 Gram(s) Oral once      ICU Vital Signs Last 24 Hrs  T(C): 37 (2020 12:00), Max: 37.3 (2020 16:00)  T(F): 98.6 (2020 12:00), Max: 99.1 (2020 16:00)  HR: 99 (2020 12:00) (91 - 134)  BP: 142/65 (2020 12:00) (111/57 - 142/65)  BP(mean): 93 (2020 12:00) (78 - 93)  ABP: 144/61 (2020 12:00) (105/51 - 144/61)  ABP(mean): 86 (2020 12:00) (69 - 86)  RR: 21 (2020 12:00) (16 - 26)  SpO2: 99% (2020 12:00) (93% - 100%)      Adult Advanced Hemodynamics Last 24 Hrs  CVP(mm Hg): --  CVP(cm H2O): --  CO: --  CI: --  PA: --  PA(mean): --  PCWP: --  SVR: --  SVRI: --  PVR: --  PVRI: --    Mode: AC/ CMV (Assist Control/ Continuous Mandatory Ventilation)  RR (machine): 20  TV (machine): 450  FiO2: 50  PEEP: 10  ITime: 1  MAP: 15  PIP: 26    ABG - ( 2020 04:30 )  pH, Arterial: 7.24  pH, Blood: x     /  pCO2: 37    /  pO2: 149   / HCO3: 16    / Base Excess: -10.6 /  SaO2: 98                  I&O's Summary    2020 07:  -  2020 07:00  --------------------------------------------------------  IN: 1093.5 mL / OUT: 1210 mL / NET: -116.5 mL    2020 07:  -  2020 13:58  --------------------------------------------------------  IN: 392.2 mL / OUT: 330 mL / NET: 62.2 mL      I&O's Detail    2020 07:01  -  2020 07:00  --------------------------------------------------------  IN:    dextrose 10%.: 250 mL    diltiazem Infusion: 45 mL    Glucerna: 30 mL    IV PiggyBack: 350 mL    norepinephrine Infusion: 193.5 mL    Oral Fluid: 125 mL    sodium chloride 0.9%: 100 mL  Total IN: 1093.5 mL    OUT:    Indwelling Catheter - Urethral: 1210 mL  Total OUT: 1210 mL    Total NET: -116.5 mL      2020 07:01  -  2020 13:58  --------------------------------------------------------  IN:    diltiazem Infusion: 22.5 mL    IV PiggyBack: 250 mL    norepinephrine Infusion: 69.7 mL    Oral Fluid: 50 mL  Total IN: 392.2 mL    OUT:    Indwelling Catheter - Urethral: 330 mL  Total OUT: 330 mL    Total NET: 62.2 mL          PHYSICAL EXAM:    General/Neuro  RASS:  -2   sedated    Lungs:  Intubated, vented    Cardiovascular : A-fib- rate controlled.  Peripheral edema      GI: Abdomen soft, Non-tender, Non-distended.  Orogastric tube in place.  Colost  :    Extremities: Extremities cool peripherally       :       Evans catheter in place.      CXR: bilateral opacities    LABS:  CAPILLARY BLOOD GLUCOSE      POCT Blood Glucose.: 187 mg/dL (2020 11:00)  POCT Blood Glucose.: 188 mg/dL (2020 05:55)  POCT Blood Glucose.: 167 mg/dL (2020 01:40)  POCT Blood Glucose.: 164 mg/dL (2020 21:34)  POCT Blood Glucose.: 176 mg/dL (2020 18:17)                          12.5   15.26 )-----------( 332      ( 2020 00:00 )             38.2       04-08    142  |  108  |  158<HH>  ----------------------------<  242<H>  5.5<H>   |  15<L>  |  6.4<HH>    Ca    8.6      2020 07:40  Phos  8.4     04-08  Mg     2.9     04-08        PT/INR - ( 2020 00:00 )   PT: 13.90 sec;   INR: 1.21 ratio         PTT - ( 2020 00:00 )  PTT:26.4 sec

## 2020-04-08 NOTE — CHART NOTE - NSCHARTNOTEFT_GEN_A_CORE
Left voice mail to patients family member, Rebecca. Left voice mail to patients family member, Rebecca .[phone]. Discussed plan of care. Pt expressed verbal understanding

## 2020-04-08 NOTE — PROGRESS NOTE ADULT - ASSESSMENT
IMPRESSION:  COVID19 with resolved septic shock, off pressors, mechanical ventilation with ARDS with fio2 40 % secondary to Cytokine Release Syndrome  -end organ damage with acute renal failure, transaminitis, metabolic encephalopathy  -inflammatory markers significantly elevated but are decreasing  -d Dimers 50057/ CRP 31.9  associated with increased mortality  -Blood & Urine cxs NGTD     RECOMMENDATIONS:  -s/p HCQ   -deep ET cultures  -Cefepime 1 gm iv q24h  prognosis is extremely poor

## 2020-04-08 NOTE — PROGRESS NOTE ADULT - SUBJECTIVE AND OBJECTIVE BOX
KONSTANTIN WYATT  74y, Male    All available historical data reviewed    OVERNIGHT EVENTS:  fio2 50%    ROS:  unable to obtain history secondary to patient's mental status and/or sedation     VITALS:  T(F): 98.6, Max: 99.1 (04-07-20 @ 16:00)  HR: 99  BP: 142/65  RR: 21Vital Signs Last 24 Hrs  T(C): 37 (08 Apr 2020 12:00), Max: 37.3 (07 Apr 2020 16:00)  T(F): 98.6 (08 Apr 2020 12:00), Max: 99.1 (07 Apr 2020 16:00)  HR: 99 (08 Apr 2020 12:00) (91 - 134)  BP: 142/65 (08 Apr 2020 12:00) (111/57 - 142/65)  BP(mean): 93 (08 Apr 2020 12:00) (78 - 93)  RR: 21 (08 Apr 2020 12:00) (16 - 26)  SpO2: 99% (08 Apr 2020 12:00) (93% - 100%)    TESTS & MEASUREMENTS:                        12.5   15.26 )-----------( 332      ( 08 Apr 2020 00:00 )             38.2     04-08    142  |  108  |  158<HH>  ----------------------------<  242<H>  5.5<H>   |  15<L>  |  6.4<HH>    Ca    8.6      08 Apr 2020 07:40  Phos  8.4     04-08  Mg     2.9     04-08                RADIOLOGY & ADDITIONAL TESTS:  Personal review of radiological diagnostics performed  Echo and EKG results noted when applicable.     MEDICATIONS:  acetaminophen  Suppository .. 650 milliGRAM(s) Rectal every 6 hours PRN  acetaZOLAMIDE  IVPB 250 milliGRAM(s) IV Intermittent two times a day  apixaban 5 milliGRAM(s) Oral two times a day  ascorbic acid 500 milliGRAM(s) Oral every 8 hours  cefepime   IVPB      cefepime   IVPB 1000 milliGRAM(s) IV Intermittent every 24 hours  chlorhexidine 4% Liquid 1 Application(s) Topical <User Schedule>  dextrose 10%. 250 milliLiter(s) IV Continuous <Continuous>  dextrose 40% Gel 15 Gram(s) Oral once PRN  dextrose 5%. 1000 milliLiter(s) IV Continuous <Continuous>  dextrose 50% Injectable 12.5 Gram(s) IV Push once  diltiazem    Tablet 30 milliGRAM(s) Oral every 6 hours  diltiazem Infusion 10 mG/Hr IV Continuous <Continuous>  guaifenesin/dextromethorphan  Syrup 10 milliLiter(s) Oral every 8 hours  insulin regular  human corrective regimen sliding scale   IV Push every 4 hours  lactulose Syrup 30 Gram(s) Oral daily  norepinephrine Infusion 0.05 MICROgram(s)/kG/Min IV Continuous <Continuous>  pantoprazole  Injectable 40 milliGRAM(s) IV Push daily  senna 2 Tablet(s) Oral at bedtime  zinc sulfate 220 milliGRAM(s) Oral daily      ANTIBIOTICS:  cefepime   IVPB      cefepime   IVPB 1000 milliGRAM(s) IV Intermittent every 24 hours

## 2020-04-08 NOTE — PROGRESS NOTE ADULT - ASSESSMENT
NEURO/PSYCH  Sedation;   - continue, Propofol gtt titrate to RASS -3  - started Midazolam gtt 5mg/hr  - started Morphine gtt 2mg/hr  Pain Control;  - continue, Acetaminophen 650mg PO q6 PRN    RESP  Acute Hypoxemic Respiratory Failure;  Mode: AC/ CMV (Assist Control/ Continuous Mandatory Ventilation)  RR (machine): 30 TV (machine): 450 FiO2: 40 PEEP: 15 ITime: 1 MAP: 20 PIP: 38, plat pressure 29  ABG - ( 05 Apr 2020 05:17 ) pH, Arterial: 7.27  pH, Blood: x     /  pCO2: 42    /  pO2: 132   / HCO3: 19    / Base Excess: -7.4  /  SaO2: 98      - Improving oxygenation  - FiO2 lowered to 40%  COVID-19/Viral Pneumonia;  - positive SARS-CoV-2  - continue, Methylprednisolone 60mg q12  - worsening diffuse bilateral opacities  - continue, Guaifenisen/DM  - daily cxr qam    CARD/VASC  BP remains labile on levo  Bradycardia;  - Diltiazem from 30mg PO bid  AFib; chronic  - continue, Apixaban 5mg PO bid  - continue, Diltiazem 30mg PO bid   - continue, EKG qd  CAD    - Atorvastatin 40mg PO qhs  HTN  - holding Lisinopril     GI/NUTR  - NPO  - continue TF 75mL/hr  - remains on Lactulose     /Renal  LIZZIE; steadily worsening   101/5.6  <--, 99/5.9  <--, 90/5.5  <--BUN/Creatinine  UOP; adequate  - averaging 100-1500mL/hr  - continue, Spironolactone 25mg OGT qd  - monitor, I&Os  Fluids  - LR 100mL/hr  - monitor I&Os  - santana in place  Electrolytes;  Lytes-04-05 @ 00:30 Na 140   K 5.4   Phos 8.3    Mg 2.7  04-04 @ 17:04 Na 138   K 5.4   Phos 8.1    Mg 2.6  Hyperkalemia; mild  - no EKG changes  - repeat CMP in the pm  - monitor e-  BPH; chronic     HEME/ONC  Serial Hemoglobin:   12.7 (04-05 @ 00:30)  12.8 (04-04 @ 17:04)  12.1 (04-04 @ 00:00)  12.9 (04-03 @ 16:00)  12.7 (04-03 @ 00:05)    - stable H/H  - monitor H/H    ID  Fever;  - continue, Acetaminophen 650mg PO q6  COVID-19, suspected;  - positive repeat swab  - Hydroxychloroquine (complete 4/5)  - Azithromycin (complete 4/4)  - continue Cefepime until 4/8  - nasal MRSA, urine Legionella & Strep    ENDO  DM2;  - continue, RISS   - continue POCT q6h  - holding, Metformin    MSK/DERM  - no active issues    DVT PPx  - on Apixaban     GI PPx  - continue Pantoprazole 40mg PO qac  - continue Senna 17.2mg PO qhs  - continue lactulose    Disposition  -  SICU NEURO/PSYCH      - continue, Acetaminophen 650mg PO q6 PRN fever    RESP  Acute Hypoxemic Respiratory Failure;  Mode: AC/ CMV (Assist Control/ Continuous Mandatory Ventilation)  RR (machine): 30 TV (machine): 450 FiO2: 40 PEEP: 15 ITime: 1 MAP: 20 PIP: 38, plat pressure 29  ABG - ( 05 Apr 2020 05:17 ) pH, Arterial: 7.27  pH, Blood: x     /  pCO2: 42    /  pO2: 132   / HCO3: 19    / Base Excess: -7.4  /  SaO2: 98      - Improving oxygenation  - FiO2 lowered to 40%  COVID-19/Viral Pneumonia;  - positive SARS-CoV-2  - continue, Methylprednisolone 60mg q12  - worsening diffuse bilateral opacities  - continue, Guaifenisen/DM  - daily cxr qam    CARD/VASC  BP remains labile on levo  Bradycardia;  - Diltiazem from 30mg PO bid  AFib; chronic  - continue, Apixaban 5mg PO bid  - continue, Diltiazem 30mg PO bid   - continue, EKG qd  CAD    - Atorvastatin 40mg PO qhs  HTN  - holding Lisinopril     GI/NUTR  - NPO  - continue TF 75mL/hr  - remains on Lactulose     /Renal  LIZZIE; steadily worsening   101/5.6  <--, 99/5.9  <--, 90/5.5  <--BUN/Creatinine  UOP; adequate  - averaging 100-1500mL/hr  - continue, Spironolactone 25mg OGT qd  - monitor, I&Os  Fluids  - LR 100mL/hr  - monitor I&Os  - santana in place  Electrolytes;  Lytes-04-05 @ 00:30 Na 140   K 5.4   Phos 8.3    Mg 2.7  04-04 @ 17:04 Na 138   K 5.4   Phos 8.1    Mg 2.6  Hyperkalemia; mild  - no EKG changes  - repeat CMP in the pm  - monitor e-  BPH; chronic     HEME/ONC  Serial Hemoglobin:   12.7 (04-05 @ 00:30)  12.8 (04-04 @ 17:04)  12.1 (04-04 @ 00:00)  12.9 (04-03 @ 16:00)  12.7 (04-03 @ 00:05)    - stable H/H  - monitor H/H    ID  Fever;  - continue, Acetaminophen 650mg PO q6  COVID-19, suspected;  - positive repeat swab  - Hydroxychloroquine (complete 4/5)  - Azithromycin (complete 4/4)  - continue Cefepime until 4/8  - nasal MRSA, urine Legionella & Strep    ENDO  DM2;  - continue, RISS   - continue POCT q6h  - holding, Metformin    MSK/DERM  - no active issues    DVT PPx  - on Apixaban     GI PPx  - continue Pantoprazole 40mg PO qac  - continue Senna 17.2mg PO qhs  - continue lactulose    Disposition  -  SICU NEURO/PSYCH  - continue, Acetaminophen 650mg PO q6 PRN fever  -start low dose Seroquel    RESP  Acute Hypoxemic Respiratory Failure;  Mode: AC/ CMV (Assist Control/ Continuous Mandatory Ventilation)  50%/ PEEP 15- trend O2 sats  COVID-19/Viral Pneumonia;  - positive SARS-CoV-2  - completed Methylprednisolone 60mg q12  - worsening diffuse bilateral opacities  - continue, Guaifenisen/DM  - daily cxr qam    CARD/VASC  BP remains labile on levo- remains on low dose levo  -wean diltiazem gtt too  - Increase Diltiazem from 30mg PO q6h  AFib; chronic  - continue, Apixaban 5mg PO bid  - continue, Diltiazem 30mg PO bid   - continue, EKG qd  CAD    - Atorvastatin 40mg PO qhs  HTN  - holding Lisinopril     GI/NUTR  - NPO  - continue TF 75mL/hr  - remains on Lactulose     /Renal  LIZZIE; steadily worsening   101/5.6  <--, 99/5.9  <--, 90/5.5  <--BUN/Creatinine  UOP; adequate  - averaging 100-1500mL/hr  - continue, Spironolactone 25mg OGT qd  - monitor, I&Os  Fluids  - LR 100mL/hr  - monitor I&Os  - santana in place  Electrolytes;  Lytes-04-05 @ 00:30 Na 140   K 5.4   Phos 8.3    Mg 2.7  04-04 @ 17:04 Na 138   K 5.4   Phos 8.1    Mg 2.6  Hyperkalemia; mild  - no EKG changes  - repeat CMP in the pm  - monitor e-  BPH; chronic     HEME/ONC  Serial Hemoglobin:   12.7 (04-05 @ 00:30)  12.8 (04-04 @ 17:04)  12.1 (04-04 @ 00:00)  12.9 (04-03 @ 16:00)  12.7 (04-03 @ 00:05)    - stable H/H  - monitor H/H    ID  Fever;  - continue, Acetaminophen 650mg PO q6  COVID-19, suspected;  - positive repeat swab  - Hydroxychloroquine (complete 4/5)  - Azithromycin (complete 4/4)  - continue Cefepime until 4/8  - nasal MRSA, urine Legionella & Strep    ENDO  DM2;  - continue, RISS   - continue POCT q6h  - holding, Metformin    MSK/DERM  - no active issues    DVT PPx  - on Apixaban     GI PPx  - continue Pantoprazole 40mg PO qac  - continue Senna 17.2mg PO qhs  - continue lactulose    Disposition  -  SICU

## 2020-04-08 NOTE — CONSULT NOTE ADULT - ASSESSMENT
75 y/o M with LIZZIE, hyperK initially presented to the hospital for SOB, diarrhea. COVID POSITIVE. intubated on vent.  hx of Afib on Eliquis, CAD s/p PCI, DM, BPH,    # LIZZIE / hyperK / acidosis: baseline cr. ~ 1.0   - ATN 2/2 hypoperfusion.   - nonoliguric   - even though > 14L positive balance overall, but was negative balance ~ 100 cc yesterday. ? opening up   - serum creatinine slowly trending up   - 73 y/o M with LIZZIE, hyperK initially presented to the hospital for SOB, diarrhea. COVID POSITIVE. intubated on vent.  hx of Afib on Eliquis, CAD s/p PCI, DM, BPH,    # LIZZIE / hyperK / acidosis: baseline cr. ~ 1.0   - ATN 2/2 hypoperfusion.   - nonoliguric   - even though > 14L positive balance overall, but was negative balance ~ 100 cc yesterday. ? opening up   - serum creatinine slowly trending up   - hyperK noted. ? kayexalate ordered. follow low K diet. monitor levels.   - ABG noted for high AG metabolic acidosis plus respiratory acidosis. on vent.   - start bicarb infusion with d5% plus 150 meq bicarb at 50 cc/hr   - strict I/O   - BUN elevated noted. stable.   - BP noted, keep MAP > 65   - check UA, urine creatinine, Na, Pr/Cr ratio   - Ca, Mg at goal. hyperphos noted. start sevelamer 1600 mg q 8 hr with each tube feed.   - avoid nephrotoxins and hypotension   - no urgent need for RRT at the moment, but will likely need RRT very soon if no significant improvement, may be within hours than days.   - CXR noted / COVID POSITIVE: ID notes appreciated.   - prognosis guarded.   - will follow 73 y/o M with LIZZIE, hyperK initially presented to the hospital for SOB, diarrhea. COVID POSITIVE. intubated on vent.  hx of Afib on Eliquis, CAD s/p PCI, DM, BPH,    # LIZZIE / hyperK / acidosis: baseline cr. ~ 1.0   - ATN 2/2 hypoperfusion.   - nonoliguric   - even though > 14L positive balance overall, but was negative balance ~ 100 cc yesterday. ? opening up   - serum creatinine slowly trending up   - hyperK noted. ? kayexalate ordered. follow low K diet. monitor levels.   - ABG noted for high AG metabolic acidosis plus respiratory acidosis. on vent.   - start bicarb infusion with d5% plus 150 meq bicarb at 50 cc/hr   - strict I/O   - BUN elevated noted. stable.   - BP noted, keep MAP > 65   - check UA, urine creatinine, Na, Pr/Cr ratio   - Ca, Mg at goal. hyperphos noted. start sevelamer 1600 mg q 8 hr with each tube feed.   - avoid nephrotoxins and hypotension   - no urgent need for RRT at the moment, but will likely need RRT in AM if no significant improvement, may be within hours than days.   - CXR noted / COVID POSITIVE: ID notes appreciated.   - prognosis guarded.   - will follow

## 2020-04-09 LAB
ALBUMIN SERPL ELPH-MCNC: 2.5 G/DL — LOW (ref 3.5–5.2)
ALBUMIN SERPL ELPH-MCNC: 2.6 G/DL — LOW (ref 3.5–5.2)
ALBUMIN SERPL ELPH-MCNC: 2.6 G/DL — LOW (ref 3.5–5.2)
ALP SERPL-CCNC: 76 U/L — SIGNIFICANT CHANGE UP (ref 30–115)
ALP SERPL-CCNC: 78 U/L — SIGNIFICANT CHANGE UP (ref 30–115)
ALP SERPL-CCNC: 82 U/L — SIGNIFICANT CHANGE UP (ref 30–115)
ALT FLD-CCNC: 52 U/L — HIGH (ref 0–41)
ALT FLD-CCNC: 58 U/L — HIGH (ref 0–41)
ALT FLD-CCNC: 58 U/L — HIGH (ref 0–41)
ANION GAP SERPL CALC-SCNC: 19 MMOL/L — HIGH (ref 7–14)
ANION GAP SERPL CALC-SCNC: 19 MMOL/L — HIGH (ref 7–14)
ANION GAP SERPL CALC-SCNC: 20 MMOL/L — HIGH (ref 7–14)
ANION GAP SERPL CALC-SCNC: 21 MMOL/L — HIGH (ref 7–14)
AST SERPL-CCNC: 35 U/L — SIGNIFICANT CHANGE UP (ref 0–41)
AST SERPL-CCNC: 38 U/L — SIGNIFICANT CHANGE UP (ref 0–41)
AST SERPL-CCNC: 41 U/L — SIGNIFICANT CHANGE UP (ref 0–41)
BASE EXCESS BLDA CALC-SCNC: -10.6 MMOL/L — LOW (ref -2–2)
BASOPHILS # BLD AUTO: 0.02 K/UL — SIGNIFICANT CHANGE UP (ref 0–0.2)
BASOPHILS # BLD AUTO: 0.03 K/UL — SIGNIFICANT CHANGE UP (ref 0–0.2)
BASOPHILS NFR BLD AUTO: 0.1 % — SIGNIFICANT CHANGE UP (ref 0–1)
BASOPHILS NFR BLD AUTO: 0.2 % — SIGNIFICANT CHANGE UP (ref 0–1)
BILIRUB SERPL-MCNC: 2 MG/DL — HIGH (ref 0.2–1.2)
BILIRUB SERPL-MCNC: 2 MG/DL — HIGH (ref 0.2–1.2)
BILIRUB SERPL-MCNC: 2.6 MG/DL — HIGH (ref 0.2–1.2)
BUN SERPL-MCNC: 156 MG/DL — CRITICAL HIGH (ref 10–20)
BUN SERPL-MCNC: 157 MG/DL — CRITICAL HIGH (ref 10–20)
BUN SERPL-MCNC: 162 MG/DL — CRITICAL HIGH (ref 10–20)
BUN SERPL-MCNC: 170 MG/DL — CRITICAL HIGH (ref 10–20)
CALCIUM SERPL-MCNC: 8.5 MG/DL — SIGNIFICANT CHANGE UP (ref 8.5–10.1)
CALCIUM SERPL-MCNC: 8.7 MG/DL — SIGNIFICANT CHANGE UP (ref 8.5–10.1)
CALCIUM SERPL-MCNC: 8.8 MG/DL — SIGNIFICANT CHANGE UP (ref 8.5–10.1)
CALCIUM SERPL-MCNC: 8.9 MG/DL — SIGNIFICANT CHANGE UP (ref 8.5–10.1)
CHLORIDE SERPL-SCNC: 104 MMOL/L — SIGNIFICANT CHANGE UP (ref 98–110)
CHLORIDE SERPL-SCNC: 107 MMOL/L — SIGNIFICANT CHANGE UP (ref 98–110)
CHLORIDE SERPL-SCNC: 109 MMOL/L — SIGNIFICANT CHANGE UP (ref 98–110)
CHLORIDE SERPL-SCNC: 110 MMOL/L — SIGNIFICANT CHANGE UP (ref 98–110)
CK SERPL-CCNC: 191 U/L — SIGNIFICANT CHANGE UP (ref 0–225)
CO2 SERPL-SCNC: 15 MMOL/L — LOW (ref 17–32)
CO2 SERPL-SCNC: 16 MMOL/L — LOW (ref 17–32)
CO2 SERPL-SCNC: 16 MMOL/L — LOW (ref 17–32)
CO2 SERPL-SCNC: 17 MMOL/L — SIGNIFICANT CHANGE UP (ref 17–32)
CREAT SERPL-MCNC: 6.3 MG/DL — CRITICAL HIGH (ref 0.7–1.5)
CREAT SERPL-MCNC: 6.5 MG/DL — CRITICAL HIGH (ref 0.7–1.5)
CREAT SERPL-MCNC: 6.5 MG/DL — CRITICAL HIGH (ref 0.7–1.5)
CREAT SERPL-MCNC: 7.1 MG/DL — CRITICAL HIGH (ref 0.7–1.5)
CRP SERPL-MCNC: 27.32 MG/DL — HIGH (ref 0–0.4)
D DIMER BLD IA.RAPID-MCNC: 8421 NG/ML DDU — HIGH (ref 0–230)
EOSINOPHIL # BLD AUTO: 0.08 K/UL — SIGNIFICANT CHANGE UP (ref 0–0.7)
EOSINOPHIL # BLD AUTO: 0.08 K/UL — SIGNIFICANT CHANGE UP (ref 0–0.7)
EOSINOPHIL NFR BLD AUTO: 0.6 % — SIGNIFICANT CHANGE UP (ref 0–8)
EOSINOPHIL NFR BLD AUTO: 0.6 % — SIGNIFICANT CHANGE UP (ref 0–8)
ERYTHROCYTE [SEDIMENTATION RATE] IN BLOOD: 53 MM/HR — HIGH (ref 0–10)
FERRITIN SERPL-MCNC: 1895 NG/ML — HIGH (ref 30–400)
GAS PNL BLDA: SIGNIFICANT CHANGE UP
GLUCOSE BLDC GLUCOMTR-MCNC: 132 MG/DL — HIGH (ref 70–99)
GLUCOSE BLDC GLUCOMTR-MCNC: 149 MG/DL — HIGH (ref 70–99)
GLUCOSE BLDC GLUCOMTR-MCNC: 151 MG/DL — HIGH (ref 70–99)
GLUCOSE BLDC GLUCOMTR-MCNC: 152 MG/DL — HIGH (ref 70–99)
GLUCOSE BLDC GLUCOMTR-MCNC: 155 MG/DL — HIGH (ref 70–99)
GLUCOSE BLDC GLUCOMTR-MCNC: 164 MG/DL — HIGH (ref 70–99)
GLUCOSE SERPL-MCNC: 147 MG/DL — HIGH (ref 70–99)
GLUCOSE SERPL-MCNC: 152 MG/DL — HIGH (ref 70–99)
GLUCOSE SERPL-MCNC: 178 MG/DL — HIGH (ref 70–99)
GLUCOSE SERPL-MCNC: 184 MG/DL — HIGH (ref 70–99)
HCO3 BLDA-SCNC: 16 MMOL/L — LOW (ref 21–29)
HCT VFR BLD CALC: 37.1 % — LOW (ref 42–52)
HGB BLD-MCNC: 12.1 G/DL — LOW (ref 14–18)
HOROWITZ INDEX BLDA+IHG-RTO: 21 — SIGNIFICANT CHANGE UP
IMM GRANULOCYTES NFR BLD AUTO: 2.7 % — HIGH (ref 0.1–0.3)
IMM GRANULOCYTES NFR BLD AUTO: 2.8 % — HIGH (ref 0.1–0.3)
LACTATE SERPL-SCNC: 0.9 MMOL/L — SIGNIFICANT CHANGE UP (ref 0.7–2)
LDH SERPL L TO P-CCNC: 642 U/L — HIGH (ref 50–242)
LYMPHOCYTES # BLD AUTO: 0.5 K/UL — LOW (ref 1.2–3.4)
LYMPHOCYTES # BLD AUTO: 0.51 K/UL — LOW (ref 1.2–3.4)
LYMPHOCYTES # BLD AUTO: 3.5 % — LOW (ref 20.5–51.1)
LYMPHOCYTES # BLD AUTO: 3.8 % — LOW (ref 20.5–51.1)
MAGNESIUM SERPL-MCNC: 3.1 MG/DL — CRITICAL HIGH (ref 1.8–2.4)
MCHC RBC-ENTMCNC: 29.6 PG — SIGNIFICANT CHANGE UP (ref 27–31)
MCHC RBC-ENTMCNC: 32.6 G/DL — SIGNIFICANT CHANGE UP (ref 32–37)
MCV RBC AUTO: 90.7 FL — SIGNIFICANT CHANGE UP (ref 80–94)
MONOCYTES # BLD AUTO: 0.59 K/UL — SIGNIFICANT CHANGE UP (ref 0.1–0.6)
MONOCYTES # BLD AUTO: 0.61 K/UL — HIGH (ref 0.1–0.6)
MONOCYTES NFR BLD AUTO: 4.2 % — SIGNIFICANT CHANGE UP (ref 1.7–9.3)
MONOCYTES NFR BLD AUTO: 4.4 % — SIGNIFICANT CHANGE UP (ref 1.7–9.3)
NEUTROPHILS # BLD AUTO: 11.7 K/UL — HIGH (ref 1.4–6.5)
NEUTROPHILS # BLD AUTO: 12.8 K/UL — HIGH (ref 1.4–6.5)
NEUTROPHILS NFR BLD AUTO: 88.2 % — HIGH (ref 42.2–75.2)
NEUTROPHILS NFR BLD AUTO: 88.9 % — HIGH (ref 42.2–75.2)
NRBC # BLD: 0 /100 WBCS — SIGNIFICANT CHANGE UP (ref 0–0)
NRBC # BLD: 0 /100 WBCS — SIGNIFICANT CHANGE UP (ref 0–0)
PCO2 BLDA: 37 MMHG — LOW (ref 38–42)
PH BLDA: 7.24 — LOW (ref 7.38–7.42)
PHOSPHATE SERPL-MCNC: 10 MG/DL — HIGH (ref 2.1–4.9)
PHOSPHATE SERPL-MCNC: 10.6 MG/DL — HIGH (ref 2.1–4.9)
PHOSPHATE SERPL-MCNC: 9.5 MG/DL — HIGH (ref 2.1–4.9)
PLATELET # BLD AUTO: 283 K/UL — SIGNIFICANT CHANGE UP (ref 130–400)
PO2 BLDA: 111 MMHG — HIGH (ref 78–95)
POTASSIUM SERPL-MCNC: 5.2 MMOL/L — HIGH (ref 3.5–5)
POTASSIUM SERPL-MCNC: 5.2 MMOL/L — HIGH (ref 3.5–5)
POTASSIUM SERPL-MCNC: 5.3 MMOL/L — HIGH (ref 3.5–5)
POTASSIUM SERPL-MCNC: 5.3 MMOL/L — HIGH (ref 3.5–5)
POTASSIUM SERPL-SCNC: 5.2 MMOL/L — HIGH (ref 3.5–5)
POTASSIUM SERPL-SCNC: 5.2 MMOL/L — HIGH (ref 3.5–5)
POTASSIUM SERPL-SCNC: 5.3 MMOL/L — HIGH (ref 3.5–5)
POTASSIUM SERPL-SCNC: 5.3 MMOL/L — HIGH (ref 3.5–5)
PROCALCITONIN SERPL-MCNC: 1.41 NG/ML — HIGH (ref 0.02–0.1)
PROT SERPL-MCNC: 5.2 G/DL — LOW (ref 6–8)
PROT SERPL-MCNC: 5.4 G/DL — LOW (ref 6–8)
PROT SERPL-MCNC: 5.5 G/DL — LOW (ref 6–8)
RBC # BLD: 4.09 M/UL — LOW (ref 4.7–6.1)
RBC # FLD: 15.4 % — HIGH (ref 11.5–14.5)
SAO2 % BLDA: 97 % — HIGH (ref 92–96)
SODIUM SERPL-SCNC: 142 MMOL/L — SIGNIFICANT CHANGE UP (ref 135–146)
SODIUM SERPL-SCNC: 142 MMOL/L — SIGNIFICANT CHANGE UP (ref 135–146)
SODIUM SERPL-SCNC: 143 MMOL/L — SIGNIFICANT CHANGE UP (ref 135–146)
SODIUM SERPL-SCNC: 146 MMOL/L — SIGNIFICANT CHANGE UP (ref 135–146)
TROPONIN T SERPL-MCNC: 0.14 NG/ML — CRITICAL HIGH
WBC # BLD: 13.27 K/UL — HIGH (ref 4.8–10.8)
WBC # FLD AUTO: 13.27 K/UL — HIGH (ref 4.8–10.8)

## 2020-04-09 PROCEDURE — 99291 CRITICAL CARE FIRST HOUR: CPT

## 2020-04-09 PROCEDURE — 71045 X-RAY EXAM CHEST 1 VIEW: CPT | Mod: 26

## 2020-04-09 PROCEDURE — 93010 ELECTROCARDIOGRAM REPORT: CPT

## 2020-04-09 RX ORDER — SODIUM BICARBONATE 1 MEQ/ML
650 SYRINGE (ML) INTRAVENOUS EVERY 6 HOURS
Refills: 0 | Status: DISCONTINUED | OUTPATIENT
Start: 2020-04-09 | End: 2020-04-11

## 2020-04-09 RX ORDER — PHENYLEPHRINE HYDROCHLORIDE 10 MG/ML
0.2 INJECTION INTRAVENOUS
Qty: 160 | Refills: 0 | Status: DISCONTINUED | OUTPATIENT
Start: 2020-04-09 | End: 2020-04-11

## 2020-04-09 RX ORDER — DILTIAZEM HCL 120 MG
60 CAPSULE, EXT RELEASE 24 HR ORAL EVERY 8 HOURS
Refills: 0 | Status: DISCONTINUED | OUTPATIENT
Start: 2020-04-09 | End: 2020-04-20

## 2020-04-09 RX ORDER — ETHACRYNIC ACID 25 MG/1
25 TABLET ORAL EVERY 12 HOURS
Refills: 0 | Status: DISCONTINUED | OUTPATIENT
Start: 2020-04-09 | End: 2020-04-15

## 2020-04-09 RX ADMIN — PHENYLEPHRINE HYDROCHLORIDE 3.56 MICROGRAM(S)/KG/MIN: 10 INJECTION INTRAVENOUS at 10:37

## 2020-04-09 RX ADMIN — Medication 650 MILLIGRAM(S): at 23:22

## 2020-04-09 RX ADMIN — Medication 5 MILLILITER(S): at 15:26

## 2020-04-09 RX ADMIN — Medication 500 MILLIGRAM(S): at 05:26

## 2020-04-09 RX ADMIN — Medication 650 MILLIGRAM(S): at 15:28

## 2020-04-09 RX ADMIN — Medication 30 MILLIGRAM(S): at 00:43

## 2020-04-09 RX ADMIN — SENNA PLUS 2 TABLET(S): 8.6 TABLET ORAL at 21:50

## 2020-04-09 RX ADMIN — Medication 30 MILLIGRAM(S): at 05:26

## 2020-04-09 RX ADMIN — ETHACRYNIC ACID 25 MILLIGRAM(S): 25 TABLET ORAL at 16:50

## 2020-04-09 RX ADMIN — INSULIN HUMAN 2: 100 INJECTION, SOLUTION SUBCUTANEOUS at 21:51

## 2020-04-09 RX ADMIN — APIXABAN 5 MILLIGRAM(S): 2.5 TABLET, FILM COATED ORAL at 05:26

## 2020-04-09 RX ADMIN — Medication 10 MILLILITER(S): at 21:50

## 2020-04-09 RX ADMIN — CHLORHEXIDINE GLUCONATE 1 APPLICATION(S): 213 SOLUTION TOPICAL at 05:27

## 2020-04-09 RX ADMIN — Medication 10 MILLILITER(S): at 05:26

## 2020-04-09 RX ADMIN — ZINC SULFATE TAB 220 MG (50 MG ZINC EQUIVALENT) 220 MILLIGRAM(S): 220 (50 ZN) TAB at 12:14

## 2020-04-09 RX ADMIN — APIXABAN 5 MILLIGRAM(S): 2.5 TABLET, FILM COATED ORAL at 17:45

## 2020-04-09 RX ADMIN — INSULIN HUMAN 2: 100 INJECTION, SOLUTION SUBCUTANEOUS at 17:46

## 2020-04-09 RX ADMIN — LACTULOSE 30 GRAM(S): 10 SOLUTION ORAL at 12:15

## 2020-04-09 RX ADMIN — ACETAZOLAMIDE 105 MILLIGRAM(S): 250 TABLET ORAL at 05:27

## 2020-04-09 RX ADMIN — Medication 650 MILLIGRAM(S): at 16:52

## 2020-04-09 RX ADMIN — PANTOPRAZOLE SODIUM 40 MILLIGRAM(S): 20 TABLET, DELAYED RELEASE ORAL at 12:12

## 2020-04-09 RX ADMIN — Medication 500 MILLIGRAM(S): at 21:49

## 2020-04-09 RX ADMIN — INSULIN HUMAN 2: 100 INJECTION, SOLUTION SUBCUTANEOUS at 12:57

## 2020-04-09 RX ADMIN — Medication 500 MILLIGRAM(S): at 15:21

## 2020-04-09 RX ADMIN — Medication 60 MILLIGRAM(S): at 21:49

## 2020-04-09 RX ADMIN — ETHACRYNIC ACID 25 MILLIGRAM(S): 25 TABLET ORAL at 12:27

## 2020-04-09 RX ADMIN — Medication 60 MILLIGRAM(S): at 15:25

## 2020-04-09 NOTE — PROGRESS NOTE ADULT - SUBJECTIVE AND OBJECTIVE BOX
intubated/ventilated        PAST HISTORY  --------------------------------------------------------------------------------  No significant changes to PMH, PSH, FHx, SHx, unless otherwise noted    ALLERGIES & MEDICATIONS  --------------------------------------------------------------------------------  Allergies    Bactrim (Unknown)    Intolerances      Standing Inpatient Medications  apixaban 5 milliGRAM(s) Oral two times a day  ascorbic acid 500 milliGRAM(s) Oral every 8 hours  cefepime   IVPB      cefepime   IVPB 1000 milliGRAM(s) IV Intermittent every 24 hours  chlorhexidine 4% Liquid 1 Application(s) Topical <User Schedule>  dextrose 10%. 250 milliLiter(s) IV Continuous <Continuous>  dextrose 5%. 1000 milliLiter(s) IV Continuous <Continuous>  dextrose 50% Injectable 12.5 Gram(s) IV Push once  diltiazem    Tablet 30 milliGRAM(s) Oral every 6 hours  diltiazem Infusion 10 mG/Hr IV Continuous <Continuous>  ethacrynic acid 25 milliGRAM(s) Oral every 12 hours  guaifenesin/dextromethorphan  Syrup 10 milliLiter(s) Oral every 8 hours  insulin regular  human corrective regimen sliding scale   IV Push every 4 hours  lactulose Syrup 30 Gram(s) Oral daily  pantoprazole  Injectable 40 milliGRAM(s) IV Push daily  phenylephrine    Infusion 0.2 MICROgram(s)/kG/Min IV Continuous <Continuous>  senna 2 Tablet(s) Oral at bedtime  zinc sulfate 220 milliGRAM(s) Oral daily    PRN Inpatient Medications  acetaminophen  Suppository .. 650 milliGRAM(s) Rectal every 6 hours PRN  dextrose 40% Gel 15 Gram(s) Oral once PRN          VITALS/PHYSICAL EXAM  --------------------------------------------------------------------------------  T(C): 36.9 (04-09-20 @ 08:00), Max: 37.2 (04-08-20 @ 16:00)  HR: 100 (04-09-20 @ 08:00) (98 - 125)  BP: 138/65 (04-08-20 @ 16:00) (138/65 - 142/65)  RR: 20 (04-09-20 @ 08:00) (16 - 23)  SpO2: 100% (04-09-20 @ 08:00) (99% - 100%)  Wt(kg): --        04-08-20 @ 07:01  -  04-09-20 @ 07:00  --------------------------------------------------------  IN: 1476.8 mL / OUT: 2520 mL / NET: -1043.2 mL    04-09-20 @ 07:01  -  04-09-20 @ 09:54  --------------------------------------------------------  IN: 21.8 mL / OUT: 250 mL / NET: -228.2 mL      Physical Exam:  	Gen: intubated/ventilated   	    LABS/STUDIES  --------------------------------------------------------------------------------              12.1   14.41 >-----------<  299      [04-08-20 @ 23:20]              37.3     142  |  107  |  162  ----------------------------<  178      [04-09-20 @ 08:36]  5.3   |  16  |  6.5        Ca     8.5     [04-09-20 @ 08:36]      Mg     3.1     [04-08-20 @ 23:20]      Phos  10.0     [04-08-20 @ 23:20]    TPro  5.4  /  Alb  2.5  /  TBili  2.0  /  DBili  x   /  AST  38  /  ALT  58  /  AlkPhos  78  [04-08-20 @ 23:20]    PT/INR: PT 13.50, INR 1.17       [04-08-20 @ 23:20]  PTT: 26.6       [04-08-20 @ 23:20]    Troponin 0.14      [04-08-20 @ 23:20]        [04-08-20 @ 23:20]        [04-08-20 @ 23:20]    Creatinine Trend:  SCr 6.5 [04-09 @ 08:36]  SCr 6.5 [04-08 @ 23:20]  SCr 6.4 [04-08 @ 07:40]  SCr 6.0 [04-08 @ 04:00]  SCr Results removed [04-08 @ 03:03]    Urinalysis - [03-30-20 @ 22:00]      Color Yellow / Appearance Clear / SG 1.031 / pH 6.0      Gluc Negative / Ketone Trace  / Bili Negative / Urobili <2 mg/dL       Blood Moderate / Protein 300 mg/dL / Leuk Est Negative / Nitrite Negative      RBC 10 / WBC 29 / Hyaline 24 / Gran  / Sq Epi  / Non Sq Epi 22 / Bacteria Negative      Ferritin 303      [04-05-20 @ 23:30]  HbA1c 7.7      [04-01-20 @ 04:30]

## 2020-04-09 NOTE — CHART NOTE - NSCHARTNOTEFT_GEN_A_CORE
Registered Dietitian Follow-Up     Patient Profile Reviewed                           Yes [x]   No []     Nutrition History Previously Obtained        Yes []  No [x]-unable to obtain at this time as pt is intubated        Pertinent Medical Interventions: BP remains labile on levo; added phenylephrine gtt, weaning  off  levophed.     Diet order: Glucerna 1.2 @ 50ml/hr (1440kcal, 72gm pro, 966ml free water)--TF held since 4/7 due to vomiting around NGT and TF in ETT. MAP 63.     Anthropometrics:  - Ht. 69"  - Wt. 105.2kg--wt trending upwards 2/2 fluids (pt noted to be on diuretic therapy)  (4/4): 104.2kg   (4/3): 103.3kg   (4/2): 99kg   (3/31): 95kg   - BMI: 31.0   - IBW: 160#      Pertinent Lab Data: (4/9): POCT K+ 5.3, , Cr. 6.5, Gluc 178, elevated LFTs, GFR 8    Pertinent Meds: eliquis, insulin, cardizem, lactulose, ethacrynic acid, phenylephrine, sodium bicarbonate, lactulose, Vit C, levophed, protonix, senna, zinc sulfate (D/C between 4/9-4/13)    Physical Findings:  - Appearance: intubated/ventilated; 2+ generalized edema, 3+ to B/L arm and leg, 4+ to scrotum  - GI function: LBM 4/6   - Tubes: OGT   - Oral/Mouth cavity: NPO  - Skin: intact      Nutrition Requirements  Weight Used: lowest wt this adm: 95kg, Ve: 13.2, Tmax: 36.5, [id number]: 2055; needs continued from RD note on 4/6 as they are comparable to needs recalculated to day w/ new [id number]: 1993     Estimated Energy Needs: ~5848-6996 kcal/day obtained by comparing the following 1233-1439kcal (60-70% of [id number]) vs. 1045-1330kcal (11-14 kcal/kg ABW) vs. 1606-1825kcal (22-25 kcal/kg IBW)  Estimated Protein Needs: 110-146 gm/day (1.2-1.5 gm/kg IBW)--impaired renal fxn noted, will monitor and adjust prn   Estimated Fluid Needs: per SICU team      Nutrient Intake: not meeting kcal/pro needs d/t NPO status      Previous Nutrition Diagnosis: Inadequate protein-energy intake (ongoing)      Nutrition Intervention: enteral nutrition, collaboration of care     Recommendations:  1. When pt hemodynamically stable and consistently maintains MAP>65, initiate the following TF regimen: Peptamen AF @ 25ml/hr, increase rate by 15ml Q6H, until goal rate of 55ml/hr is reached. TF @ goal rate to provide a total of 1584kcal, 100gm pro, 1071ml free water. Additional flushes per LIP. Recs discussed with LIP ([phone]).   2. D/C zinc sulfate between 4/9-4/13    **Recommend hydrolyzed formula such as Peptamen AF, as it is designed to support absorption and tolerance.**     Goal/Expected Outcome: Pt to meet % of estimated nutrient needs within 4 days      Indicator/Monitoring: RD to monitor diet order, energy intake, body composition, renal/glucose/liver profiles, NFPF

## 2020-04-09 NOTE — PROGRESS NOTE ADULT - ASSESSMENT
IMPRESSION:  COVID19 with resolved septic shock, off pressors,on mechanical ventilation with ARDS with fio2 60 % secondary to Cytokine Release Syndrome  -end organ damage with acute renal failure, transaminitis, metabolic encephalopathy  -inflammatory markers significantly elevated but are decreasing  -d Dimers [id number]/ CRP 31.9  associated with increased mortality  -Blood & Urine cxs NGTD     RECOMMENDATIONS:  -s/p HCQ   -Cefepime 1 gm iv q24h  prognosis is extremely poor

## 2020-04-09 NOTE — PROGRESS NOTE ADULT - ASSESSMENT
RESP  Acute Hypoxemic Respiratory Failure;  Mode: AC/ CMV (Assist Control/ Continuous Mandatory Ventilation)  50%/ PEEP 15- trend O2 sats  COVID-19/Viral Pneumonia;  - positive SARS-CoV-2  - completed Methylprednisolone 60mg q12  - worsening diffuse bilateral opacities  - continue, Guaifenisen/DM  - daily cxr qam    CARD/VASC  BP remains labile on levo- remains on low dose levo  -wean diltiazem gtt too  - Increase Diltiazem from 30mg PO q6h  AFib; chronic  - continue, Apixaban 5mg PO bid  - continue, Diltiazem 30mg PO bid   - continue, EKG qd  CAD    - Atorvastatin 40mg PO qhs  HTN  - holding Lisinopril     GI/NUTR  - NPO  - continue TF 75mL/hr  - remains on Lactulose     /Renal  LIZZIE; steadily worsening   101/5.6  <--, 99/5.9  <--, 90/5.5  <--BUN/Creatinine  UOP; adequate  - averaging 100-1500mL/hr  - continue, Spironolactone 25mg OGT qd  - monitor, I&Os  Fluids  - LR 100mL/hr  - monitor I&Os  - santana in place  Electrolytes;  Lytes-04-05 @ 00:30 Na 140   K 5.4   Phos 8.3    Mg 2.7  04-04 @ 17:04 Na 138   K 5.4   Phos 8.1    Mg 2.6  Hyperkalemia; mild  - no EKG changes  - repeat CMP in the pm  - monitor e-  BPH; chronic     HEME/ONC  Serial Hemoglobin:   12.7 (04-05 @ 00:30)  12.8 (04-04 @ 17:04)  12.1 (04-04 @ 00:00)  12.9 (04-03 @ 16:00)  12.7 (04-03 @ 00:05)    - stable H/H  - monitor H/H    ID  Fever;  - continue, Acetaminophen 650mg PO q6  COVID-19, suspected;  - positive repeat swab  - Hydroxychloroquine (complete 4/5)  - Azithromycin (complete 4/4)  - continue Cefepime until 4/8  - nasal MRSA, urine Legionella & Strep    ENDO  DM2;  - continue, RISS   - continue POCT q6h  - holding, Metformin    MSK/DERM  - no active issues    DVT PPx  - on Apixaban     GI PPx  - continue Pantoprazole 40mg PO qac  - continue Senna 17.2mg PO qhs  - continue lactulose    Disposition  -  SICU

## 2020-04-09 NOTE — PROGRESS NOTE ADULT - ASSESSMENT
LIZZIE/ ATN/ hyperkalemia/ respiratory failure / covid positive / high BUN  # s/p 1 dose of edecrin yesterday  # non oliguric  # ph 10, feed nepro, renagel 2/2/2  # d/c vit C   # start sodium bicarbonate 650 q 6 h   # no acute indication for RRT  # case discussed with ICU team   # will follow

## 2020-04-09 NOTE — PROGRESS NOTE ADULT - SUBJECTIVE AND OBJECTIVE BOX
KONSTANTIN WYATT  74y, Male    All available historical data reviewed    OVERNIGHT EVENTS:  fio2 60%  on levo    ROS:  unable to obtain history secondary to patient's mental status and/or sedation     VITALS:  T(F): 98.4, Max: 99 (04-08-20 @ 16:00)  HR: 107  BP: 138/65  RR: 24Vital Signs Last 24 Hrs  T(C): 36.9 (09 Apr 2020 08:00), Max: 37.2 (08 Apr 2020 16:00)  T(F): 98.4 (09 Apr 2020 08:00), Max: 99 (08 Apr 2020 16:00)  HR: 107 (09 Apr 2020 14:00) (88 - 125)  BP: 138/65 (08 Apr 2020 16:00) (138/65 - 138/65)  BP(mean): 93 (08 Apr 2020 16:00) (93 - 93)  RR: 24 (09 Apr 2020 14:00) (16 - 24)  SpO2: 100% (09 Apr 2020 14:00) (99% - 100%)    TESTS & MEASUREMENTS:                        12.1   14.41 )-----------( 299      ( 08 Apr 2020 23:20 )             37.3     04-09    142  |  107  |  162<HH>  ----------------------------<  178<H>  5.3<H>   |  16<L>  |  6.5<HH>    Ca    8.5      09 Apr 2020 08:36  Phos  10.0     04-08  Mg     3.1     04-08    TPro  5.4<L>  /  Alb  2.5<L>  /  TBili  2.0<H>  /  DBili  x   /  AST  38  /  ALT  58<H>  /  AlkPhos  78  04-08    LIVER FUNCTIONS - ( 08 Apr 2020 23:20 )  Alb: 2.5 g/dL / Pro: 5.4 g/dL / ALK PHOS: 78 U/L / ALT: 58 U/L / AST: 38 U/L / GGT: x                   RADIOLOGY & ADDITIONAL TESTS:  Personal review of radiological diagnostics performed  Echo and EKG results noted when applicable.     MEDICATIONS:  acetaminophen  Suppository .. 650 milliGRAM(s) Rectal every 6 hours PRN  apixaban 5 milliGRAM(s) Oral two times a day  ascorbic acid 500 milliGRAM(s) Oral every 8 hours  chlorhexidine 4% Liquid 1 Application(s) Topical <User Schedule>  dextrose 10%. 250 milliLiter(s) IV Continuous <Continuous>  dextrose 40% Gel 15 Gram(s) Oral once PRN  dextrose 5%. 1000 milliLiter(s) IV Continuous <Continuous>  dextrose 50% Injectable 12.5 Gram(s) IV Push once  diltiazem    Tablet 60 milliGRAM(s) Oral every 8 hours  diltiazem Infusion 10 mG/Hr IV Continuous <Continuous>  ethacrynic acid 25 milliGRAM(s) Oral every 12 hours  guaifenesin/dextromethorphan  Syrup 10 milliLiter(s) Oral every 8 hours  insulin regular  human corrective regimen sliding scale   IV Push every 4 hours  lactulose Syrup 30 Gram(s) Oral daily  pantoprazole  Injectable 40 milliGRAM(s) IV Push daily  phenylephrine    Infusion 0.2 MICROgram(s)/kG/Min IV Continuous <Continuous>  senna 2 Tablet(s) Oral at bedtime  sodium bicarbonate 650 milliGRAM(s) Oral every 6 hours  zinc sulfate 220 milliGRAM(s) Oral daily      ANTIBIOTICS:

## 2020-04-09 NOTE — PROGRESS NOTE ADULT - SUBJECTIVE AND OBJECTIVE BOX
KONSTANTIN WYATT  [id number]  74y Male    Indication for ICU admission:  Acute Hypoxemic Respiratory Failure  clinical COVID19 (false negatvie)    Admit Date:20  ICU Date: 20  OR Date:    Bactrim (Unknown)    PAST MEDICAL & SURGICAL HISTORY:  Afib  DM (diabetes mellitus)  BPH (benign prostatic hyperplasia)  CAD (coronary artery disease)    Home Medications:  Cartia  mg/24 hours oral capsule, extended release: 1 cap(s) orally once a day (30 Mar 2020 18:04)  Eliquis 5 mg oral tablet: 1 tab(s) orally 2 times a day (30 Mar 2020 18:04)  Lipitor 40 mg oral tablet: 1 tab(s) orally once a day (30 Mar 2020 18:04)  lisinopril 40 mg oral tablet: 1 tab(s) orally once a day (30 Mar 2020 18:04)  metFORMIN 750 mg oral tablet, extended release: 1 tab(s) orally once a day (30 Mar 2020 18:04)      24HRS EVENT:  Overnight: Afib 150s, 30mg PO cardizem, 10mg IVx2, levo requirement going up, HR still in 120-130, started cardizem gtt (diltiazem increased to TID); desating to 90s, plateaus in 30s pushed 3cc propofol- resolved, EKG - Afib w/ RVR, , Wt 108.8kg, K 5.6- tx, FIO2 149 in am, decreased fio2 to 50%    Neuro    off sedation  - Acetaminophen 650mg PO q6 PRN    RESP  Acute Hypoxemic Respiratory Failure;  - Intubated: vent: 450/20/50/10  ABG 7.25/37/138/16, dropped fio2 to 40%    COVID-19/Viral Pneumonia;  - positive SARS-CoV-2;  - CXR: unchanged diffuse bilateral opacities  - on Guaifenisen    CARD/VASC  tachycardic 30mg PO cardizem q6h and weaning cardizem gtt(2.5)  -restarted on levo gtt 0.07  Hx of Afib - on Apixaban, Diltiazem increased to QID  Hx of CAD - Atorvastatin 40mg PO qhs  Hx of HTN - hold Lisinopril   daily EKG - Afib w/ RVR,     GI/NUTR  - Diet: TF (glucerna)@ 50mL/hr, held since  due to vomiting around NGT and TF in ETT  - PPI  - Senna, Lactulose     /Renal  LIZZIE; worsening - BUN/Cr 115/5.6> 143/5.9>   Fluids: NS @ 100mL/hr  Evans, UOP:100/hr  - D/c'd  Spironolactone 25mg , on acetazolamide 250mg q12  nephro consult- started ethylcrinic acetate (diuretic) 50mg x1 and if improvement consider 25 bid starting   If no improvement possible udall and HD   Electrolytes: Na 142 / K 5.5 / Mg 2.9 / Ph 8.6- hyperkalemia tx, repeat   Hx of BPH    HEME/ONC  Hgb 12>11.8>12,  stable  on Eliquis    ID  Tmax 96.4   WBC 10>14> now 15  COVID-19 +  - completed course of Hydroxychloroquine & Azithromycin  - abx: Cefepime   - Ascorbic acid, Zinc  - D-dimer uptrending 11,529 >65046, , ESR 60     ENDO  DM2; uncontrolled, holding home Metformin  - off Insulin gtt since , RISS, fs q4    MSK/DERM  - no active issues    DVT PPx  - on Apixaban     GI PPx  -  Pantoprazole 40mg PO qac      ***Tubes/Lines/Drains  ***  Peripheral IV  Central Venous Line  Right IJ TLC  	Date 3/31/20  Arterial Line  Right radial A-line		                Date: 3/31/20  Urnary Catheter		Indication: Strict I&O    Date Placed: 3/31/20  ETT  OGT    REVIEW OF SYSTEMS    [ ] A ten-point review of systems was otherwise negative except as noted.  [x ] Due to altered mental status/intubation, subjective information were not able to be obtained from the patient. History was obtained, to the extent possible, from review of the chart and collateral sources of information.    Daily     Daily Weight in k.2 (2020 01:00)    Diet, NPO with Tube Feed:   Tube Feeding Modality: Orogastric  Glucerna 1.2 Choco  Total Volume for 24 Hours (mL): 1200  Continuous  Starting Tube Feed Rate mL per Hour: 30  Increase Tube Feed Rate by (mL): 10     Every hour  Until Goal Tube Feed Rate (mL per Hour): 50  Tube Feed Duration (in Hours): 24  Tube Feed Start Time: 13:00 (20 @ 17:56)      CURRENT MEDS:  Neurologic Medications  acetaminophen  Suppository .. 650 milliGRAM(s) Rectal every 6 hours PRN Temp greater or equal to 38.5C (101.3F)    Respiratory Medications  guaifenesin/dextromethorphan  Syrup 10 milliLiter(s) Oral every 8 hours    Cardiovascular Medications  acetaZOLAMIDE  IVPB 250 milliGRAM(s) IV Intermittent two times a day  diltiazem    Tablet 30 milliGRAM(s) Oral every 6 hours  diltiazem Infusion 10 mG/Hr IV Continuous <Continuous>  norepinephrine Infusion 0.05 MICROgram(s)/kG/Min IV Continuous <Continuous>    Gastrointestinal Medications  ascorbic acid 500 milliGRAM(s) Oral every 8 hours  dextrose 10%. 250 milliLiter(s) IV Continuous <Continuous>  dextrose 5%. 1000 milliLiter(s) IV Continuous <Continuous>  lactulose Syrup 30 Gram(s) Oral daily  pantoprazole  Injectable 40 milliGRAM(s) IV Push daily  senna 2 Tablet(s) Oral at bedtime  zinc sulfate 220 milliGRAM(s) Oral daily    Genitourinary Medications    Hematologic/Oncologic Medications  apixaban 5 milliGRAM(s) Oral two times a day    Antimicrobial/Immunologic Medications  cefepime   IVPB      cefepime   IVPB 1000 milliGRAM(s) IV Intermittent every 24 hours    Endocrine/Metabolic Medications  dextrose 40% Gel 15 Gram(s) Oral once PRN Blood Glucose LESS THAN 70 milliGRAM(s)/deciliter  dextrose 50% Injectable 12.5 Gram(s) IV Push once  insulin regular  human corrective regimen sliding scale   IV Push every 4 hours    Topical/Other Medications  chlorhexidine 4% Liquid 1 Application(s) Topical <User Schedule>      ICU Vital Signs Last 24 Hrs  T(C): 36.9 (2020 06:00), Max: 37.2 (2020 16:00)  T(F): 98.4 (2020 06:00), Max: 99 (2020 16:00)  HR: 111 (2020 06:00) (91 - 125)  BP: 138/65 (2020 16:00) (138/65 - 142/65)  BP(mean): 93 (2020 16:00) (93 - 93)  ABP: 138/52 (2020 01:00) (116/49 - 149/54)  ABP(mean): 73 (2020 01:00) (69 - 86)  RR: 16 (2020 06:00) (16 - 23)  SpO2: 99% (2020 06:00) (94% - 100%)      Adult Advanced Hemodynamics Last 24 Hrs  CVP(mm Hg): --  CVP(cm H2O): --  CO: --  CI: --  PA: --  PA(mean): --  PCWP: --  SVR: --  SVRI: --  PVR: --  PVRI: --    Mode: AC/ CMV (Assist Control/ Continuous Mandatory Ventilation)  RR (machine): 20  TV (machine): 450  FiO2: 50  PEEP: 10  ITime: 1  MAP: 15  PIP: 30    ABG - ( 2020 14:15 )  pH, Arterial: 7.25  pH, Blood: x     /  pCO2: 37    /  pO2: 138   / HCO3: 16    / Base Excess: -10.4 /  SaO2: 98                  I&O's Summary    2020 07:01  -  2020 07:00  --------------------------------------------------------  IN: 1093.5 mL / OUT: 1210 mL / NET: -116.5 mL    2020 07:01  -  2020 06:30  --------------------------------------------------------  IN: 1458 mL / OUT: 2370 mL / NET: -912 mL      I&O's Detail    2020 07:01  -  2020 07:00  --------------------------------------------------------  IN:    dextrose 10%.: 250 mL    diltiazem Infusion: 45 mL    Glucerna: 30 mL    IV PiggyBack: 350 mL    norepinephrine Infusion: 193.5 mL    Oral Fluid: 125 mL    sodium chloride 0.9%: 100 mL  Total IN: 1093.5 mL    OUT:    Indwelling Catheter - Urethral: 1210 mL  Total OUT: 1210 mL    Total NET: -116.5 mL      2020 07:01  -  2020 06:30  --------------------------------------------------------  IN:    diltiazem Infusion: 112 mL    Enteral Tube Flush: 100 mL    IV PiggyBack: 945 mL    norepinephrine Infusion: 251 mL    Oral Fluid: 50 mL  Total IN: 1458 mL    OUT:    Indwelling Catheter - Urethral: 2370 mL  Total OUT: 2370 mL    Total NET: -912 mL          PHYSICAL EXAM:    General/Neuro  RASS:             GCS:     = E   / V   / M      Deficits:                             alert & oriented x 3, no focal deficits  Pupils:    Lungs:      clear to auscultation, Normal expansion/effort.     Cardiovascular : S1, S2.  Regular rate and rhythm.  Peripheral edema   Cardiac Rhythm: Normal Sinus Rhythm    GI: Abdomen soft, Non-tender, Non-distended.    Gastrostomy / Jejunostomy tube in place.  Nasogastric tube in place.  Colostomy / Ileostomy.    Wound:    Extremities: Extremities warm, pink, well-perfused. Pulses:Rt     Lt    Derm: Good skin turgor, no skin breakdown.      :       Evans catheter in place.      CXR:     LABS:  CAPILLARY BLOOD GLUCOSE      POCT Blood Glucose.: 149 mg/dL (2020 05:34)  POCT Blood Glucose.: 132 mg/dL (2020 01:41)  POCT Blood Glucose.: 138 mg/dL (2020 21:05)  POCT Blood Glucose.: 200 mg/dL (2020 17:50)  POCT Blood Glucose.: 227 mg/dL (2020 14:02)  POCT Blood Glucose.: 187 mg/dL (2020 11:00)                          12.1   14.41 )-----------( 299      ( 2020 23:20 )             37.3       04-08    146  |  110  |  157<HH>  ----------------------------<  152<H>  5.2<H>   |  16<L>  |  6.5<HH>    Ca    8.7      2020 23:20  Phos  10.0     04-08  Mg     3.1     04-08    TPro  5.4<L>  /  Alb  2.5<L>  /  TBili  2.0<H>  /  DBili  x   /  AST  38  /  ALT  58<H>  /  AlkPhos  78  04-08      PT/INR - ( 2020 23:20 )   PT: 13.50 sec;   INR: 1.17 ratio         PTT - ( 2020 23:20 )  PTT:26.6 sec  CARDIAC MARKERS ( 2020 23:20 )  x     / 0.14 ng/mL / 191 U/L / x     / x KONSTANTIN WYATT  [id number]  74y Male    Indication for ICU admission:  Acute Hypoxemic Respiratory Failure  clinical COVID19 (false negatvie)    Admit Date:20  ICU Date: 20  OR Date:    Bactrim (Unknown)    PAST MEDICAL & SURGICAL HISTORY:  Afib  DM (diabetes mellitus)  BPH (benign prostatic hyperplasia)  CAD (coronary artery disease)    Home Medications:  Cartia  mg/24 hours oral capsule, extended release: 1 cap(s) orally once a day (30 Mar 2020 18:04)  Eliquis 5 mg oral tablet: 1 tab(s) orally 2 times a day (30 Mar 2020 18:04)  Lipitor 40 mg oral tablet: 1 tab(s) orally once a day (30 Mar 2020 18:04)  lisinopril 40 mg oral tablet: 1 tab(s) orally once a day (30 Mar 2020 18:04)  metFORMIN 750 mg oral tablet, extended release: 1 tab(s) orally once a day (30 Mar 2020 18:04)      24HRS EVENT:  Overnight: Afib 150s, 30mg PO cardizem, 10mg IVx2, levo requirement going up, HR still in 120-130, started cardizem gtt (diltiazem increased to TID); desating to 90s, plateaus in 30s pushed 3cc propofol- resolved, EKG - Afib w/ RVR, , Wt 108.8kg, K 5.6- tx, FIO2 149 in am, decreased fio2 to 50%    Neuro    off sedation  - Acetaminophen 650mg PO q6 PRN    RESP  Acute Hypoxemic Respiratory Failure;  - Intubated: vent: 450/20/50/10  ABG 7.25/37/138/16, dropped fio2 to 40%    COVID-19/Viral Pneumonia;  - positive SARS-CoV-2;  - CXR: unchanged diffuse bilateral opacities  - on Guaifenisen    CARD/VASC  tachycardic 30mg PO cardizem q6h and weaning cardizem gtt(2.5)  -restarted on levo gtt 0.07  Hx of Afib - on Apixaban, Diltiazem increased to QID  Hx of CAD - Atorvastatin 40mg PO qhs  Hx of HTN - hold Lisinopril   daily EKG - Afib w/ RVR,     GI/NUTR  - Diet: TF (glucerna)@ 50mL/hr, held since  due to vomiting around NGT and TF in ETT  - PPI  - Senna, Lactulose     /Renal  LIZZIE; worsening - BUN/Cr 115/5.6> 143/5.9>   Fluids: NS @ 100mL/hr  Evans, UOP:100/hr  - D/c'd  Spironolactone 25mg , on acetazolamide 250mg q12  nephro consult- started ethylcrinic acetate (diuretic) 50mg x1 and if improvement consider 25 bid starting   If no improvement possible udall and HD   Electrolytes: Na 142 / K 5.5 / Mg 2.9 / Ph 8.6- hyperkalemia tx, repeat   Hx of BPH    HEME/ONC  Hgb 12>11.8>12,  stable  on Eliquis    ID  Tmax 96.4   WBC 10>14> now 15  COVID-19 +  - completed course of Hydroxychloroquine & Azithromycin  - abx: Cefepime   - Ascorbic acid, Zinc  - D-dimer uptrending 11,529 >48765, , ESR 60     ENDO  DM2; uncontrolled, holding home Metformin  - off Insulin gtt since , RISS, fs q4    MSK/DERM  - no active issues    DVT PPx  - on Apixaban     GI PPx  -  Pantoprazole 40mg PO qac      ***Tubes/Lines/Drains  ***  Peripheral IV  Central Venous Line  Right IJ TLC  	Date 3/31/20  Arterial Line  Right radial A-line		                Date: 3/31/20  Urnary Catheter		Indication: Strict I&O    Date Placed: 3/31/20  ETT  OGT    REVIEW OF SYSTEMS    [ ] A ten-point review of systems was otherwise negative except as noted.  [x ] Due to altered mental status/intubation, subjective information were not able to be obtained from the patient. History was obtained, to the extent possible, from review of the chart and collateral sources of information.    Daily     Daily Weight in k.2 (2020 01:00)    Diet, NPO with Tube Feed:   Tube Feeding Modality: Orogastric  Glucerna 1.2 Choco  Total Volume for 24 Hours (mL): 1200  Continuous  Starting Tube Feed Rate mL per Hour: 30  Increase Tube Feed Rate by (mL): 10     Every hour  Until Goal Tube Feed Rate (mL per Hour): 50  Tube Feed Duration (in Hours): 24  Tube Feed Start Time: 13:00 (20 @ 17:56)      CURRENT MEDS:  Neurologic Medications  acetaminophen  Suppository .. 650 milliGRAM(s) Rectal every 6 hours PRN Temp greater or equal to 38.5C (101.3F)    Respiratory Medications  guaifenesin/dextromethorphan  Syrup 10 milliLiter(s) Oral every 8 hours    Cardiovascular Medications  acetaZOLAMIDE  IVPB 250 milliGRAM(s) IV Intermittent two times a day  diltiazem    Tablet 30 milliGRAM(s) Oral every 6 hours  diltiazem Infusion 10 mG/Hr IV Continuous <Continuous>  norepinephrine Infusion 0.05 MICROgram(s)/kG/Min IV Continuous <Continuous>    Gastrointestinal Medications  ascorbic acid 500 milliGRAM(s) Oral every 8 hours  dextrose 10%. 250 milliLiter(s) IV Continuous <Continuous>  dextrose 5%. 1000 milliLiter(s) IV Continuous <Continuous>  lactulose Syrup 30 Gram(s) Oral daily  pantoprazole  Injectable 40 milliGRAM(s) IV Push daily  senna 2 Tablet(s) Oral at bedtime  zinc sulfate 220 milliGRAM(s) Oral daily    Genitourinary Medications    Hematologic/Oncologic Medications  apixaban 5 milliGRAM(s) Oral two times a day    Antimicrobial/Immunologic Medications  cefepime   IVPB      cefepime   IVPB 1000 milliGRAM(s) IV Intermittent every 24 hours    Endocrine/Metabolic Medications  dextrose 40% Gel 15 Gram(s) Oral once PRN Blood Glucose LESS THAN 70 milliGRAM(s)/deciliter  dextrose 50% Injectable 12.5 Gram(s) IV Push once  insulin regular  human corrective regimen sliding scale   IV Push every 4 hours    Topical/Other Medications  chlorhexidine 4% Liquid 1 Application(s) Topical <User Schedule>      ICU Vital Signs Last 24 Hrs  T(C): 36.9 (2020 06:00), Max: 37.2 (2020 16:00)  T(F): 98.4 (2020 06:00), Max: 99 (2020 16:00)  HR: 111 (2020 06:00) (91 - 125)  BP: 138/65 (2020 16:00) (138/65 - 142/65)  BP(mean): 93 (2020 16:00) (93 - 93)  ABP: 138/52 (2020 01:00) (116/49 - 149/54)  ABP(mean): 73 (2020 01:00) (69 - 86)  RR: 16 (2020 06:00) (16 - 23)  SpO2: 99% (2020 06:00) (94% - 100%)      Mode: AC/ CMV (Assist Control/ Continuous Mandatory Ventilation)  RR (machine): 20  TV (machine): 450  FiO2: 50  PEEP: 10  ITime: 1  MAP: 15  PIP: 30    ABG - ( 2020 14:15 )  pH, Arterial: 7.25  pH, Blood: x     /  pCO2: 37    /  pO2: 138   / HCO3: 16    / Base Excess: -10.4 /  SaO2: 98                I&O's Summary    2020 07:01  -  2020 07:00  --------------------------------------------------------  IN: 1093.5 mL / OUT: 1210 mL / NET: -116.5 mL    2020 07:01  -  2020 06:30  --------------------------------------------------------  IN: 1458 mL / OUT: 2370 mL / NET: -912 mL      I&O's Detail    2020 07:01  -  2020 07:00  --------------------------------------------------------  IN:    dextrose 10%.: 250 mL    diltiazem Infusion: 45 mL    Glucerna: 30 mL    IV PiggyBack: 350 mL    norepinephrine Infusion: 193.5 mL    Oral Fluid: 125 mL    sodium chloride 0.9%: 100 mL  Total IN: 1093.5 mL    OUT:    Indwelling Catheter - Urethral: 1210 mL  Total OUT: 1210 mL    Total NET: -116.5 mL      2020 07:01  -  2020 06:30  --------------------------------------------------------  IN:    diltiazem Infusion: 112 mL    Enteral Tube Flush: 100 mL    IV PiggyBack: 945 mL    norepinephrine Infusion: 251 mL    Oral Fluid: 50 mL  Total IN: 1458 mL    OUT:    Indwelling Catheter - Urethral: 2370 mL  Total OUT: 2370 mL    Total NET: -912 mL          PHYSICAL EXAM:      Sedated RASS -3  no spontaneous     CXR:     LABS:  CAPILLARY BLOOD GLUCOSE      POCT Blood Glucose.: 149 mg/dL (2020 05:34)  POCT Blood Glucose.: 132 mg/dL (2020 01:41)  POCT Blood Glucose.: 138 mg/dL (2020 21:05)  POCT Blood Glucose.: 200 mg/dL (2020 17:50)  POCT Blood Glucose.: 227 mg/dL (2020 14:02)  POCT Blood Glucose.: 187 mg/dL (2020 11:00)                          12.1   14.41 )-----------( 299      ( 2020 23:20 )             37.3       04-08    146  |  110  |  157<HH>  ----------------------------<  152<H>  5.2<H>   |  16<L>  |  6.5<HH>    Ca    8.7      2020 23:20  Phos  10.0     04-08  Mg     3.1     04-08    TPro  5.4<L>  /  Alb  2.5<L>  /  TBili  2.0<H>  /  DBili  x   /  AST  38  /  ALT  58<H>  /  AlkPhos  78  04-08      PT/INR - ( 2020 23:20 )   PT: 13.50 sec;   INR: 1.17 ratio         PTT - ( 2020 23:20 )  PTT:26.6 sec  CARDIAC MARKERS ( 2020 23:20 )  x     / 0.14 ng/mL / 191 U/L / x     / x

## 2020-04-10 LAB
ALBUMIN SERPL ELPH-MCNC: 2.5 G/DL — LOW (ref 3.5–5.2)
ALBUMIN SERPL ELPH-MCNC: 2.5 G/DL — LOW (ref 3.5–5.2)
ALBUMIN SERPL ELPH-MCNC: 2.6 G/DL — LOW (ref 3.5–5.2)
ALP SERPL-CCNC: 79 U/L — SIGNIFICANT CHANGE UP (ref 30–115)
ALP SERPL-CCNC: 81 U/L — SIGNIFICANT CHANGE UP (ref 30–115)
ALP SERPL-CCNC: 89 U/L — SIGNIFICANT CHANGE UP (ref 30–115)
ALT FLD-CCNC: 53 U/L — HIGH (ref 0–41)
ALT FLD-CCNC: 53 U/L — HIGH (ref 0–41)
ALT FLD-CCNC: 55 U/L — HIGH (ref 0–41)
ANION GAP SERPL CALC-SCNC: 19 MMOL/L — HIGH (ref 7–14)
ANION GAP SERPL CALC-SCNC: 21 MMOL/L — HIGH (ref 7–14)
ANION GAP SERPL CALC-SCNC: 24 MMOL/L — HIGH (ref 7–14)
APTT BLD: 31.8 SEC — SIGNIFICANT CHANGE UP (ref 27–39.2)
AST SERPL-CCNC: 33 U/L — SIGNIFICANT CHANGE UP (ref 0–41)
AST SERPL-CCNC: 34 U/L — SIGNIFICANT CHANGE UP (ref 0–41)
AST SERPL-CCNC: 34 U/L — SIGNIFICANT CHANGE UP (ref 0–41)
BILIRUB SERPL-MCNC: 2.5 MG/DL — HIGH (ref 0.2–1.2)
BILIRUB SERPL-MCNC: 2.5 MG/DL — HIGH (ref 0.2–1.2)
BILIRUB SERPL-MCNC: 2.7 MG/DL — HIGH (ref 0.2–1.2)
BUN SERPL-MCNC: 144 MG/DL — CRITICAL HIGH (ref 10–20)
BUN SERPL-MCNC: 170 MG/DL — CRITICAL HIGH (ref 10–20)
BUN SERPL-MCNC: 172 MG/DL — CRITICAL HIGH (ref 10–20)
CALCIUM SERPL-MCNC: 8.4 MG/DL — LOW (ref 8.5–10.1)
CALCIUM SERPL-MCNC: 8.7 MG/DL — SIGNIFICANT CHANGE UP (ref 8.5–10.1)
CALCIUM SERPL-MCNC: 8.8 MG/DL — SIGNIFICANT CHANGE UP (ref 8.5–10.1)
CHLORIDE SERPL-SCNC: 106 MMOL/L — SIGNIFICANT CHANGE UP (ref 98–110)
CHLORIDE SERPL-SCNC: 107 MMOL/L — SIGNIFICANT CHANGE UP (ref 98–110)
CHLORIDE SERPL-SCNC: 99 MMOL/L — SIGNIFICANT CHANGE UP (ref 98–110)
CO2 SERPL-SCNC: 16 MMOL/L — LOW (ref 17–32)
CO2 SERPL-SCNC: 18 MMOL/L — SIGNIFICANT CHANGE UP (ref 17–32)
CO2 SERPL-SCNC: 21 MMOL/L — SIGNIFICANT CHANGE UP (ref 17–32)
CREAT SERPL-MCNC: 6.2 MG/DL — CRITICAL HIGH (ref 0.7–1.5)
CREAT SERPL-MCNC: 6.8 MG/DL — CRITICAL HIGH (ref 0.7–1.5)
CREAT SERPL-MCNC: 7.2 MG/DL — CRITICAL HIGH (ref 0.7–1.5)
D DIMER BLD IA.RAPID-MCNC: 5852 NG/ML DDU — HIGH (ref 0–230)
ERYTHROCYTE [SEDIMENTATION RATE] IN BLOOD: 89 MM/HR — HIGH (ref 0–10)
GAS PNL BLDA: SIGNIFICANT CHANGE UP
GAS PNL BLDA: SIGNIFICANT CHANGE UP
GLUCOSE BLDC GLUCOMTR-MCNC: 143 MG/DL — HIGH (ref 70–99)
GLUCOSE BLDC GLUCOMTR-MCNC: 148 MG/DL — HIGH (ref 70–99)
GLUCOSE BLDC GLUCOMTR-MCNC: 154 MG/DL — HIGH (ref 70–99)
GLUCOSE BLDC GLUCOMTR-MCNC: 167 MG/DL — HIGH (ref 70–99)
GLUCOSE BLDC GLUCOMTR-MCNC: 211 MG/DL — HIGH (ref 70–99)
GLUCOSE BLDC GLUCOMTR-MCNC: 211 MG/DL — HIGH (ref 70–99)
GLUCOSE SERPL-MCNC: 139 MG/DL — HIGH (ref 70–99)
GLUCOSE SERPL-MCNC: 166 MG/DL — HIGH (ref 70–99)
GLUCOSE SERPL-MCNC: 232 MG/DL — HIGH (ref 70–99)
HCT VFR BLD CALC: 33.5 % — LOW (ref 42–52)
HCT VFR BLD CALC: 35.6 % — LOW (ref 42–52)
HGB BLD-MCNC: 11.2 G/DL — LOW (ref 14–18)
HGB BLD-MCNC: 11.7 G/DL — LOW (ref 14–18)
INR BLD: 1.5 RATIO — HIGH (ref 0.65–1.3)
LDH SERPL L TO P-CCNC: 536 U/L — HIGH (ref 50–242)
MAGNESIUM SERPL-MCNC: 2.7 MG/DL — HIGH (ref 1.8–2.4)
MAGNESIUM SERPL-MCNC: 3 MG/DL — HIGH (ref 1.8–2.4)
MAGNESIUM SERPL-MCNC: 3.1 MG/DL — CRITICAL HIGH (ref 1.8–2.4)
MCHC RBC-ENTMCNC: 29.9 PG — SIGNIFICANT CHANGE UP (ref 27–31)
MCHC RBC-ENTMCNC: 30.2 PG — SIGNIFICANT CHANGE UP (ref 27–31)
MCHC RBC-ENTMCNC: 32.9 G/DL — SIGNIFICANT CHANGE UP (ref 32–37)
MCHC RBC-ENTMCNC: 33.4 G/DL — SIGNIFICANT CHANGE UP (ref 32–37)
MCV RBC AUTO: 90.3 FL — SIGNIFICANT CHANGE UP (ref 80–94)
MCV RBC AUTO: 91 FL — SIGNIFICANT CHANGE UP (ref 80–94)
NRBC # BLD: 0 /100 WBCS — SIGNIFICANT CHANGE UP (ref 0–0)
PHOSPHATE SERPL-MCNC: 10.9 MG/DL — HIGH (ref 2.1–4.9)
PHOSPHATE SERPL-MCNC: 10.9 MG/DL — HIGH (ref 2.1–4.9)
PHOSPHATE SERPL-MCNC: 11.1 MG/DL — HIGH (ref 2.1–4.9)
PLATELET # BLD AUTO: 217 K/UL — SIGNIFICANT CHANGE UP (ref 130–400)
PLATELET # BLD AUTO: 261 K/UL — SIGNIFICANT CHANGE UP (ref 130–400)
POTASSIUM SERPL-MCNC: 4.9 MMOL/L — SIGNIFICANT CHANGE UP (ref 3.5–5)
POTASSIUM SERPL-MCNC: 5.1 MMOL/L — HIGH (ref 3.5–5)
POTASSIUM SERPL-MCNC: 5.3 MMOL/L — HIGH (ref 3.5–5)
POTASSIUM SERPL-SCNC: 4.9 MMOL/L — SIGNIFICANT CHANGE UP (ref 3.5–5)
POTASSIUM SERPL-SCNC: 5.1 MMOL/L — HIGH (ref 3.5–5)
POTASSIUM SERPL-SCNC: 5.3 MMOL/L — HIGH (ref 3.5–5)
PROCALCITONIN SERPL-MCNC: 1.53 NG/ML — HIGH (ref 0.02–0.1)
PROT SERPL-MCNC: 5.2 G/DL — LOW (ref 6–8)
PROT SERPL-MCNC: 5.3 G/DL — LOW (ref 6–8)
PROT SERPL-MCNC: 5.5 G/DL — LOW (ref 6–8)
PROTHROM AB SERPL-ACNC: 17.3 SEC — HIGH (ref 9.95–12.87)
RBC # BLD: 3.71 M/UL — LOW (ref 4.7–6.1)
RBC # BLD: 3.91 M/UL — LOW (ref 4.7–6.1)
RBC # FLD: 15.1 % — HIGH (ref 11.5–14.5)
RBC # FLD: 15.3 % — HIGH (ref 11.5–14.5)
SODIUM SERPL-SCNC: 139 MMOL/L — SIGNIFICANT CHANGE UP (ref 135–146)
SODIUM SERPL-SCNC: 146 MMOL/L — SIGNIFICANT CHANGE UP (ref 135–146)
SODIUM SERPL-SCNC: 146 MMOL/L — SIGNIFICANT CHANGE UP (ref 135–146)
WBC # BLD: 11.6 K/UL — HIGH (ref 4.8–10.8)
WBC # BLD: 12.59 K/UL — HIGH (ref 4.8–10.8)
WBC # FLD AUTO: 11.6 K/UL — HIGH (ref 4.8–10.8)
WBC # FLD AUTO: 12.59 K/UL — HIGH (ref 4.8–10.8)

## 2020-04-10 PROCEDURE — 71045 X-RAY EXAM CHEST 1 VIEW: CPT | Mod: 26,77

## 2020-04-10 PROCEDURE — 36800 INSERTION OF CANNULA: CPT | Mod: CS

## 2020-04-10 PROCEDURE — 99291 CRITICAL CARE FIRST HOUR: CPT | Mod: CS,25

## 2020-04-10 PROCEDURE — 93010 ELECTROCARDIOGRAM REPORT: CPT

## 2020-04-10 PROCEDURE — 71045 X-RAY EXAM CHEST 1 VIEW: CPT | Mod: 26

## 2020-04-10 RX ORDER — DILTIAZEM HCL 120 MG
10 CAPSULE, EXT RELEASE 24 HR ORAL ONCE
Refills: 0 | Status: COMPLETED | OUTPATIENT
Start: 2020-04-10 | End: 2020-04-11

## 2020-04-10 RX ORDER — DILTIAZEM HCL 120 MG
10 CAPSULE, EXT RELEASE 24 HR ORAL
Qty: 125 | Refills: 0 | Status: DISCONTINUED | OUTPATIENT
Start: 2020-04-10 | End: 2020-04-11

## 2020-04-10 RX ORDER — LACTULOSE 10 G/15ML
30 SOLUTION ORAL EVERY 8 HOURS
Refills: 0 | Status: DISCONTINUED | OUTPATIENT
Start: 2020-04-10 | End: 2020-04-10

## 2020-04-10 RX ORDER — DILTIAZEM HCL 120 MG
10 CAPSULE, EXT RELEASE 24 HR ORAL ONCE
Refills: 0 | Status: COMPLETED | OUTPATIENT
Start: 2020-04-10 | End: 2020-04-10

## 2020-04-10 RX ADMIN — Medication 650 MILLIGRAM(S): at 05:25

## 2020-04-10 RX ADMIN — PANTOPRAZOLE SODIUM 40 MILLIGRAM(S): 20 TABLET, DELAYED RELEASE ORAL at 11:41

## 2020-04-10 RX ADMIN — ETHACRYNIC ACID 25 MILLIGRAM(S): 25 TABLET ORAL at 18:01

## 2020-04-10 RX ADMIN — Medication 60 MILLIGRAM(S): at 05:28

## 2020-04-10 RX ADMIN — APIXABAN 5 MILLIGRAM(S): 2.5 TABLET, FILM COATED ORAL at 18:00

## 2020-04-10 RX ADMIN — INSULIN HUMAN 2: 100 INJECTION, SOLUTION SUBCUTANEOUS at 09:23

## 2020-04-10 RX ADMIN — APIXABAN 5 MILLIGRAM(S): 2.5 TABLET, FILM COATED ORAL at 05:26

## 2020-04-10 RX ADMIN — ETHACRYNIC ACID 25 MILLIGRAM(S): 25 TABLET ORAL at 05:28

## 2020-04-10 RX ADMIN — Medication 650 MILLIGRAM(S): at 11:42

## 2020-04-10 RX ADMIN — CHLORHEXIDINE GLUCONATE 1 APPLICATION(S): 213 SOLUTION TOPICAL at 05:28

## 2020-04-10 RX ADMIN — Medication 10 MILLILITER(S): at 21:31

## 2020-04-10 RX ADMIN — Medication 10 MILLIGRAM(S): at 18:44

## 2020-04-10 RX ADMIN — Medication 650 MILLIGRAM(S): at 18:01

## 2020-04-10 RX ADMIN — INSULIN HUMAN 2: 100 INJECTION, SOLUTION SUBCUTANEOUS at 13:36

## 2020-04-10 RX ADMIN — Medication 10 MG/HR: at 20:05

## 2020-04-10 RX ADMIN — Medication 10 MILLILITER(S): at 05:29

## 2020-04-10 RX ADMIN — Medication 650 MILLIGRAM(S): at 23:36

## 2020-04-10 RX ADMIN — Medication 500 MILLIGRAM(S): at 05:25

## 2020-04-10 RX ADMIN — Medication 500 MILLIGRAM(S): at 21:31

## 2020-04-10 RX ADMIN — Medication 60 MILLIGRAM(S): at 13:44

## 2020-04-10 RX ADMIN — ZINC SULFATE TAB 220 MG (50 MG ZINC EQUIVALENT) 220 MILLIGRAM(S): 220 (50 ZN) TAB at 11:42

## 2020-04-10 RX ADMIN — Medication 5 MILLILITER(S): at 13:46

## 2020-04-10 RX ADMIN — Medication 60 MILLIGRAM(S): at 21:31

## 2020-04-10 RX ADMIN — INSULIN HUMAN 6: 100 INJECTION, SOLUTION SUBCUTANEOUS at 18:49

## 2020-04-10 RX ADMIN — Medication 500 MILLIGRAM(S): at 13:45

## 2020-04-10 RX ADMIN — SENNA PLUS 2 TABLET(S): 8.6 TABLET ORAL at 21:42

## 2020-04-10 RX ADMIN — INSULIN HUMAN 6: 100 INJECTION, SOLUTION SUBCUTANEOUS at 22:01

## 2020-04-10 NOTE — PROGRESS NOTE ADULT - SUBJECTIVE AND OBJECTIVE BOX
intubated/ventilated  non oliguric       PAST HISTORY  --------------------------------------------------------------------------------  No significant changes to PMH, PSH, FHx, SHx, unless otherwise noted    ALLERGIES & MEDICATIONS  --------------------------------------------------------------------------------  Allergies    Bactrim (Unknown)    Intolerances      Standing Inpatient Medications  apixaban 5 milliGRAM(s) Oral two times a day  ascorbic acid 500 milliGRAM(s) Oral every 8 hours  chlorhexidine 4% Liquid 1 Application(s) Topical <User Schedule>  dextrose 10%. 250 milliLiter(s) IV Continuous <Continuous>  dextrose 5%. 1000 milliLiter(s) IV Continuous <Continuous>  dextrose 50% Injectable 12.5 Gram(s) IV Push once  diltiazem    Tablet 60 milliGRAM(s) Oral every 8 hours  ethacrynic acid 25 milliGRAM(s) Oral every 12 hours  guaifenesin/dextromethorphan  Syrup 10 milliLiter(s) Oral every 8 hours  insulin regular  human corrective regimen sliding scale   IV Push every 4 hours  pantoprazole  Injectable 40 milliGRAM(s) IV Push daily  phenylephrine    Infusion 0.2 MICROgram(s)/kG/Min IV Continuous <Continuous>  senna 2 Tablet(s) Oral at bedtime  sodium bicarbonate 650 milliGRAM(s) Oral every 6 hours  zinc sulfate 220 milliGRAM(s) Oral daily    PRN Inpatient Medications  acetaminophen  Suppository .. 650 milliGRAM(s) Rectal every 6 hours PRN  dextrose 40% Gel 15 Gram(s) Oral once PRN        VITALS/PHYSICAL EXAM  --------------------------------------------------------------------------------  T(C): 37 (04-10-20 @ 08:00), Max: 37 (04-10-20 @ 08:00)  HR: 112 (04-10-20 @ 08:20) (74 - 122)  BP: 125/63 (04-10-20 @ 08:00) (125/63 - 125/63)  RR: 20 (04-10-20 @ 08:20) (20 - 24)  SpO2: 100% (04-10-20 @ 08:20) (97% - 100%)  Wt(kg): --        04-09-20 @ 07:01  -  04-10-20 @ 07:00  --------------------------------------------------------  IN: 1383.6 mL / OUT: 3553 mL / NET: -2169.4 mL      Physical Exam:  	Gen: intubated/ventilated       LABS/STUDIES  --------------------------------------------------------------------------------              11.7   12.59 >-----------<  261      [04-10-20 @ 00:54]              35.6     146  |  106  |  170  ----------------------------<  166      [04-10-20 @ 00:54]  5.1   |  16  |  6.8        Ca     8.7     [04-10-20 @ 00:54]      Mg     3.0     [04-10-20 @ 00:54]      Phos  10.9     [04-10-20 @ 00:54]    TPro  5.3  /  Alb  2.5  /  TBili  2.5  /  DBili  x   /  AST  33  /  ALT  53  /  AlkPhos  79  [04-10-20 @ 00:54]    PT/INR: PT 13.50, INR 1.17       [04-08-20 @ 23:20]  PTT: 26.6       [04-08-20 @ 23:20]    Troponin 0.14      [04-08-20 @ 23:20]        [04-08-20 @ 23:20]        [04-08-20 @ 23:20]    Creatinine Trend:  SCr 6.8 [04-10 @ 00:54]  SCr 7.1 [04-09 @ 16:00]  SCr 6.5 [04-09 @ 08:36]  SCr 6.5 [04-08 @ 23:20]  SCr 6.4 [04-08 @ 07:40]    Urinalysis - [03-30-20 @ 22:00]      Color Yellow / Appearance Clear / SG 1.031 / pH 6.0      Gluc Negative / Ketone Trace  / Bili Negative / Urobili <2 mg/dL       Blood Moderate / Protein 300 mg/dL / Leuk Est Negative / Nitrite Negative      RBC 10 / WBC 29 / Hyaline 24 / Gran  / Sq Epi  / Non Sq Epi 22 / Bacteria Negative      Ferritin 1895      [04-08-20 @ 23:20]  HbA1c 7.7      [04-01-20 @ 04:30]

## 2020-04-10 NOTE — PROGRESS NOTE ADULT - ASSESSMENT
IMPRESSION:  COVID19 with resolved septic shock, off pressors,on mechanical ventilation with ARDS with fio2 60 % secondary to Cytokine Release Syndrome  -end organ damage with acute renal failure, transaminitis, metabolic encephalopathy  -inflammatory markers significantly elevated but are decreasing  -d Dimers 01895/ CRP 31.9  associated with increased mortality  -Blood & Urine cxs NGTD     RECOMMENDATIONS:  -s/p HCQ   -Cefepime 1 gm iv q24h  prognosis is extremely poor

## 2020-04-10 NOTE — PROGRESS NOTE ADULT - SUBJECTIVE AND OBJECTIVE BOX
KONSTANTIN WYATT  [id number]  74y Male    Indication for ICU admission:  Acute Hypoxemic Respiratory Failure  clinical COVID19 (false negatvie)    Admit Date:03-30-20  ICU Date: 03-31-20  OR Date:    Bactrim (Unknown)    PAST MEDICAL & SURGICAL HISTORY:  Afib  DM (diabetes mellitus)  BPH (benign prostatic hyperplasia)  CAD (coronary artery disease)    Home Medications:  Cartia  mg/24 hours oral capsule, extended release: 1 cap(s) orally once a day (30 Mar 2020 18:04)  Eliquis 5 mg oral tablet: 1 tab(s) orally 2 times a day (30 Mar 2020 18:04)  Lipitor 40 mg oral tablet: 1 tab(s) orally once a day (30 Mar 2020 18:04)  lisinopril 40 mg oral tablet: 1 tab(s) orally once a day (30 Mar 2020 18:04)  metFORMIN 750 mg oral tablet, extended release: 1 tab(s) orally once a day (30 Mar 2020 18:04)      24HRS EVENT:  Overnight:      Neuro   Off all sedation  RASS -4, pupils sluggish, reactive  Pain control: Acetaminophen 650mg PO q6 PRN    RESP  Acute Hypoxemic Respiratory Failure;  - Intubated: /20/50/10  ABG 7.24/40/115/17, sat 97%, pplat 35    COVID-19/Viral Pneumonia;  - positive SARS-CoV-2;  - CXR: unchanged diffuse bilateral opacities  - on Guaifenisen    CARD/VASC  Acute tachycardia - off Cardizem gtt, started on PO Cardizem 60mg q8h  -Levo changed to Alexander   Hx of Afib - on Apixaban, Diltiazem   Hx of CAD - Atorvastatin 40mg PO qhs  Hx of HTN - hold Lisinopril   daily EKG - Afib w/ RVR, QTc 443    GI/NUTR  - Diet: TF (glucerna) @ 50mL/hr  - PPI  - Senna, Lactulose   -last BM __  -daily weight (4/10) 98.7kg    /Renal  LIZZIE; worsening, BUN / Cr 170/6.8  IVL  Nathan, UOP:140-180  Nepho c/s: started on Ethacrynic acid 25mg BID (D/c'd  Spironolactone 25mg 4/7,d/'cd  acetazolamide 250mg q12 4/9)  -added sodium bicarb 650q6h  no plans for HD yet  Electrolytes: Na 146 / K 5.1 / Mg 3 / Ph 10.9  Hx of BPH    HEME/ONC  Hgb 11.7 / 35.6, stable  on Eliquis    ID  Tmax 96.4   WBC 12.59 from 13   COVID-19 +  - off all antibiotics  - completed course of Hydroxychloroquine & Azithromycin, Cefepime  - Ascorbic acid, Zinc  - D-dimer uptrending 11,529 >69146, , ESR 60     ENDO  DM2; uncontrolled, holding home Metformin  - on ISS    MSK/DERM  - no active issues    DVT PPx  - on Apixaban     GI PPx  -  Pantoprazole 40mg PO qac      ***Tubes/Lines/Drains  ***  Peripheral IV  Central Venous Line  Right IJ TLC  	Date 3/31/20  Arterial Line  Right radial A-line		                Date: 3/31/20  Urnary Catheter		Indication: Strict I&O    Date Placed: 3/31/20  ETT  OGT    REVIEW OF SYSTEMS    [ ] A ten-point review of systems was otherwise negative except as noted.  [x ] Due to altered mental status/intubation, subjective information were not able to be obtained from the patient. History was obtained, to the extent possible, from review of the chart and collateral sources of information.

## 2020-04-10 NOTE — PROGRESS NOTE ADULT - ASSESSMENT
NEURO  Off all sedation  RASS -4    RESP  Acute Hypoxemic Respiratory Failure;  Intubated, /20/50/10 --> decr FiO2 to 40% based on AM ABG (PaO2 115)  COVID-19/Viral Pneumonia;  - positive SARS-CoV-2  - completed Methylprednisolone 60mg q12  - worsening diffuse bilateral opacities  - continue, Guaifenisen/DM  - AM CXR    CARD/VASC  Hypotension, tachycardia - continues on Alexander - titrate to MAP > 75  -Diltiazem increased to 60mg q8  AFib; chronic  - continue, Apixaban 5mg PO bid  - continue, Diltiazem 30mg PO bid   - continue, EKG qd  CAD    - Atorvastatin 40mg PO qhs  HTN  - holding Lisinopril     GI/NUTR  - diet: TF 75mL/hr  - PPI  - Senna, Lactulose - increased Lactulose - continue to monitor for BM    /Renal  LIZZIE + uremia - Cr slowly downtrending, BUN remains markedly elevated - will place [place] today for HD  Evans - UO adequate  started on Ethacrynic acid  - monitor, I&Os  IVL  Hx of BPH  Monitor & replete elytes prn    HEME/ONC  Hgb stable  DVT ppx: on Eliquis    ID  Afebrile  - continue, Acetaminophen 650mg PO q6  COVID-19  - positive repeat swab  - completed all abx, COVID regimen  - nasal MRSA, urine Legionella & Strep      ENDO  DM2;  - continue, RISS   - continue POCT q6h  - holding, Metformin    MSK/DERM  - no active issues    DVT PPx: Eliquis    GI PPx: PPI    Disposition: SICU

## 2020-04-10 NOTE — PROGRESS NOTE ADULT - SUBJECTIVE AND OBJECTIVE BOX
KONSTANTIN WYATT  74y, Male    All available historical data reviewed    OVERNIGHT EVENTS:  fio2 60%  on pressors    ROS:  unable to obtain history secondary to patient's mental status and/or sedation     VITALS:  T(F): 98.5, Max: 98.6 (04-10-20 @ 08:00)  HR: 124  BP: 125/63  RR: 26Vital Signs Last 24 Hrs  T(C): 36.9 (10 Apr 2020 12:00), Max: 37 (10 Apr 2020 08:00)  T(F): 98.5 (10 Apr 2020 12:00), Max: 98.6 (10 Apr 2020 08:00)  HR: 124 (10 Apr 2020 12:00) (74 - 124)  BP: 125/63 (10 Apr 2020 08:00) (125/63 - 125/63)  BP(mean): --  RR: 26 (10 Apr 2020 12:00) (20 - 27)  SpO2: 98% (10 Apr 2020 12:00) (97% - 100%)    TESTS & MEASUREMENTS:                        11.7   12.59 )-----------( 261      ( 10 Apr 2020 00:54 )             35.6     04-10    146  |  106  |  170<HH>  ----------------------------<  166<H>  5.1<H>   |  16<L>  |  6.8<HH>    Ca    8.7      10 Apr 2020 00:54  Phos  10.9     04-10  Mg     3.0     04-10    TPro  5.3<L>  /  Alb  2.5<L>  /  TBili  2.5<H>  /  DBili  x   /  AST  33  /  ALT  53<H>  /  AlkPhos  79  04-10    LIVER FUNCTIONS - ( 10 Apr 2020 00:54 )  Alb: 2.5 g/dL / Pro: 5.3 g/dL / ALK PHOS: 79 U/L / ALT: 53 U/L / AST: 33 U/L / GGT: x                   RADIOLOGY & ADDITIONAL TESTS:  Personal review of radiological diagnostics performed  Echo and EKG results noted when applicable.     MEDICATIONS:  acetaminophen  Suppository .. 650 milliGRAM(s) Rectal every 6 hours PRN  apixaban 5 milliGRAM(s) Oral two times a day  ascorbic acid 500 milliGRAM(s) Oral every 8 hours  chlorhexidine 4% Liquid 1 Application(s) Topical <User Schedule>  dextrose 10%. 250 milliLiter(s) IV Continuous <Continuous>  dextrose 40% Gel 15 Gram(s) Oral once PRN  dextrose 5%. 1000 milliLiter(s) IV Continuous <Continuous>  dextrose 50% Injectable 12.5 Gram(s) IV Push once  diltiazem    Tablet 60 milliGRAM(s) Oral every 8 hours  ethacrynic acid 25 milliGRAM(s) Oral every 12 hours  guaifenesin/dextromethorphan  Syrup 10 milliLiter(s) Oral every 8 hours  insulin regular  human corrective regimen sliding scale   IV Push every 4 hours  pantoprazole  Injectable 40 milliGRAM(s) IV Push daily  phenylephrine    Infusion 0.2 MICROgram(s)/kG/Min IV Continuous <Continuous>  senna 2 Tablet(s) Oral at bedtime  sodium bicarbonate 650 milliGRAM(s) Oral every 6 hours  zinc sulfate 220 milliGRAM(s) Oral daily      ANTIBIOTICS:

## 2020-04-11 LAB
ALBUMIN SERPL ELPH-MCNC: 2.5 G/DL — LOW (ref 3.5–5.2)
ALP SERPL-CCNC: 99 U/L — SIGNIFICANT CHANGE UP (ref 30–115)
ALT FLD-CCNC: 52 U/L — HIGH (ref 0–41)
ANION GAP SERPL CALC-SCNC: 17 MMOL/L — HIGH (ref 7–14)
AST SERPL-CCNC: 36 U/L — SIGNIFICANT CHANGE UP (ref 0–41)
BASOPHILS # BLD AUTO: 0.02 K/UL — SIGNIFICANT CHANGE UP (ref 0–0.2)
BASOPHILS NFR BLD AUTO: 0.2 % — SIGNIFICANT CHANGE UP (ref 0–1)
BILIRUB SERPL-MCNC: 2 MG/DL — HIGH (ref 0.2–1.2)
BUN SERPL-MCNC: 97 MG/DL — CRITICAL HIGH (ref 10–20)
CALCIUM SERPL-MCNC: 8.3 MG/DL — LOW (ref 8.5–10.1)
CHLORIDE SERPL-SCNC: 99 MMOL/L — SIGNIFICANT CHANGE UP (ref 98–110)
CO2 SERPL-SCNC: 24 MMOL/L — SIGNIFICANT CHANGE UP (ref 17–32)
CREAT SERPL-MCNC: 4.4 MG/DL — CRITICAL HIGH (ref 0.7–1.5)
CRP SERPL-MCNC: 17.43 MG/DL — HIGH (ref 0–0.4)
EOSINOPHIL # BLD AUTO: 0 K/UL — SIGNIFICANT CHANGE UP (ref 0–0.7)
EOSINOPHIL NFR BLD AUTO: 0 % — SIGNIFICANT CHANGE UP (ref 0–8)
FERRITIN SERPL-MCNC: 1821 NG/ML — HIGH (ref 30–400)
GAS PNL BLDA: SIGNIFICANT CHANGE UP
GAS PNL BLDA: SIGNIFICANT CHANGE UP
GLUCOSE BLDC GLUCOMTR-MCNC: 115 MG/DL — HIGH (ref 70–99)
GLUCOSE BLDC GLUCOMTR-MCNC: 120 MG/DL — HIGH (ref 70–99)
GLUCOSE BLDC GLUCOMTR-MCNC: 125 MG/DL — HIGH (ref 70–99)
GLUCOSE BLDC GLUCOMTR-MCNC: 137 MG/DL — HIGH (ref 70–99)
GLUCOSE BLDC GLUCOMTR-MCNC: 143 MG/DL — HIGH (ref 70–99)
GLUCOSE BLDC GLUCOMTR-MCNC: 148 MG/DL — HIGH (ref 70–99)
GLUCOSE BLDC GLUCOMTR-MCNC: 152 MG/DL — HIGH (ref 70–99)
GLUCOSE BLDC GLUCOMTR-MCNC: 156 MG/DL — HIGH (ref 70–99)
GLUCOSE BLDC GLUCOMTR-MCNC: 162 MG/DL — HIGH (ref 70–99)
GLUCOSE BLDC GLUCOMTR-MCNC: 165 MG/DL — HIGH (ref 70–99)
GLUCOSE BLDC GLUCOMTR-MCNC: 177 MG/DL — HIGH (ref 70–99)
GLUCOSE BLDC GLUCOMTR-MCNC: 193 MG/DL — HIGH (ref 70–99)
GLUCOSE BLDC GLUCOMTR-MCNC: 198 MG/DL — HIGH (ref 70–99)
GLUCOSE BLDC GLUCOMTR-MCNC: 201 MG/DL — HIGH (ref 70–99)
GLUCOSE BLDC GLUCOMTR-MCNC: 209 MG/DL — HIGH (ref 70–99)
GLUCOSE SERPL-MCNC: 157 MG/DL — HIGH (ref 70–99)
IMM GRANULOCYTES NFR BLD AUTO: 2 % — HIGH (ref 0.1–0.3)
LYMPHOCYTES # BLD AUTO: 0.1 K/UL — LOW (ref 1.2–3.4)
LYMPHOCYTES # BLD AUTO: 0.9 % — LOW (ref 20.5–51.1)
MAGNESIUM SERPL-MCNC: 2.3 MG/DL — SIGNIFICANT CHANGE UP (ref 1.8–2.4)
MONOCYTES # BLD AUTO: 0.5 K/UL — SIGNIFICANT CHANGE UP (ref 0.1–0.6)
MONOCYTES NFR BLD AUTO: 4.3 % — SIGNIFICANT CHANGE UP (ref 1.7–9.3)
NEUTROPHILS # BLD AUTO: 10.75 K/UL — HIGH (ref 1.4–6.5)
NEUTROPHILS NFR BLD AUTO: 92.6 % — HIGH (ref 42.2–75.2)
NRBC # BLD: 0 /100 WBCS — SIGNIFICANT CHANGE UP (ref 0–0)
PHOSPHATE SERPL-MCNC: 6.5 MG/DL — HIGH (ref 2.1–4.9)
POTASSIUM SERPL-MCNC: 3.3 MMOL/L — LOW (ref 3.5–5)
POTASSIUM SERPL-SCNC: 3.3 MMOL/L — LOW (ref 3.5–5)
PROCALCITONIN SERPL-MCNC: 1.87 NG/ML — HIGH (ref 0.02–0.1)
PROT SERPL-MCNC: 5.4 G/DL — LOW (ref 6–8)
SODIUM SERPL-SCNC: 140 MMOL/L — SIGNIFICANT CHANGE UP (ref 135–146)

## 2020-04-11 PROCEDURE — 99291 CRITICAL CARE FIRST HOUR: CPT

## 2020-04-11 PROCEDURE — 71045 X-RAY EXAM CHEST 1 VIEW: CPT | Mod: 26

## 2020-04-11 PROCEDURE — 93010 ELECTROCARDIOGRAM REPORT: CPT

## 2020-04-11 RX ORDER — INSULIN HUMAN 100 [IU]/ML
1 INJECTION, SOLUTION SUBCUTANEOUS
Qty: 100 | Refills: 0 | Status: DISCONTINUED | OUTPATIENT
Start: 2020-04-11 | End: 2020-04-14

## 2020-04-11 RX ORDER — POTASSIUM CHLORIDE 20 MEQ
20 PACKET (EA) ORAL ONCE
Refills: 0 | Status: COMPLETED | OUTPATIENT
Start: 2020-04-11 | End: 2020-04-11

## 2020-04-11 RX ORDER — METOPROLOL TARTRATE 50 MG
12.5 TABLET ORAL EVERY 8 HOURS
Refills: 0 | Status: DISCONTINUED | OUTPATIENT
Start: 2020-04-11 | End: 2020-04-13

## 2020-04-11 RX ORDER — CALCIUM ACETATE 667 MG
1334 TABLET ORAL
Refills: 0 | Status: DISCONTINUED | OUTPATIENT
Start: 2020-04-11 | End: 2020-05-05

## 2020-04-11 RX ORDER — PHENYLEPHRINE HYDROCHLORIDE 10 MG/ML
0.2 INJECTION INTRAVENOUS
Qty: 160 | Refills: 0 | Status: DISCONTINUED | OUTPATIENT
Start: 2020-04-11 | End: 2020-04-12

## 2020-04-11 RX ADMIN — ETHACRYNIC ACID 25 MILLIGRAM(S): 25 TABLET ORAL at 05:28

## 2020-04-11 RX ADMIN — Medication 12.5 MILLIGRAM(S): at 21:26

## 2020-04-11 RX ADMIN — Medication 12.5 MILLIGRAM(S): at 16:17

## 2020-04-11 RX ADMIN — Medication 650 MILLIGRAM(S): at 16:18

## 2020-04-11 RX ADMIN — ZINC SULFATE TAB 220 MG (50 MG ZINC EQUIVALENT) 220 MILLIGRAM(S): 220 (50 ZN) TAB at 16:17

## 2020-04-11 RX ADMIN — Medication 100 MILLIEQUIVALENT(S): at 19:52

## 2020-04-11 RX ADMIN — Medication 10 MILLILITER(S): at 05:26

## 2020-04-11 RX ADMIN — PHENYLEPHRINE HYDROCHLORIDE 3.56 MICROGRAM(S)/KG/MIN: 10 INJECTION INTRAVENOUS at 02:01

## 2020-04-11 RX ADMIN — Medication 60 MILLIGRAM(S): at 05:28

## 2020-04-11 RX ADMIN — Medication 60 MILLIGRAM(S): at 16:19

## 2020-04-11 RX ADMIN — Medication 60 MILLIGRAM(S): at 21:26

## 2020-04-11 RX ADMIN — CHLORHEXIDINE GLUCONATE 1 APPLICATION(S): 213 SOLUTION TOPICAL at 05:05

## 2020-04-11 RX ADMIN — PANTOPRAZOLE SODIUM 40 MILLIGRAM(S): 20 TABLET, DELAYED RELEASE ORAL at 16:18

## 2020-04-11 RX ADMIN — Medication 1334 MILLIGRAM(S): at 16:24

## 2020-04-11 RX ADMIN — Medication 500 MILLIGRAM(S): at 05:26

## 2020-04-11 RX ADMIN — APIXABAN 5 MILLIGRAM(S): 2.5 TABLET, FILM COATED ORAL at 16:24

## 2020-04-11 RX ADMIN — Medication 500 MILLIGRAM(S): at 16:18

## 2020-04-11 RX ADMIN — Medication 650 MILLIGRAM(S): at 05:26

## 2020-04-11 RX ADMIN — Medication 10 MILLIGRAM(S): at 10:10

## 2020-04-11 RX ADMIN — Medication 10 MILLILITER(S): at 21:25

## 2020-04-11 RX ADMIN — Medication 500 MILLIGRAM(S): at 21:25

## 2020-04-11 RX ADMIN — Medication 10 MILLILITER(S): at 16:18

## 2020-04-11 RX ADMIN — SENNA PLUS 2 TABLET(S): 8.6 TABLET ORAL at 21:26

## 2020-04-11 RX ADMIN — APIXABAN 5 MILLIGRAM(S): 2.5 TABLET, FILM COATED ORAL at 05:26

## 2020-04-11 RX ADMIN — ETHACRYNIC ACID 25 MILLIGRAM(S): 25 TABLET ORAL at 16:24

## 2020-04-11 RX ADMIN — INSULIN HUMAN 1 UNIT(S)/HR: 100 INJECTION, SOLUTION SUBCUTANEOUS at 00:30

## 2020-04-11 NOTE — PROGRESS NOTE ADULT - ASSESSMENT
LIZZIE/ ATN/ hyperkalemia/ respiratory failure / covid positive / high BUN  # hold diuretics , patient is 1.5 liters negative   # non oliguric  # ph 11.1 feed nepro, renagel 2/2/2  #  d/c sodium bicarbonate    # s/p hd yesterday, hd again today no uf   # case discussed with ICU team   # will follow

## 2020-04-11 NOTE — PROGRESS NOTE ADULT - SUBJECTIVE AND OBJECTIVE BOX
KONSTANTIN WYATT  [id number]  74y Male    Indication for ICU admission:  Acute Hypoxemic Respiratory Failure  clinical COVID19 (false negatvie)    Admit Date:03-30-20  ICU Date: 03-31-20  OR Date:    Bactrim (Unknown)    PAST MEDICAL & SURGICAL HISTORY:  Afib  DM (diabetes mellitus)  BPH (benign prostatic hyperplasia)  CAD (coronary artery disease)    Home Medications:  Cartia  mg/24 hours oral capsule, extended release: 1 cap(s) orally once a day (30 Mar 2020 18:04)  Eliquis 5 mg oral tablet: 1 tab(s) orally 2 times a day (30 Mar 2020 18:04)  Lipitor 40 mg oral tablet: 1 tab(s) orally once a day (30 Mar 2020 18:04)  lisinopril 40 mg oral tablet: 1 tab(s) orally once a day (30 Mar 2020 18:04)  metFORMIN 750 mg oral tablet, extended release: 1 tab(s) orally once a day (30 Mar 2020 18:04)      24HRS EVENT:  Overnight: post HD, afib w/ RVR to 150, cardizem 10mg IV x2, started gtt, started on insulin gtt, restarted maricel gtt at 2am,    Neuro   Off all sedation  RASS -4, pupils sluggish, reactive  Pain control: Acetaminophen 650mg PO q6 PRN    RESP  Acute Hypoxemic Respiratory Failure;  - Intubated: /20/40/10  ABG 7.32, 45, 137, 23, 98%    COVID-19/Viral Pneumonia;  - positive SARS-CoV-2;  - CXR: unchanged diffuse bilateral opacities  - on Guaifenisen    CARD/VASC  Acute tachycardia - off Cardizem gtt 4/10am, started on PO Cardizem 60mg q8h, by PM afib w/ RVR to 150, cardizem 10mg IV x2, started gtt  Acute hypotension - off maricel since 4/10 pm then restarted overnight  Hx of Afib - on Apixaban, Diltiazem   Hx of CAD - Atorvastatin 40mg PO qhs  Hx of HTN - hold Lisinopril   daily EKG - Afib       GI/NUTR  - Diet: TF (glucerna) @ 50mL/hr  - PPI  - Senna, Lactulose   - last BM 4/10  - daily weight (4/10) 98.7kg, 4/11 98.7    /Renal  LIZZIE; worsening, BUN / Cr 170/6.8 >144/6.2  IVL  Evans, non-oliguric renal failure UOP:100-200cc  Nepho c/s: started on Ethacrynic acid 25mg BID, sodium bicarb 650q6h  (4/10) L IJ [place] placed for HD (kept even 4/10)  Electrolytes: Na 139 / K 4.9 / Mg 2.7 / IP 11  Hx of BPH    HEME/ONC  Hgb 11.7 / 35.6> 11/33, stable  on Eliquis    ID  Tmax 98.6  WBC 13>12.6>11  COVID-19 +  - off all antibiotics  - completed course of Hydroxychloroquine & Azithromycin, Cefepime  - Ascorbic acid, Zinc  - D-dimer uptrending 11,529 >85089>8421>5852, , ESR 60     ENDO  DM2; uncontrolled, holding home Metformin  - overnight f/s >200, started insulin gtt     MSK/DERM  - no active issues    DVT PPx  - on Apixaban     GI PPx  -  Pantoprazole 40mg PO qac      ***Tubes/Lines/Drains  ***  Peripheral IV  Central Venous Line  Right IJ TLC  	Date 3/31/20  Arterial Line  Right radial A-line		                Date: 3/31/20  Urnary Catheter		Indication: Strict I&O    Date Placed: 3/31/20  ETT  OGT    REVIEW OF SYSTEMS    [ ] A ten-point review of systems was otherwise negative except as noted.  [x ] Due to altered mental status/intubation, subjective information were not able to be obtained from the patient. History was obtained, to the extent possible, from review of the chart and collateral sources of information. KONSTANTIN WYATT  [id number]  74y Male    Indication for ICU admission:  Acute Hypoxemic Respiratory Failure  clinical COVID19 (false negatvie)    Admit Date:20  ICU Date: 20  OR Date:    Bactrim (Unknown)    PAST MEDICAL & SURGICAL HISTORY:  Afib  DM (diabetes mellitus)  BPH (benign prostatic hyperplasia)  CAD (coronary artery disease)    Home Medications:  Cartia  mg/24 hours oral capsule, extended release: 1 cap(s) orally once a day (30 Mar 2020 18:04)  Eliquis 5 mg oral tablet: 1 tab(s) orally 2 times a day (30 Mar 2020 18:04)  Lipitor 40 mg oral tablet: 1 tab(s) orally once a day (30 Mar 2020 18:04)  lisinopril 40 mg oral tablet: 1 tab(s) orally once a day (30 Mar 2020 18:04)  metFORMIN 750 mg oral tablet, extended release: 1 tab(s) orally once a day (30 Mar 2020 18:04)      24HRS EVENT:  Overnight: post HD, afib w/ RVR to 150, cardizem 10mg IV x2, started gtt, started on insulin gtt, restarted maricel gtt at 2am,    Neuro   Off all sedation  RASS -4, pupils sluggish, reactive  Pain control: Acetaminophen 650mg PO q6 PRN    RESP  Acute Hypoxemic Respiratory Failure;  - Intubated: /20/40/10  ABG 7.32, 45, 137, 23, 98%    COVID-19/Viral Pneumonia;  - positive SARS-CoV-2;  - CXR: unchanged diffuse bilateral opacities  - on Guaifenisen    CARD/VASC  Acute tachycardia - off Cardizem gtt 4/10am, started on PO Cardizem 60mg q8h, by PM afib w/ RVR to 150, cardizem 10mg IV x2, started gtt  Acute hypotension - off maricel since 4/10 pm then restarted overnight  Hx of Afib - on Apixaban, Diltiazem   Hx of CAD - Atorvastatin 40mg PO qhs  Hx of HTN - hold Lisinopril   daily EKG - Afib       GI/NUTR  - Diet: TF (glucerna) @ 50mL/hr  - PPI  - Senna, Lactulose   - last BM 4/10  - daily weight (4/10) 98.7kg,  98.7    /Renal  LIZZIE; worsening, BUN / Cr 170/6.8 >144/6.2  IVL  Evans, non-oliguric renal failure UOP:100-200cc  Nepho c/s: started on Ethacrynic acid 25mg BID, sodium bicarb 650q6h  (4/10) L IJ [place] placed for HD (kept even 4/10)  Electrolytes: Na 139 / K 4.9 / Mg 2.7 / IP 11  Hx of BPH    HEME/ONC  Hgb 11.7 / 35.6> , stable  on Eliquis    ID  Tmax 98.6  WBC 13>12.6>11  COVID-19 +  - off all antibiotics  - completed course of Hydroxychloroquine & Azithromycin, Cefepime  - Ascorbic acid, Zinc  - D-dimer uptrending 11,529 >80954>8421>5852, , ESR 60     ENDO  DM2; uncontrolled, holding home Metformin  - overnight f/s >200, started insulin gtt     MSK/DERM  - no active issues    DVT PPx  - on Apixaban     GI PPx  -  Pantoprazole 40mg PO qac      ***Tubes/Lines/Drains  ***  Peripheral IV  Central Venous Line  Right IJ TLC  	Date 3/31/20  Arterial Line  Right radial A-line		                Date: 3/31/20  Urnary Catheter		Indication: Strict I&O    Date Placed: 3/31/20  ETT  OGT    REVIEW OF SYSTEMS    [ ] A ten-point review of systems was otherwise negative except as noted.  [x ] Due to altered mental status/intubation, subjective information were not able to be obtained from the patient. History was obtained, to the extent possible, from review of the chart and collateral sources of information.        Daily     Daily Weight in k.7 (2020 04:00)    Diet, NPO with Tube Feed:   Tube Feeding Modality: Orogastric  Glucerna 1.2 Choco  Total Volume for 24 Hours (mL): 1200  Continuous  Starting Tube Feed Rate mL per Hour: 30  Increase Tube Feed Rate by (mL): 10     Every hour  Until Goal Tube Feed Rate (mL per Hour): 50  Tube Feed Duration (in Hours): 24  Tube Feed Start Time: 13:00 (20 @ 17:56)      CURRENT MEDS:  Neurologic Medications  acetaminophen  Suppository .. 650 milliGRAM(s) Rectal every 6 hours PRN Temp greater or equal to 38.5C (101.3F)    Respiratory Medications  guaifenesin/dextromethorphan  Syrup 10 milliLiter(s) Oral every 8 hours    Cardiovascular Medications  diltiazem    Tablet 60 milliGRAM(s) Oral every 8 hours  diltiazem Infusion 10 mG/Hr IV Continuous <Continuous>  ethacrynic acid 25 milliGRAM(s) Oral every 12 hours  metoprolol tartrate 12.5 milliGRAM(s) Oral every 8 hours  phenylephrine    Infusion 0.2 MICROgram(s)/kG/Min IV Continuous <Continuous>    Gastrointestinal Medications  ascorbic acid 500 milliGRAM(s) Oral every 8 hours  calcium acetate 1334 milliGRAM(s) Oral three times a day with meals  dextrose 10%. 250 milliLiter(s) IV Continuous <Continuous>  dextrose 5%. 1000 milliLiter(s) IV Continuous <Continuous>  pantoprazole  Injectable 40 milliGRAM(s) IV Push daily  senna 2 Tablet(s) Oral at bedtime  sodium bicarbonate 650 milliGRAM(s) Oral every 6 hours  zinc sulfate 220 milliGRAM(s) Oral daily    Genitourinary Medications    Hematologic/Oncologic Medications  apixaban 5 milliGRAM(s) Oral two times a day    Antimicrobial/Immunologic Medications    Endocrine/Metabolic Medications  dextrose 40% Gel 15 Gram(s) Oral once PRN Blood Glucose LESS THAN 70 milliGRAM(s)/deciliter  dextrose 50% Injectable 12.5 Gram(s) IV Push once  insulin regular Infusion 1 Unit(s)/Hr IV Continuous <Continuous>    Topical/Other Medications  chlorhexidine 4% Liquid 1 Application(s) Topical <User Schedule>      ICU Vital Signs Last 24 Hrs  T(C): 36.6 (2020 08:00), Max: 37.3 (10 Apr 2020 19:03)  T(F): 97.8 (2020 08:00), Max: 99.1 (10 Apr 2020 19:03)  HR: 99 (2020 12:00) (84 - 155)  BP: 114/65 (2020 12:00) (95/54 - 176/82)  BP(mean): 80 (2020 04:00) (69 - 92)  ABP: 126/64 (2020 11:50) (107/50 - 134/52)  ABP(mean): 74 (2020 11:50) (67 - 78)  RR: 20 (2020 12:00) (20 - 25)  SpO2: 100% (2020 11:50) (97% - 100%)      Mode: AC/ CMV (Assist Control/ Continuous Mandatory Ventilation)  RR (machine): 20  TV (machine): 450  FiO2: 40  PEEP: 5  MAP: 10  PIP: 20    ABG - ( 2020 03:45 )  pH, Arterial: 7.32  pH, Blood: x     /  pCO2: 45    /  pO2: 137   / HCO3: 23    / Base Excess: -3.3  /  SaO2: 98                  I&O's Summary    10 Apr 2020 07:  -  2020 07:00  --------------------------------------------------------  IN: 1214.2 mL / OUT: 2760 mL / NET: -1545.8 mL    2020 07:  -  2020 13:23  --------------------------------------------------------  IN: 256.2 mL / OUT: 410 mL / NET: -153.8 mL      I&O's Detail    10 Apr 2020 07:  -  2020 07:00  --------------------------------------------------------  IN:    diltiazem Infusion: 145 mL    Enteral Tube Flush: 255 mL    Glucerna: 620 mL    insulin regular Infusion: 23 mL    phenylephrine   Infusion: 121.3 mL    phenylephrine   Infusion: 49.9 mL  Total IN: 1214.2 mL    OUT:    Indwelling Catheter - Urethral: 2610 mL    Nasoenteral Tube: 150 mL  Total OUT: 2760 mL    Total NET: -1545.8 mL      2020 07:  -  2020 13:23  --------------------------------------------------------  IN:    diltiazem Infusion: 30 mL    Glucerna: 150 mL    insulin regular Infusion: 12 mL    phenylephrine   Infusion: 64.2 mL  Total IN: 256.2 mL    OUT:    Indwelling Catheter - Urethral: 410 mL  Total OUT: 410 mL    Total NET: -153.8 mL          PHYSICAL EXAM:    General/Neuro  RASS:     -4  Pupils: equal, sluggish, reactive    Lungs:      clear to auscultation, Normal expansion/effort.     Cardiovascular : S1, S2.  Regular rate.  Cardiac Rhythm: Afib    GI: Abdomen soft, Non-tender, Non-distended.    OGT    Extremities: Extremities warm, pink, well-perfused.     Derm: Good skin turgor, no skin breakdown.      :       [place] catheter in place.      CXR:       LABS:  CAPILLARY BLOOD GLUCOSE      POCT Blood Glucose.: 115 mg/dL (2020 09:33)  POCT Blood Glucose.: 125 mg/dL (2020 05:59)  POCT Blood Glucose.: 152 mg/dL (2020 05:01)  POCT Blood Glucose.: 165 mg/dL (2020 04:01)  POCT Blood Glucose.: 177 mg/dL (2020 03:03)  POCT Blood Glucose.: 201 mg/dL (2020 02:08)  POCT Blood Glucose.: 198 mg/dL (2020 00:58)  POCT Blood Glucose.: 209 mg/dL (2020 00:17)  POCT Blood Glucose.: 211 mg/dL (10 Apr 2020 21:53)  POCT Blood Glucose.: 211 mg/dL (10 Apr 2020 18:48)                          11.2   11.60 )-----------( 217      ( 10 Apr 2020 23:00 )             33.5         04-10    139  |  99  |  144<HH>  ----------------------------<  232<H>  4.9   |  21  |  6.2<HH>    Ca    8.4<L>      10 Apr 2020 23:00  Phos  11.1     04-10  Mg     2.7     04-10    TPro  5.2<L>  /  Alb  2.5<L>  /  TBili  2.5<H>  /  DBili  x   /  AST  34  /  ALT  55<H>  /  AlkPhos  89  04-10      PT/INR - ( 10 Apr 2020 23:00 )   PT: 17.30 sec;   INR: 1.50 ratio         PTT - ( 10 Apr 2020 23:00 )  PTT:31.8 sec

## 2020-04-11 NOTE — PROGRESS NOTE ADULT - SUBJECTIVE AND OBJECTIVE BOX
intubated/ventilated  s/p hd yesterday         PAST HISTORY  --------------------------------------------------------------------------------  No significant changes to PMH, PSH, FHx, SHx, unless otherwise noted    ALLERGIES & MEDICATIONS  --------------------------------------------------------------------------------  Allergies    Bactrim (Unknown)    Intolerances      Standing Inpatient Medications  apixaban 5 milliGRAM(s) Oral two times a day  ascorbic acid 500 milliGRAM(s) Oral every 8 hours  chlorhexidine 4% Liquid 1 Application(s) Topical <User Schedule>  dextrose 10%. 250 milliLiter(s) IV Continuous <Continuous>  dextrose 5%. 1000 milliLiter(s) IV Continuous <Continuous>  dextrose 50% Injectable 12.5 Gram(s) IV Push once  diltiazem    Tablet 60 milliGRAM(s) Oral every 8 hours  diltiazem Infusion 10 mG/Hr IV Continuous <Continuous>  diltiazem Injectable 10 milliGRAM(s) IV Push once  ethacrynic acid 25 milliGRAM(s) Oral every 12 hours  guaifenesin/dextromethorphan  Syrup 10 milliLiter(s) Oral every 8 hours  insulin regular Infusion 1 Unit(s)/Hr IV Continuous <Continuous>  pantoprazole  Injectable 40 milliGRAM(s) IV Push daily  phenylephrine    Infusion 0.2 MICROgram(s)/kG/Min IV Continuous <Continuous>  senna 2 Tablet(s) Oral at bedtime  sodium bicarbonate 650 milliGRAM(s) Oral every 6 hours  zinc sulfate 220 milliGRAM(s) Oral daily    PRN Inpatient Medications  acetaminophen  Suppository .. 650 milliGRAM(s) Rectal every 6 hours PRN  dextrose 40% Gel 15 Gram(s) Oral once PRN      VITALS/PHYSICAL EXAM  --------------------------------------------------------------------------------  T(C): 36.8 (04-11-20 @ 04:00), Max: 37.3 (04-10-20 @ 19:03)  HR: 88 (04-11-20 @ 04:00) (84 - 155)  BP: 120/57 (04-11-20 @ 04:00) (95/54 - 176/82)  RR: 22 (04-11-20 @ 04:00) (20 - 27)  SpO2: 99% (04-11-20 @ 04:00) (97% - 100%)  Wt(kg): --        04-10-20 @ 07:01  -  04-11-20 @ 07:00  --------------------------------------------------------  IN: 1214.2 mL / OUT: 2760 mL / NET: -1545.8 mL      Physical Exam:  	Gen: intubated/ventilated   	Vascular access: amelia     LABS/STUDIES  --------------------------------------------------------------------------------              11.2   11.60 >-----------<  217      [04-10-20 @ 23:00]              33.5     139  |  99  |  144  ----------------------------<  232      [04-10-20 @ 23:00]  4.9   |  21  |  6.2        Ca     8.4     [04-10-20 @ 23:00]      Mg     2.7     [04-10-20 @ 23:00]      Phos  11.1     [04-10-20 @ 23:00]    TPro  5.2  /  Alb  2.5  /  TBili  2.5  /  DBili  x   /  AST  34  /  ALT  55  /  AlkPhos  89  [04-10-20 @ 23:00]    PT/INR: PT 17.30, INR 1.50       [04-10-20 @ 23:00]  PTT: 31.8       [04-10-20 @ 23:00]          [04-10-20 @ 23:00]    Creatinine Trend:  SCr 6.2 [04-10 @ 23:00]  SCr 7.2 [04-10 @ 19:00]  SCr 6.8 [04-10 @ 00:54]  SCr 7.1 [04-09 @ 16:00]  SCr 6.5 [04-09 @ 08:36]    Urinalysis - [03-30-20 @ 22:00]      Color Yellow / Appearance Clear / SG 1.031 / pH 6.0      Gluc Negative / Ketone Trace  / Bili Negative / Urobili <2 mg/dL       Blood Moderate / Protein 300 mg/dL / Leuk Est Negative / Nitrite Negative      RBC 10 / WBC 29 / Hyaline 24 / Gran  / Sq Epi  / Non Sq Epi 22 / Bacteria Negative      Ferritin 1821      [04-10-20 @ 19:10]  HbA1c 7.7      [04-01-20 @ 04:30]

## 2020-04-11 NOTE — PROGRESS NOTE ADULT - ASSESSMENT
NEURO  Off all sedation  RASS -4    RESP  Acute Hypoxemic Respiratory Failure;  Intubated, /20/50/10 --> decr FiO2 to 40% based on AM ABG (PaO2 115)  COVID-19/Viral Pneumonia;  - positive SARS-CoV-2  - completed Methylprednisolone 60mg q12  - worsening diffuse bilateral opacities  - continue, Guaifenisen/DM  - AM CXR    CARD/VASC  Hypotension, tachycardia - continues on Alexander - titrate to MAP > 75  -Diltiazem increased to 60mg q8  AFib; chronic  - continue, Apixaban 5mg PO bid  - continue, Diltiazem 30mg PO bid   - continue, EKG qd  CAD    - Atorvastatin 40mg PO qhs  HTN  - holding Lisinopril     GI/NUTR  - diet: TF 75mL/hr  - PPI  - Senna, Lactulose - increased Lactulose - continue to monitor for BM    /Renal  LIZZIE + uremia - Cr slowly downtrending, BUN remains markedly elevated - will place [place] today for HD  Evans - UO adequate  started on Ethacrynic acid  - monitor, I&Os  IVL  Hx of BPH  Monitor & replete elytes prn    HEME/ONC  Hgb stable  DVT ppx: on Eliquis    ID  Afebrile  - continue, Acetaminophen 650mg PO q6  COVID-19  - positive repeat swab  - completed all abx, COVID regimen  - nasal MRSA, urine Legionella & Strep      ENDO  DM2;  - continue, RISS   - continue POCT q6h  - holding, Metformin    MSK/DERM  - no active issues    DVT PPx: Eliquis    GI PPx: PPI    Disposition: SICU NEURO  Off all sedation  RASS -4    RESP  Acute Hypoxemic Respiratory Failure;  Intubated, /20/40/10 --> decreased PEEP to 5 based on AM ABG  COVID-19/Viral Pneumonia;  - positive SARS-CoV-2  - completed Methylprednisolone 60mg q12  - worsening diffuse bilateral opacities  - continue, Guaifenisen/DM  - AM CXR    CARD/VASC  Hypotension, tachycardia - continues on Alexander - titrate to MAP > 75  Hx of Afib - continue Eliquis, Cardizem  -start Metoprolol 12.5 q8, d/c Cardizem gtt  CAD    - Atorvastatin 40mg PO qhs  HTN  - holding Lisinopril   Daily EKG for QTc    GI/NUTR  - diet: TF 75mL/hr  - PPI  - Senna, Lactulose     /Renal  LIZZIE + uremia - [place] placed  and HD on 4/10 - plan for HD again today  Evans - UO adequate  on Ethacrynic acid, sodium bicarb 650 q6h  - monitor, I&Os  IVL  Hx of BPH  Monitor & replete elytes prn    HEME/ONC  Hgb stable  DVT ppx: on Eliquis    ID  Afebrile  WBC stable, wnl  COVID-19  - completed all abx, COVID regimen      ENDO  DM2;  - continue, RISS   - continue POCT q6h  - holding, Metformin    MSK/DERM  - no active issues    DVT PPx: Eliquis    GI PPx: PPI    Disposition: SICU

## 2020-04-12 LAB
ALBUMIN SERPL ELPH-MCNC: 2.5 G/DL — LOW (ref 3.5–5.2)
ALP SERPL-CCNC: 95 U/L — SIGNIFICANT CHANGE UP (ref 30–115)
ALT FLD-CCNC: 48 U/L — HIGH (ref 0–41)
ANION GAP SERPL CALC-SCNC: 18 MMOL/L — HIGH (ref 7–14)
ANION GAP SERPL CALC-SCNC: 21 MMOL/L — HIGH (ref 7–14)
AST SERPL-CCNC: 34 U/L — SIGNIFICANT CHANGE UP (ref 0–41)
BASE EXCESS BLDA CALC-SCNC: 3.1 MMOL/L — HIGH (ref -2–2)
BASOPHILS # BLD AUTO: 0.02 K/UL — SIGNIFICANT CHANGE UP (ref 0–0.2)
BASOPHILS NFR BLD AUTO: 0.1 % — SIGNIFICANT CHANGE UP (ref 0–1)
BILIRUB SERPL-MCNC: 1.7 MG/DL — HIGH (ref 0.2–1.2)
BUN SERPL-MCNC: 102 MG/DL — CRITICAL HIGH (ref 10–20)
BUN SERPL-MCNC: 108 MG/DL — CRITICAL HIGH (ref 10–20)
CALCIUM SERPL-MCNC: 8.2 MG/DL — LOW (ref 8.5–10.1)
CALCIUM SERPL-MCNC: 8.3 MG/DL — LOW (ref 8.5–10.1)
CHLORIDE SERPL-SCNC: 99 MMOL/L — SIGNIFICANT CHANGE UP (ref 98–110)
CHLORIDE SERPL-SCNC: 99 MMOL/L — SIGNIFICANT CHANGE UP (ref 98–110)
CO2 SERPL-SCNC: 24 MMOL/L — SIGNIFICANT CHANGE UP (ref 17–32)
CO2 SERPL-SCNC: 25 MMOL/L — SIGNIFICANT CHANGE UP (ref 17–32)
CREAT SERPL-MCNC: 4.8 MG/DL — CRITICAL HIGH (ref 0.7–1.5)
CREAT SERPL-MCNC: 5.4 MG/DL — CRITICAL HIGH (ref 0.7–1.5)
EOSINOPHIL # BLD AUTO: 0.04 K/UL — SIGNIFICANT CHANGE UP (ref 0–0.7)
EOSINOPHIL NFR BLD AUTO: 0.3 % — SIGNIFICANT CHANGE UP (ref 0–8)
GLUCOSE BLDC GLUCOMTR-MCNC: 107 MG/DL — HIGH (ref 70–99)
GLUCOSE BLDC GLUCOMTR-MCNC: 121 MG/DL — HIGH (ref 70–99)
GLUCOSE BLDC GLUCOMTR-MCNC: 124 MG/DL — HIGH (ref 70–99)
GLUCOSE BLDC GLUCOMTR-MCNC: 125 MG/DL — HIGH (ref 70–99)
GLUCOSE BLDC GLUCOMTR-MCNC: 125 MG/DL — HIGH (ref 70–99)
GLUCOSE BLDC GLUCOMTR-MCNC: 143 MG/DL — HIGH (ref 70–99)
GLUCOSE BLDC GLUCOMTR-MCNC: 144 MG/DL — HIGH (ref 70–99)
GLUCOSE BLDC GLUCOMTR-MCNC: 153 MG/DL — HIGH (ref 70–99)
GLUCOSE BLDC GLUCOMTR-MCNC: 156 MG/DL — HIGH (ref 70–99)
GLUCOSE BLDC GLUCOMTR-MCNC: 161 MG/DL — HIGH (ref 70–99)
GLUCOSE BLDC GLUCOMTR-MCNC: 179 MG/DL — HIGH (ref 70–99)
GLUCOSE BLDC GLUCOMTR-MCNC: 197 MG/DL — HIGH (ref 70–99)
GLUCOSE BLDC GLUCOMTR-MCNC: 199 MG/DL — HIGH (ref 70–99)
GLUCOSE BLDC GLUCOMTR-MCNC: 209 MG/DL — HIGH (ref 70–99)
GLUCOSE SERPL-MCNC: 142 MG/DL — HIGH (ref 70–99)
GLUCOSE SERPL-MCNC: 153 MG/DL — HIGH (ref 70–99)
HCO3 BLDA-SCNC: 27 MMOL/L — SIGNIFICANT CHANGE UP (ref 21–29)
HCT VFR BLD CALC: 31.7 % — LOW (ref 42–52)
HCT VFR BLD CALC: 31.8 % — LOW (ref 42–52)
HGB BLD-MCNC: 10.6 G/DL — LOW (ref 14–18)
HGB BLD-MCNC: 10.8 G/DL — LOW (ref 14–18)
HOROWITZ INDEX BLDA+IHG-RTO: 40 — SIGNIFICANT CHANGE UP
IMM GRANULOCYTES NFR BLD AUTO: 1.1 % — HIGH (ref 0.1–0.3)
LYMPHOCYTES # BLD AUTO: 0.4 K/UL — LOW (ref 1.2–3.4)
LYMPHOCYTES # BLD AUTO: 2.6 % — LOW (ref 20.5–51.1)
MAGNESIUM SERPL-MCNC: 2.4 MG/DL — SIGNIFICANT CHANGE UP (ref 1.8–2.4)
MAGNESIUM SERPL-MCNC: 2.4 MG/DL — SIGNIFICANT CHANGE UP (ref 1.8–2.4)
MCHC RBC-ENTMCNC: 28.9 PG — SIGNIFICANT CHANGE UP (ref 27–31)
MCHC RBC-ENTMCNC: 29.5 PG — SIGNIFICANT CHANGE UP (ref 27–31)
MCHC RBC-ENTMCNC: 33.4 G/DL — SIGNIFICANT CHANGE UP (ref 32–37)
MCHC RBC-ENTMCNC: 34 G/DL — SIGNIFICANT CHANGE UP (ref 32–37)
MCV RBC AUTO: 86.4 FL — SIGNIFICANT CHANGE UP (ref 80–94)
MCV RBC AUTO: 86.9 FL — SIGNIFICANT CHANGE UP (ref 80–94)
MONOCYTES # BLD AUTO: 0.97 K/UL — HIGH (ref 0.1–0.6)
MONOCYTES NFR BLD AUTO: 6.2 % — SIGNIFICANT CHANGE UP (ref 1.7–9.3)
NEUTROPHILS # BLD AUTO: 14 K/UL — HIGH (ref 1.4–6.5)
NEUTROPHILS NFR BLD AUTO: 89.7 % — HIGH (ref 42.2–75.2)
NRBC # BLD: 0 /100 WBCS — SIGNIFICANT CHANGE UP (ref 0–0)
NRBC # BLD: 0 /100 WBCS — SIGNIFICANT CHANGE UP (ref 0–0)
PCO2 BLDA: 40 MMHG — SIGNIFICANT CHANGE UP (ref 38–42)
PH BLDA: 7.44 — HIGH (ref 7.38–7.42)
PHOSPHATE SERPL-MCNC: 7.2 MG/DL — HIGH (ref 2.1–4.9)
PHOSPHATE SERPL-MCNC: 7.5 MG/DL — HIGH (ref 2.1–4.9)
PLATELET # BLD AUTO: 185 K/UL — SIGNIFICANT CHANGE UP (ref 130–400)
PLATELET # BLD AUTO: 192 K/UL — SIGNIFICANT CHANGE UP (ref 130–400)
PO2 BLDA: 90 MMHG — SIGNIFICANT CHANGE UP (ref 78–95)
POTASSIUM SERPL-MCNC: 3.5 MMOL/L — SIGNIFICANT CHANGE UP (ref 3.5–5)
POTASSIUM SERPL-MCNC: 3.7 MMOL/L — SIGNIFICANT CHANGE UP (ref 3.5–5)
POTASSIUM SERPL-SCNC: 3.5 MMOL/L — SIGNIFICANT CHANGE UP (ref 3.5–5)
POTASSIUM SERPL-SCNC: 3.7 MMOL/L — SIGNIFICANT CHANGE UP (ref 3.5–5)
PROT SERPL-MCNC: 5.1 G/DL — LOW (ref 6–8)
RBC # BLD: 3.66 M/UL — LOW (ref 4.7–6.1)
RBC # BLD: 3.67 M/UL — LOW (ref 4.7–6.1)
RBC # FLD: 14 % — SIGNIFICANT CHANGE UP (ref 11.5–14.5)
RBC # FLD: 14.2 % — SIGNIFICANT CHANGE UP (ref 11.5–14.5)
SAO2 % BLDA: 96 % — SIGNIFICANT CHANGE UP (ref 92–96)
SODIUM SERPL-SCNC: 142 MMOL/L — SIGNIFICANT CHANGE UP (ref 135–146)
SODIUM SERPL-SCNC: 144 MMOL/L — SIGNIFICANT CHANGE UP (ref 135–146)
WBC # BLD: 15.6 K/UL — HIGH (ref 4.8–10.8)
WBC # BLD: 17.32 K/UL — HIGH (ref 4.8–10.8)
WBC # FLD AUTO: 15.6 K/UL — HIGH (ref 4.8–10.8)
WBC # FLD AUTO: 17.32 K/UL — HIGH (ref 4.8–10.8)

## 2020-04-12 PROCEDURE — 99291 CRITICAL CARE FIRST HOUR: CPT

## 2020-04-12 PROCEDURE — 93010 ELECTROCARDIOGRAM REPORT: CPT

## 2020-04-12 PROCEDURE — 71045 X-RAY EXAM CHEST 1 VIEW: CPT | Mod: 26

## 2020-04-12 RX ORDER — CEFEPIME 1 G/1
500 INJECTION, POWDER, FOR SOLUTION INTRAMUSCULAR; INTRAVENOUS
Refills: 0 | Status: DISCONTINUED | OUTPATIENT
Start: 2020-04-13 | End: 2020-04-14

## 2020-04-12 RX ORDER — FLUMAZENIL 0.1 MG/ML
0.2 VIAL (ML) INTRAVENOUS ONCE
Refills: 0 | Status: COMPLETED | OUTPATIENT
Start: 2020-04-12 | End: 2020-04-12

## 2020-04-12 RX ORDER — METOPROLOL TARTRATE 50 MG
5 TABLET ORAL ONCE
Refills: 0 | Status: COMPLETED | OUTPATIENT
Start: 2020-04-12 | End: 2020-04-12

## 2020-04-12 RX ORDER — CEFEPIME 1 G/1
1000 INJECTION, POWDER, FOR SOLUTION INTRAMUSCULAR; INTRAVENOUS ONCE
Refills: 0 | Status: COMPLETED | OUTPATIENT
Start: 2020-04-12 | End: 2020-04-12

## 2020-04-12 RX ORDER — METOPROLOL TARTRATE 50 MG
2.5 TABLET ORAL ONCE
Refills: 0 | Status: COMPLETED | OUTPATIENT
Start: 2020-04-12 | End: 2020-04-12

## 2020-04-12 RX ADMIN — Medication 500 MILLIGRAM(S): at 05:05

## 2020-04-12 RX ADMIN — Medication 1334 MILLIGRAM(S): at 08:42

## 2020-04-12 RX ADMIN — Medication 1334 MILLIGRAM(S): at 16:50

## 2020-04-12 RX ADMIN — Medication 10 MILLILITER(S): at 05:04

## 2020-04-12 RX ADMIN — Medication 5 MILLIGRAM(S): at 19:49

## 2020-04-12 RX ADMIN — Medication 1334 MILLIGRAM(S): at 11:17

## 2020-04-12 RX ADMIN — Medication 500 MILLIGRAM(S): at 21:56

## 2020-04-12 RX ADMIN — ETHACRYNIC ACID 25 MILLIGRAM(S): 25 TABLET ORAL at 16:50

## 2020-04-12 RX ADMIN — APIXABAN 5 MILLIGRAM(S): 2.5 TABLET, FILM COATED ORAL at 05:04

## 2020-04-12 RX ADMIN — Medication 60 MILLIGRAM(S): at 22:01

## 2020-04-12 RX ADMIN — Medication 2.5 MILLIGRAM(S): at 23:40

## 2020-04-12 RX ADMIN — Medication 500 MILLIGRAM(S): at 12:00

## 2020-04-12 RX ADMIN — Medication 10 MILLILITER(S): at 12:01

## 2020-04-12 RX ADMIN — PANTOPRAZOLE SODIUM 40 MILLIGRAM(S): 20 TABLET, DELAYED RELEASE ORAL at 11:17

## 2020-04-12 RX ADMIN — Medication 60 MILLIGRAM(S): at 05:06

## 2020-04-12 RX ADMIN — Medication 12.5 MILLIGRAM(S): at 21:59

## 2020-04-12 RX ADMIN — Medication 12.5 MILLIGRAM(S): at 12:01

## 2020-04-12 RX ADMIN — APIXABAN 5 MILLIGRAM(S): 2.5 TABLET, FILM COATED ORAL at 16:50

## 2020-04-12 RX ADMIN — Medication 10 MILLILITER(S): at 21:58

## 2020-04-12 RX ADMIN — Medication 0.2 MILLIGRAM(S): at 14:04

## 2020-04-12 RX ADMIN — ETHACRYNIC ACID 25 MILLIGRAM(S): 25 TABLET ORAL at 05:06

## 2020-04-12 RX ADMIN — CEFEPIME 100 MILLIGRAM(S): 1 INJECTION, POWDER, FOR SOLUTION INTRAMUSCULAR; INTRAVENOUS at 08:40

## 2020-04-12 RX ADMIN — SENNA PLUS 2 TABLET(S): 8.6 TABLET ORAL at 21:56

## 2020-04-12 RX ADMIN — Medication 5 MILLIGRAM(S): at 12:30

## 2020-04-12 RX ADMIN — Medication 60 MILLIGRAM(S): at 12:00

## 2020-04-12 RX ADMIN — Medication 12.5 MILLIGRAM(S): at 05:05

## 2020-04-12 RX ADMIN — ZINC SULFATE TAB 220 MG (50 MG ZINC EQUIVALENT) 220 MILLIGRAM(S): 220 (50 ZN) TAB at 11:17

## 2020-04-12 NOTE — PROGRESS NOTE ADULT - ASSESSMENT
LIZZIE/ ATN/ hyperkalemia/ respiratory failure / covid positive / high BUN  # hold diuretics , patient is 1.5 liters negative   # non oliguric  # ph improving feed nepro, renagel 2/2/2  # remains on pressors   # d/c vit C    # s/p hd yesterday,  and 4/10, no hd today, will assess daily needs   # case discussed with ICU team   # will follow

## 2020-04-12 NOTE — PROGRESS NOTE ADULT - ASSESSMENT
NEURO  Off all sedation  RASS -4    RESP  Acute Hypoxemic Respiratory Failure;  Intubated, /20/40/5 - f/u AM ABG, not resulted yet  COVID-19/Viral Pneumonia;  - worsening diffuse bilateral opacities  - continue, Guaifenisen/DM  - AM CXR    CARD/VASC  Hypotension, tachycardia - continues on Alexander - titrate to MAP > 75  Hx of Afib - continue Eliquis, Cardizem PO, Metoprolol; off Cardizem gtt yesterday  CAD - continue Atorvastatin   Hx of HTN- holding Lisinopril   Daily EKG for QTc    GI/NUTR  - diet: TF @ 50  - PPI  - Senna, Lactulose     /Renal  LIZZIE + uremia - L IJ [place] - HD 4/10, 4/11 - discuss w/ nephro for HD today  [place]ans - UO adequate  on Ethacrynic acid, started on phoslo  - monitor, I&Os  IVL  Hx of BPH  Monitor & replete elytes prn    HEME/ONC  Hgb stable  DVT ppx: on Eliquis    ID  Afebrile  WBC uptrending - start on Cefepime 1g q12h today  COVID-19  - completed all abx, COVID regimen      ENDO  DM2 - holding home Metformin  Hyperglycemia - on insulin gtt, FS q1h    MSK/DERM  - no active issues    DVT PPx: Eliquis    GI PPx: PPI    Disposition: SICU

## 2020-04-12 NOTE — PROGRESS NOTE ADULT - SUBJECTIVE AND OBJECTIVE BOX
intubated/ventilated  s/p hd yesterday         PAST HISTORY  --------------------------------------------------------------------------------  No significant changes to PMH, PSH, FHx, SHx, unless otherwise noted    ALLERGIES & MEDICATIONS  --------------------------------------------------------------------------------  Allergies    Bactrim (Unknown)    Intolerances      Standing Inpatient Medications  apixaban 5 milliGRAM(s) Oral two times a day  ascorbic acid 500 milliGRAM(s) Oral every 8 hours  calcium acetate 1334 milliGRAM(s) Oral three times a day with meals  chlorhexidine 4% Liquid 1 Application(s) Topical <User Schedule>  dextrose 10%. 250 milliLiter(s) IV Continuous <Continuous>  dextrose 5%. 1000 milliLiter(s) IV Continuous <Continuous>  dextrose 50% Injectable 12.5 Gram(s) IV Push once  diltiazem    Tablet 60 milliGRAM(s) Oral every 8 hours  ethacrynic acid 25 milliGRAM(s) Oral every 12 hours  guaifenesin/dextromethorphan  Syrup 10 milliLiter(s) Oral every 8 hours  insulin regular Infusion 1 Unit(s)/Hr IV Continuous <Continuous>  metoprolol tartrate 12.5 milliGRAM(s) Oral every 8 hours  pantoprazole  Injectable 40 milliGRAM(s) IV Push daily  phenylephrine    Infusion 0.2 MICROgram(s)/kG/Min IV Continuous <Continuous>  senna 2 Tablet(s) Oral at bedtime  zinc sulfate 220 milliGRAM(s) Oral daily    PRN Inpatient Medications  acetaminophen  Suppository .. 650 milliGRAM(s) Rectal every 6 hours PRN  dextrose 40% Gel 15 Gram(s) Oral once PRN      VITALS/PHYSICAL EXAM  --------------------------------------------------------------------------------  T(C): 36.8 (04-12-20 @ 02:00), Max: 36.9 (04-11-20 @ 16:00)  HR: 102 (04-12-20 @ 04:00) (79 - 103)  BP: 167/70 (04-12-20 @ 04:00) (114/65 - 174/88)  RR: 20 (04-12-20 @ 04:00) (10 - 23)  SpO2: 97% (04-12-20 @ 04:00) (97% - 100%)  Wt(kg): --        04-10-20 @ 07:01  -  04-11-20 @ 07:00  --------------------------------------------------------  IN: 1214.2 mL / OUT: 2760 mL / NET: -1545.8 mL    04-11-20 @ 07:01  -  04-12-20 @ 05:04  --------------------------------------------------------  IN: 659.3 mL / OUT: 2720 mL / NET: -2060.7 mL      Physical Exam:  	Gen: intubated/ventilated  	Vascular access: amelia     LABS/STUDIES  --------------------------------------------------------------------------------              10.8   15.60 >-----------<  192      [04-12-20 @ 01:10]              31.8     142  |  99  |  102  ----------------------------<  153      [04-12-20 @ 01:10]  3.5   |  25  |  4.8        Ca     8.2     [04-12-20 @ 01:10]      Mg     2.4     [04-12-20 @ 01:10]      Phos  7.2     [04-12-20 @ 01:10]    TPro  5.1  /  Alb  2.5  /  TBili  1.7  /  DBili  x   /  AST  34  /  ALT  48  /  AlkPhos  95  [04-12-20 @ 01:10]    PT/INR: PT 17.30, INR 1.50       [04-10-20 @ 23:00]  PTT: 31.8       [04-10-20 @ 23:00]          [04-10-20 @ 23:00]    Creatinine Trend:  SCr 4.8 [04-12 @ 01:10]  SCr 4.4 [04-11 @ 17:35]  SCr 6.2 [04-10 @ 23:00]  SCr 7.2 [04-10 @ 19:00]  SCr 6.8 [04-10 @ 00:54]    Urinalysis - [03-30-20 @ 22:00]      Color Yellow / Appearance Clear / SG 1.031 / pH 6.0      Gluc Negative / Ketone Trace  / Bili Negative / Urobili <2 mg/dL       Blood Moderate / Protein 300 mg/dL / Leuk Est Negative / Nitrite Negative      RBC 10 / WBC 29 / Hyaline 24 / Gran  / Sq Epi  / Non Sq Epi 22 / Bacteria Negative      Ferritin 1821      [04-10-20 @ 19:10]  HbA1c 7.7      [04-01-20 @ 04:30]

## 2020-04-12 NOTE — PROGRESS NOTE ADULT - SUBJECTIVE AND OBJECTIVE BOX
KONSTANTIN WYATT  [id number]  74y Male    Indication for ICU admission:  Acute Hypoxemic Respiratory Failure  clinical COVID19 (false negatvie)    Admit Date:20  ICU Date: 20  OR Date:    Bactrim (Unknown)    PAST MEDICAL & SURGICAL HISTORY:  Afib  DM (diabetes mellitus)  BPH (benign prostatic hyperplasia)  CAD (coronary artery disease)    Home Medications:  Cartia  mg/24 hours oral capsule, extended release: 1 cap(s) orally once a day (30 Mar 2020 18:04)  Eliquis 5 mg oral tablet: 1 tab(s) orally 2 times a day (30 Mar 2020 18:04)  Lipitor 40 mg oral tablet: 1 tab(s) orally once a day (30 Mar 2020 18:04)  lisinopril 40 mg oral tablet: 1 tab(s) orally once a day (30 Mar 2020 18:04)  metFORMIN 750 mg oral tablet, extended release: 1 tab(s) orally once a day (30 Mar 2020 18:04)      24HRS EVENT: No events overnight    NEURO  Off all sedation  RASS -4, pupils sluggish, reactive  Pain control: Acetaminophen 650mg PO q6 PRN    RESP  Acute Hypoxemic Respiratory Failure;  - Intubated: /20/40/5  ABG : PENDING _____  COVID-19/Viral Pneumonia;  - CXR: unchanged diffuse bilateral opacities  - on Guaifenisen    CARD/VASC  Acute tachycardia - off Cardizem gtt , on PO Cardizem 60mg q8h,   Acute hypotension - on Alexander  Hx of Afib - on Apixaban, Diltiazem   Hx of CAD - Atorvastatin 40mg PO qhs  Hx of HTN - hold Lisinopril   daily EKG - Afib  QTC: 462    GI/NUTR  - Diet: TF (glucerna) @ 50mL/hr  - PPI  - Senna, Lactulose   - last BM  overnight ()  - daily weight (4/10) 98.7kg, () 98.7, () 98.1kg    /Renal  LIZZIE; worsening, BUN / Cr: 102/4.8  IVL  Evans, non-oliguric renal failure UOP: 100-200cc/hr  on Ethacrynic acid 25mg BID  d/c'ed sodium bicarb 650q6h  (4/10) L IJ [place] placed for HD (kept even 4/10, )  Electrolytes: Na 142 / K 3.5 / Mg 2.4 / Phos 7.2  Hx of BPH    HEME/ONC  Hgb stable 10.8  on Eliquis    ID  afebrukeK 99.0  WBC 15 from 11  COVID-19 +  - off all antibiotics  - completed course of Hydroxychloroquine & Azithromycin, Cefepime  - Ascorbic acid, Zinc  - D-dimer uptrending 11,529 >13090>8421>5852, , ESR 60     ENDO  DM2; uncontrolled, holding home Metformin  on insulin gtt     MSK/DERM  - no active issues    DVT PPx  - on Apixaban     GI PPx  -  Pantoprazole 40mg PO qac      ***Tubes/Lines/Drains  ***  Peripheral IV  Central Venous Line  Right IJ TLC  	Date 3/31/20  Arterial Line  Right radial A-line		                Date: 3/31/20  Urnary Catheter		Indication: Strict I&O    Date Placed: 3/31/20  ETT  OGT    REVIEW OF SYSTEMS    [ ] A ten-point review of systems was otherwise negative except as noted.  [x ] Due to altered mental status/intubation, subjective information were not able to be obtained from the patient. History was obtained, to the extent possible, from review of the chart and collateral sources of information.    Daily     Daily Weight in k.1 (2020 04:00)    Diet, NPO with Tube Feed:   Tube Feeding Modality: Orogastric  Glucerna 1.2 Choco  Total Volume for 24 Hours (mL): 1200  Continuous  Starting Tube Feed Rate mL per Hour: 30  Increase Tube Feed Rate by (mL): 10     Every hour  Until Goal Tube Feed Rate (mL per Hour): 50  Tube Feed Duration (in Hours): 24  Tube Feed Start Time: 13:00 (20 @ 17:56)      CURRENT MEDS:  Neurologic Medications  acetaminophen  Suppository .. 650 milliGRAM(s) Rectal every 6 hours PRN Temp greater or equal to 38.5C (101.3F)    Respiratory Medications  guaifenesin/dextromethorphan  Syrup 10 milliLiter(s) Oral every 8 hours    Cardiovascular Medications  diltiazem    Tablet 60 milliGRAM(s) Oral every 8 hours  ethacrynic acid 25 milliGRAM(s) Oral every 12 hours  metoprolol tartrate 12.5 milliGRAM(s) Oral every 8 hours  phenylephrine    Infusion 0.2 MICROgram(s)/kG/Min IV Continuous <Continuous>    Gastrointestinal Medications  ascorbic acid 500 milliGRAM(s) Oral every 8 hours  calcium acetate 1334 milliGRAM(s) Oral three times a day with meals  dextrose 10%. 250 milliLiter(s) IV Continuous <Continuous>  dextrose 5%. 1000 milliLiter(s) IV Continuous <Continuous>  pantoprazole  Injectable 40 milliGRAM(s) IV Push daily  senna 2 Tablet(s) Oral at bedtime  zinc sulfate 220 milliGRAM(s) Oral daily    Genitourinary Medications    Hematologic/Oncologic Medications  apixaban 5 milliGRAM(s) Oral two times a day    Antimicrobial/Immunologic Medications    Endocrine/Metabolic Medications  dextrose 40% Gel 15 Gram(s) Oral once PRN Blood Glucose LESS THAN 70 milliGRAM(s)/deciliter  dextrose 50% Injectable 12.5 Gram(s) IV Push once  insulin regular Infusion 1 Unit(s)/Hr IV Continuous <Continuous>    Topical/Other Medications  chlorhexidine 4% Liquid 1 Application(s) Topical <User Schedule>      ICU Vital Signs Last 24 Hrs  T(C): 37.4 (2020 09:14), Max: 37.4 (2020 09:14)  T(F): 99.3 (2020 09:14), Max: 99.3 (2020 09:14)  HR: 106 (2020 10:10) (79 - 119)  BP: 135/65 (2020 06:30) (114/65 - 174/88)  BP(mean): --  ABP: 95/55 (2020 09:14) (95/55 - 167/47)  ABP(mean): 69 (2020 09:14) (56 - 74)  RR: 26 (2020 09:14) (10 - 26)  SpO2: 99% (2020 10:10) (95% - 100%)        Mode: AC/ CMV (Assist Control/ Continuous Mandatory Ventilation)  RR (machine): 20  TV (machine): 450  FiO2: 40  PEEP: 5  MAP: 15  PIP: 30    ABG - ( 2020 14:19 )  pH, Arterial: 7.46  pH, Blood: x     /  pCO2: 39    /  pO2: 79    / HCO3: 28    / Base Excess: 3.5   /  SaO2: 95                  I&O's Summary    2020 07:  -  2020 07:00  --------------------------------------------------------  IN: 1106.8 mL / OUT: 3395 mL / NET: -2288.2 mL    :  -  2020 10:30  --------------------------------------------------------  IN: 246.2 mL / OUT: 605 mL / NET: -358.8 mL      I&O's Detail    2020 07:  -  2020 07:00  --------------------------------------------------------  IN:    diltiazem Infusion: 51 mL    Glucerna: 750 mL    insulin regular Infusion: 49 mL    phenylephrine   Infusion: 256.8 mL  Total IN: 1106.8 mL    OUT:    Indwelling Catheter - Urethral: 3395 mL  Total OUT: 3395 mL    Total NET: -2288.2 mL      2020 07:  -  2020 10:30  --------------------------------------------------------  IN:    Glucerna: 200 mL    insulin regular Infusion: 7 mL    phenylephrine   Infusion: 39.2 mL  Total IN: 246.2 mL    OUT:    Indwelling Catheter - Urethral: 605 mL  Total OUT: 605 mL    Total NET: -358.8 mL          PHYSICAL EXAM:    General/Neuro  RASS:    -4  Pupils: PERRLA    Lungs:      clear to auscultation, Normal expansion/effort.     Cardiovascular : S1, S2.  Regular rate.  Cardiac Rhythm: Afib    GI: Abdomen soft, Non-tender, Non-distended.    OGT    Extremities: Extremities warm, pink, well-perfused.     Derm: Good skin turgor, no skin breakdown.      :       Evans catheter in place.      CXR:       LABS:  CAPILLARY BLOOD GLUCOSE      POCT Blood Glucose.: 199 mg/dL (2020 09:03)  POCT Blood Glucose.: 179 mg/dL (2020 06:15)  POCT Blood Glucose.: 156 mg/dL (2020 05:14)  POCT Blood Glucose.: 107 mg/dL (2020 04:14)  POCT Blood Glucose.: 125 mg/dL (2020 03:22)  POCT Blood Glucose.: 124 mg/dL (2020 01:55)  POCT Blood Glucose.: 143 mg/dL (2020 00:53)  POCT Blood Glucose.: 156 mg/dL (2020 23:39)  POCT Blood Glucose.: 162 mg/dL (2020 22:31)  POCT Blood Glucose.: 193 mg/dL (2020 21:01)  POCT Blood Glucose.: 148 mg/dL (2020 20:10)  POCT Blood Glucose.: 143 mg/dL (2020 17:04)  POCT Blood Glucose.: 137 mg/dL (2020 15:22)  POCT Blood Glucose.: 120 mg/dL (2020 13:36)                          10.8   15.60 )-----------( 192      ( 2020 01:10 )             31.8         04-12    142  |  99  |  102<HH>  ----------------------------<  153<H>  3.5   |  25  |  4.8<HH>    Ca    8.2<L>      2020 01:10  Phos  7.2     04-12  Mg     2.4     04-12    TPro  5.1<L>  /  Alb  2.5<L>  /  TBili  1.7<H>  /  DBili  x   /  AST  34  /  ALT  48<H>  /  AlkPhos  95  04-12      PT/INR - ( 10 Apr 2020 23:00 )   PT: 17.30 sec;   INR: 1.50 ratio         PTT - ( 10 Apr 2020 23:00 )  PTT:31.8 sec

## 2020-04-13 LAB
ANION GAP SERPL CALC-SCNC: 20 MMOL/L — HIGH (ref 7–14)
ANION GAP SERPL CALC-SCNC: 20 MMOL/L — HIGH (ref 7–14)
ANION GAP SERPL CALC-SCNC: 21 MMOL/L — HIGH (ref 7–14)
APTT BLD: 30.8 SEC — SIGNIFICANT CHANGE UP (ref 27–39.2)
APTT BLD: 31.3 SEC — SIGNIFICANT CHANGE UP (ref 27–39.2)
BASE EXCESS BLDA CALC-SCNC: 4.4 MMOL/L — HIGH (ref -2–2)
BASOPHILS # BLD AUTO: 0.02 K/UL — SIGNIFICANT CHANGE UP (ref 0–0.2)
BASOPHILS # BLD AUTO: 0.03 K/UL — SIGNIFICANT CHANGE UP (ref 0–0.2)
BASOPHILS NFR BLD AUTO: 0.1 % — SIGNIFICANT CHANGE UP (ref 0–1)
BASOPHILS NFR BLD AUTO: 0.1 % — SIGNIFICANT CHANGE UP (ref 0–1)
BUN SERPL-MCNC: 118 MG/DL — CRITICAL HIGH (ref 10–20)
BUN SERPL-MCNC: 125 MG/DL — CRITICAL HIGH (ref 10–20)
BUN SERPL-MCNC: 134 MG/DL — CRITICAL HIGH (ref 10–20)
CALCIUM SERPL-MCNC: 8.5 MG/DL — SIGNIFICANT CHANGE UP (ref 8.5–10.1)
CALCIUM SERPL-MCNC: 8.6 MG/DL — SIGNIFICANT CHANGE UP (ref 8.5–10.1)
CALCIUM SERPL-MCNC: 8.6 MG/DL — SIGNIFICANT CHANGE UP (ref 8.5–10.1)
CHLORIDE SERPL-SCNC: 97 MMOL/L — LOW (ref 98–110)
CHLORIDE SERPL-SCNC: 98 MMOL/L — SIGNIFICANT CHANGE UP (ref 98–110)
CHLORIDE SERPL-SCNC: 99 MMOL/L — SIGNIFICANT CHANGE UP (ref 98–110)
CO2 SERPL-SCNC: 25 MMOL/L — SIGNIFICANT CHANGE UP (ref 17–32)
CO2 SERPL-SCNC: 26 MMOL/L — SIGNIFICANT CHANGE UP (ref 17–32)
CO2 SERPL-SCNC: 27 MMOL/L — SIGNIFICANT CHANGE UP (ref 17–32)
CREAT SERPL-MCNC: 5.6 MG/DL — CRITICAL HIGH (ref 0.7–1.5)
CREAT SERPL-MCNC: 5.7 MG/DL — CRITICAL HIGH (ref 0.7–1.5)
CREAT SERPL-MCNC: 5.9 MG/DL — CRITICAL HIGH (ref 0.7–1.5)
EOSINOPHIL # BLD AUTO: 0.02 K/UL — SIGNIFICANT CHANGE UP (ref 0–0.7)
EOSINOPHIL # BLD AUTO: 0.02 K/UL — SIGNIFICANT CHANGE UP (ref 0–0.7)
EOSINOPHIL NFR BLD AUTO: 0.1 % — SIGNIFICANT CHANGE UP (ref 0–8)
EOSINOPHIL NFR BLD AUTO: 0.1 % — SIGNIFICANT CHANGE UP (ref 0–8)
GLUCOSE BLDC GLUCOMTR-MCNC: 143 MG/DL — HIGH (ref 70–99)
GLUCOSE BLDC GLUCOMTR-MCNC: 150 MG/DL — HIGH (ref 70–99)
GLUCOSE BLDC GLUCOMTR-MCNC: 152 MG/DL — HIGH (ref 70–99)
GLUCOSE BLDC GLUCOMTR-MCNC: 161 MG/DL — HIGH (ref 70–99)
GLUCOSE BLDC GLUCOMTR-MCNC: 166 MG/DL — HIGH (ref 70–99)
GLUCOSE BLDC GLUCOMTR-MCNC: 168 MG/DL — HIGH (ref 70–99)
GLUCOSE BLDC GLUCOMTR-MCNC: 170 MG/DL — HIGH (ref 70–99)
GLUCOSE BLDC GLUCOMTR-MCNC: 170 MG/DL — HIGH (ref 70–99)
GLUCOSE BLDC GLUCOMTR-MCNC: 176 MG/DL — HIGH (ref 70–99)
GLUCOSE BLDC GLUCOMTR-MCNC: 184 MG/DL — HIGH (ref 70–99)
GLUCOSE BLDC GLUCOMTR-MCNC: 184 MG/DL — HIGH (ref 70–99)
GLUCOSE BLDC GLUCOMTR-MCNC: 194 MG/DL — HIGH (ref 70–99)
GLUCOSE BLDC GLUCOMTR-MCNC: 214 MG/DL — HIGH (ref 70–99)
GLUCOSE BLDC GLUCOMTR-MCNC: 67 MG/DL — LOW (ref 70–99)
GLUCOSE BLDC GLUCOMTR-MCNC: 67 MG/DL — LOW (ref 70–99)
GLUCOSE SERPL-MCNC: 174 MG/DL — HIGH (ref 70–99)
GLUCOSE SERPL-MCNC: 177 MG/DL — HIGH (ref 70–99)
GLUCOSE SERPL-MCNC: 189 MG/DL — HIGH (ref 70–99)
HCO3 BLDA-SCNC: 28 MMOL/L — SIGNIFICANT CHANGE UP (ref 21–29)
HCT VFR BLD CALC: 32.4 % — LOW (ref 42–52)
HCT VFR BLD CALC: 32.5 % — LOW (ref 42–52)
HGB BLD-MCNC: 10.9 G/DL — LOW (ref 14–18)
HGB BLD-MCNC: 11.4 G/DL — LOW (ref 14–18)
HOROWITZ INDEX BLDA+IHG-RTO: 21 — SIGNIFICANT CHANGE UP
IMM GRANULOCYTES NFR BLD AUTO: 0.9 % — HIGH (ref 0.1–0.3)
IMM GRANULOCYTES NFR BLD AUTO: 0.9 % — HIGH (ref 0.1–0.3)
INR BLD: 1.55 RATIO — HIGH (ref 0.65–1.3)
INR BLD: 1.59 RATIO — HIGH (ref 0.65–1.3)
LYMPHOCYTES # BLD AUTO: 0.65 K/UL — LOW (ref 1.2–3.4)
LYMPHOCYTES # BLD AUTO: 0.67 K/UL — LOW (ref 1.2–3.4)
LYMPHOCYTES # BLD AUTO: 3.2 % — LOW (ref 20.5–51.1)
LYMPHOCYTES # BLD AUTO: 3.2 % — LOW (ref 20.5–51.1)
MAGNESIUM SERPL-MCNC: 2.4 MG/DL — SIGNIFICANT CHANGE UP (ref 1.8–2.4)
MAGNESIUM SERPL-MCNC: 2.5 MG/DL — HIGH (ref 1.8–2.4)
MAGNESIUM SERPL-MCNC: 2.6 MG/DL — HIGH (ref 1.8–2.4)
MCHC RBC-ENTMCNC: 29.2 PG — SIGNIFICANT CHANGE UP (ref 27–31)
MCHC RBC-ENTMCNC: 30.4 PG — SIGNIFICANT CHANGE UP (ref 27–31)
MCHC RBC-ENTMCNC: 33.5 G/DL — SIGNIFICANT CHANGE UP (ref 32–37)
MCHC RBC-ENTMCNC: 35.2 G/DL — SIGNIFICANT CHANGE UP (ref 32–37)
MCV RBC AUTO: 86.4 FL — SIGNIFICANT CHANGE UP (ref 80–94)
MCV RBC AUTO: 87.1 FL — SIGNIFICANT CHANGE UP (ref 80–94)
MONOCYTES # BLD AUTO: 1.22 K/UL — HIGH (ref 0.1–0.6)
MONOCYTES # BLD AUTO: 1.35 K/UL — HIGH (ref 0.1–0.6)
MONOCYTES NFR BLD AUTO: 6 % — SIGNIFICANT CHANGE UP (ref 1.7–9.3)
MONOCYTES NFR BLD AUTO: 6.5 % — SIGNIFICANT CHANGE UP (ref 1.7–9.3)
NEUTROPHILS # BLD AUTO: 18.31 K/UL — HIGH (ref 1.4–6.5)
NEUTROPHILS # BLD AUTO: 18.42 K/UL — HIGH (ref 1.4–6.5)
NEUTROPHILS NFR BLD AUTO: 89.2 % — HIGH (ref 42.2–75.2)
NEUTROPHILS NFR BLD AUTO: 89.7 % — HIGH (ref 42.2–75.2)
NRBC # BLD: 0 /100 WBCS — SIGNIFICANT CHANGE UP (ref 0–0)
NRBC # BLD: 0 /100 WBCS — SIGNIFICANT CHANGE UP (ref 0–0)
PCO2 BLDA: 38 MMHG — SIGNIFICANT CHANGE UP (ref 38–42)
PH BLDA: 7.48 — HIGH (ref 7.38–7.42)
PHOSPHATE SERPL-MCNC: 7.8 MG/DL — HIGH (ref 2.1–4.9)
PHOSPHATE SERPL-MCNC: 8 MG/DL — HIGH (ref 2.1–4.9)
PHOSPHATE SERPL-MCNC: 8.2 MG/DL — HIGH (ref 2.1–4.9)
PLATELET # BLD AUTO: 178 K/UL — SIGNIFICANT CHANGE UP (ref 130–400)
PLATELET # BLD AUTO: 183 K/UL — SIGNIFICANT CHANGE UP (ref 130–400)
PO2 BLDA: 104 MMHG — HIGH (ref 78–95)
POTASSIUM SERPL-MCNC: 3.2 MMOL/L — LOW (ref 3.5–5)
POTASSIUM SERPL-MCNC: 3.3 MMOL/L — LOW (ref 3.5–5)
POTASSIUM SERPL-MCNC: 3.4 MMOL/L — LOW (ref 3.5–5)
POTASSIUM SERPL-SCNC: 3.2 MMOL/L — LOW (ref 3.5–5)
POTASSIUM SERPL-SCNC: 3.3 MMOL/L — LOW (ref 3.5–5)
POTASSIUM SERPL-SCNC: 3.4 MMOL/L — LOW (ref 3.5–5)
PROTHROM AB SERPL-ACNC: 17.8 SEC — HIGH (ref 9.95–12.87)
PROTHROM AB SERPL-ACNC: 18.3 SEC — HIGH (ref 9.95–12.87)
RBC # BLD: 3.73 M/UL — LOW (ref 4.7–6.1)
RBC # BLD: 3.75 M/UL — LOW (ref 4.7–6.1)
RBC # FLD: 13.7 % — SIGNIFICANT CHANGE UP (ref 11.5–14.5)
RBC # FLD: 13.8 % — SIGNIFICANT CHANGE UP (ref 11.5–14.5)
SAO2 % BLDA: 97 % — HIGH (ref 92–96)
SODIUM SERPL-SCNC: 143 MMOL/L — SIGNIFICANT CHANGE UP (ref 135–146)
SODIUM SERPL-SCNC: 145 MMOL/L — SIGNIFICANT CHANGE UP (ref 135–146)
SODIUM SERPL-SCNC: 145 MMOL/L — SIGNIFICANT CHANGE UP (ref 135–146)
WBC # BLD: 20.4 K/UL — HIGH (ref 4.8–10.8)
WBC # BLD: 20.68 K/UL — HIGH (ref 4.8–10.8)
WBC # FLD AUTO: 20.4 K/UL — HIGH (ref 4.8–10.8)
WBC # FLD AUTO: 20.68 K/UL — HIGH (ref 4.8–10.8)

## 2020-04-13 PROCEDURE — 99291 CRITICAL CARE FIRST HOUR: CPT

## 2020-04-13 PROCEDURE — 93010 ELECTROCARDIOGRAM REPORT: CPT

## 2020-04-13 PROCEDURE — 71045 X-RAY EXAM CHEST 1 VIEW: CPT | Mod: 26

## 2020-04-13 RX ORDER — DILTIAZEM HCL 120 MG
5 CAPSULE, EXT RELEASE 24 HR ORAL ONCE
Refills: 0 | Status: COMPLETED | OUTPATIENT
Start: 2020-04-13 | End: 2020-04-13

## 2020-04-13 RX ORDER — METOPROLOL TARTRATE 50 MG
5 TABLET ORAL ONCE
Refills: 0 | Status: COMPLETED | OUTPATIENT
Start: 2020-04-13 | End: 2020-04-13

## 2020-04-13 RX ORDER — PROPOFOL 10 MG/ML
3 INJECTION, EMULSION INTRAVENOUS ONCE
Refills: 0 | Status: COMPLETED | OUTPATIENT
Start: 2020-04-13 | End: 2020-04-13

## 2020-04-13 RX ORDER — CHLORHEXIDINE GLUCONATE 213 G/1000ML
15 SOLUTION TOPICAL EVERY 12 HOURS
Refills: 0 | Status: DISCONTINUED | OUTPATIENT
Start: 2020-04-13 | End: 2020-04-21

## 2020-04-13 RX ORDER — SODIUM CHLORIDE 9 MG/ML
1000 INJECTION, SOLUTION INTRAVENOUS
Refills: 0 | Status: DISCONTINUED | OUTPATIENT
Start: 2020-04-13 | End: 2020-04-14

## 2020-04-13 RX ORDER — PROPOFOL 10 MG/ML
5 INJECTION, EMULSION INTRAVENOUS ONCE
Refills: 0 | Status: COMPLETED | OUTPATIENT
Start: 2020-04-13 | End: 2020-04-13

## 2020-04-13 RX ORDER — DILTIAZEM HCL 120 MG
24 CAPSULE, EXT RELEASE 24 HR ORAL ONCE
Refills: 0 | Status: DISCONTINUED | OUTPATIENT
Start: 2020-04-13 | End: 2020-04-13

## 2020-04-13 RX ORDER — POTASSIUM CHLORIDE 20 MEQ
20 PACKET (EA) ORAL ONCE
Refills: 0 | Status: COMPLETED | OUTPATIENT
Start: 2020-04-13 | End: 2020-04-13

## 2020-04-13 RX ORDER — METOPROLOL TARTRATE 50 MG
2.5 TABLET ORAL ONCE
Refills: 0 | Status: COMPLETED | OUTPATIENT
Start: 2020-04-13 | End: 2020-04-13

## 2020-04-13 RX ORDER — METOPROLOL TARTRATE 50 MG
25 TABLET ORAL EVERY 8 HOURS
Refills: 0 | Status: DISCONTINUED | OUTPATIENT
Start: 2020-04-13 | End: 2020-04-14

## 2020-04-13 RX ORDER — PROPOFOL 10 MG/ML
5 INJECTION, EMULSION INTRAVENOUS
Qty: 1000 | Refills: 0 | Status: DISCONTINUED | OUTPATIENT
Start: 2020-04-13 | End: 2020-04-14

## 2020-04-13 RX ADMIN — Medication 25 MILLIGRAM(S): at 21:22

## 2020-04-13 RX ADMIN — Medication 500 MILLIGRAM(S): at 05:53

## 2020-04-13 RX ADMIN — Medication 60 MILLIGRAM(S): at 05:56

## 2020-04-13 RX ADMIN — CHLORHEXIDINE GLUCONATE 1 APPLICATION(S): 213 SOLUTION TOPICAL at 07:00

## 2020-04-13 RX ADMIN — SENNA PLUS 2 TABLET(S): 8.6 TABLET ORAL at 21:22

## 2020-04-13 RX ADMIN — APIXABAN 5 MILLIGRAM(S): 2.5 TABLET, FILM COATED ORAL at 18:03

## 2020-04-13 RX ADMIN — ETHACRYNIC ACID 25 MILLIGRAM(S): 25 TABLET ORAL at 05:56

## 2020-04-13 RX ADMIN — Medication 5 MILLIGRAM(S): at 06:59

## 2020-04-13 RX ADMIN — ETHACRYNIC ACID 25 MILLIGRAM(S): 25 TABLET ORAL at 18:15

## 2020-04-13 RX ADMIN — Medication 500 MILLIGRAM(S): at 21:22

## 2020-04-13 RX ADMIN — Medication 60 MILLIGRAM(S): at 21:23

## 2020-04-13 RX ADMIN — PANTOPRAZOLE SODIUM 40 MILLIGRAM(S): 20 TABLET, DELAYED RELEASE ORAL at 12:44

## 2020-04-13 RX ADMIN — Medication 500 MILLIGRAM(S): at 13:06

## 2020-04-13 RX ADMIN — ZINC SULFATE TAB 220 MG (50 MG ZINC EQUIVALENT) 220 MILLIGRAM(S): 220 (50 ZN) TAB at 12:45

## 2020-04-13 RX ADMIN — PROPOFOL 5 MILLIGRAM(S): 10 INJECTION, EMULSION INTRAVENOUS at 18:55

## 2020-04-13 RX ADMIN — Medication 12.5 MILLIGRAM(S): at 05:53

## 2020-04-13 RX ADMIN — Medication 1334 MILLIGRAM(S): at 09:14

## 2020-04-13 RX ADMIN — Medication 100 MILLIEQUIVALENT(S): at 18:59

## 2020-04-13 RX ADMIN — Medication 2.5 MILLIGRAM(S): at 20:31

## 2020-04-13 RX ADMIN — Medication 12.5 MILLIGRAM(S): at 13:03

## 2020-04-13 RX ADMIN — PROPOFOL 3 MILLIGRAM(S): 10 INJECTION, EMULSION INTRAVENOUS at 02:47

## 2020-04-13 RX ADMIN — Medication 10 MILLILITER(S): at 21:22

## 2020-04-13 RX ADMIN — Medication 10 MILLILITER(S): at 13:06

## 2020-04-13 RX ADMIN — Medication 1334 MILLIGRAM(S): at 18:00

## 2020-04-13 RX ADMIN — Medication 10 MILLILITER(S): at 05:52

## 2020-04-13 RX ADMIN — Medication 60 MILLIGRAM(S): at 13:04

## 2020-04-13 RX ADMIN — APIXABAN 5 MILLIGRAM(S): 2.5 TABLET, FILM COATED ORAL at 05:53

## 2020-04-13 RX ADMIN — Medication 1334 MILLIGRAM(S): at 12:44

## 2020-04-13 RX ADMIN — Medication 5 MILLIGRAM(S): at 18:50

## 2020-04-13 RX ADMIN — CEFEPIME 100 MILLIGRAM(S): 1 INJECTION, POWDER, FOR SOLUTION INTRAMUSCULAR; INTRAVENOUS at 18:16

## 2020-04-13 RX ADMIN — Medication 650 MILLIGRAM(S): at 20:51

## 2020-04-13 RX ADMIN — PROPOFOL 2.85 MICROGRAM(S)/KG/MIN: 10 INJECTION, EMULSION INTRAVENOUS at 18:55

## 2020-04-13 RX ADMIN — CHLORHEXIDINE GLUCONATE 15 MILLILITER(S): 213 SOLUTION TOPICAL at 17:59

## 2020-04-13 NOTE — CHART NOTE - NSCHARTNOTEFT_GEN_A_CORE
Registered Dietitian Follow-Up     Patient Profile Reviewed                           Yes [x]   No []     Nutrition History Previously Obtained        Yes []  No [x]-unable to obtain at this time as pt is intubated        Pertinent Medical Interventions:   1. LIZZIE/ ATN  --per Nephro: hold diuretics as pt 1.5L negative; non-oliguric; d/c vitamin C. [place] in place, HD on 4/10 and 4/11.   2. Acute Hypoxemic Respiratory Failure 2/2 COVID PNA  --remains intubated, off sedation and pressor support    --worsening diffuse b/l opacities. afebrile. leukocytosis starting on cefepime   3. Hypotension, tachycardia  4. h/o Afib, off cardizem gtt yesterday  5. Hyperglycermia--insulin gtt in place      Diet order: Glucerna 1.2 @ 50ml/hr (1440kcal, 72gm pro, 966ml free water)--TF resumed but not per RD recommendations. Running at goal rate of 50mL/hr and tolerating without further vomiting or intolerance noted. Despite tolerance, recommend lower fat/lower fiber formula to optimize tolerance. MAP today of 77 & 64.      Anthropometrics:  - Ht. 69"  - Wt. 95.4kg (4/13), stable to admit wt. shifts likely d/t previous diuretic use and HD over weekend. will continue to monitor   (4/9) 105.2kg  (4/4): 104.2kg   (4/3): 103.3kg   (4/2): 99kg   (3/31): 95kg   - BMI: 31.0   - IBW: 160#      Pertinent Lab Data: (4/13): WBC 20.40, RBC 3.73, H/H 10.9/32.5, POCT 184/170/176/170/194, glucose 189, K+ 3.4, , Cr 5.9, GFR 9, PO4 8.0     Pertinent Meds: eliquis, cefepime, insulin, phoslo, metoprolol, ascorbic acid, protonix, senna, zinc sulfate   Physical Findings:  - Appearance: intubated/ventilated  - GI function: LBM 4/10   - Tubes: OGT   - Oral/Mouth cavity: NPO  - Skin: sDTI to sacrum. 4+ generalized edema      Nutrition Requirements  Weight Used: lowest wt this adm: 95kg, Ve: 11.6, Tmax: 37.8, [id number]: 2055; needs updated from RD note on 4/6 as pt remains intubated but comparable to previous estimate so unchanged      Estimated Energy Needs: ~9020-0960 kcal/day obtained by comparing the following 1238-1445kcal (60-70% of [id number]) vs. 1045-1330kcal (11-14 kcal/kg ABW) vs. 1606-1825kcal (22-25 kcal/kg IBW)  Estimated Protein Needs: 110-146 gm/day (1.2-1.5 gm/kg IBW)--no note of need for further HD so protein needs will remain. will monitor and adjust based on POC.   Estimated Fluid Needs: per SICU team      Nutrient Intake: TF providing 87% est kcal needs and 49% est. protein needs      Previous Nutrition Diagnosis: Inadequate protein-energy intake (ongoing)      Nutrition Intervention: enteral nutrition, collaboration of care   Recommendations:  1. HOLD TF if pt cannot susptain MAP >65.  2. When pt hemodynamically stable,  initiate the following TF regimen: Peptamen AF @ 25ml/hr, increase rate by 15ml Q6H, until goal rate of 55ml/hr is reached. TF @ goal rate to provide a total of 1584kcal, 100gm pro, 1071ml free water. Additional flushes per LIP. Recs discussed with LIP ([id number]).   3. D/C zinc sulfate today as it has been in place [id number].   4. Monitor serum PO4 and correct PRN.  **Recommend hydrolyzed formula such as Peptamen AF, as it is designed to support absorption and tolerance.**     Goal/Expected Outcome: Pt to meet % of estimated nutrient needs within 3 days      Indicator/Monitoring: RD to monitor diet order, energy intake, body composition, renal/glucose/liver profiles, NFPF.

## 2020-04-13 NOTE — PROGRESS NOTE ADULT - SUBJECTIVE AND OBJECTIVE BOX
Nephrology progress note    THIS IS AN INCOMPLETE NOTE . FULL NOTE TO FOLLOW SHORTLY    Patient is seen and examined, events over the last 24 h noted .    Allergies:  Bactrim (Unknown)    Hospital Medications:   MEDICATIONS  (STANDING):  apixaban 5 milliGRAM(s) Oral two times a day  ascorbic acid 500 milliGRAM(s) Oral every 8 hours  calcium acetate 1334 milliGRAM(s) Oral three times a day with meals  cefepime   IVPB 500 milliGRAM(s) IV Intermittent <User Schedule>  chlorhexidine 0.12% Liquid 15 milliLiter(s) Oral Mucosa every 12 hours  chlorhexidine 4% Liquid 1 Application(s) Topical <User Schedule>  dextrose 10%. 250 milliLiter(s) (1000 mL/Hr) IV Continuous <Continuous>  dextrose 5%. 1000 milliLiter(s) (50 mL/Hr) IV Continuous <Continuous>  dextrose 50% Injectable 12.5 Gram(s) IV Push once  diltiazem    Tablet 60 milliGRAM(s) Oral every 8 hours  ethacrynic acid 25 milliGRAM(s) Oral every 12 hours  guaifenesin/dextromethorphan  Syrup 10 milliLiter(s) Oral every 8 hours  insulin regular Infusion 1 Unit(s)/Hr (1 mL/Hr) IV Continuous <Continuous>  metoprolol tartrate 12.5 milliGRAM(s) Oral every 8 hours  pantoprazole  Injectable 40 milliGRAM(s) IV Push daily  senna 2 Tablet(s) Oral at bedtime  zinc sulfate 220 milliGRAM(s) Oral daily        VITALS:  T(F): 100 (04-13-20 @ 06:43), Max: 100 (04-13-20 @ 06:43)  HR: 105 (04-13-20 @ 07:42)  BP: --  RR: 25 (04-13-20 @ 06:43)  SpO2: 99% (04-13-20 @ 07:42)  Wt(kg): --    04-11 @ 07:01  -  04-12 @ 07:00  --------------------------------------------------------  IN: 1106.8 mL / OUT: 3395 mL / NET: -2288.2 mL    04-12 @ 07:01 - 04-13 @ 07:00  --------------------------------------------------------  IN: 1734.2 mL / OUT: 3105 mL / NET: -1370.8 mL    04-13 @ 07:01 - 04-13 @ 08:33  --------------------------------------------------------  IN: 52 mL / OUT: 30 mL / NET: 22 mL          PHYSICAL EXAM:  Constitutional: NAD  HEENT: anicteric sclera, oropharynx clear, MMM  Neck: No JVD  Respiratory: CTAB, no wheezes, rales or rhonchi  Cardiovascular: S1, S2, RRR  Gastrointestinal: BS+, soft, NT/ND  Extremities: No cyanosis or clubbing. No peripheral edema  :  No santana.   Skin: No rashes    LABS:  04-13    145  |  99  |  125<HH>  ----------------------------<  189<H>  3.4<L>   |  25  |  5.9<HH>    Ca    8.5      13 Apr 2020 04:30  Phos  8.0     04-13  Mg     2.4     04-13    TPro  5.1<L>  /  Alb  2.5<L>  /  TBili  1.7<H>  /  DBili      /  AST  34  /  ALT  48<H>  /  AlkPhos  95  04-12                          10.9   20.40 )-----------( 178      ( 13 Apr 2020 04:30 )             32.5       Urine Studies:      RADIOLOGY & ADDITIONAL STUDIES:

## 2020-04-13 NOTE — PROGRESS NOTE ADULT - ASSESSMENT
NEURO  Off all sedation  RASS -4    RESP  Acute Hypoxemic Respiratory Failure;  Intubated, /20/40/5 - f/u AM ABG, not resulted yet  COVID-19/Viral Pneumonia;  - worsening diffuse bilateral opacities  - continue, Guaifenisen/DM  - AM CXR    CARD/VASC  Hypotension, tachycardia - continues on Alexander - titrate to MAP > 75  Hx of Afib - continue Eliquis, Cardizem PO, Metoprolol; off Cardizem gtt yesterday  CAD - continue Atorvastatin   Hx of HTN- holding Lisinopril   Daily EKG for QTc    GI/NUTR  - diet: TF @ 50  - PPI  - Senna, Lactulose     /Renal  LIZZIE + uremia - L IJ [place] - HD 4/10, 4/11 - discuss w/ nephro for HD today  Evans - UO adequate  on Ethacrynic acid, started on phoslo  - monitor, I&Os  IVL  Hx of BPH  Monitor & replete elytes prn    HEME/ONC  Hgb stable  DVT ppx: on Eliquis    ID  Afebrile  WBC uptrending - start on Cefepime 1g q12h today  COVID-19  - completed all abx, COVID regimen      ENDO  DM2 - holding home Metformin  Hyperglycemia - on insulin gtt, FS q1h    MSK/DERM  - no active issues    DVT PPx: Eliquis    GI PPx: PPI

## 2020-04-13 NOTE — PROGRESS NOTE ADULT - ASSESSMENT
LIZZIE/ ATN/ hyperkalemia/ respiratory failure / covid positive / high BUN  # hold diuretics , patient is 1.5 liters negative   # non oliguric  # ph noted  feed nepro, on binders  # d/c vit C    # s/p friday and saturday  # BUN noted : highly catabolic and prerenal, d/c diuretics  # increase cefepime to 1 g   # will follow

## 2020-04-13 NOTE — PROGRESS NOTE ADULT - SUBJECTIVE AND OBJECTIVE BOX
KONSTANTIN WYATT  74y ([birth year]) Male  [id number]      HPI:  73 yo M w/ hx of Afib on Eliquis, CAD s/p PCI, DM, BPH, presents with 1 week of fever, cough, and progressive SOB. Admits to watery diarrhea for 3 days. Denies chest pain. 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 He has exposure to COVID positive family member at the wedding. No travel hx. Pt pulse ox in 80s in the field per EMS. Of note, per patient he had recent normal stress test recently.    COVID PCR sent, labs show lymphopenia and transaminitis, CXR shows b/l lung opacity, requiring NRB (30 Mar 2020 16:35)    Home Medications:  Cartia  mg/24 hours oral capsule, extended release: 1 cap(s) orally once a day (30 Mar 2020 18:04)  Eliquis 5 mg oral tablet: 1 tab(s) orally 2 times a day (30 Mar 2020 18:04)  Lipitor 40 mg oral tablet: 1 tab(s) orally once a day (30 Mar 2020 18:04)  lisinopril 40 mg oral tablet: 1 tab(s) orally once a day (30 Mar 2020 18:04)  metFORMIN 750 mg oral tablet, extended release: 1 tab(s) orally once a day (30 Mar 2020 18:04)                      KONSTANTIN WYATT  [id number]  74y Male    Indication for ICU admission:  Acute Hypoxemic Respiratory Failure  clinical COVID19 (false negatvie)    Admit Date:03-30-20  ICU Date: 03-31-20  OR Date:    Bactrim (Unknown)    PAST MEDICAL & SURGICAL HISTORY:  Afib  DM (diabetes mellitus)  BPH (benign prostatic hyperplasia)  CAD (coronary artery disease)    Home Medications:  Cartia  mg/24 hours oral capsule, extended release: 1 cap(s) orally once a day (30 Mar 2020 18:04)  Eliquis 5 mg oral tablet: 1 tab(s) orally 2 times a day (30 Mar 2020 18:04)  Lipitor 40 mg oral tablet: 1 tab(s) orally once a day (30 Mar 2020 18:04)  lisinopril 40 mg oral tablet: 1 tab(s) orally once a day (30 Mar 2020 18:04)  metFORMIN 750 mg oral tablet, extended release: 1 tab(s) orally once a day (30 Mar 2020 18:04)      24HRS EVENT: No events overnight    NEURO  Off all sedation, given Flumenazil 0.2mg x1 (4/12) w/ no response  RASS -4, pupils reactive  Pain control: Acetaminophen 650mg PO q6 PRN    RESP  Acute Hypoxemic Respiratory Failure;  - Intubated: /20/40/5  ABG : 7.48, 36 , 110 , 26 , 98 %   COVID-19/Viral Pneumonia;  - CXR: unchanged diffuse bilateral opacities  - on Guaifenisen    CARD/VASC  Acute tachycardia -  on PO Cardizem 60mg q8h,   Acute hypotension - off Alexander @ 12pm on 4/12  Hx of Afib - on Apixaban, Diltiazem   Hx of CAD - Atorvastatin 40mg PO qhs  Hx of HTN - hold Lisinopril   daily EKG - Afib  QTC: 473     GI/NUTR  - Diet: TF (glucerna) @ 50mL/hr  - PPI  - Senna  - last BM 4/11 overnight   - daily weight (4/10) 98.7kg, (4/11) 98.7, (4/12) 98.1kg  ( 4/13)     /Renal  LIZZIE; worsening, BUN / Cr  IVL  Evans, non-oliguric renal failure UOP: 100-200cc/hr  on Ethacrynic acid 25mg BID  (4/10) L IJ [place] placed for HD (kept even 4/10, 4/11) - plan for HD 4/12  Electrolytes: Na 142 / K 3.5 / Mg 2.4 / Phos 7.2  Hx of BPH    HEME/ONC  Hgb   on Eliquis    ID  afebrile: Tmax 99.0  WBC 11 > 15 > 17  COVID-19 +  - restarted on Cefepime  - completed course of Hydroxychloroquine & Azithromycin  - Ascorbic acid, Zinc  - D-dimer uptrending 11,529 >42284>8421>5852, , ESR 60     ENDO  DM2; uncontrolled, holding home Metformin  on insulin gtt     MSK/DERM  - no active issues    DVT PPx  - on Apixaban     GI PPx  -  Pantoprazole 40mg PO qac      ***Tubes/Lines/Drains  ***  Peripheral IV  Central Venous Line  Right IJ TLC  	Date 3/31/20  Arterial Line  Right radial A-line		                Date: 3/31/20  Urnary Catheter		Indication: Strict I&O    Date Placed: 3/31/20  ETT  OGT    REVIEW OF SYSTEMS    [ ] A ten-point review of systems was otherwise negative except as noted.  [x ] Due to altered mental status/intubation, subjective information were not able to be obtained from the patient. History was obtained, to the extent possible, from review of the chart and collateral sources of information.                         Neuro:       acetaminophen  Suppository .. 650 milliGRAM(s) Rectal every 6 hours PRN          Resp:      04-13-20 @ 05:12  pH:7.48   pC02:36   paO2:110   HCO3>:26   Sat%:98   Base Excess: 2.8   Lactate:--  04-12-20 @ 13:11  pH:7.44   pC02:40   paO2:90   HCO3>:27   Sat%:96   Base Excess: 3.1   Lactate:--      Ventilator      Mode: AC/ CMV (Assist Control/ Continuous Mandatory Ventilation), RR (machine): 20, TV (machine): 450, FiO2: 40, PEEP: 5, ITime: 0.9, MAP: 10, PIP: 23    guaifenesin/dextromethorphan  Syrup 10 milliLiter(s) Oral every 8 hours       Cards:      diltiazem    Tablet 60 milliGRAM(s) Oral every 8 hours  ethacrynic acid 25 milliGRAM(s) Oral every 12 hours  metoprolol tartrate 12.5 milliGRAM(s) Oral every 8 hours       GI:      pantoprazole  Injectable 40 milliGRAM(s) IV Push daily  senna 2 Tablet(s) Oral at bedtime           :          Weight:     ascorbic acid 500 milliGRAM(s) Oral every 8 hours  calcium acetate 1334 milliGRAM(s) Oral three times a day with meals  dextrose 10%. 250 milliLiter(s) IV Continuous <Continuous>  dextrose 5%. 1000 milliLiter(s) IV Continuous <Continuous>  zinc sulfate 220 milliGRAM(s) Oral daily             Heme/Onc:      apixaban 5 milliGRAM(s) Oral two times a day           Endo:      dextrose 40% Gel 15 Gram(s) Oral once PRN  dextrose 50% Injectable 12.5 Gram(s) IV Push once  insulin regular Infusion 1 Unit(s)/Hr IV Continuous <Continuous>        VITALS  T(F): 100 (04-13-20 @ 06:43), Max: 100 (04-13-20 @ 06:43)  HR: 105 (04-13-20 @ 07:42) (101 - 140)  BP: --  BP(mean): --  ABP: 151/47 (04-13-20 @ 06:43) (95/55 - 166/55)  ABP(mean): 77 (04-13-20 @ 06:43) (69 - 82)  RR: 25 (04-13-20 @ 06:43) (25 - 30)  SpO2: 99% (04-13-20 @ 07:42) (97% - 100%)      INPUTS/OUTPUTS    04-12 - 04-13  --------------------------------------------------------  IN:    Enteral Tube Flush: 300 mL    Glucerna: 1000 mL    insulin regular Infusion: 31 mL    phenylephrine   Infusion: 39.2 mL  Total IN: 1370.2 mL    OUT:    Indwelling Catheter - Urethral: 1655 mL  Total OUT: 1655 mL    Total NET: -284.8 mL KONSTANTIN WYATT  74y ([birth year]) Male  [id number]      HPI:  73 yo M w/ hx of Afib on Eliquis, CAD s/p PCI, DM, BPH, presents with 1 week of fever, cough, and progressive SOB. Admits to watery diarrhea for 3 days. Denies chest pain. 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 He has exposure to COVID positive family member at the wedding. No travel hx. Pt pulse ox in 80s in the field per EMS. Of note, per patient he had recent normal stress test recently.    COVID PCR sent, labs show lymphopenia and transaminitis, CXR shows b/l lung opacity, requiring NRB (30 Mar 2020 16:35)    Home Medications:  Cartia  mg/24 hours oral capsule, extended release: 1 cap(s) orally once a day (30 Mar 2020 18:04)  Eliquis 5 mg oral tablet: 1 tab(s) orally 2 times a day (30 Mar 2020 18:04)  Lipitor 40 mg oral tablet: 1 tab(s) orally once a day (30 Mar 2020 18:04)  lisinopril 40 mg oral tablet: 1 tab(s) orally once a day (30 Mar 2020 18:04)  metFORMIN 750 mg oral tablet, extended release: 1 tab(s) orally once a day (30 Mar 2020 18:04)          KONSTANTIN WYATT  [id number]  74y Male    Indication for ICU admission:  Acute Hypoxemic Respiratory Failure  clinical COVID19 (false negatvie)    Admit Date:03-30-20  ICU Date: 03-31-20  OR Date:    Bactrim (Unknown)    PAST MEDICAL & SURGICAL HISTORY:  Afib  DM (diabetes mellitus)  BPH (benign prostatic hyperplasia)  CAD (coronary artery disease)    Home Medications:  Cartia  mg/24 hours oral capsule, extended release: 1 cap(s) orally once a day (30 Mar 2020 18:04)  Eliquis 5 mg oral tablet: 1 tab(s) orally 2 times a day (30 Mar 2020 18:04)  Lipitor 40 mg oral tablet: 1 tab(s) orally once a day (30 Mar 2020 18:04)  lisinopril 40 mg oral tablet: 1 tab(s) orally once a day (30 Mar 2020 18:04)  metFORMIN 750 mg oral tablet, extended release: 1 tab(s) orally once a day (30 Mar 2020 18:04)      24HRS EVENT: No events overnight    NEURO  Off all sedation, given Flumenazil 0.2mg x1 (4/12) w/ no response  RASS -4, pupils reactive  Pain control: Acetaminophen 650mg PO q6 PRN    RESP  Acute Hypoxemic Respiratory Failure;  - Intubated: /20/40/5  ABG : 7.48, 36 , 110 , 26 , 98 %   COVID-19/Viral Pneumonia;  - CXR: unchanged diffuse bilateral opacities  - on Guaifenisen    CARD/VASC  Acute tachycardia -  on PO Cardizem 60mg q8h,   Acute hypotension - off Alexander @ 12pm on 4/12  Hx of Afib - on Apixaban, Diltiazem   Hx of CAD - Atorvastatin 40mg PO qhs  Hx of HTN - hold Lisinopril   daily EKG - Afib  QTC: 473     GI/NUTR  - Diet: TF (glucerna) @ 50mL/hr  - PPI  - Senna  - last BM 4/11 overnight   - daily weight (4/10) 98.7kg, (4/11) 98.7, (4/12) 98.1kg  ( 4/13)     /Renal  LIZZIE; worsening, BUN / Cr  IVL  [place], non-oliguric renal failure UOP: 100-200cc/hr  on Ethacrynic acid 25mg BID  (4/10) L IJ [place] placed for HD (kept even 4/10, 4/11) - plan for HD 4/12  Electrolytes: Na 142 / K 3.5 / Mg 2.4 / Phos 7.2  Hx of BPH    HEME/ONC  Hgb   on Eliquis    ID  afebrile: Tmax 99.0  WBC 11 > 15 > 17  COVID-19 +  - restarted on Cefepime  - completed course of Hydroxychloroquine & Azithromycin  - Ascorbic acid, Zinc  - D-dimer uptrending 11,529 >42750>8421>5852, , ESR 60     ENDO  DM2; uncontrolled, holding home Metformin  on insulin gtt     MSK/DERM  - no active issues    DVT PPx  - on Apixaban     GI PPx  -  Pantoprazole 40mg PO qac      ***Tubes/Lines/Drains  ***  Peripheral IV  Central Venous Line  Right IJ TLC  	Date 3/31/20  Arterial Line  Right radial A-line		                Date: 3/31/20  Urnary Catheter		Indication: Strict I&O    Date Placed: 3/31/20  ETT  OGT    REVIEW OF SYSTEMS    [ ] A ten-point review of systems was otherwise negative except as noted.  [x ] Due to altered mental status/intubation, subjective information were not able to be obtained from the patient. History was obtained, to the extent possible, from review of the chart and collateral sources of information.                         Neuro:       acetaminophen  Suppository .. 650 milliGRAM(s) Rectal every 6 hours PRN          Resp:      04-13-20 @ 05:12  pH:7.48   pC02:36   paO2:110   HCO3>:26   Sat%:98   Base Excess: 2.8   Lactate:--  04-12-20 @ 13:11  pH:7.44   pC02:40   paO2:90   HCO3>:27   Sat%:96   Base Excess: 3.1   Lactate:--      Ventilator      Mode: AC/ CMV (Assist Control/ Continuous Mandatory Ventilation), RR (machine): 20, TV (machine): 450, FiO2: 40, PEEP: 5, ITime: 0.9, MAP: 10, PIP: 23    guaifenesin/dextromethorphan  Syrup 10 milliLiter(s) Oral every 8 hours       Cards:      diltiazem    Tablet 60 milliGRAM(s) Oral every 8 hours  ethacrynic acid 25 milliGRAM(s) Oral every 12 hours  metoprolol tartrate 12.5 milliGRAM(s) Oral every 8 hours       GI:      pantoprazole  Injectable 40 milliGRAM(s) IV Push daily  senna 2 Tablet(s) Oral at bedtime           :          Weight:     ascorbic acid 500 milliGRAM(s) Oral every 8 hours  calcium acetate 1334 milliGRAM(s) Oral three times a day with meals  dextrose 10%. 250 milliLiter(s) IV Continuous <Continuous>  dextrose 5%. 1000 milliLiter(s) IV Continuous <Continuous>  zinc sulfate 220 milliGRAM(s) Oral daily             Heme/Onc:      apixaban 5 milliGRAM(s) Oral two times a day           Endo:      dextrose 40% Gel 15 Gram(s) Oral once PRN  dextrose 50% Injectable 12.5 Gram(s) IV Push once  insulin regular Infusion 1 Unit(s)/Hr IV Continuous <Continuous>        VITALS  T(F): 100 (04-13-20 @ 06:43), Max: 100 (04-13-20 @ 06:43)  HR: 105 (04-13-20 @ 07:42) (101 - 140)  BP: --  BP(mean): --  ABP: 151/47 (04-13-20 @ 06:43) (95/55 - 166/55)  ABP(mean): 77 (04-13-20 @ 06:43) (69 - 82)  RR: 25 (04-13-20 @ 06:43) (25 - 30)  SpO2: 99% (04-13-20 @ 07:42) (97% - 100%)      INPUTS/OUTPUTS    04-12 - 04-13  --------------------------------------------------------  IN:    Enteral Tube Flush: 300 mL    Glucerna: 1000 mL    insulin regular Infusion: 31 mL    phenylephrine   Infusion: 39.2 mL  Total IN: 1370.2 mL    OUT:    Indwelling Catheter - Urethral: 1655 mL  Total OUT: 1655 mL    Total NET: -284.8 mL

## 2020-04-13 NOTE — PROGRESS NOTE ADULT - SUBJECTIVE AND OBJECTIVE BOX
24 h events noted  intubated/ventilated         PAST HISTORY  --------------------------------------------------------------------------------  No significant changes to PMH, PSH, FHx, SHx, unless otherwise noted    ALLERGIES & MEDICATIONS  --------------------------------------------------------------------------------  Allergies    Bactrim (Unknown)    Intolerances      Standing Inpatient Medications  apixaban 5 milliGRAM(s) Oral two times a day  ascorbic acid 500 milliGRAM(s) Oral every 8 hours  calcium acetate 1334 milliGRAM(s) Oral three times a day with meals  cefepime   IVPB 500 milliGRAM(s) IV Intermittent <User Schedule>  chlorhexidine 0.12% Liquid 15 milliLiter(s) Oral Mucosa every 12 hours  chlorhexidine 4% Liquid 1 Application(s) Topical <User Schedule>  dextrose 10%. 250 milliLiter(s) IV Continuous <Continuous>  dextrose 5%. 1000 milliLiter(s) IV Continuous <Continuous>  dextrose 50% Injectable 12.5 Gram(s) IV Push once  diltiazem    Tablet 60 milliGRAM(s) Oral every 8 hours  ethacrynic acid 25 milliGRAM(s) Oral every 12 hours  guaifenesin/dextromethorphan  Syrup 10 milliLiter(s) Oral every 8 hours  insulin regular Infusion 1 Unit(s)/Hr IV Continuous <Continuous>  metoprolol tartrate 12.5 milliGRAM(s) Oral every 8 hours  pantoprazole  Injectable 40 milliGRAM(s) IV Push daily  senna 2 Tablet(s) Oral at bedtime  zinc sulfate 220 milliGRAM(s) Oral daily    PRN Inpatient Medications  acetaminophen  Suppository .. 650 milliGRAM(s) Rectal every 6 hours PRN  dextrose 40% Gel 15 Gram(s) Oral once PRN          VITALS/PHYSICAL EXAM  --------------------------------------------------------------------------------  T(C): 37.7 (04-13-20 @ 08:00), Max: 37.8 (04-13-20 @ 06:43)  HR: 110 (04-13-20 @ 08:00) (101 - 140)  BP: --  RR: 25 (04-13-20 @ 08:00) (25 - 30)  SpO2: 99% (04-13-20 @ 08:00) (98% - 100%)  Wt(kg): --        04-12-20 @ 07:01  -  04-13-20 @ 07:00  --------------------------------------------------------  IN: 1734.2 mL / OUT: 3105 mL / NET: -1370.8 mL    04-13-20 @ 07:01  -  04-13-20 @ 09:15  --------------------------------------------------------  IN: 104 mL / OUT: 330 mL / NET: -226 mL      Physical Exam:  	Gen: intubated/ventilated  	Vascular access: amelia     LABS/STUDIES  --------------------------------------------------------------------------------              10.9   20.40 >-----------<  178      [04-13-20 @ 04:30]              32.5     145  |  99  |  125  ----------------------------<  189      [04-13-20 @ 04:30]  3.4   |  25  |  5.9        Ca     8.5     [04-13-20 @ 04:30]      Mg     2.4     [04-13-20 @ 04:30]      Phos  8.0     [04-13-20 @ 04:30]    TPro  5.1  /  Alb  2.5  /  TBili  1.7  /  DBili  x   /  AST  34  /  ALT  48  /  AlkPhos  95  [04-12-20 @ 01:10]    PT/INR: PT 17.80, INR 1.55       [04-13-20 @ 04:30]  PTT: 30.8       [04-13-20 @ 04:30]      Creatinine Trend:  SCr 5.9 [04-13 @ 04:30]  SCr 5.4 [04-12 @ 16:00]  SCr 4.8 [04-12 @ 01:10]  SCr 5.6 [04-12 @ 01:07]  SCr 4.4 [04-11 @ 17:35]    Urinalysis - [03-30-20 @ 22:00]      Color Yellow / Appearance Clear / SG 1.031 / pH 6.0      Gluc Negative / Ketone Trace  / Bili Negative / Urobili <2 mg/dL       Blood Moderate / Protein 300 mg/dL / Leuk Est Negative / Nitrite Negative      RBC 10 / WBC 29 / Hyaline 24 / Gran  / Sq Epi  / Non Sq Epi 22 / Bacteria Negative      Ferritin 1821      [04-10-20 @ 19:10]  HbA1c 7.7      [04-01-20 @ 04:30]

## 2020-04-14 LAB
ANION GAP SERPL CALC-SCNC: 20 MMOL/L — HIGH (ref 7–14)
ANION GAP SERPL CALC-SCNC: 20 MMOL/L — HIGH (ref 7–14)
APTT BLD: 30.5 SEC — SIGNIFICANT CHANGE UP (ref 27–39.2)
BASE EXCESS BLDA CALC-SCNC: -0.3 MMOL/L — SIGNIFICANT CHANGE UP (ref -2–2)
BUN SERPL-MCNC: 138 MG/DL — CRITICAL HIGH (ref 10–20)
BUN SERPL-MCNC: 147 MG/DL — CRITICAL HIGH (ref 10–20)
CALCIUM SERPL-MCNC: 8.7 MG/DL — SIGNIFICANT CHANGE UP (ref 8.5–10.1)
CALCIUM SERPL-MCNC: 9 MG/DL — SIGNIFICANT CHANGE UP (ref 8.5–10.1)
CHLORIDE SERPL-SCNC: 101 MMOL/L — SIGNIFICANT CHANGE UP (ref 98–110)
CHLORIDE SERPL-SCNC: 99 MMOL/L — SIGNIFICANT CHANGE UP (ref 98–110)
CK SERPL-CCNC: 65 U/L — SIGNIFICANT CHANGE UP (ref 0–225)
CO2 SERPL-SCNC: 27 MMOL/L — SIGNIFICANT CHANGE UP (ref 17–32)
CO2 SERPL-SCNC: 27 MMOL/L — SIGNIFICANT CHANGE UP (ref 17–32)
CREAT SERPL-MCNC: 6.2 MG/DL — CRITICAL HIGH (ref 0.7–1.5)
CREAT SERPL-MCNC: 6.6 MG/DL — CRITICAL HIGH (ref 0.7–1.5)
CRP SERPL-MCNC: 11.06 MG/DL — HIGH (ref 0–0.4)
ERYTHROCYTE [SEDIMENTATION RATE] IN BLOOD: 127 MM/HR — HIGH (ref 0–10)
FERRITIN SERPL-MCNC: 1769 NG/ML — HIGH (ref 30–400)
GAS PNL BLDA: SIGNIFICANT CHANGE UP
GAS PNL BLDA: SIGNIFICANT CHANGE UP
GLUCOSE BLDC GLUCOMTR-MCNC: 136 MG/DL — HIGH (ref 70–99)
GLUCOSE BLDC GLUCOMTR-MCNC: 137 MG/DL — HIGH (ref 70–99)
GLUCOSE BLDC GLUCOMTR-MCNC: 138 MG/DL — HIGH (ref 70–99)
GLUCOSE BLDC GLUCOMTR-MCNC: 151 MG/DL — HIGH (ref 70–99)
GLUCOSE BLDC GLUCOMTR-MCNC: 156 MG/DL — HIGH (ref 70–99)
GLUCOSE BLDC GLUCOMTR-MCNC: 167 MG/DL — HIGH (ref 70–99)
GLUCOSE BLDC GLUCOMTR-MCNC: 181 MG/DL — HIGH (ref 70–99)
GLUCOSE BLDC GLUCOMTR-MCNC: 181 MG/DL — HIGH (ref 70–99)
GLUCOSE BLDC GLUCOMTR-MCNC: 184 MG/DL — HIGH (ref 70–99)
GLUCOSE BLDC GLUCOMTR-MCNC: 185 MG/DL — HIGH (ref 70–99)
GLUCOSE BLDC GLUCOMTR-MCNC: 195 MG/DL — HIGH (ref 70–99)
GLUCOSE BLDC GLUCOMTR-MCNC: 202 MG/DL — HIGH (ref 70–99)
GLUCOSE BLDC GLUCOMTR-MCNC: 95 MG/DL — SIGNIFICANT CHANGE UP (ref 70–99)
GLUCOSE BLDC GLUCOMTR-MCNC: 98 MG/DL — SIGNIFICANT CHANGE UP (ref 70–99)
GLUCOSE SERPL-MCNC: 172 MG/DL — HIGH (ref 70–99)
GLUCOSE SERPL-MCNC: 67 MG/DL — LOW (ref 70–99)
HCO3 BLDA-SCNC: 29 MMOL/L — SIGNIFICANT CHANGE UP (ref 21–29)
HCT VFR BLD CALC: 31.5 % — LOW (ref 42–52)
HGB BLD-MCNC: 11 G/DL — LOW (ref 14–18)
INR BLD: 1.58 RATIO — HIGH (ref 0.65–1.3)
LDH SERPL L TO P-CCNC: 713 U/L — HIGH (ref 50–242)
MAGNESIUM SERPL-MCNC: 2.6 MG/DL — HIGH (ref 1.8–2.4)
MAGNESIUM SERPL-MCNC: 2.7 MG/DL — HIGH (ref 1.8–2.4)
MCHC RBC-ENTMCNC: 31.2 PG — HIGH (ref 27–31)
MCHC RBC-ENTMCNC: 34.9 G/DL — SIGNIFICANT CHANGE UP (ref 32–37)
MCV RBC AUTO: 89.2 FL — SIGNIFICANT CHANGE UP (ref 80–94)
NRBC # BLD: 0 /100 WBCS — SIGNIFICANT CHANGE UP (ref 0–0)
PCO2 BLDA: 77 MMHG — CRITICAL HIGH (ref 38–42)
PH BLDA: 7.19 — CRITICAL LOW (ref 7.38–7.42)
PHOSPHATE SERPL-MCNC: 13.5 MG/DL — HIGH (ref 2.1–4.9)
PHOSPHATE SERPL-MCNC: 9.2 MG/DL — HIGH (ref 2.1–4.9)
PLATELET # BLD AUTO: 172 K/UL — SIGNIFICANT CHANGE UP (ref 130–400)
PO2 BLDA: 83 MMHG — SIGNIFICANT CHANGE UP (ref 78–95)
POTASSIUM SERPL-MCNC: 3.3 MMOL/L — LOW (ref 3.5–5)
POTASSIUM SERPL-MCNC: 4.8 MMOL/L — SIGNIFICANT CHANGE UP (ref 3.5–5)
POTASSIUM SERPL-SCNC: 3.3 MMOL/L — LOW (ref 3.5–5)
POTASSIUM SERPL-SCNC: 4.8 MMOL/L — SIGNIFICANT CHANGE UP (ref 3.5–5)
PROTHROM AB SERPL-ACNC: 18.2 SEC — HIGH (ref 9.95–12.87)
RBC # BLD: 3.53 M/UL — LOW (ref 4.7–6.1)
RBC # FLD: 13.8 % — SIGNIFICANT CHANGE UP (ref 11.5–14.5)
SAO2 % BLDA: 93 % — SIGNIFICANT CHANGE UP (ref 92–96)
SARS-COV-2 RNA SPEC QL NAA+PROBE: SIGNIFICANT CHANGE UP
SODIUM SERPL-SCNC: 146 MMOL/L — SIGNIFICANT CHANGE UP (ref 135–146)
SODIUM SERPL-SCNC: 148 MMOL/L — HIGH (ref 135–146)
WBC # BLD: 21.57 K/UL — HIGH (ref 4.8–10.8)
WBC # FLD AUTO: 21.57 K/UL — HIGH (ref 4.8–10.8)

## 2020-04-14 PROCEDURE — 70450 CT HEAD/BRAIN W/O DYE: CPT | Mod: 26,CS

## 2020-04-14 PROCEDURE — 93010 ELECTROCARDIOGRAM REPORT: CPT

## 2020-04-14 PROCEDURE — 99291 CRITICAL CARE FIRST HOUR: CPT

## 2020-04-14 PROCEDURE — 71045 X-RAY EXAM CHEST 1 VIEW: CPT | Mod: 26,CS

## 2020-04-14 RX ORDER — LABETALOL HCL 100 MG
10 TABLET ORAL ONCE
Refills: 0 | Status: COMPLETED | OUTPATIENT
Start: 2020-04-14 | End: 2020-04-14

## 2020-04-14 RX ORDER — LABETALOL HCL 100 MG
100 TABLET ORAL EVERY 8 HOURS
Refills: 0 | Status: DISCONTINUED | OUTPATIENT
Start: 2020-04-14 | End: 2020-04-14

## 2020-04-14 RX ORDER — APIXABAN 2.5 MG/1
2.5 TABLET, FILM COATED ORAL EVERY 12 HOURS
Refills: 0 | Status: DISCONTINUED | OUTPATIENT
Start: 2020-04-14 | End: 2020-04-14

## 2020-04-14 RX ORDER — LACTULOSE 10 G/15ML
20 SOLUTION ORAL EVERY 12 HOURS
Refills: 0 | Status: DISCONTINUED | OUTPATIENT
Start: 2020-04-14 | End: 2020-04-22

## 2020-04-14 RX ORDER — LEVETIRACETAM 250 MG/1
250 TABLET, FILM COATED ORAL
Refills: 0 | Status: DISCONTINUED | OUTPATIENT
Start: 2020-04-14 | End: 2020-04-29

## 2020-04-14 RX ORDER — CEFEPIME 1 G/1
1000 INJECTION, POWDER, FOR SOLUTION INTRAMUSCULAR; INTRAVENOUS
Refills: 0 | Status: DISCONTINUED | OUTPATIENT
Start: 2020-04-14 | End: 2020-04-18

## 2020-04-14 RX ORDER — SODIUM CHLORIDE 9 MG/ML
1000 INJECTION, SOLUTION INTRAVENOUS
Refills: 0 | Status: DISCONTINUED | OUTPATIENT
Start: 2020-04-14 | End: 2020-04-15

## 2020-04-14 RX ORDER — POTASSIUM CHLORIDE 20 MEQ
20 PACKET (EA) ORAL
Refills: 0 | Status: COMPLETED | OUTPATIENT
Start: 2020-04-14 | End: 2020-04-14

## 2020-04-14 RX ORDER — NOREPINEPHRINE BITARTRATE/D5W 8 MG/250ML
0.05 PLASTIC BAG, INJECTION (ML) INTRAVENOUS
Qty: 8 | Refills: 0 | Status: DISCONTINUED | OUTPATIENT
Start: 2020-04-14 | End: 2020-04-18

## 2020-04-14 RX ADMIN — ETHACRYNIC ACID 25 MILLIGRAM(S): 25 TABLET ORAL at 05:01

## 2020-04-14 RX ADMIN — Medication 500 MILLIGRAM(S): at 21:22

## 2020-04-14 RX ADMIN — Medication 500 MILLIGRAM(S): at 05:00

## 2020-04-14 RX ADMIN — Medication 25 MILLIGRAM(S): at 05:00

## 2020-04-14 RX ADMIN — APIXABAN 5 MILLIGRAM(S): 2.5 TABLET, FILM COATED ORAL at 05:00

## 2020-04-14 RX ADMIN — CHLORHEXIDINE GLUCONATE 15 MILLILITER(S): 213 SOLUTION TOPICAL at 17:25

## 2020-04-14 RX ADMIN — ETHACRYNIC ACID 25 MILLIGRAM(S): 25 TABLET ORAL at 18:49

## 2020-04-14 RX ADMIN — Medication 50 MILLIEQUIVALENT(S): at 05:00

## 2020-04-14 RX ADMIN — Medication 10 MILLILITER(S): at 05:00

## 2020-04-14 RX ADMIN — CEFEPIME 100 MILLIGRAM(S): 1 INJECTION, POWDER, FOR SOLUTION INTRAMUSCULAR; INTRAVENOUS at 17:25

## 2020-04-14 RX ADMIN — Medication 10 MILLIGRAM(S): at 09:30

## 2020-04-14 RX ADMIN — Medication 1334 MILLIGRAM(S): at 05:01

## 2020-04-14 RX ADMIN — LACTULOSE 20 GRAM(S): 10 SOLUTION ORAL at 21:22

## 2020-04-14 RX ADMIN — CHLORHEXIDINE GLUCONATE 15 MILLILITER(S): 213 SOLUTION TOPICAL at 05:00

## 2020-04-14 RX ADMIN — CHLORHEXIDINE GLUCONATE 1 APPLICATION(S): 213 SOLUTION TOPICAL at 05:01

## 2020-04-14 RX ADMIN — Medication 50 MILLIEQUIVALENT(S): at 03:34

## 2020-04-14 RX ADMIN — Medication 10 MILLILITER(S): at 12:10

## 2020-04-14 RX ADMIN — Medication 60 MILLIGRAM(S): at 12:10

## 2020-04-14 RX ADMIN — Medication 10 MILLILITER(S): at 21:22

## 2020-04-14 RX ADMIN — PANTOPRAZOLE SODIUM 40 MILLIGRAM(S): 20 TABLET, DELAYED RELEASE ORAL at 12:11

## 2020-04-14 RX ADMIN — LEVETIRACETAM 250 MILLIGRAM(S): 250 TABLET, FILM COATED ORAL at 18:50

## 2020-04-14 RX ADMIN — Medication 100 MILLIGRAM(S): at 12:10

## 2020-04-14 RX ADMIN — Medication 1334 MILLIGRAM(S): at 12:09

## 2020-04-14 RX ADMIN — Medication 1334 MILLIGRAM(S): at 16:28

## 2020-04-14 RX ADMIN — Medication 500 MILLIGRAM(S): at 12:09

## 2020-04-14 RX ADMIN — ZINC SULFATE TAB 220 MG (50 MG ZINC EQUIVALENT) 220 MILLIGRAM(S): 220 (50 ZN) TAB at 12:11

## 2020-04-14 RX ADMIN — Medication 8.91 MICROGRAM(S)/KG/MIN: at 14:56

## 2020-04-14 RX ADMIN — Medication 100 MILLIGRAM(S): at 10:13

## 2020-04-14 RX ADMIN — SODIUM CHLORIDE 50 MILLILITER(S): 9 INJECTION, SOLUTION INTRAVENOUS at 00:45

## 2020-04-14 RX ADMIN — Medication 60 MILLIGRAM(S): at 05:01

## 2020-04-14 RX ADMIN — SENNA PLUS 2 TABLET(S): 8.6 TABLET ORAL at 21:22

## 2020-04-14 RX ADMIN — Medication 60 MILLIGRAM(S): at 21:21

## 2020-04-14 NOTE — CONSULT NOTE ADULT - ASSESSMENT
73 yo M w/ hx of Afib on Eliquis, CAD s/p PCI, DM, BPH, presents with 1 week of fever, cough, and progressive SOB. Covid + intubated since 3/31, off sedation for 5 days, unresponsive. CTH was done 4/14 with bl acute hemorrhagic strokes s/o embolic etiology.    Plan:  Hold Eliquis  Continue Keppra 250 Q12  REEG  MRI wwo when stable          Neuroattending note will follow

## 2020-04-14 NOTE — CHART NOTE - NSCHARTNOTEFT_GEN_A_CORE
spoke with wife Opal Iniguez via phone at [phone]  -remains of sedation  -HCT results show hemorraghic  infarcts, will f/u neuro consult recs  -remains on minimal vent setting    All questions and concerns addressed and answered

## 2020-04-14 NOTE — PROGRESS NOTE ADULT - ASSESSMENT
LIZZIE/ ATN/ hyperkalemia/ respiratory failure / covid positive / high BUN  # hold diuretics , patient is 1 liter negative   # non oliguric  # ph noted  feed nepro, on binders  # d/c vit C    # s/p friday and saturday, no hd today   # BUN noted : highly catabolic and prerenal, d/c diuretics  #  on  cefepime to 1 g   # will follow

## 2020-04-14 NOTE — PROGRESS NOTE ADULT - ASSESSMENT
72y male, acute respiratory failure 2/2 COVID-19    NEURO    agitation,desynchrony-weaning down on propofol    RASS -4    RESP  Acute Hypoxemic Respiratory Failure;  Intubated, /20/40/5   ABG - ( 14 Apr 2020 02:44 )  pH, Arterial: 7.45  pH, Blood: x     /  pCO2: 42    /  pO2: 95    / HCO3: 29    / Base Excess: 4.3   /  SaO2: 96      COVID-19/Viral Pneumonia;  - worsening diffuse bilateral opacities  - continue, Guaifenisen/DM    CARD/VASC    acute hypotension-remains of all pressors 48+ hrs    Hx of Afib - continue Eliquis, Cardizem PO 60mg q8, Metoprolol 25mg q8; remains of cardizem gtt  CAD - continue Atorvastatin   Hx of HTN- holding Lisinopril   Daily EKG-QTc 4/13 is 459    GI/NUTR  - diet: TF @ 50  - PPI  - Senna, Lactulose     /Renal  LIZZIE + uremia - L IJ [place] - HD 4/10, 4/11 - discuss w/ nephro for HD today  138/6.2  <--, 134/5.7  <--, 125/5.9  <--  Evans - UO 50-100cc/hr  on Ethacrynic acid, started on phoslo  Fluid balance: Stay 4.6L positive, 24hrs 1L negative  Hx of BPH  Monitor & replete elytes prn    HEME/ONC  Hgb stable-11.3 (11)  DVT ppx: on Eliquis, d/w team adjusting dose per renal clearance    ID    Afebrile    T(C): 37 (14 Apr 2020 06:00), Max: 38.2 (13 Apr 2020 20:00)    T(F): 98.6 (14 Apr 2020 06:00), Max: 100.8 (13 Apr 2020 20:00)    COVID 19-WBC uptrending - Cefepeme 1g q12    WBC-lateral at 20s    ENDO  DM2 - holding home Metformin  Hyperglycemia - on insulin gtt, FS q1h  CAPILLARY BLOOD GLUCOSE  POCT Blood Glucose.: 184 mg/dL (14 Apr 2020 06:14)      MSK/DERM  - no active issues    DVT PPx: Eliquis 5mg d/w team adjusting for renal clearance    GI PPx: PPI    DISPO: ICU

## 2020-04-14 NOTE — CONSULT NOTE ADULT - SUBJECTIVE AND OBJECTIVE BOX
Neurocritical Care Consult  Note:    1. Brief Presentation: 75 yo M w/ hx of Afib on Eliquis, CAD s/p PCI, DM, BPH, presents with 1 week of fever, cough, and progressive SOB. Admits to watery diarrhea for 3 days. Denies chest pain. 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 He has exposure to COVID positive family member at the wedding. No travel hx. Pt pulse ox in 80s in the field per EMS.       2. Today's Acute Problems: patient is intubated, off sedation for 5 days, unresponsive. CTH was done today with multiple BL hemorrhagic strokes s/o embolic etiology. Eliquis is being held.    3. Relevant brief History: patient is here since 3/30/2020 intubated on Propofol an dVersed    4-Yesterday's Plan:    5. Last 24 hour updates:    6. Medications:   ascorbic acid 500 milliGRAM(s) Oral every 8 hours  calcium acetate 1334 milliGRAM(s) Oral three times a day with meals  cefepime   IVPB 1000 milliGRAM(s) IV Intermittent <User Schedule>  chlorhexidine 0.12% Liquid 15 milliLiter(s) Oral Mucosa every 12 hours  chlorhexidine 4% Liquid 1 Application(s) Topical <User Schedule>  dextrose 5%. 1000 milliLiter(s) IV Continuous <Continuous>  diltiazem    Tablet 60 milliGRAM(s) Oral every 8 hours  ethacrynic acid 25 milliGRAM(s) Oral every 12 hours  guaifenesin/dextromethorphan  Syrup 10 milliLiter(s) Oral every 8 hours  lactulose Syrup 20 Gram(s) Oral every 12 hours  levETIRAcetam 250 milliGRAM(s) Oral two times a day  norepinephrine Infusion 0.05 MICROgram(s)/kG/Min IV Continuous <Continuous>  pantoprazole  Injectable 40 milliGRAM(s) IV Push daily  senna 2 Tablet(s) Oral at bedtime  zinc sulfate 220 milliGRAM(s) Oral daily      7. Ancillary Management:   Chest PT[ ]   Head of bed >35 [ ]   Out of bed to chair [ ]   PT/OT/SP Eval [ ]   Spirometry[ ]   DVT prophalaxis[ ]    8.Neuro:   Awake: Spontaneously[ ] Occasionally[ ] To Voice [ ] To painful stimuli [x ]   AIert [ ]. Following commands: 3 steps[ ], 2 steps[ ], 1 step [ ], None [ x]   Orientation: 0[ x], 1[ ], 2[ ], 3[ ]. Tracking objects with eyes: [ ]   Language: IN  Time off sedation for exam: no sedation  Pupils: Right 3  >   Left  3.5  >      Corneal:   +   Gag reflex:  +   EOMI: +      mRS:  0 No symptoms at all  1 No significant disability despite symptoms; able to carry out all usual duties and activities without assistance  2 Slight disability; unable to carry out all previous activities, but able to look after own affairs  3 Moderate disability; requiring some help, but able to walk without assistance  4 Moderately severe disability; unable to walk without assistance and unable to attend to own bodily needs without assistance  5 Severe disability; bedridden, incontinent and requiring constant nursing care and attention  6 Dead      Last CTH:< from: CT Head No Cont (04.14.20 @ 11:06) >  Findings:    The third and lateral ventricles are mildly enlarged as are the cortical sulci consistent with a mild degree of cortical atrophy. The fourth ventricle is normal in size and position.    There is a moderate-sized area of mixed attenuation in the left frontal/temporal region consistent with an acute hemorrhagic infarct. There is mild mass effect upon the adjacent left lateral ventricle. Slight shift of the third and lateral ventricles to the right of midline. There is a similar finding in the right posterior parietal-occipital region and a similar smaller finding in the left posterior superior cerebellar hemisphere. In view of the multiple vascular territories the possibility of showering emboli should be considered.    Patchy foci of diminished attenuation the periventricular white matter is nonspecific and differential diagnostic possibilities include ischemic change, gliosis or demyelination.    The right frontal sinus is hypoplastic.    The opacification of the sphenoid sinuses.    Opacification of scattered mastoid air cells consistent with inflammation or infection.    The calvarium is intact.    IMPRESSION:    Multiple acute hemorrhagic infarcts in the left frontal temporal region, right posterior parietal occipital region, and left cerebellar hemisphere. In view of the multiple vascular territories the possibility of showering emboli shouldbe considered.    Dr. Lance Traylor discussed preliminary findings with CAMRON MCQUEEN PA on 4/14/2020 11:34 AM with readback.    < end of copied text >      Last CTA/MRA:    Last CTP:    Last MRI:    Last TCD:    Last EEG:    EVD: [ ] [place]: [ ]         9. Cardiovascular:   Vital Signs Last 24 Hrs  T(C): 37.3 (14 Apr 2020 14:29), Max: 38.2 (13 Apr 2020 20:00)  T(F): 99.1 (14 Apr 2020 14:29), Max: 100.8 (13 Apr 2020 20:00)  HR: 88 (14 Apr 2020 15:00) (83 - 116)  BP: --  BP(mean): --  RR: 26 (14 Apr 2020 19:01) (21 - 31)  SpO2: 98% (14 Apr 2020 19:01) (96% - 100%)       Last EKG: Afib      10. Respiratory:   ABG:ABG - ( 14 Apr 2020 17:21 )  pH, Arterial: 7.32  pH, Blood: x     /  pCO2: 56    /  pO2: 179   / HCO3: 29    / Base Excess: 1.3   /  SaO2: 99              Mode: AC/ CMV (Assist Control/ Continuous Mandatory Ventilation)  RR (machine): 26  TV (machine): 450  FiO2: 40  PEEP: 5  ITime: 1  MAP: 11  PIP: 31            12.Renal/Fluids/Electrolytes:    04-14    148<H>  |  101  |  147<HH>  ----------------------------<  172<H>  4.8   |  27  |  6.6<HH>    Ca    9.0      14 Apr 2020 16:00  Phos  13.5     04-14  Mg     2.7     04-14        I&O's Detail    13 Apr 2020 07:01  -  14 Apr 2020 07:00  --------------------------------------------------------  IN:    Enteral Tube Flush: 120 mL    Glucerna: 1200 mL    insulin regular Infusion: 79 mL    IV PiggyBack: 300 mL    propofol Infusion: 92 mL  Total IN: 1791 mL    OUT:    Indwelling Catheter - Urethral: 2850 mL  Total OUT: 2850 mL    Total NET: -1059 mL      14 Apr 2020 07:01  -  14 Apr 2020 19:48  --------------------------------------------------------  IN:    Glucerna: 400 mL    insulin regular Infusion: 41 mL    norepinephrine Infusion: 20.5 mL    propofol Infusion: 105 mL  Total IN: 566.5 mL    OUT:    Indwelling Catheter - Urethral: 1055 mL    Voided: 275 mL  Total OUT: 1330 mL    Total NET: -763.5 mL          13.ID:   TMax:   Vital Signs Last 24 Hrs  T(C): 37.3 (14 Apr 2020 14:29), Max: 38.2 (13 Apr 2020 20:00)  T(F): 99.1 (14 Apr 2020 14:29), Max: 100.8 (13 Apr 2020 20:00)  HR: 88 (14 Apr 2020 15:00) (83 - 116)  BP: --  BP(mean): --  RR: 26 (14 Apr 2020 19:01) (21 - 31)  SpO2: 98% (14 Apr 2020 19:01) (96% - 100%)           Lines: Central[] Date inserted: Peripheral[]    14. Hematology:                         11.0   21.57 )-----------( 172      ( 14 Apr 2020 00:25 )             31.5      04-14    148<H>  |  101  |  147<HH>  ----------------------------<  172<H>  4.8   |  27  |  6.6<HH>    Ca    9.0      14 Apr 2020 16:00  Phos  13.5     04-14  Mg     2.7     04-14       PT/INR - ( 14 Apr 2020 00:25 )   PT: 18.20 sec;   INR: 1.58 ratio         PTT - ( 14 Apr 2020 00:25 )  PTT:30.5 sec    DVT Prophylaxis Lovenox[ ] Heparin[ ] Venodynes[ ] SCD's[ ]    15. Impression:        16. Suggestions:        17. Disposition:

## 2020-04-14 NOTE — PROGRESS NOTE ADULT - SUBJECTIVE AND OBJECTIVE BOX
24 h events noted  intubated/ventilated         PAST HISTORY  --------------------------------------------------------------------------------  No significant changes to PMH, PSH, FHx, SHx, unless otherwise noted    ALLERGIES & MEDICATIONS  --------------------------------------------------------------------------------  Allergies    Bactrim (Unknown)    Intolerances      Standing Inpatient Medications  apixaban 2.5 milliGRAM(s) Oral every 12 hours  ascorbic acid 500 milliGRAM(s) Oral every 8 hours  calcium acetate 1334 milliGRAM(s) Oral three times a day with meals  cefepime   IVPB 1000 milliGRAM(s) IV Intermittent <User Schedule>  chlorhexidine 0.12% Liquid 15 milliLiter(s) Oral Mucosa every 12 hours  chlorhexidine 4% Liquid 1 Application(s) Topical <User Schedule>  dextrose 5%. 1000 milliLiter(s) IV Continuous <Continuous>  dextrose 5%. 1000 milliLiter(s) IV Continuous <Continuous>  diltiazem    Tablet 60 milliGRAM(s) Oral every 8 hours  ethacrynic acid 25 milliGRAM(s) Oral every 12 hours  guaifenesin/dextromethorphan  Syrup 10 milliLiter(s) Oral every 8 hours  insulin regular Infusion 1 Unit(s)/Hr IV Continuous <Continuous>  labetalol 100 milliGRAM(s) Oral every 8 hours  pantoprazole  Injectable 40 milliGRAM(s) IV Push daily  propofol Infusion 5 MICROgram(s)/kG/Min IV Continuous <Continuous>  senna 2 Tablet(s) Oral at bedtime  zinc sulfate 220 milliGRAM(s) Oral daily    PRN Inpatient Medications  acetaminophen  Suppository .. 650 milliGRAM(s) Rectal every 6 hours PRN        VITALS/PHYSICAL EXAM  --------------------------------------------------------------------------------  T(C): 37 (04-14-20 @ 06:00), Max: 38.2 (04-13-20 @ 20:00)  HR: 100 (04-14-20 @ 07:36) (86 - 139)  BP: --  RR: 28 (04-14-20 @ 06:00) (27 - 31)  SpO2: 99% (04-14-20 @ 07:36) (99% - 100%)  Wt(kg): --        04-13-20 @ 07:01  -  04-14-20 @ 07:00  --------------------------------------------------------  IN: 1791 mL / OUT: 2850 mL / NET: -1059 mL      Physical Exam:  	Gen: intubated/ventilated  	Vascular access: amelia     LABS/STUDIES  --------------------------------------------------------------------------------              11.0   21.57 >-----------<  172      [04-14-20 @ 00:25]              31.5     146  |  99  |  138  ----------------------------<  67      [04-14-20 @ 00:25]  3.3   |  27  |  6.2        Ca     8.7     [04-14-20 @ 00:25]      Mg     2.6     [04-14-20 @ 00:25]      Phos  9.2     [04-14-20 @ 00:25]      PT/INR: PT 18.20, INR 1.58       [04-14-20 @ 00:25]  PTT: 30.5       [04-14-20 @ 00:25]    CK 65      [04-14-20 @ 00:25]        [04-14-20 @ 00:25]    Creatinine Trend:  SCr 6.2 [04-14 @ 00:25]  SCr 5.7 [04-13 @ 17:03]  SCr 5.9 [04-13 @ 04:30]  SCr 5.4 [04-12 @ 16:00]  SCr 4.8 [04-12 @ 01:10]    Urinalysis - [03-30-20 @ 22:00]      Color Yellow / Appearance Clear / SG 1.031 / pH 6.0      Gluc Negative / Ketone Trace  / Bili Negative / Urobili <2 mg/dL       Blood Moderate / Protein 300 mg/dL / Leuk Est Negative / Nitrite Negative      RBC 10 / WBC 29 / Hyaline 24 / Gran  / Sq Epi  / Non Sq Epi 22 / Bacteria Negative      Ferritin 1821      [04-10-20 @ 19:10]  HbA1c 7.7      [04-01-20 @ 04:30]

## 2020-04-14 NOTE — PROGRESS NOTE ADULT - SUBJECTIVE AND OBJECTIVE BOX
KONSTANTIN WYATT  [id number]  74y Male    Indication for ICU admission:  Acute Hypoxemic Respiratory Failure  clinical COVID19 (false negatvie)    Admit Date:20  ICU Date: 20    OR Date:    Bactrim (Unknown)    PAST MEDICAL & SURGICAL HISTORY:  Afib  DM (diabetes mellitus)  BPH (benign prostatic hyperplasia)  CAD (coronary artery disease)    Home Medications:  Cartia  mg/24 hours oral capsule, extended release: 1 cap(s) orally once a day (30 Mar 2020 18:04)  Eliquis 5 mg oral tablet: 1 tab(s) orally 2 times a day (30 Mar 2020 18:04)  Lipitor 40 mg oral tablet: 1 tab(s) orally once a day (30 Mar 2020 18:04)  lisinopril 40 mg oral tablet: 1 tab(s) orally once a day (30 Mar 2020 18:04)  metFORMIN 750 mg oral tablet, extended release: 1 tab(s) orally once a day (30 Mar 2020 18:04)      24HRS EVENT: No events overnight    NEURO  back pn propofol as of , given Flumenazil 0.2mg x1 () w/ no response  RASS -4, pupils reactive  Pain control: Acetaminophen 650mg PO q6 PRN    RESP  Acute Hypoxemic Respiratory Failure;  - Intubated: /20/40/5- no changes on   ABG - ( 2020 13:41 )  pH, Arterial: 7.48  pCO2: 42    /  pO2: 95   / HCO3: 29     /  SaO2: 96%       COVID-19/Viral Pneumonia;    CXR: unchanged diffuse bilateral opacities    On Guaifenisen    CARD/VASC    Acute tachycardia -  on PO Cardizem 60mg q8h, and  PO Lopressor 25 mg q 8 hrs ( was increased from 12.5 mg q 8 h on     Acute hypotension - off Alexander @ 12pm on     Hx of Afib - on Apixaban, Diltiazem    Hx of CAD - Atorvastatin 40mg PO qhs    Hx of HTN - hold Lisinopril     daily EKG - Afib  QTC: 459     GI/NUTR   Diet: TF (glucerna) @ 50mL/hr    PPI    Senna     Daily weight (4/10) 98.7kg,                         () 98.1kg                          ( ) 95.4kg                         ( ) 93.3 kg    /Renal    LIZZIE; worsening, BUN / Cr, 138/ 6.2  not a HD candidate for , will reevaluate  AM with nephro  IVL  Evans, non-oliguric renal failure UOP: 100-200cc/hr  on Ethacrynic acid 25mg BID  (4/10) L IJ [place] placed for HD (kept even 4/10, )   Electrolytes: Na 146 / K 3.3 / Mg 2.6 / Phos 9.2   Hx of BPH    HEME/ONC  Hgb 11  on Eliquis    ID  afebrile  WBC 17-->20    21.57   COVID-19 +  - restarted on Cefepime  - completed course of Hydroxychloroquine & Azithromycin  - Ascorbic acid, Zinc  - D-dimer uptrending 11,529 >22958>8421>5852,  ,     ENDO  DM2; uncontrolled, holding home Metformin  on insulin gtt     MSK/DERM  - no active issues    DVT PPx  - on Apixaban     GI PPx  -  Pantoprazole 40mg PO qac      ***Tubes/Lines/Drains  ***  Peripheral IV  Central Venous Line  Right IJ TLC  	Date 3/31/20  Arterial Line  Right radial A-line		                Date: 3/31/20  Urnary Catheter		Indication: Strict I&O    Date Placed: 3/31/20  ETT  OGT    REVIEW OF SYSTEMS    [ ] A ten-point review of systems was otherwise negative except as noted.  [x ] Due to altered mental status/intubation, subjective information were not able to be obtained from the patient. History was obtained, to the extent possible, from review of the chart and collateral sources of information.        Daily     Daily Weight in k.3 (2020 04:00)    Diet, NPO with Tube Feed:   Tube Feeding Modality: Orogastric  Glucerna 1.2 Choco  Total Volume for 24 Hours (mL): 1200  Continuous  Starting Tube Feed Rate mL per Hour: 30  Increase Tube Feed Rate by (mL): 10     Every hour  Until Goal Tube Feed Rate (mL per Hour): 50  Tube Feed Duration (in Hours): 24  Tube Feed Start Time: 13:00 (20 @ 17:56)      CURRENT MEDS:  Neurologic Medications  acetaminophen  Suppository .. 650 milliGRAM(s) Rectal every 6 hours PRN Temp greater or equal to 38.5C (101.3F)  propofol Infusion 5 MICROgram(s)/kG/Min IV Continuous <Continuous>    Respiratory Medications  guaifenesin/dextromethorphan  Syrup 10 milliLiter(s) Oral every 8 hours    Cardiovascular Medications  diltiazem    Tablet 60 milliGRAM(s) Oral every 8 hours  ethacrynic acid 25 milliGRAM(s) Oral every 12 hours  metoprolol tartrate 25 milliGRAM(s) Oral every 8 hours    Gastrointestinal Medications  ascorbic acid 500 milliGRAM(s) Oral every 8 hours  calcium acetate 1334 milliGRAM(s) Oral three times a day with meals  dextrose 5%. 1000 milliLiter(s) IV Continuous <Continuous>  dextrose 5%. 1000 milliLiter(s) IV Continuous <Continuous>  pantoprazole  Injectable 40 milliGRAM(s) IV Push daily  senna 2 Tablet(s) Oral at bedtime  zinc sulfate 220 milliGRAM(s) Oral daily    Genitourinary Medications    Hematologic/Oncologic Medications  apixaban 5 milliGRAM(s) Oral two times a day    Antimicrobial/Immunologic Medications  cefepime   IVPB 1000 milliGRAM(s) IV Intermittent <User Schedule>    Endocrine/Metabolic Medications  insulin regular Infusion 1 Unit(s)/Hr IV Continuous <Continuous>    Topical/Other Medications  chlorhexidine 0.12% Liquid 15 milliLiter(s) Oral Mucosa every 12 hours  chlorhexidine 4% Liquid 1 Application(s) Topical <User Schedule>      ICU Vital Signs Last 24 Hrs  T(C): 37 (2020 06:00), Max: 38.2 (2020 20:00)  T(F): 98.6 (2020 06:00), Max: 100.8 (2020 20:00)  HR: 100 (2020 07:36) (86 - 139)  BP: --  BP(mean): --  ABP: 134/50 (2020 06:00) (124/44 - 161/56)  ABP(mean): 77 (2020 06:00) (64 - 87)  RR: 28 (2020 06:00) (25 - 31)  SpO2: 99% (2020 07:36) (99% - 100%)      Adult Advanced Hemodynamics Last 24 Hrs  CVP(mm Hg): --  CVP(cm H2O): --  CO: --  CI: --  PA: --  PA(mean): --  PCWP: --  SVR: --  SVRI: --  PVR: --  PVRI: --    Mode: AC/ CMV (Assist Control/ Continuous Mandatory Ventilation)  RR (machine): 20  TV (machine): 450  FiO2: 40  PEEP: 5  ITime: 1  MAP: 11  PIP: 31    ABG - ( 2020 02:44 )  pH, Arterial: 7.45  pH, Blood: x     /  pCO2: 42    /  pO2: 95    / HCO3: 29    / Base Excess: 4.3   /  SaO2: 96                  I&O's Summary    2020 07:01  -  2020 07:00  --------------------------------------------------------  IN: 1791 mL / OUT: 2850 mL / NET: -1059 mL      I&O's Detail    2020 07:01  -  2020 07:00  --------------------------------------------------------  IN:    Enteral Tube Flush: 120 mL    Glucerna: 1200 mL    insulin regular Infusion: 79 mL    IV PiggyBack: 300 mL    propofol Infusion: 92 mL  Total IN: 1791 mL    OUT:    Indwelling Catheter - Urethral: 2850 mL  Total OUT: 2850 mL    Total NET: -1059 mL          PHYSICAL EXAM:    General/Neuro  RASS -4  responds to painful stimuli, does not open eyes    Lungs: Normal expansion/effort.     Cardiovascular :     GI: Abdomen soft, Non-tender, Non-distended.      Extremities: Extremities warm, pink, well-perfused.    Derm: Good skin turgor, no skin breakdown.      :       Evans catheter in place.      CXR:   < from: Xray Chest 1 View- PORTABLE-Routine (20 @ 05:30) >    Stable bilateral opacities.    < end of copied text >        LABS:  CAPILLARY BLOOD GLUCOSE      POCT Blood Glucose.: 184 mg/dL (2020 06:14)  POCT Blood Glucose.: 137 mg/dL (2020 04:22)  POCT Blood Glucose.: 151 mg/dL (2020 02:36)  POCT Blood Glucose.: 138 mg/dL (2020 00:48)  POCT Blood Glucose.: 67 mg/dL (2020 23:52)  POCT Blood Glucose.: 67 mg/dL (2020 23:51)  POCT Blood Glucose.: 152 mg/dL (2020 20:10)  POCT Blood Glucose.: 150 mg/dL (2020 19:02)  POCT Blood Glucose.: 143 mg/dL (2020 18:28)  POCT Blood Glucose.: 161 mg/dL (2020 17:05)  POCT Blood Glucose.: 168 mg/dL (2020 16:11)  POCT Blood Glucose.: 184 mg/dL (2020 14:55)  POCT Blood Glucose.: 214 mg/dL (2020 14:08)  POCT Blood Glucose.: 194 mg/dL (2020 12:24)  POCT Blood Glucose.: 170 mg/dL (2020 11:06)  POCT Blood Glucose.: 176 mg/dL (2020 10:11)  POCT Blood Glucose.: 170 mg/dL (2020 09:06)                          11.0   21.57 )-----------( 172      ( 2020 00:25 )             31.5       04-14    146  |  99  |  138<HH>  ----------------------------<  67<L>  3.3<L>   |  27  |  6.2<HH>    Ca    8.7      :25  Phos  9.2     04-14  Mg     2.6     04-14        PT/INR - ( :25 )   PT: 18.20 sec;   INR: 1.58 ratio         PTT - ( 25 )  PTT:30.5 sec  CARDIAC MARKERS ( :25 )  x     / x     / 65 U/L / x     / x

## 2020-04-15 LAB
ANION GAP SERPL CALC-SCNC: 20 MMOL/L — HIGH (ref 7–14)
ANION GAP SERPL CALC-SCNC: 22 MMOL/L — HIGH (ref 7–14)
BASOPHILS # BLD AUTO: 0.03 K/UL — SIGNIFICANT CHANGE UP (ref 0–0.2)
BASOPHILS NFR BLD AUTO: 0.1 % — SIGNIFICANT CHANGE UP (ref 0–1)
BUN SERPL-MCNC: 152 MG/DL — CRITICAL HIGH (ref 10–20)
BUN SERPL-MCNC: 164 MG/DL — CRITICAL HIGH (ref 10–20)
CALCIUM SERPL-MCNC: 8.8 MG/DL — SIGNIFICANT CHANGE UP (ref 8.5–10.1)
CALCIUM SERPL-MCNC: 9.2 MG/DL — SIGNIFICANT CHANGE UP (ref 8.5–10.1)
CHLORIDE SERPL-SCNC: 100 MMOL/L — SIGNIFICANT CHANGE UP (ref 98–110)
CHLORIDE SERPL-SCNC: 100 MMOL/L — SIGNIFICANT CHANGE UP (ref 98–110)
CO2 SERPL-SCNC: 27 MMOL/L — SIGNIFICANT CHANGE UP (ref 17–32)
CO2 SERPL-SCNC: 28 MMOL/L — SIGNIFICANT CHANGE UP (ref 17–32)
CREAT SERPL-MCNC: 5.7 MG/DL — CRITICAL HIGH (ref 0.7–1.5)
CREAT SERPL-MCNC: 6.6 MG/DL — CRITICAL HIGH (ref 0.7–1.5)
EOSINOPHIL # BLD AUTO: 0.06 K/UL — SIGNIFICANT CHANGE UP (ref 0–0.7)
EOSINOPHIL NFR BLD AUTO: 0.2 % — SIGNIFICANT CHANGE UP (ref 0–8)
GAS PNL BLDA: SIGNIFICANT CHANGE UP
GAS PNL BLDA: SIGNIFICANT CHANGE UP
GLUCOSE BLDC GLUCOMTR-MCNC: 125 MG/DL — HIGH (ref 70–99)
GLUCOSE BLDC GLUCOMTR-MCNC: 140 MG/DL — HIGH (ref 70–99)
GLUCOSE BLDC GLUCOMTR-MCNC: 140 MG/DL — HIGH (ref 70–99)
GLUCOSE BLDC GLUCOMTR-MCNC: 141 MG/DL — HIGH (ref 70–99)
GLUCOSE BLDC GLUCOMTR-MCNC: 149 MG/DL — HIGH (ref 70–99)
GLUCOSE BLDC GLUCOMTR-MCNC: 156 MG/DL — HIGH (ref 70–99)
GLUCOSE BLDC GLUCOMTR-MCNC: 160 MG/DL — HIGH (ref 70–99)
GLUCOSE BLDC GLUCOMTR-MCNC: 162 MG/DL — HIGH (ref 70–99)
GLUCOSE BLDC GLUCOMTR-MCNC: 165 MG/DL — HIGH (ref 70–99)
GLUCOSE BLDC GLUCOMTR-MCNC: 178 MG/DL — HIGH (ref 70–99)
GLUCOSE BLDC GLUCOMTR-MCNC: 184 MG/DL — HIGH (ref 70–99)
GLUCOSE BLDC GLUCOMTR-MCNC: 189 MG/DL — HIGH (ref 70–99)
GLUCOSE BLDC GLUCOMTR-MCNC: 191 MG/DL — HIGH (ref 70–99)
GLUCOSE BLDC GLUCOMTR-MCNC: 195 MG/DL — HIGH (ref 70–99)
GLUCOSE BLDC GLUCOMTR-MCNC: 198 MG/DL — HIGH (ref 70–99)
GLUCOSE SERPL-MCNC: 141 MG/DL — HIGH (ref 70–99)
GLUCOSE SERPL-MCNC: 175 MG/DL — HIGH (ref 70–99)
HCT VFR BLD CALC: 32.2 % — LOW (ref 42–52)
HCT VFR BLD CALC: 35.2 % — LOW (ref 42–52)
HGB BLD-MCNC: 10.5 G/DL — LOW (ref 14–18)
HGB BLD-MCNC: 11 G/DL — LOW (ref 14–18)
IMM GRANULOCYTES NFR BLD AUTO: 0.7 % — HIGH (ref 0.1–0.3)
LYMPHOCYTES # BLD AUTO: 0.62 K/UL — LOW (ref 1.2–3.4)
LYMPHOCYTES # BLD AUTO: 2.6 % — LOW (ref 20.5–51.1)
MAGNESIUM SERPL-MCNC: 2.8 MG/DL — HIGH (ref 1.8–2.4)
MAGNESIUM SERPL-MCNC: 2.8 MG/DL — HIGH (ref 1.8–2.4)
MCHC RBC-ENTMCNC: 29.2 PG — SIGNIFICANT CHANGE UP (ref 27–31)
MCHC RBC-ENTMCNC: 29.3 PG — SIGNIFICANT CHANGE UP (ref 27–31)
MCHC RBC-ENTMCNC: 31.3 G/DL — LOW (ref 32–37)
MCHC RBC-ENTMCNC: 32.6 G/DL — SIGNIFICANT CHANGE UP (ref 32–37)
MCV RBC AUTO: 89.7 FL — SIGNIFICANT CHANGE UP (ref 80–94)
MCV RBC AUTO: 93.9 FL — SIGNIFICANT CHANGE UP (ref 80–94)
MONOCYTES # BLD AUTO: 0.85 K/UL — HIGH (ref 0.1–0.6)
MONOCYTES NFR BLD AUTO: 3.5 % — SIGNIFICANT CHANGE UP (ref 1.7–9.3)
NEUTROPHILS # BLD AUTO: 22.34 K/UL — HIGH (ref 1.4–6.5)
NEUTROPHILS NFR BLD AUTO: 92.9 % — HIGH (ref 42.2–75.2)
NRBC # BLD: 0 /100 WBCS — SIGNIFICANT CHANGE UP (ref 0–0)
NRBC # BLD: 0 /100 WBCS — SIGNIFICANT CHANGE UP (ref 0–0)
PHOSPHATE SERPL-MCNC: 11.1 MG/DL — HIGH (ref 2.1–4.9)
PHOSPHATE SERPL-MCNC: 14.1 MG/DL — HIGH (ref 2.1–4.9)
PLATELET # BLD AUTO: 177 K/UL — SIGNIFICANT CHANGE UP (ref 130–400)
PLATELET # BLD AUTO: 200 K/UL — SIGNIFICANT CHANGE UP (ref 130–400)
POTASSIUM SERPL-MCNC: 3.8 MMOL/L — SIGNIFICANT CHANGE UP (ref 3.5–5)
POTASSIUM SERPL-MCNC: 4.7 MMOL/L — SIGNIFICANT CHANGE UP (ref 3.5–5)
POTASSIUM SERPL-SCNC: 3.8 MMOL/L — SIGNIFICANT CHANGE UP (ref 3.5–5)
POTASSIUM SERPL-SCNC: 4.7 MMOL/L — SIGNIFICANT CHANGE UP (ref 3.5–5)
PROCALCITONIN SERPL-MCNC: 0.83 NG/ML — HIGH (ref 0.02–0.1)
RBC # BLD: 3.59 M/UL — LOW (ref 4.7–6.1)
RBC # BLD: 3.75 M/UL — LOW (ref 4.7–6.1)
RBC # FLD: 13.8 % — SIGNIFICANT CHANGE UP (ref 11.5–14.5)
RBC # FLD: 14.1 % — SIGNIFICANT CHANGE UP (ref 11.5–14.5)
SODIUM SERPL-SCNC: 148 MMOL/L — HIGH (ref 135–146)
SODIUM SERPL-SCNC: 149 MMOL/L — HIGH (ref 135–146)
WBC # BLD: 24.06 K/UL — HIGH (ref 4.8–10.8)
WBC # BLD: 27.18 K/UL — HIGH (ref 4.8–10.8)
WBC # FLD AUTO: 24.06 K/UL — HIGH (ref 4.8–10.8)
WBC # FLD AUTO: 27.18 K/UL — HIGH (ref 4.8–10.8)

## 2020-04-15 PROCEDURE — 93010 ELECTROCARDIOGRAM REPORT: CPT

## 2020-04-15 PROCEDURE — 71045 X-RAY EXAM CHEST 1 VIEW: CPT | Mod: 26

## 2020-04-15 PROCEDURE — 99291 CRITICAL CARE FIRST HOUR: CPT | Mod: CS

## 2020-04-15 PROCEDURE — 99223 1ST HOSP IP/OBS HIGH 75: CPT

## 2020-04-15 RX ORDER — HEPARIN SODIUM 5000 [USP'U]/ML
5000 INJECTION INTRAVENOUS; SUBCUTANEOUS EVERY 8 HOURS
Refills: 0 | Status: DISCONTINUED | OUTPATIENT
Start: 2020-04-15 | End: 2020-04-28

## 2020-04-15 RX ORDER — PROPOFOL 10 MG/ML
50 INJECTION, EMULSION INTRAVENOUS ONCE
Refills: 0 | Status: COMPLETED | OUTPATIENT
Start: 2020-04-15 | End: 2020-04-15

## 2020-04-15 RX ORDER — PROPOFOL 10 MG/ML
5 INJECTION, EMULSION INTRAVENOUS ONCE
Refills: 0 | Status: COMPLETED | OUTPATIENT
Start: 2020-04-15 | End: 2020-04-15

## 2020-04-15 RX ORDER — INSULIN HUMAN 100 [IU]/ML
1 INJECTION, SOLUTION SUBCUTANEOUS
Qty: 100 | Refills: 0 | Status: DISCONTINUED | OUTPATIENT
Start: 2020-04-15 | End: 2020-04-23

## 2020-04-15 RX ORDER — METOPROLOL TARTRATE 50 MG
5 TABLET ORAL ONCE
Refills: 0 | Status: COMPLETED | OUTPATIENT
Start: 2020-04-15 | End: 2020-04-15

## 2020-04-15 RX ADMIN — LEVETIRACETAM 250 MILLIGRAM(S): 250 TABLET, FILM COATED ORAL at 16:45

## 2020-04-15 RX ADMIN — Medication 60 MILLIGRAM(S): at 05:49

## 2020-04-15 RX ADMIN — Medication 10 MILLILITER(S): at 11:09

## 2020-04-15 RX ADMIN — PROPOFOL 5 MILLIGRAM(S): 10 INJECTION, EMULSION INTRAVENOUS at 16:44

## 2020-04-15 RX ADMIN — LEVETIRACETAM 250 MILLIGRAM(S): 250 TABLET, FILM COATED ORAL at 06:24

## 2020-04-15 RX ADMIN — LACTULOSE 20 GRAM(S): 10 SOLUTION ORAL at 16:45

## 2020-04-15 RX ADMIN — ZINC SULFATE TAB 220 MG (50 MG ZINC EQUIVALENT) 220 MILLIGRAM(S): 220 (50 ZN) TAB at 11:10

## 2020-04-15 RX ADMIN — Medication 1334 MILLIGRAM(S): at 11:08

## 2020-04-15 RX ADMIN — Medication 60 MILLIGRAM(S): at 21:02

## 2020-04-15 RX ADMIN — ETHACRYNIC ACID 25 MILLIGRAM(S): 25 TABLET ORAL at 05:50

## 2020-04-15 RX ADMIN — PANTOPRAZOLE SODIUM 40 MILLIGRAM(S): 20 TABLET, DELAYED RELEASE ORAL at 11:10

## 2020-04-15 RX ADMIN — LACTULOSE 20 GRAM(S): 10 SOLUTION ORAL at 05:49

## 2020-04-15 RX ADMIN — PROPOFOL 50 MILLIGRAM(S): 10 INJECTION, EMULSION INTRAVENOUS at 19:06

## 2020-04-15 RX ADMIN — HEPARIN SODIUM 5000 UNIT(S): 5000 INJECTION INTRAVENOUS; SUBCUTANEOUS at 11:09

## 2020-04-15 RX ADMIN — Medication 10 MILLILITER(S): at 05:49

## 2020-04-15 RX ADMIN — Medication 60 MILLIGRAM(S): at 11:09

## 2020-04-15 RX ADMIN — CHLORHEXIDINE GLUCONATE 15 MILLILITER(S): 213 SOLUTION TOPICAL at 05:49

## 2020-04-15 RX ADMIN — CEFEPIME 100 MILLIGRAM(S): 1 INJECTION, POWDER, FOR SOLUTION INTRAMUSCULAR; INTRAVENOUS at 16:39

## 2020-04-15 RX ADMIN — SENNA PLUS 2 TABLET(S): 8.6 TABLET ORAL at 21:02

## 2020-04-15 RX ADMIN — CHLORHEXIDINE GLUCONATE 15 MILLILITER(S): 213 SOLUTION TOPICAL at 16:39

## 2020-04-15 RX ADMIN — Medication 500 MILLIGRAM(S): at 11:08

## 2020-04-15 RX ADMIN — Medication 500 MILLIGRAM(S): at 05:49

## 2020-04-15 RX ADMIN — Medication 5 MILLIGRAM(S): at 19:05

## 2020-04-15 RX ADMIN — Medication 10 MILLILITER(S): at 21:01

## 2020-04-15 RX ADMIN — HEPARIN SODIUM 5000 UNIT(S): 5000 INJECTION INTRAVENOUS; SUBCUTANEOUS at 21:01

## 2020-04-15 RX ADMIN — Medication 1334 MILLIGRAM(S): at 16:36

## 2020-04-15 RX ADMIN — Medication 500 MILLIGRAM(S): at 21:01

## 2020-04-15 NOTE — PROGRESS NOTE ADULT - NSREFPHYEXINPTDOCREFER_GEN_ALL_CORE
4/4/2020
3/31
4/10
4/2
4/3
4/6
4/6
4/8
4/9
4/12-4/11 (no acute changes)
Exam 4/14-no change in mental status

## 2020-04-15 NOTE — PROGRESS NOTE ADULT - SUBJECTIVE AND OBJECTIVE BOX
KONSTANTIN WYATT  [id number]  74y Male    Indication for ICU admission: Acute Hypoxemic Respiratory Failure, clinical COVID19 (false negatvie)    Admit Date:03-30-20  ICU Date: 03-31-20  OR Date:    Bactrim (Unknown)    PAST MEDICAL & SURGICAL HISTORY:  Afib  DM (diabetes mellitus)  BPH (benign prostatic hyperplasia)  CAD (coronary artery disease)    Home Medications:  Cartia  mg/24 hours oral capsule, extended release: 1 cap(s) orally once a day (30 Mar 2020 18:04)  Eliquis 5 mg oral tablet: 1 tab(s) orally 2 times a day (30 Mar 2020 18:04)  Lipitor 40 mg oral tablet: 1 tab(s) orally once a day (30 Mar 2020 18:04)  lisinopril 40 mg oral tablet: 1 tab(s) orally once a day (30 Mar 2020 18:04)  metFORMIN 750 mg oral tablet, extended release: 1 tab(s) orally once a day (30 Mar 2020 18:04)      24HRS EVENT: No events overnight    NEURO    sedation-propofol    HCT 4/14-multiple acute hemorraghic infarcts, neuro consulted contacted Dr. Portillo, recs     Keppra 250mg BID 2/2 poor renal function, awaiting final recs    RASS -4, pupils reactive    Pain control: Acetaminophen 650mg PO q6 PRN  neurology rec MRI +/- gado of brain when stable and EEG     RESP  Acute Hypoxemic Respiratory Failure;  - Intubated: /28/40/5    COVID-19/Viral Pneumonia, pending reswab sent 4/13  ABG - ( 14 Apr 2020 17:21 )  pH, Arterial 7.208  pH, Blood: x     74.6/  pCO2: 1  /  pO2: 179   / HCO3: 29    / Base Excess: 1.3   /                CXR: unchanged diffuse bilateral opacities    On Guanifenisen    CARD/VASC    Acute tachycardia -  on PO Cardizem 60mg q8h, and  PO Lopressor 25 mg q 8 hrs    Acute hypotension - on levo low dose restarted 4/14    Hx of Afib - Diltiazem. BB, currently holding Apixaban 2/2 HCT findings    Hx of CAD - Atorvastatin 40mg PO qhs    Hx of HTN - hold Lisinopril     GI/NUTR   Diet: TF (glucerna) @ 50mL/hr    PPI  Senna     Daily weight (4/10) 98.7kg,                          ( 4/13) 95.4kg                         ( 4/14) 93.3 kg    /Renal    LIZZIE; worsening, BUN / Cr, 152/ 6.6   <--, 138/6.2  <--, 134/5.7  <-- not a HD candidate for 4/14, will reevaluate 4/15 AM with nephro   IVL  Evans, non-oliguric renal failure UOP: 100-200cc/hr  on Ethacrynic acid 25mg BID  (4/10) L IJ [place] placed for HD (kept even 4/10, 4/11)   Hx of BPH    HEME/ONC  Hgb 11  Holding ELIQUIS    ID  afebrile  WBC 17-->20   4/15 21.97   COVID-19 +  - restarted on Cefepime, d/w team regarding end date  - completed course of Hydroxychloroquine & Azithromycin  - Ascorbic acid, Zinc  - D-dimer uptrending 11,529 >03326>8421>5852,  ,     ENDO  DM2; uncontrolled, holding home Metformin  off insulin gtt 4/14 PM    MSK/DERM  - no active issues    DVT PPx  - on Apixaban     GI PPx  -  Pantoprazole 40mg PO qac      ***Tubes/Lines/Drains  ***  Peripheral IV  Central Venous Line  Right IJ TLC  	Date 3/31/20  Arterial Line  Right radial A-line		                Date: 3/31/20  Urnary Catheter		Indication: Strict I&O    Date Placed: 3/31/20  ETT  OGT    REVIEW OF SYSTEMS    [ ] A ten-point review of systems was otherwise negative except as noted.  [x ] Due to altered mental status/intubation, subjective information were not able to be obtained from the patient. History was obtained, to the extent possible, from review of the chart and collateral sources of information. KONSTANTIN WYATT  [id number]  74y Male    Indication for ICU admission: Acute Hypoxemic Respiratory Failure, clinical COVID19 (false negatvie)    Admit Date:20  ICU Date: 20  OR Date:    Bactrim (Unknown)    PAST MEDICAL & SURGICAL HISTORY:  Afib  DM (diabetes mellitus)  BPH (benign prostatic hyperplasia)  CAD (coronary artery disease)    Home Medications:  Cartia  mg/24 hours oral capsule, extended release: 1 cap(s) orally once a day (30 Mar 2020 18:04)  Eliquis 5 mg oral tablet: 1 tab(s) orally 2 times a day (30 Mar 2020 18:04)  Lipitor 40 mg oral tablet: 1 tab(s) orally once a day (30 Mar 2020 18:04)  lisinopril 40 mg oral tablet: 1 tab(s) orally once a day (30 Mar 2020 18:04)  metFORMIN 750 mg oral tablet, extended release: 1 tab(s) orally once a day (30 Mar 2020 18:04)      24HRS EVENT: No events overnight    NEURO    sedation-propofol    HCT -multiple acute hemorraghic infarcts, neuro consulted contacted Dr. Portillo, recs     Keppra 250mg BID / poor renal function, awaiting final recs    RASS -4, pupils reactive    Pain control: Acetaminophen 650mg PO q6 PRN  neurology rec MRI +/- gado of brain when stable and EEG     RESP  Acute Hypoxemic Respiratory Failure;  - Intubated: /28/40/5    COVID-19/Viral Pneumonia, pending reswab sent   ABG - ( 2020 17:21 )  pH, Arterial 7.208  pH, Blood: x     74.6/  pCO2: 1  /  pO2: 179   / HCO3: 29    / Base Excess: 1.3   /                CXR: unchanged diffuse bilateral opacities    On Guanifenisen    CARD/VASC    Acute tachycardia -  on PO Cardizem 60mg q8h, and  PO Lopressor 25 mg q 8 hrs    Acute hypotension - on levo low dose restarted     Hx of Afib - Diltiazem. BB, currently holding Apixaban 2/2 HCT findings    Hx of CAD - Atorvastatin 40mg PO qhs    Hx of HTN - hold Lisinopril     GI/NUTR   Diet: TF (glucerna) @ 50mL/hr    PPI  Senna     Daily weight (4/10) 98.7kg,                          ( ) 95.4kg                         ( ) 93.3 kg    /Renal    LIZZIE; worsening, BUN / Cr, 152/ 6.6   <--, 138/6.2  <--, 134/5.7  <-- not a HD candidate for , will reevaluate 4/15 AM with nephro   IVL  [place], non-oliguric renal failure UOP: 100-200cc/hr  on Ethacrynic acid 25mg BID  (4/10) L IJ [place] placed for HD (kept even 4/10, )   Hx of BPH    HEME/ONC  Hgb 11  Holding ELIQUIS    ID  afebrile  WBC 17-->20   4/15 21.97   COVID-19 +  - restarted on Cefepime, d/w team regarding end date  - completed course of Hydroxychloroquine & Azithromycin  - Ascorbic acid, Zinc  - D-dimer uptrending 11,529 >59107>8421>5852,  ,     ENDO  DM2; uncontrolled, holding home Metformin  off insulin gtt  PM    MSK/DERM  - no active issues    DVT PPx  - on Apixaban     GI PPx  -  Pantoprazole 40mg PO qac      ***Tubes/Lines/Drains  ***  Peripheral IV  Central Venous Line  Right IJ TLC  	Date 3/31/20  Arterial Line  Right radial A-line		                Date: 3/31/20  Urnary Catheter		Indication: Strict I&O    Date Placed: 3/31/20  ETT  OGT    REVIEW OF SYSTEMS    [ ] A ten-point review of systems was otherwise negative except as noted.  [x ] Due to altered mental status/intubation, subjective information were not able to be obtained from the patient. History was obtained, to the extent possible, from review of the chart and collateral sources of information.      Daily     Daily Weight in k.8 (15 Apr 2020 04:51)    Diet, NPO with Tube Feed:   Tube Feeding Modality: Orogastric  Glucerna 1.2 Choco  Total Volume for 24 Hours (mL): 1200  Continuous  Starting Tube Feed Rate mL per Hour: 30  Increase Tube Feed Rate by (mL): 10     Every hour  Until Goal Tube Feed Rate (mL per Hour): 50  Tube Feed Duration (in Hours): 24  Tube Feed Start Time: 13:00 (20 @ 17:56)      CURRENT MEDS:  Neurologic Medications  acetaminophen  Suppository .. 650 milliGRAM(s) Rectal every 6 hours PRN Temp greater or equal to 38.5C (101.3F)  levETIRAcetam 250 milliGRAM(s) Oral two times a day    Respiratory Medications  guaifenesin/dextromethorphan  Syrup 10 milliLiter(s) Oral every 8 hours    Cardiovascular Medications  diltiazem    Tablet 60 milliGRAM(s) Oral every 8 hours  norepinephrine Infusion 0.05 MICROgram(s)/kG/Min IV Continuous <Continuous>    Gastrointestinal Medications  ascorbic acid 500 milliGRAM(s) Oral every 8 hours  calcium acetate 1334 milliGRAM(s) Oral three times a day with meals  lactulose Syrup 20 Gram(s) Oral every 12 hours  pantoprazole  Injectable 40 milliGRAM(s) IV Push daily  senna 2 Tablet(s) Oral at bedtime  zinc sulfate 220 milliGRAM(s) Oral daily    Genitourinary Medications    Hematologic/Oncologic Medications  heparin  Injectable 5000 Unit(s) SubCutaneous every 8 hours    Antimicrobial/Immunologic Medications  cefepime   IVPB 1000 milliGRAM(s) IV Intermittent <User Schedule>    Endocrine/Metabolic Medications  insulin regular Infusion 1 Unit(s)/Hr IV Continuous <Continuous>    Topical/Other Medications  chlorhexidine 0.12% Liquid 15 milliLiter(s) Oral Mucosa every 12 hours  chlorhexidine 4% Liquid 1 Application(s) Topical <User Schedule>      ICU Vital Signs Last 24 Hrs  T(C): 36.8 (15 Apr 2020 16:28), Max: 36.9 (15 Apr 2020 06:01)  T(F): 98.2 (15 Apr 2020 16:28), Max: 98.5 (15 Apr 2020 06:01)  HR: 113 (15 Apr 2020 16:28) (90 - 114)  BP: --  BP(mean): --  ABP: 152/73 (15 Apr 2020 16:28) (123/55 - 152/73)  ABP(mean): 103 (15 Apr 2020 16:28) (74 - 103)  RR: 30 (15 Apr 2020 16:28) (26 - 30)  SpO2: 100% (15 Apr 2020 16:28) (98% - 100%)      Adult Advanced Hemodynamics Last 24 Hrs  CVP(mm Hg): --  CVP(cm H2O): --  CO: --  CI: --  PA: --  PA(mean): --  PCWP: --  SVR: --  SVRI: --  PVR: --  PVRI: --    Mode: AC/ CMV (Assist Control/ Continuous Mandatory Ventilation)  RR (machine): 30  TV (machine): 500  FiO2: 40  PEEP: 5  ITime: 1  MAP: 13  PIP: 31    ABG - ( 15 Apr 2020 10:56 )  pH, Arterial: 7.39  pH, Blood: x     /  pCO2: 43    /  pO2: 134   / HCO3: 26    / Base Excess: 0.6   /  SaO2: 98                  I&O's Summary    2020 07:  -  15 Apr 2020 07:00  --------------------------------------------------------  IN: 580.7 mL / OUT: 2240 mL / NET: -1659.3 mL    15 Apr 2020 07:  -  15 Apr 2020 18:38  --------------------------------------------------------  IN: 534.5 mL / OUT: 1030 mL / NET: -495.5 mL      I&O's Detail    2020 07:  -  15 Apr 2020 07:00  --------------------------------------------------------  IN:    Glucerna: 400 mL    insulin regular Infusion: 41 mL    norepinephrine Infusion: 34.7 mL    propofol Infusion: 105 mL  Total IN: 580.7 mL    OUT:    Indwelling Catheter - Urethral: 1965 mL    Voided: 275 mL  Total OUT: 2240 mL    Total NET: -1659.3 mL      15 Apr 2020 07:  -  15 Apr 2020 18:38  --------------------------------------------------------  IN:    Glucerna: 360 mL    insulin regular Infusion: 34 mL    norepinephrine Infusion: 140.5 mL  Total IN: 534.5 mL    OUT:    Indwelling Catheter - Urethral: 1030 mL  Total OUT: 1030 mL    Total NET: -495.5 mL          CXR:       LABS:  CAPILLARY BLOOD GLUCOSE      POCT Blood Glucose.: 141 mg/dL (15 Apr 2020 16:54)  POCT Blood Glucose.: 156 mg/dL (15 Apr 2020 15:29)  POCT Blood Glucose.: 184 mg/dL (15 Apr 2020 13:26)  POCT Blood Glucose.: 195 mg/dL (15 Apr 2020 12:24)  POCT Blood Glucose.: 189 mg/dL (15 Apr 2020 11:19)  POCT Blood Glucose.: 198 mg/dL (15 Apr 2020 09:45)  POCT Blood Glucose.: 191 mg/dL (15 Apr 2020 06:32)  POCT Blood Glucose.: 140 mg/dL (15 Apr 2020 05:37)  POCT Blood Glucose.: 140 mg/dL (15 Apr 2020 04:10)  POCT Blood Glucose.: 160 mg/dL (15 Apr 2020 03:14)  POCT Blood Glucose.: 162 mg/dL (15 Apr 2020 02:02)  POCT Blood Glucose.: 178 mg/dL (15 Apr 2020 00:51)  POCT Blood Glucose.: 95 mg/dL (2020 20:09)  POCT Blood Glucose.: 98 mg/dL (2020 18:58)                          10.5   24.06 )-----------( 200      ( 15 Apr 2020 16:20 )             32.2       04-15    149<H>  |  100  |  164<HH>  ----------------------------<  141<H>  3.8   |  27  |  5.7<HH>    Ca    8.8      15 Apr 2020 16:20  Phos  11.1     04-15  Mg     2.8     04-15        PT/INR - ( 2020 00:25 )   PT: 18.20 sec;   INR: 1.58 ratio         PTT - ( 2020 00:25 )  PTT:30.5 sec  CARDIAC MARKERS ( 2020 00:25 )  x     / x     / 65 U/L / x     / x

## 2020-04-15 NOTE — PROGRESS NOTE ADULT - SUBJECTIVE AND OBJECTIVE BOX
Neurocritical Care Progress Note:    1. Brief Presentation: 75 yo M w/ hx of Afib on Eliquis, CAD s/p PCI, DM, BPH, presents with 1 week of fever, cough, and progressive SOB. Admits to watery diarrhea for 3 days. Denies chest pain. 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 He has exposure to COVID positive family member at the wedding. No travel hx. Pt pulse ox in 80s in the field per EMS. Patient is intubated since 03/31. Off sedation for 5 days but remains unresponsive.      2. Today's Acute Problems: remains comatose, on pressors, acute kidney failure, +uremia    3. Relevant brief History: CTH with BL acute ischemic strokes with hemorrhagic conversion    4-Yesterday's Plan: hold Eliquis, start Keppra 250 Q12, REEG    5. Last 24 hour updates: REEG is negative for epileptiform activity, generalized slowing    6. Medications:   ascorbic acid 500 milliGRAM(s) Oral every 8 hours  calcium acetate 1334 milliGRAM(s) Oral three times a day with meals  cefepime   IVPB 1000 milliGRAM(s) IV Intermittent <User Schedule>  chlorhexidine 0.12% Liquid 15 milliLiter(s) Oral Mucosa every 12 hours  chlorhexidine 4% Liquid 1 Application(s) Topical <User Schedule>  diltiazem    Tablet 60 milliGRAM(s) Oral every 8 hours  guaifenesin/dextromethorphan  Syrup 10 milliLiter(s) Oral every 8 hours  heparin  Injectable 5000 Unit(s) SubCutaneous every 8 hours  insulin regular Infusion 1 Unit(s)/Hr IV Continuous <Continuous>  lactulose Syrup 20 Gram(s) Oral every 12 hours  levETIRAcetam 250 milliGRAM(s) Oral two times a day  norepinephrine Infusion 0.05 MICROgram(s)/kG/Min IV Continuous <Continuous>  pantoprazole  Injectable 40 milliGRAM(s) IV Push daily  senna 2 Tablet(s) Oral at bedtime  zinc sulfate 220 milliGRAM(s) Oral daily      7. Ancillary Management:   Chest PT[ ]   Head of bed >35 [ x]   Out of bed to chair [ ]   PT/OT/SP Eval [ ]   Spirometry[ ]   DVT prophalaxis[x ]    88.Neuro:   Awake: Spontaneously[ ] Occasionally[ ] To Voice [ ] To painful stimuli [x ]   AIert [ ]. Following commands: 3 steps[ ], 2 steps[ ], 1 step [ ], None [ x]   Orientation: 0[ x], 1[ ], 2[ ], 3[ ]. Tracking objects with eyes: [ ]   Language: IN  Time off sedation for exam: no sedation  Pupils: Right 3  >   Left  3.5  >      Corneal:   +   Gag reflex:  +   EOMI: +      mRS:  0 No symptoms at all  1 No significant disability despite symptoms; able to carry out all usual duties and activities without assistance  2 Slight disability; unable to carry out all previous activities, but able to look after own affairs  3 Moderate disability; requiring some help, but able to walk without assistance  4 Moderately severe disability; unable to walk without assistance and unable to attend to own bodily needs without assistance  5 Severe disability; bedridden, incontinent and requiring constant nursing care and attention  6 Dead      Last CTH:< from: CT Head No Cont (04.14.20 @ 11:06) >  Findings:    The third and lateral ventricles are mildly enlarged as are the cortical sulci consistent with a mild degree of cortical atrophy. The fourth ventricle is normal in size and position.    There is a moderate-sized area of mixed attenuation in the left frontal/temporal region consistent with an acute hemorrhagic infarct. There is mild mass effect upon the adjacent left lateral ventricle. Slight shift of the third and lateral ventricles to the right of midline. There is a similar finding in the right posterior parietal-occipital region and a similar smaller finding in the left posterior superior cerebellar hemisphere. In view of the multiple vascular territories the possibility of showering emboli should be considered.    Patchy foci of diminished attenuation the periventricular white matter is nonspecific and differential diagnostic possibilities include ischemic change, gliosis or demyelination.    The right frontal sinus is hypoplastic.    The opacification of the sphenoid sinuses.    Opacification of scattered mastoid air cells consistent with inflammation or infection.    The calvarium is intact.    IMPRESSION:    Multiple acute hemorrhagic infarcts in the left frontal temporal region, right posterior parietal occipital region, and left cerebellar hemisphere. In view of the multiple vascular territories the possibility of showering emboli shouldbe considered.    Dr. Lance Traylor discussed preliminary findings with CAMRON MCQUEEN PA on 4/14/2020 11:34 AM with readback.    < end of copied text >      .    9. Cardiovascular:   Vital Signs Last 24 Hrs  T(C): 36.8 (15 Apr 2020 16:28), Max: 36.9 (15 Apr 2020 06:01)  T(F): 98.2 (15 Apr 2020 16:28), Max: 98.5 (15 Apr 2020 06:01)  HR: 165 (15 Apr 2020 19:01) (90 - 165)  BP: --  BP(mean): --  RR: 30 (15 Apr 2020 19:01) (30 - 30)  SpO2: 100% (15 Apr 2020 19:01) (98% - 100%)     Last Echo:    Last EKG:    CVP   MAP/CPP/SBP target:   CO:      CI:       Enzymes/Trop:    10. Respiratory:   ABG:ABG - ( 15 Apr 2020 10:56 )  pH, Arterial: 7.39  pH, Blood: x     /  pCO2: 43    /  pO2: 134   / HCO3: 26    / Base Excess: 0.6   /  SaO2: 98            Mode: AC/ CMV (Assist Control/ Continuous Mandatory Ventilation)  RR (machine): 30  TV (machine): 500  FiO2: 40  PEEP: 5  ITime: 1  MAP: 13  PIP: 31      Peak Pressure/[place] Pressure:    11.GI:    Prophalaxis:     Bowel mvt:     Abd distension:       12.Renal/Fluids/Electrolytes:    04-15    149<H>  |  100  |  164<HH>  ----------------------------<  141<H>  3.8   |  27  |  5.7<HH>    Ca    8.8      15 Apr 2020 16:20  Phos  11.1     04-15  Mg     2.8     04-15        I&O's Detail    14 Apr 2020 07:01  -  15 Apr 2020 07:00  --------------------------------------------------------  IN:    Glucerna: 400 mL    insulin regular Infusion: 41 mL    norepinephrine Infusion: 34.7 mL    propofol Infusion: 105 mL  Total IN: 580.7 mL    OUT:    Indwelling Catheter - Urethral: 1965 mL    Voided: 275 mL  Total OUT: 2240 mL    Total NET: -1659.3 mL      15 Apr 2020 07:01  -  15 Apr 2020 19:24  --------------------------------------------------------  IN:    Glucerna: 360 mL    insulin regular Infusion: 40 mL    norepinephrine Infusion: 140.5 mL  Total IN: 540.5 mL    OUT:    Indwelling Catheter - Urethral: 1280 mL  Total OUT: 1280 mL    Total NET: -739.5 mL

## 2020-04-15 NOTE — PROGRESS NOTE ADULT - SUBJECTIVE AND OBJECTIVE BOX
24 h events noted  intubated/ventilated         PAST HISTORY  --------------------------------------------------------------------------------  No significant changes to PMH, PSH, FHx, SHx, unless otherwise noted    ALLERGIES & MEDICATIONS  --------------------------------------------------------------------------------  Allergies    Bactrim (Unknown)    Intolerances      Standing Inpatient Medications  ascorbic acid 500 milliGRAM(s) Oral every 8 hours  calcium acetate 1334 milliGRAM(s) Oral three times a day with meals  cefepime   IVPB 1000 milliGRAM(s) IV Intermittent <User Schedule>  chlorhexidine 0.12% Liquid 15 milliLiter(s) Oral Mucosa every 12 hours  chlorhexidine 4% Liquid 1 Application(s) Topical <User Schedule>  dextrose 5%. 1000 milliLiter(s) IV Continuous <Continuous>  diltiazem    Tablet 60 milliGRAM(s) Oral every 8 hours  guaifenesin/dextromethorphan  Syrup 10 milliLiter(s) Oral every 8 hours  heparin  Injectable 5000 Unit(s) SubCutaneous every 8 hours  insulin regular Infusion 1 Unit(s)/Hr IV Continuous <Continuous>  lactulose Syrup 20 Gram(s) Oral every 12 hours  levETIRAcetam 250 milliGRAM(s) Oral two times a day  norepinephrine Infusion 0.05 MICROgram(s)/kG/Min IV Continuous <Continuous>  pantoprazole  Injectable 40 milliGRAM(s) IV Push daily  senna 2 Tablet(s) Oral at bedtime  zinc sulfate 220 milliGRAM(s) Oral daily    PRN Inpatient Medications  acetaminophen  Suppository .. 650 milliGRAM(s) Rectal every 6 hours PRN          VITALS/PHYSICAL EXAM  --------------------------------------------------------------------------------  T(C): 36.9 (04-15-20 @ 06:01), Max: 37.3 (04-14-20 @ 12:52)  HR: 113 (04-15-20 @ 04:18) (83 - 113)  BP: --  RR: 26 (04-14-20 @ 19:01) (21 - 28)  SpO2: 99% (04-15-20 @ 08:26) (96% - 99%)  Wt(kg): --        04-14-20 @ 07:01  -  04-15-20 @ 07:00  --------------------------------------------------------  IN: 580.7 mL / OUT: 2240 mL / NET: -1659.3 mL      Physical Exam:  	Gen: intubated/ventilated  	Vascular access: amelia     LABS/STUDIES  --------------------------------------------------------------------------------              11.0   27.18 >-----------<  177      [04-15-20 @ 04:30]              35.2     148  |  100  |  152  ----------------------------<  175      [04-15-20 @ 00:10]  4.7   |  28  |  6.6        Ca     9.2     [04-15-20 @ 00:10]      Mg     2.8     [04-15-20 @ 00:10]      Phos  14.1     [04-15-20 @ 00:10]      PT/INR: PT 18.20, INR 1.58       [04-14-20 @ 00:25]  PTT: 30.5       [04-14-20 @ 00:25]    CK 65      [04-14-20 @ 00:25]        [04-14-20 @ 00:25]    Creatinine Trend:  SCr 6.6 [04-15 @ 00:10]  SCr 6.6 [04-14 @ 16:00]  SCr 6.2 [04-14 @ 00:25]  SCr 5.7 [04-13 @ 17:03]  SCr 5.9 [04-13 @ 04:30]    Urinalysis - [03-30-20 @ 22:00]      Color Yellow / Appearance Clear / SG 1.031 / pH 6.0      Gluc Negative / Ketone Trace  / Bili Negative / Urobili <2 mg/dL       Blood Moderate / Protein 300 mg/dL / Leuk Est Negative / Nitrite Negative      RBC 10 / WBC 29 / Hyaline 24 / Gran  / Sq Epi  / Non Sq Epi 22 / Bacteria Negative      Ferritin 1769      [04-14-20 @ 00:25]  HbA1c 7.7      [04-01-20 @ 04:30]

## 2020-04-15 NOTE — PROGRESS NOTE ADULT - SUBJECTIVE AND OBJECTIVE BOX
KONSTANTIN WYATT  74y, Male    All available historical data reviewed    OVERNIGHT EVENTS:  fio2 40%  on pressors    ROS:  unable to obtain history secondary to patient's mental status and/or sedation   VITALS:  T(F): 97.6, Max: 99.1 (04-14-20 @ 12:52)  HR: 90  BP: --  RR: 26Vital Signs Last 24 Hrs  T(C): 36.4 (15 Apr 2020 11:27), Max: 37.3 (14 Apr 2020 12:52)  T(F): 97.6 (15 Apr 2020 11:27), Max: 99.1 (14 Apr 2020 12:52)  HR: 90 (15 Apr 2020 11:27) (83 - 113)  BP: --  BP(mean): --  RR: 26 (14 Apr 2020 19:01) (21 - 28)  SpO2: 100% (15 Apr 2020 11:27) (96% - 100%)    TESTS & MEASUREMENTS:                        11.0   27.18 )-----------( 177      ( 15 Apr 2020 04:30 )             35.2     04-15    148<H>  |  100  |  152<HH>  ----------------------------<  175<H>  4.7   |  28  |  6.6<HH>    Ca    9.2      15 Apr 2020 00:10  Phos  14.1     04-15  Mg     2.8     04-15                RADIOLOGY & ADDITIONAL TESTS:  Personal review of radiological diagnostics performed  Echo and EKG results noted when applicable.     MEDICATIONS:  acetaminophen  Suppository .. 650 milliGRAM(s) Rectal every 6 hours PRN  ascorbic acid 500 milliGRAM(s) Oral every 8 hours  calcium acetate 1334 milliGRAM(s) Oral three times a day with meals  cefepime   IVPB 1000 milliGRAM(s) IV Intermittent <User Schedule>  chlorhexidine 0.12% Liquid 15 milliLiter(s) Oral Mucosa every 12 hours  chlorhexidine 4% Liquid 1 Application(s) Topical <User Schedule>  dextrose 5%. 1000 milliLiter(s) IV Continuous <Continuous>  diltiazem    Tablet 60 milliGRAM(s) Oral every 8 hours  guaifenesin/dextromethorphan  Syrup 10 milliLiter(s) Oral every 8 hours  heparin  Injectable 5000 Unit(s) SubCutaneous every 8 hours  insulin regular Infusion 1 Unit(s)/Hr IV Continuous <Continuous>  lactulose Syrup 20 Gram(s) Oral every 12 hours  levETIRAcetam 250 milliGRAM(s) Oral two times a day  norepinephrine Infusion 0.05 MICROgram(s)/kG/Min IV Continuous <Continuous>  pantoprazole  Injectable 40 milliGRAM(s) IV Push daily  senna 2 Tablet(s) Oral at bedtime  zinc sulfate 220 milliGRAM(s) Oral daily      ANTIBIOTICS:  cefepime   IVPB 1000 milliGRAM(s) IV Intermittent <User Schedule>

## 2020-04-15 NOTE — PROGRESS NOTE ADULT - ASSESSMENT
· Assessment		  IMPRESSION:  COVID19 with severe septic shock, on pressors,on mechanical ventilation with ARDS with fio2 40 % secondary to Cytokine Release Syndrome  -end organ damage with acute renal failure, transaminitis, metabolic encephalopathy  -inflammatory markers significantly elevated but are decreasing  -d Dimers 74702/ CRP 31.9  associated with increased mortality  -Blood & Urine cxs NGTD     RECOMMENDATIONS:  patient is a candidate for end of life care, palliative care team/hospice   recall prn please

## 2020-04-15 NOTE — CHART NOTE - NSCHARTNOTEFT_GEN_A_CORE
s/w partner Opal and Son  Harvinder via phone today  -EEG to be conducted, MRI once patient out of critical state  -kidney function unchanged  -on seizure prophylaxis  family will discuss further treatment and goals of care

## 2020-04-15 NOTE — PROGRESS NOTE ADULT - ASSESSMENT
LIZZIE/ ATN/ hyperkalemia/ respiratory failure / covid positive / high BUN  #off  diuretics , patient is 1.6 liter negative   # non oliguric  # ph noted  feed nepro, on binders, add renagel 3/3/3  # d/c vit C    # s/p friday and saturday, no hd today   # BUN noted : highly catabolic and prerenal, d/c diuretics  #  on  cefepime to 1 g   # will follow   # overall progress poor  # case discussed with PACU team

## 2020-04-15 NOTE — PROGRESS NOTE ADULT - ASSESSMENT
75 yo M w/ hx of Afib on Eliquis, CAD s/p PCI, DM, BPH, presents with 1 week of fever, cough, and progressive SOB. Covid + intubated since 3/31, off sedation for 5 days, unresponsive. CTH was done 4/14 with bl acute ischemic strokes with  hemorrhagic conversion.  REEG with generalized slowing. No change in his exam.  DDX: covid related encephalopathy vs uremic encephalopathy    Suggestion:  Continue Hold Eliquis  Continue Keppra 250 Q12  MRI brain and MRA head and neck  LP send for CSF WBC, RBC, protein, glucose, OCB, IgG Index, SARS-CoV2 PCR   Serological assays for all SARS-CoV2 cases: pro-inflammatory cytokines assay panel (i.e., IL-1ß, IL-2, IL-6, IL-10, IL-17, TNF- a, IFN-?, IP-10), in addition to serum inflammatory biomarkers ferritin, CRP, absolute lymphocyte counts          Discussed with neuroattending DR. Loja A 74 years old man w/ hx of Afib on Eliquis, CAD s/p PCI, DM, BPH, presents with 1 week of fever, cough, SOB. He is with Covid + intubated since 3/31. Consulted for encephalopathy despite patient being off sedation for 5 days, unresponsive per primary team. CTH on 4/14 shows b/l acute ischemic strokes with hemorrhagic conversion in vascular territory which cannot explain his encephalopathy. REEG was completed and reports of generalized slowing. Other encephalopathy etiologies should be further investigated and not limited to metabolic derangement and/or cytokine storm.      Suggestion:  Continue to hold Eliquis  Continue with Keppra 250mg Q12  Obtain CTA head/neck   LP send for CSF WBC, RBC, protein, glucose, OCB, IgG Index, SARS-CoV2 PCR   Serological assays for all SARS-CoV2 cases: pro-inflammatory cytokines assay panel (i.e., IL-1ß, IL-2, IL-6, IL-10, IL-17, TNF- a, IFN-?, IP-10), in addition to serum inflammatory biomarkers ferritin, CRP, absolute lymphocyte counts  Follow up EEG report      Discussed with neuroattending DR. Loja A 74 years old man w/ hx of Afib on Eliquis, CAD s/p PCI, DM, BPH, presents with 1 week of fever, cough, SOB. He is with Covid + intubated since 3/31. Consulted for encephalopathy despite patient being off sedation for 5 days, unresponsive per primary team. CTH on 4/14 shows b/l acute ischemic strokes with hemorrhagic conversion in vascular territory which cannot explain his encephalopathy. REEG was completed and reports of generalized slowing. Other encephalopathy etiologies should be further investigated and not limited to metabolic derangement and/or cytokine storm.      Suggestion:  Continue to hold Eliquis  Continue with Keppra 250mg Q12  Obtain CTA head/neck   LP send for CSF WBC, RBC, protein, glucose, OCB, IgG Index, SARS-CoV2 PCR   Serological assays for all SARS-CoV2 cases: pro-inflammatory cytokines assay panel (i.e., IL-1ß, IL-2, IL-6, IL-10, IL-17, TNF- a, IFN-?, IP-10), in addition to serum inflammatory biomarkers ferritin, CRP, absolute lymphocyte counts  Follow up EEG report

## 2020-04-15 NOTE — PROGRESS NOTE ADULT - ASSESSMENT
72y male, acute respiratory failure 2/2 COVID-19    NEURO    acute infarcts-keppra 250mg BID, EEG, when stable peform MRI    agitation,desynchrony-off propofol, remains off    RASS -4    RESP  Acute Hypoxemic Respiratory Failure;  Intubated, /28/40/5   ABG - ( 15 Apr 2020 03:51 )  pH, Arterial: 7.21  pH, Blood: x     /  pCO2: 75    /  pO2: 105   / HCO3: 30    / Base Excess: x     /  SaO2: 96      COVID-19/Viral Pneumonia;  - worsening diffuse bilateral opacities  - continue, Guaifenisen/DM      CARD/VASC    acute hypotension-on low dose levo    Hx of Afib - continue Eliquis, Cardizem PO 60mg q8, Metoprolol 25mg q8;   CAD - continue Atorvastatin   Hx of HTN- holding Lisinopril   Daily EKG-QTc 4/13 is 459    GI/NUTR  - diet: TF @ 50  - PPI  - Senna, Lactulose   Daily weight 4/15- 91.8kg (93.3)  /Renal  LIZZIE + uremia - L IJ [place] - HD 4/10, 4/11 - discuss w/ nephro for HD today, if not required, remove udall  WBC-uptrending 21 to 27  152/6.6  <--, 147/6.6  <--, 138/6.2  <--[place]ans - UO 50-100cc/hr  on Ethacrynic acid d/w team possible end date, started on phoslo  Hx of BPH  148 | 100 | 152  --------------------<175  4.7 | 28 | 6.6  Mg 2.8  Phos  14.1    HEME/ONC  Hgb stable-11.3 (11)  DVT ppx: holding eliquis, d/w restart 2/2 infarct    ID    Afebrile    ICU Vital Signs Last 24 Hrs    T(F): 98.5 (15 Apr 2020 06:01), Max: 99.3 (14 Apr 2020 08:00)    COVID 19-WBC uptrending 21 to 27- Cefepeme 1g q12    WBC-lateral at 20s    ENDO  DM2 - holding home Metformin  Hyperglycemia - on insulin gtt, FS q1h  CAPILLARY BLOOD GLUCOSE  POCT Blood Glucose.: 184 mg/dL (14 Apr 2020 06:14)      MSK/DERM  - no active issues    DVT PPx: Eliquis 5mg d/w team adjusting for renal clearance    GI PPx: PPI    DISPO: ICU 72y male, acute respiratory failure 2/2 COVID-19    NEURO    acute infarcts-keppra 250mg BID, EEG, when stable peform MRI, EEG pending    agitation,desynchrony-off propofol, remains off    RASS -4    consult-palliative care     RESP  Acute Hypoxemic Respiratory Failure;  Intubated, /28/40/5   ABG - ( 15 Apr 2020 03:51 )  pH, Arterial: 7.21  pH, Blood: x     /  pCO2: 75    /  pO2: 105   / HCO3: 30    / Base Excess: x     /  SaO2: 96      COVID-19/Viral Pneumonia  -repeat swab 4/13 negative, deep tracheal aspirate being collected to be sent--->results to determine if patient candidate for trach/peg  - worsening diffuse bilateral opacities  - continue, Guaifenisen/DM      CARD/VASC    acute hypotension-on low dose levo    Hx of Afib - holding eliquis 2/2 stroke, Cardizem PO 60mg q8, Metoprolol 25mg q8;   CAD - continue Atorvastatin   Hx of HTN- holding Lisinopril   Daily EKG-QTc 4/13 is 459    GI/NUTR  - diet: TF @ 50  - PPI  - Senna, Lactulose   Daily weight 4/15- 91.8kg (93.3)    /Renal  LIZZIE + uremia - L IJ [place] - HD 4/10, 4/11 - discuss w/ nephro for HD today, if not required, remove udall  WBC-uptrending 21 to 27  152/6.6  <--, 147/6.6  <--, 138/6.2  <--Evans - UO 50-100cc/hr  on Ethacrynic acid d/w team possible end date, started on phoslo  Hx of BPH  148 | 100 | 152  --------------------<175  4.7 | 28 | 6.6  Mg 2.8  Phos  14.1    HEME/ONC  Hgb stable-11.3 (11)  DVT ppx: holding eliquis, d/w restart 2/2 infarct    ID    Afebrile    ICU Vital Signs Last 24 Hrs    T(F): 98.5 (15 Apr 2020 06:01), Max: 99.3 (14 Apr 2020 08:00)    COVID 19-WBC uptrending 21 to 27- Cefepeme 1g q12, d/w team end date    WBC-lateral at 20s    ENDO  DM2 - holding home Metformin  Hyperglycemia - on insulin gtt, FS q1h  CAPILLARY BLOOD GLUCOSE  POCT Blood Glucose.: 184 mg/dL (14 Apr 2020 06:14)      MSK/DERM  - no active issues    DVT PPx: Eliquis (currently held 2/2 cerebral infarcts)  GI PPx: PPI    DISPO: ICU

## 2020-04-16 LAB
ALBUMIN SERPL ELPH-MCNC: 2.6 G/DL — LOW (ref 3.5–5.2)
ALP SERPL-CCNC: 103 U/L — SIGNIFICANT CHANGE UP (ref 30–115)
ALT FLD-CCNC: 74 U/L — HIGH (ref 0–41)
ANION GAP SERPL CALC-SCNC: 21 MMOL/L — HIGH (ref 7–14)
ANION GAP SERPL CALC-SCNC: 23 MMOL/L — HIGH (ref 7–14)
AST SERPL-CCNC: 39 U/L — SIGNIFICANT CHANGE UP (ref 0–41)
BASE EXCESS BLDA CALC-SCNC: -3.3 MMOL/L — LOW (ref -2–2)
BILIRUB SERPL-MCNC: 1 MG/DL — SIGNIFICANT CHANGE UP (ref 0.2–1.2)
BUN SERPL-MCNC: 169 MG/DL — CRITICAL HIGH (ref 10–20)
BUN SERPL-MCNC: 170 MG/DL — CRITICAL HIGH (ref 10–20)
CALCIUM SERPL-MCNC: 8.6 MG/DL — SIGNIFICANT CHANGE UP (ref 8.5–10.1)
CALCIUM SERPL-MCNC: 8.8 MG/DL — SIGNIFICANT CHANGE UP (ref 8.5–10.1)
CHLORIDE SERPL-SCNC: 102 MMOL/L — SIGNIFICANT CHANGE UP (ref 98–110)
CHLORIDE SERPL-SCNC: 106 MMOL/L — SIGNIFICANT CHANGE UP (ref 98–110)
CO2 SERPL-SCNC: 25 MMOL/L — SIGNIFICANT CHANGE UP (ref 17–32)
CO2 SERPL-SCNC: 26 MMOL/L — SIGNIFICANT CHANGE UP (ref 17–32)
CREAT SERPL-MCNC: 5.4 MG/DL — CRITICAL HIGH (ref 0.7–1.5)
CREAT SERPL-MCNC: 6.2 MG/DL — CRITICAL HIGH (ref 0.7–1.5)
CRP SERPL-MCNC: 8.78 MG/DL — HIGH (ref 0–0.4)
D DIMER BLD IA.RAPID-MCNC: 2305 NG/ML DDU — HIGH (ref 0–230)
GAS PNL BLDA: SIGNIFICANT CHANGE UP
GLUCOSE BLDC GLUCOMTR-MCNC: 104 MG/DL — HIGH (ref 70–99)
GLUCOSE BLDC GLUCOMTR-MCNC: 134 MG/DL — HIGH (ref 70–99)
GLUCOSE BLDC GLUCOMTR-MCNC: 140 MG/DL — HIGH (ref 70–99)
GLUCOSE BLDC GLUCOMTR-MCNC: 162 MG/DL — HIGH (ref 70–99)
GLUCOSE BLDC GLUCOMTR-MCNC: 166 MG/DL — HIGH (ref 70–99)
GLUCOSE BLDC GLUCOMTR-MCNC: 168 MG/DL — HIGH (ref 70–99)
GLUCOSE BLDC GLUCOMTR-MCNC: 173 MG/DL — HIGH (ref 70–99)
GLUCOSE BLDC GLUCOMTR-MCNC: 176 MG/DL — HIGH (ref 70–99)
GLUCOSE BLDC GLUCOMTR-MCNC: 177 MG/DL — HIGH (ref 70–99)
GLUCOSE BLDC GLUCOMTR-MCNC: 192 MG/DL — HIGH (ref 70–99)
GLUCOSE BLDC GLUCOMTR-MCNC: 193 MG/DL — HIGH (ref 70–99)
GLUCOSE BLDC GLUCOMTR-MCNC: 198 MG/DL — HIGH (ref 70–99)
GLUCOSE BLDC GLUCOMTR-MCNC: 199 MG/DL — HIGH (ref 70–99)
GLUCOSE BLDC GLUCOMTR-MCNC: 214 MG/DL — HIGH (ref 70–99)
GLUCOSE SERPL-MCNC: 174 MG/DL — HIGH (ref 70–99)
GLUCOSE SERPL-MCNC: 220 MG/DL — HIGH (ref 70–99)
HCO3 BLDA-SCNC: 23 MMOL/L — SIGNIFICANT CHANGE UP (ref 21–29)
HCT VFR BLD CALC: 30.5 % — LOW (ref 42–52)
HGB BLD-MCNC: 10.1 G/DL — LOW (ref 14–18)
HOROWITZ INDEX BLDA+IHG-RTO: 21 — SIGNIFICANT CHANGE UP
LDH SERPL L TO P-CCNC: 569 U/L — HIGH (ref 50–242)
MAGNESIUM SERPL-MCNC: 2.7 MG/DL — HIGH (ref 1.8–2.4)
MAGNESIUM SERPL-MCNC: 2.8 MG/DL — HIGH (ref 1.8–2.4)
MCHC RBC-ENTMCNC: 29.4 PG — SIGNIFICANT CHANGE UP (ref 27–31)
MCHC RBC-ENTMCNC: 33.1 G/DL — SIGNIFICANT CHANGE UP (ref 32–37)
MCV RBC AUTO: 88.7 FL — SIGNIFICANT CHANGE UP (ref 80–94)
NRBC # BLD: 0 /100 WBCS — SIGNIFICANT CHANGE UP (ref 0–0)
PCO2 BLDA: 48 MMHG — HIGH (ref 38–42)
PH BLDA: 7.29 — LOW (ref 7.38–7.42)
PHOSPHATE SERPL-MCNC: 10.2 MG/DL — HIGH (ref 2.1–4.9)
PHOSPHATE SERPL-MCNC: 8.7 MG/DL — HIGH (ref 2.1–4.9)
PLATELET # BLD AUTO: 183 K/UL — SIGNIFICANT CHANGE UP (ref 130–400)
PO2 BLDA: 78 MMHG — SIGNIFICANT CHANGE UP (ref 78–95)
POTASSIUM SERPL-MCNC: 3.6 MMOL/L — SIGNIFICANT CHANGE UP (ref 3.5–5)
POTASSIUM SERPL-MCNC: 3.8 MMOL/L — SIGNIFICANT CHANGE UP (ref 3.5–5)
POTASSIUM SERPL-SCNC: 3.6 MMOL/L — SIGNIFICANT CHANGE UP (ref 3.5–5)
POTASSIUM SERPL-SCNC: 3.8 MMOL/L — SIGNIFICANT CHANGE UP (ref 3.5–5)
PROCALCITONIN SERPL-MCNC: 1.37 NG/ML — HIGH (ref 0.02–0.1)
PROT SERPL-MCNC: 5.7 G/DL — LOW (ref 6–8)
RBC # BLD: 3.44 M/UL — LOW (ref 4.7–6.1)
RBC # FLD: 13.7 % — SIGNIFICANT CHANGE UP (ref 11.5–14.5)
SAO2 % BLDA: 94 % — SIGNIFICANT CHANGE UP (ref 92–96)
SODIUM SERPL-SCNC: 151 MMOL/L — HIGH (ref 135–146)
SODIUM SERPL-SCNC: 152 MMOL/L — HIGH (ref 135–146)
WBC # BLD: 21.57 K/UL — HIGH (ref 4.8–10.8)
WBC # FLD AUTO: 21.57 K/UL — HIGH (ref 4.8–10.8)

## 2020-04-16 PROCEDURE — 99291 CRITICAL CARE FIRST HOUR: CPT | Mod: CS

## 2020-04-16 PROCEDURE — 99233 SBSQ HOSP IP/OBS HIGH 50: CPT

## 2020-04-16 PROCEDURE — 71045 X-RAY EXAM CHEST 1 VIEW: CPT | Mod: 26

## 2020-04-16 PROCEDURE — 99221 1ST HOSP IP/OBS SF/LOW 40: CPT | Mod: CS

## 2020-04-16 RX ORDER — METOPROLOL TARTRATE 50 MG
5 TABLET ORAL ONCE
Refills: 0 | Status: COMPLETED | OUTPATIENT
Start: 2020-04-16 | End: 2020-04-16

## 2020-04-16 RX ORDER — LABETALOL HCL 100 MG
10 TABLET ORAL ONCE
Refills: 0 | Status: COMPLETED | OUTPATIENT
Start: 2020-04-16 | End: 2020-04-16

## 2020-04-16 RX ORDER — METOPROLOL TARTRATE 50 MG
5 TABLET ORAL ONCE
Refills: 0 | Status: DISCONTINUED | OUTPATIENT
Start: 2020-04-16 | End: 2020-04-16

## 2020-04-16 RX ADMIN — Medication 1334 MILLIGRAM(S): at 05:05

## 2020-04-16 RX ADMIN — Medication 10 MILLIGRAM(S): at 18:48

## 2020-04-16 RX ADMIN — LACTULOSE 20 GRAM(S): 10 SOLUTION ORAL at 05:04

## 2020-04-16 RX ADMIN — Medication 1334 MILLIGRAM(S): at 18:02

## 2020-04-16 RX ADMIN — CHLORHEXIDINE GLUCONATE 15 MILLILITER(S): 213 SOLUTION TOPICAL at 18:20

## 2020-04-16 RX ADMIN — HEPARIN SODIUM 5000 UNIT(S): 5000 INJECTION INTRAVENOUS; SUBCUTANEOUS at 13:37

## 2020-04-16 RX ADMIN — CHLORHEXIDINE GLUCONATE 1 APPLICATION(S): 213 SOLUTION TOPICAL at 05:04

## 2020-04-16 RX ADMIN — Medication 60 MILLIGRAM(S): at 21:26

## 2020-04-16 RX ADMIN — HEPARIN SODIUM 5000 UNIT(S): 5000 INJECTION INTRAVENOUS; SUBCUTANEOUS at 05:04

## 2020-04-16 RX ADMIN — Medication 10 MILLILITER(S): at 05:04

## 2020-04-16 RX ADMIN — Medication 500 MILLIGRAM(S): at 21:14

## 2020-04-16 RX ADMIN — Medication 10 MILLILITER(S): at 13:24

## 2020-04-16 RX ADMIN — Medication 500 MILLIGRAM(S): at 05:04

## 2020-04-16 RX ADMIN — LEVETIRACETAM 250 MILLIGRAM(S): 250 TABLET, FILM COATED ORAL at 05:04

## 2020-04-16 RX ADMIN — Medication 5 MILLIGRAM(S): at 20:51

## 2020-04-16 RX ADMIN — CEFEPIME 100 MILLIGRAM(S): 1 INJECTION, POWDER, FOR SOLUTION INTRAMUSCULAR; INTRAVENOUS at 18:09

## 2020-04-16 RX ADMIN — Medication 10 MILLILITER(S): at 21:22

## 2020-04-16 RX ADMIN — Medication 5 MILLIGRAM(S): at 11:17

## 2020-04-16 RX ADMIN — Medication 1334 MILLIGRAM(S): at 12:40

## 2020-04-16 RX ADMIN — Medication 60 MILLIGRAM(S): at 05:04

## 2020-04-16 RX ADMIN — PANTOPRAZOLE SODIUM 40 MILLIGRAM(S): 20 TABLET, DELAYED RELEASE ORAL at 12:47

## 2020-04-16 RX ADMIN — CHLORHEXIDINE GLUCONATE 15 MILLILITER(S): 213 SOLUTION TOPICAL at 05:04

## 2020-04-16 RX ADMIN — ZINC SULFATE TAB 220 MG (50 MG ZINC EQUIVALENT) 220 MILLIGRAM(S): 220 (50 ZN) TAB at 12:41

## 2020-04-16 RX ADMIN — Medication 60 MILLIGRAM(S): at 13:22

## 2020-04-16 RX ADMIN — Medication 500 MILLIGRAM(S): at 13:21

## 2020-04-16 RX ADMIN — LEVETIRACETAM 250 MILLIGRAM(S): 250 TABLET, FILM COATED ORAL at 18:02

## 2020-04-16 RX ADMIN — Medication 5 MILLIGRAM(S): at 12:36

## 2020-04-16 RX ADMIN — HEPARIN SODIUM 5000 UNIT(S): 5000 INJECTION INTRAVENOUS; SUBCUTANEOUS at 21:23

## 2020-04-16 RX ADMIN — SENNA PLUS 2 TABLET(S): 8.6 TABLET ORAL at 21:15

## 2020-04-16 RX ADMIN — LACTULOSE 20 GRAM(S): 10 SOLUTION ORAL at 18:03

## 2020-04-16 NOTE — PROGRESS NOTE ADULT - SUBJECTIVE AND OBJECTIVE BOX
24 h events noted  intubated/ventilated         PAST HISTORY  --------------------------------------------------------------------------------  No significant changes to PMH, PSH, FHx, SHx, unless otherwise noted    ALLERGIES & MEDICATIONS  --------------------------------------------------------------------------------  Allergies    Bactrim (Unknown)    Intolerances      Standing Inpatient Medications  ascorbic acid 500 milliGRAM(s) Oral every 8 hours  calcium acetate 1334 milliGRAM(s) Oral three times a day with meals  cefepime   IVPB 1000 milliGRAM(s) IV Intermittent <User Schedule>  chlorhexidine 0.12% Liquid 15 milliLiter(s) Oral Mucosa every 12 hours  chlorhexidine 4% Liquid 1 Application(s) Topical <User Schedule>  diltiazem    Tablet 60 milliGRAM(s) Oral every 8 hours  guaifenesin/dextromethorphan  Syrup 10 milliLiter(s) Oral every 8 hours  heparin  Injectable 5000 Unit(s) SubCutaneous every 8 hours  insulin regular Infusion 1 Unit(s)/Hr IV Continuous <Continuous>  lactulose Syrup 20 Gram(s) Oral every 12 hours  levETIRAcetam 250 milliGRAM(s) Oral two times a day  norepinephrine Infusion 0.05 MICROgram(s)/kG/Min IV Continuous <Continuous>  pantoprazole  Injectable 40 milliGRAM(s) IV Push daily  senna 2 Tablet(s) Oral at bedtime  zinc sulfate 220 milliGRAM(s) Oral daily    PRN Inpatient Medications  acetaminophen  Suppository .. 650 milliGRAM(s) Rectal every 6 hours PRN      VITALS/PHYSICAL EXAM  --------------------------------------------------------------------------------  T(C): 36.9 (04-16-20 @ 04:00), Max: 37.3 (04-15-20 @ 20:00)  HR: 92 (04-16-20 @ 06:00) (90 - 165)  BP: --  RR: 30 (04-16-20 @ 06:00) (30 - 30)  SpO2: 97% (04-16-20 @ 07:54) (97% - 100%)  Wt(kg): --        04-15-20 @ 07:01  -  04-16-20 @ 07:00  --------------------------------------------------------  IN: 1219.5 mL / OUT: 2605 mL / NET: -1385.5 mL      Physical Exam:  	Gen: intubated/ventilated     LABS/STUDIES  --------------------------------------------------------------------------------              10.1   21.57 >-----------<  183      [04-16-20 @ 00:30]              30.5     151  |  102  |  169  ----------------------------<  174      [04-16-20 @ 00:30]  3.8   |  26  |  6.2        Ca     8.6     [04-16-20 @ 00:30]      Mg     2.7     [04-16-20 @ 00:30]      Phos  10.2     [04-16-20 @ 00:30]                [04-16-20 @ 00:30]    Creatinine Trend:  SCr 6.2 [04-16 @ 00:30]  SCr 5.7 [04-15 @ 16:20]  SCr 6.6 [04-15 @ 00:10]  SCr 6.6 [04-14 @ 16:00]  SCr 6.2 [04-14 @ 00:25]    Urinalysis - [03-30-20 @ 22:00]      Color Yellow / Appearance Clear / SG 1.031 / pH 6.0      Gluc Negative / Ketone Trace  / Bili Negative / Urobili <2 mg/dL       Blood Moderate / Protein 300 mg/dL / Leuk Est Negative / Nitrite Negative      RBC 10 / WBC 29 / Hyaline 24 / Gran  / Sq Epi  / Non Sq Epi 22 / Bacteria Negative      Ferritin 1769      [04-14-20 @ 00:25]  HbA1c 7.7      [04-01-20 @ 04:30]

## 2020-04-16 NOTE — CHART NOTE - NSCHARTNOTEFT_GEN_A_CORE
Registered Dietitian Follow-Up     Patient Profile Reviewed                           Yes [x]   No []     Nutrition History Previously Obtained        Yes []  No [x]-unable to obtain at this time as pt is intubated        Pertinent Medical Interventions:   1. LIZZIE/ ATN  --per Nephro: highly catabolic and prerenal, off diuretics; [place] in place but no HD since 4/11. need to d/w team about further HD   2. Acute Hypoxemic Respiratory Failure 2/2 COVID PNA  --remains intubated, pressor support. off sedation but unresponsive.     --reswab sent 4/13, negative   3. s/p CTH 4/14: b/l acute ischemic strokes with hemorrhagic conversion. REEG with generalized slowing  --per Neuro: COVID related encephalopathy vs uremic encephalopathy. recommend MRI brain and MRA head and neck.   4. Hyperglycermia--insulin gtt in place   Dispo: poor prognosis, palliative care c/s placed      Diet order: Glucerna 1.2 @ 50ml/hr (1440kcal, 72gm pro, 966ml free water)--MAP: 90. Continues to remain constipated x6 days--?if d/t TF vs medication vs overall poor prognosis. No distention noted, tolerating feeds other than constipation. RD recommendations continue to remain pending despite x4 RD assessments/interventions with recommendations to adjust TF formula to lower fat, lower fiber, hydrolyzed formula for optimal tolerance. Unclear why recommendations not taken despite conversations with LIP. Palliative c/s pending, RD will adjust nutrition interventions pending C discussion, for now will continue to provide recommendations at our discretion.      Anthropometrics:  - Ht. 175.26cm   - Wt. 92.3kg (4/16), remains relatively stable   (4/13): 95.4kg   (4/9): 105.2kg  (4/4): 104.2kg   (4/3): 103.3kg   (4/2): 99kg   (3/31): 95kg --remains as dosing wt   - BMI: 31.0   - IBW: 72.7kg      Pertinent Lab Data: (4/16): WBC 21.57, RBC 3.44, H/H 10.1/30.5, POCT 168/173/193/192/177, glucose 174, , Cr 6.2, Na 151, GFR 8, Mg 2.7, PO4 10.2     Pertinent Meds: heparin, insulin infusion, cefepime, lactulose, keppra, levophed, phoslo, cardizem, ascorbic acid, protonix, senna, zinc sulfate (since 3/30)     Physical Findings:  - Appearance: intubated/ventilated  - GI function: LBM 4/10--constipation    - Tubes: OGT   - Oral/Mouth cavity: NPO  - Skin: sDTI to sacrum. 2+ edema (generalized)      Nutrition Requirements  Weight Used: lowest wt this adm: 95kg, *updated today* Ve: 14.7, Tmax: 37.2      Estimated Energy Needs: ~8970-6970 kcal/day obtained by comparing the following 1327-1548kcal (60-70% of [id number]) vs. 1045-1330kcal (11-14 kcal/kg ABW) vs. 1606-1825kcal (22-25 kcal/kg IBW)  Estimated Protein Needs: 110-146 gm/day (1.2-1.5 gm/kg IBW)--aim toward lower end of range   Estimated Fluid Needs: per SICU team      Nutrient Intake: TF providing 92% est kcal needs and 49% est. protein needs      Previous Nutrition Diagnosis: Inadequate protein-energy intake (ongoing)      Nutrition Intervention: enteral nutrition, collaboration of care   Recommendations:  1. HOLD TF if pt cannot sustain MAP >65.  2. When pt hemodynamically stable,  initiate the following TF regimen: Peptamen AF @ 20ml/hr, increase rate by 10ml Q6H, until goal rate of 50ml/hr is reached. TF @ goal rate to provide a total of 1440kcal, 90gm pro, 972ml free water. Additional flushes per LIP. Recs discussed with LIP ([phone]).   3. D/C zinc sulfate today as it has been in place [id number].   4. RD will monitor GOC/POC and adjust interventions PRN.   **Recommend hydrolyzed formula such as Peptamen AF, as it is designed to support absorption and tolerance.**     Goal/Expected Outcome: Pt to meet % of estimated nutrient needs within 4 days      Indicator/Monitoring: RD to monitor diet order, energy intake, body composition, renal/glucose/liver profiles, NFPF.

## 2020-04-16 NOTE — CONSULT NOTE ADULT - ASSESSMENT
74yMale being evaluated for goals of care in setting of COVID19 with severe septic shock, on pressors, on mechanical ventilation with ARDS with fio2 40 % secondary to Cytokine Release Syndrome  Hospital day 17.  Pt with no significant signs of improvement despite all current medical intervention.  Full code status    Called pt's wife, left message, awaiting call back.    Prognosis appears grave.      Recommendations:  ongoing PACU [id number]      We will follow  [phone]

## 2020-04-16 NOTE — PROCEDURE NOTE - NSPOSTPRCRAD_GEN_A_CORE
central line located in the/line adjusted to depth of insertion
left IJ/post-procedure radiography performed/central line located in the

## 2020-04-16 NOTE — PROGRESS NOTE ADULT - ASSESSMENT
LIZZIE/ ATN/ hyperkalemia/ respiratory failure / covid positive / high BUN  #off  diuretics , patient is 1.3 liter negative   # non oliguric  # ph noted  feed nepro, on binders, add renagel 3/3/3  # d/c vit C    #  will discuss need for HD with PACU team   # BUN noted : highly catabolic and prerenal, off  diuretics  #  on  cefepime to 1 g   # will follow   # overall progress poor

## 2020-04-16 NOTE — CONSULT NOTE ADULT - SUBJECTIVE AND OBJECTIVE BOX
REQUESTED OF: DR Paiz    CLINICAL ISSUE TO BE EVALUATED BY CONSULTANT: Goals of care        KONSTANTIN WYATT 74yMale  HPI:  75 yo M w/ hx of Afib on Eliquis, CAD s/p PCI, DM, BPH, presents with 1 week of fever, cough, and progressive SOB. Admits to watery diarrhea for 3 days. Denies chest pain. 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 He has exposure to COVID positive family member at the wedding. No travel hx. Pt pulse ox in 80s in the field per EMS. Of note, per patient he had recent normal stress test recently.    COVID PCR sent, labs show lymphopenia and transaminitis, CXR shows b/l lung opacity, requiring NRB (30 Mar 2020 16:35)        PAST MEDICAL & SURGICAL HISTORY:  Afib  DM (diabetes mellitus)  BPH (benign prostatic hyperplasia)  CAD (coronary artery disease)        PHYSICAL EXAM:        T(C): 36, Max: 37.3 (20:00)  HR: 114 (78 - 165)  BP: --  RR: 30 (30 - 30)  SpO2: 100% (97% - 100%)      LABS/STUDIES:  04-16    152<H>  |  106  |  x   ----------------------------<  220<H>  3.6   |  25  |  5.4<HH>    Ca    8.8      16 Apr 2020 16:21  Phos  8.7     04-16  Mg     2.8     04-16    TPro  5.7<L>  /  Alb  2.6<L>  /  TBili  1.0  /  DBili  x   /  AST  39  /  ALT  74<H>  /  AlkPhos  103  04-16                            10.1   21.57 )-----------( 183      ( 16 Apr 2020 00:30 )             30.5       MEDICATIONS  (STANDING):  ascorbic acid 500 milliGRAM(s) Oral every 8 hours  calcium acetate 1334 milliGRAM(s) Oral three times a day with meals  cefepime   IVPB 1000 milliGRAM(s) IV Intermittent <User Schedule>  chlorhexidine 0.12% Liquid 15 milliLiter(s) Oral Mucosa every 12 hours  chlorhexidine 4% Liquid 1 Application(s) Topical <User Schedule>  diltiazem    Tablet 60 milliGRAM(s) Oral every 8 hours  guaifenesin/dextromethorphan  Syrup 10 milliLiter(s) Oral every 8 hours  heparin  Injectable 5000 Unit(s) SubCutaneous every 8 hours  insulin regular Infusion 1 Unit(s)/Hr (1 mL/Hr) IV Continuous <Continuous>  lactulose Syrup 20 Gram(s) Oral every 12 hours  levETIRAcetam 250 milliGRAM(s) Oral two times a day  norepinephrine Infusion 0.05 MICROgram(s)/kG/Min (8.91 mL/Hr) IV Continuous <Continuous>  pantoprazole  Injectable 40 milliGRAM(s) IV Push daily  senna 2 Tablet(s) Oral at bedtime  zinc sulfate 220 milliGRAM(s) Oral daily    MEDICATIONS  (PRN):  acetaminophen  Suppository .. 650 milliGRAM(s) Rectal every 6 hours PRN Temp greater or equal to 38.5C (101.3F)        [place] Symptom Assesment Scale      PPS (Palliative Performance Scale): 10

## 2020-04-16 NOTE — CHART NOTE - NSCHARTNOTEFT_GEN_A_CORE
Spoke withwife and son, [phone] and updated the family with regards to plan to place trach and step down following negative 2nd trach culture.  Moving all extremities today and plan is to dialyze.  All concerns and questions answered.  Marie Ralph MD

## 2020-04-16 NOTE — PROGRESS NOTE ADULT - SUBJECTIVE AND OBJECTIVE BOX
KONSTANTIN WYATT  [id number]  74y Male    Indication for ICU admission: Acute Hypoxemic Respiratory Failure, clinical COVID19 (false negatvie)    Admit Date:20  ICU Date: 20  OR Date:    Bactrim (Unknown)    PAST MEDICAL & SURGICAL HISTORY:  Afib  DM (diabetes mellitus)  BPH (benign prostatic hyperplasia)  CAD (coronary artery disease)    Home Medications:  Cartia  mg/24 hours oral capsule, extended release: 1 cap(s) orally once a day (30 Mar 2020 18:04)  Eliquis 5 mg oral tablet: 1 tab(s) orally 2 times a day (30 Mar 2020 18:04)  Lipitor 40 mg oral tablet: 1 tab(s) orally once a day (30 Mar 2020 18:04)  lisinopril 40 mg oral tablet: 1 tab(s) orally once a day (30 Mar 2020 18:04)  metFORMIN 750 mg oral tablet, extended release: 1 tab(s) orally once a day (30 Mar 2020 18:04)    24HRS EVENT: No events overnight    NEURO    sedation-propofol    HCT -multiple acute hemorraghic infarcts, neuro consulted contacted Dr. Portillo, recs     Keppra 250mg BID / poor renal function, MRI, MRA    RASS -4, pupils reactive, moving extremities    Pain control: Acetaminophen 650mg PO q6 PRN  neurology rec EEG- no epileptiform activity    RESP  Acute Hypoxemic Respiratory Failure;  - Intubated: /28/40/5    COVID-19/Viral Pneumonia, pending reswab sent     AM ABG 7.3/48/78/23    CXR: unchanged diffuse bilateral opacities    On Guanifenisen    CARD/VASC    Acute tachycardia -  on PO Cardizem 60mg q8h, and  PO Lopressor 25 mg q 8 hrs    Acute hypotension - on levo low dose restarted     Hx of Afib - Diltiazem. BB, currently holding Apixaban 2/ HCT findings    Hx of CAD - Atorvastatin 40mg PO qhs    Hx of HTN - hold Lisinopril     GI/NUTR   Diet: TF (glucerna) @ 50mL/hr    PPI  Senna     Daily weight (4/10) 98.7kg,                          ( ) 95.4kg                         ( ) 93.3 kg                         ( ) 92.3 kg    /Renal    LIZZIE; worsening, BUN / Cr 169/6.2, not a HD candidate, will reevaluate with nephro     IVL    Evans, non-oliguric renal failure UOP: 100-200cc/hr    on Ethacrynic acid 25mg BID    (4/10) L IJ Uldall placed for HD (kept even 4/10, ) - d/c uldall if HD not needed     Hx of BPH    Lytes-Na 151 // K 3.8 // Phos 10.2 //  Mag 2.7    HEME/ONC    Hgb 10.1    Holding ELIQUIS    ID    afebrile    WBC 21    COVID-19 +  - restarted on Cefepime, d/w team regarding end date  - completed course of Hydroxychloroquine & Azithromycin  - Ascorbic acid, Zinc  - D-dimer uptrending 11,529 >46578>8421>5852,  ,     ENDO  DM2; uncontrolled, holding home Metformin  off insulin gtt  PM    MSK/DERM  - no active issues    DVT PPx  - on Apixaban     GI PPx  -  Pantoprazole 40mg PO qac      ***Tubes/Lines/Drains  ***  Peripheral IV  Central Venous Line  Right IJ TLC  	Date 3/31/20  Arterial Line  Right radial A-line		                Date: 3/31/20  Urnary Catheter		Indication: Strict I&O    Date Placed: 3/31/20  ETT  OGT    REVIEW OF SYSTEMS    [ ] A ten-point review of systems was otherwise negative except as noted.  [x ] Due to altered mental status/intubation, subjective information were not able to be obtained from the patient. History was obtained, to the extent possible, from review of the chart and collateral sources of information.      Daily     Daily Weight in k.3 (2020 04:00)    Diet, NPO with Tube Feed:   Tube Feeding Modality: Orogastric  Glucerna 1.2 Choco  Total Volume for 24 Hours (mL): 1200  Continuous  Starting Tube Feed Rate mL per Hour: 30  Increase Tube Feed Rate by (mL): 10     Every hour  Until Goal Tube Feed Rate (mL per Hour): 50  Tube Feed Duration (in Hours): 24  Tube Feed Start Time: 13:00 (20 @ 17:56)      CURRENT MEDS:  Neurologic Medications  acetaminophen  Suppository .. 650 milliGRAM(s) Rectal every 6 hours PRN Temp greater or equal to 38.5C (101.3F)  levETIRAcetam 250 milliGRAM(s) Oral two times a day    Respiratory Medications  guaifenesin/dextromethorphan  Syrup 10 milliLiter(s) Oral every 8 hours    Cardiovascular Medications  diltiazem    Tablet 60 milliGRAM(s) Oral every 8 hours  norepinephrine Infusion 0.05 MICROgram(s)/kG/Min IV Continuous <Continuous>    Gastrointestinal Medications  ascorbic acid 500 milliGRAM(s) Oral every 8 hours  calcium acetate 1334 milliGRAM(s) Oral three times a day with meals  lactulose Syrup 20 Gram(s) Oral every 12 hours  pantoprazole  Injectable 40 milliGRAM(s) IV Push daily  senna 2 Tablet(s) Oral at bedtime  zinc sulfate 220 milliGRAM(s) Oral daily    Genitourinary Medications    Hematologic/Oncologic Medications  heparin  Injectable 5000 Unit(s) SubCutaneous every 8 hours    Antimicrobial/Immunologic Medications  cefepime   IVPB 1000 milliGRAM(s) IV Intermittent <User Schedule>    Endocrine/Metabolic Medications  insulin regular Infusion 1 Unit(s)/Hr IV Continuous <Continuous>    Topical/Other Medications  chlorhexidine 0.12% Liquid 15 milliLiter(s) Oral Mucosa every 12 hours  chlorhexidine 4% Liquid 1 Application(s) Topical <User Schedule>      ICU Vital Signs Last 24 Hrs  T(C): 36.9 (2020 04:00), Max: 37.3 (15 Apr 2020 20:00)  T(F): 98.5 (2020 04:00), Max: 99.1 (15 Apr 2020 20:00)  HR: 92 (2020 06:00) (90 - 165)  BP: --  BP(mean): --  ABP: 132/53 (2020 06:00) (104/67 - 152/73)  ABP(mean): 81 (2020 06:00) (79 - 103)  RR: 30 (2020 06:00) (30 - 30)  SpO2: 97% (2020 07:54) (97% - 100%)      Adult Advanced Hemodynamics Last 24 Hrs  CVP(mm Hg): --  CVP(cm H2O): --  CO: --  CI: --  PA: --  PA(mean): --  PCWP: --  SVR: --  SVRI: --  PVR: --  PVRI: --    Mode: AC/ CMV (Assist Control/ Continuous Mandatory Ventilation)  RR (machine): 30  TV (machine): 500  FiO2: 40  PEEP: 5  ITime: 1  MAP: 15  PIP: 26    ABG - ( 2020 04:30 )  pH, Arterial: 7.29  pH, Blood: x     /  pCO2: 48    /  pO2: 78    / HCO3: 23    / Base Excess: -3.3  /  SaO2: 94                  I&O's Summary    15 Apr 2020 07:  -  2020 07:00  --------------------------------------------------------  IN: 1219.5 mL / OUT: 2605 mL / NET: -1385.5 mL      I&O's Detail    15 Apr 2020 07:  -  2020 07:00  --------------------------------------------------------  IN:    Enteral Tube Flush: 180 mL    Glucerna: 820 mL    insulin regular Infusion: 52 mL    norepinephrine Infusion: 167.5 mL  Total IN: 1219.5 mL    OUT:    Indwelling Catheter - Urethral: 2605 mL  Total OUT: 2605 mL    Total NET: -1385.5 mL          PHYSICAL EXAM:    General/Neuro  RASS:             GCS:     = E   / V   / M      Deficits:                             alert & oriented x 3, no focal deficits  Pupils:    Lungs:      clear to auscultation, Normal expansion/effort.     Cardiovascular : S1, S2.  Regular rate and rhythm.  Peripheral edema   Cardiac Rhythm: Normal Sinus Rhythm    GI: Abdomen soft, Non-tender, Non-distended.    Gastrostomy / Jejunostomy tube in place.  Nasogastric tube in place.  Colostomy / Ileostomy.    Wound:    Extremities: Extremities warm, pink, well-perfused. Pulses:Rt     Lt    Derm: Good skin turgor, no skin breakdown.      :       Evans catheter in place.      CXR:     LABS:  CAPILLARY BLOOD GLUCOSE      POCT Blood Glucose.: 173 mg/dL (2020 06:01)  POCT Blood Glucose.: 168 mg/dL (2020 01:59)  POCT Blood Glucose.: 176 mg/dL (2020 00:20)  POCT Blood Glucose.: 165 mg/dL (15 Apr 2020 22:02)  POCT Blood Glucose.: 149 mg/dL (15 Apr 2020 19:50)  POCT Blood Glucose.: 125 mg/dL (15 Apr 2020 18:39)  POCT Blood Glucose.: 141 mg/dL (15 Apr 2020 16:54)  POCT Blood Glucose.: 156 mg/dL (15 Apr 2020 15:29)  POCT Blood Glucose.: 184 mg/dL (15 Apr 2020 13:26)  POCT Blood Glucose.: 195 mg/dL (15 Apr 2020 12:24)  POCT Blood Glucose.: 189 mg/dL (15 Apr 2020 11:19)  POCT Blood Glucose.: 198 mg/dL (15 Apr 2020 09:45)                          10.1   21.57 )-----------( 183      ( 2020 00:30 )             30.5       04-16    151<H>  |  102  |  169<HH>  ----------------------------<  174<H>  3.8   |  26  |  6.2<HH>    Ca    8.6      2020 00:30  Phos  10.2     04-16  Mg     2.7     -16 KONSTANTIN WYATT  [id number]  74y Male    Indication for ICU admission: Acute Hypoxemic Respiratory Failure, clinical COVID19 (false negatvie)    Admit Date:20  ICU Date: 20  OR Date:    Bactrim (Unknown)    PAST MEDICAL & SURGICAL HISTORY:  Afib  DM (diabetes mellitus)  BPH (benign prostatic hyperplasia)  CAD (coronary artery disease)    Home Medications:  Cartia  mg/24 hours oral capsule, extended release: 1 cap(s) orally once a day (30 Mar 2020 18:04)  Eliquis 5 mg oral tablet: 1 tab(s) orally 2 times a day (30 Mar 2020 18:04)  Lipitor 40 mg oral tablet: 1 tab(s) orally once a day (30 Mar 2020 18:04)  lisinopril 40 mg oral tablet: 1 tab(s) orally once a day (30 Mar 2020 18:04)  metFORMIN 750 mg oral tablet, extended release: 1 tab(s) orally once a day (30 Mar 2020 18:04)    24HRS EVENT: No events overnight    NEURO    sedation-propofol    HCT -multiple acute hemorraghic infarcts, neuro consulted contacted Dr. Portillo, recs     Keppra 250mg BID / poor renal function, MRI, MRA    RASS -4, pupils reactive, moving extremities    Pain control: Acetaminophen 650mg PO q6 PRN  neurology rec EEG- no epileptiform activity    RESP  Acute Hypoxemic Respiratory Failure;  - Intubated: /28/40/5    COVID-19/Viral Pneumonia, pending reswab sent     AM ABG 7.3/48/78/23    CXR: unchanged diffuse bilateral opacities    On Guanifenisen    CARD/VASC    Acute tachycardia -  on PO Cardizem 60mg q8h, and  PO Lopressor 25 mg q 8 hrs    Acute hypotension - on levo low dose restarted     Hx of Afib - Diltiazem. BB, currently holding Apixaban 2/ HCT findings    Hx of CAD - Atorvastatin 40mg PO qhs    Hx of HTN - hold Lisinopril     GI/NUTR   Diet: TF (glucerna) @ 50mL/hr    PPI  Senna     Daily weight (4/10) 98.7kg,                          ( ) 95.4kg                         ( ) 93.3 kg                         ( ) 92.3 kg    /Renal    LIZZIE; worsening, BUN / Cr 169/6.2, not a HD candidate, will reevaluate with nephro     IVL    Evans, non-oliguric renal failure UOP: 100-200cc/hr    off Ethacrynic acid 25mg BID    (4/10) L IJ Uldall placed for HD (kept even 4/10, ) - d/c uldall if HD not needed     Hx of BPH    Lytes-Na 151 // K 3.8 // Phos 10.2 //  Mag 2.7    HEME/ONC    Hgb 10.1    Holding ELIQUIS    ID    afebrile    WBC 21    COVID-19 +  - restarted on Cefepime, d/w team regarding end date  - completed course of Hydroxychloroquine & Azithromycin  - Ascorbic acid, Zinc  - D-dimer uptrending 11,529 >03941>8421>5852,  ,     ENDO  DM2; uncontrolled, holding home Metformin  off insulin gtt  PM    MSK/DERM  - no active issues    DVT PPx  - on Apixaban     GI PPx  -  Pantoprazole 40mg PO qac      ***Tubes/Lines/Drains  ***  Peripheral IV  Central Venous Line  Right IJ TLC  	Date 3/31/20  Arterial Line  Right radial A-line		                Date: 3/31/20  Urnary Catheter		Indication: Strict I&O    Date Placed: 3/31/20  ETT  OGT    REVIEW OF SYSTEMS    [ ] A ten-point review of systems was otherwise negative except as noted.  [x ] Due to altered mental status/intubation, subjective information were not able to be obtained from the patient. History was obtained, to the extent possible, from review of the chart and collateral sources of information.      Daily     Daily Weight in k.3 (2020 04:00)    Diet, NPO with Tube Feed:   Tube Feeding Modality: Orogastric  Glucerna 1.2 Choco  Total Volume for 24 Hours (mL): 1200  Continuous  Starting Tube Feed Rate mL per Hour: 30  Increase Tube Feed Rate by (mL): 10     Every hour  Until Goal Tube Feed Rate (mL per Hour): 50  Tube Feed Duration (in Hours): 24  Tube Feed Start Time: 13:00 (20 @ 17:56)      CURRENT MEDS:  Neurologic Medications  acetaminophen  Suppository .. 650 milliGRAM(s) Rectal every 6 hours PRN Temp greater or equal to 38.5C (101.3F)  levETIRAcetam 250 milliGRAM(s) Oral two times a day    Respiratory Medications  guaifenesin/dextromethorphan  Syrup 10 milliLiter(s) Oral every 8 hours    Cardiovascular Medications  diltiazem    Tablet 60 milliGRAM(s) Oral every 8 hours  norepinephrine Infusion 0.05 MICROgram(s)/kG/Min IV Continuous <Continuous>    Gastrointestinal Medications  ascorbic acid 500 milliGRAM(s) Oral every 8 hours  calcium acetate 1334 milliGRAM(s) Oral three times a day with meals  lactulose Syrup 20 Gram(s) Oral every 12 hours  pantoprazole  Injectable 40 milliGRAM(s) IV Push daily  senna 2 Tablet(s) Oral at bedtime  zinc sulfate 220 milliGRAM(s) Oral daily    Genitourinary Medications    Hematologic/Oncologic Medications  heparin  Injectable 5000 Unit(s) SubCutaneous every 8 hours    Antimicrobial/Immunologic Medications  cefepime   IVPB 1000 milliGRAM(s) IV Intermittent <User Schedule>    Endocrine/Metabolic Medications  insulin regular Infusion 1 Unit(s)/Hr IV Continuous <Continuous>    Topical/Other Medications  chlorhexidine 0.12% Liquid 15 milliLiter(s) Oral Mucosa every 12 hours  chlorhexidine 4% Liquid 1 Application(s) Topical <User Schedule>      ICU Vital Signs Last 24 Hrs  T(C): 36.9 (2020 04:00), Max: 37.3 (15 Apr 2020 20:00)  T(F): 98.5 (2020 04:00), Max: 99.1 (15 Apr 2020 20:00)  HR: 92 (2020 06:00) (90 - 165)  BP: --  BP(mean): --  ABP: 132/53 (2020 06:00) (104/67 - 152/73)  ABP(mean): 81 (2020 06:00) (79 - 103)  RR: 30 (2020 06:00) (30 - 30)  SpO2: 97% (2020 07:54) (97% - 100%)      Mode: AC/ CMV (Assist Control/ Continuous Mandatory Ventilation)  RR (machine): 30  TV (machine): 500  FiO2: 40  PEEP: 5  ITime: 1  MAP: 15  PIP: 26    ABG - ( 2020 04:30 )  pH, Arterial: 7.29  pH, Blood: x     /  pCO2: 48    /  pO2: 78    / HCO3: 23    / Base Excess: -3.3  /  SaO2: 94                  I&O's Summary    15 Apr 2020 07:  -  2020 07:00  --------------------------------------------------------  IN: 1219.5 mL / OUT: 2605 mL / NET: -1385.5 mL      I&O's Detail    15 Apr 2020 07:01  -  2020 07:00  --------------------------------------------------------  IN:    Enteral Tube Flush: 180 mL    Glucerna: 820 mL    insulin regular Infusion: 52 mL    norepinephrine Infusion: 167.5 mL  Total IN: 1219.5 mL    OUT:    Indwelling Catheter - Urethral: 2605 mL  Total OUT: 2605 mL    Total NET: -1385.5     LABS:  CAPILLARY BLOOD GLUCOSE      POCT Blood Glucose.: 173 mg/dL (2020 06:01)  POCT Blood Glucose.: 168 mg/dL (2020 01:59)  POCT Blood Glucose.: 176 mg/dL (2020 00:20)  POCT Blood Glucose.: 165 mg/dL (15 Apr 2020 22:02)  POCT Blood Glucose.: 149 mg/dL (15 Apr 2020 19:50)  POCT Blood Glucose.: 125 mg/dL (15 Apr 2020 18:39)  POCT Blood Glucose.: 141 mg/dL (15 Apr 2020 16:54)  POCT Blood Glucose.: 156 mg/dL (15 Apr 2020 15:29)  POCT Blood Glucose.: 184 mg/dL (15 Apr 2020 13:26)  POCT Blood Glucose.: 195 mg/dL (15 Apr 2020 12:24)  POCT Blood Glucose.: 189 mg/dL (15 Apr 2020 11:19)  POCT Blood Glucose.: 198 mg/dL (15 Apr 2020 09:45)                          10.1   21.57 )-----------( 183      ( 2020 00:30 )             30.5       04-16    151<H>  |  102  |  169<HH>  ----------------------------<  174<H>  3.8   |  26  |  6.2<HH>    Ca    8.6      2020 00:30  Phos  10.2     04-16  Mg     2.7     04-16

## 2020-04-16 NOTE — PROGRESS NOTE ADULT - ASSESSMENT
· Assessment	  72y male, acute respiratory failure 2/2 COVID-19    NEURO    acute infarcts-keppra 250mg BID, EEG, when stable peform MRI, EEG- no epileptiform activity    agitation,desynchrony-off propofol, remains off    RASS -4      RESP  Acute Hypoxemic Respiratory Failure;  Intubated, /28/40/5   ABG - ABG - ( 16 Apr 2020 04:30 )  pH, Arterial: 7.29  pH, Blood: x     /  pCO2: 48    /  pO2: 78    / HCO3: 23    / Base Excess: -3.3  /  SaO2: 94   COVID-19/Viral Pneumonia  -repeat swab 4/13 negative, deep tracheal aspirate being collected to be sent--->results to determine if patient candidate for trach/peg  - worsening diffuse bilateral opacities  - continue, Guaifenisen/DM      CARD/VASC    acute hypotension-on low dose levo    Hx of Afib - holding eliquis 2/2 stroke, Cardizem PO 60mg q8, Metoprolol 25mg q8;   CAD - continue Atorvastatin   Hx of HTN- holding Lisinopril       GI/NUTR  - diet: TF @ 50  - PPI  - Senna, Lactulose   Daily weight 4/15- 91.8kg (93.3)    /Renal  LIZZIE + uremia - L IJ [place] - HD 4/10, 4/11 - discuss w/ nephro for HD today, if not required, remove udall  WBC-uptrending 21 to 27  152/6.6  <--, 147/6.6  <--, 138/6.2  <--Evans - UO 50-100cc/hr  on Ethacrynic acid d/w team possible end date, started on phoslo  Hx of BPH  148 | 100 | 152  --------------------<175  4.7 | 28 | 6.6  Mg 2.8  Phos  14.1    HEME/ONC  Hgb stable-11.3 (11)  DVT ppx: holding eliquis, d/w restart 2/2 infarct    ID    Afebrile    ICU Vital Signs Last 24 Hrs    T(F): 98.5 (15 Apr 2020 06:01), Max: 99.3 (14 Apr 2020 08:00)    COVID 19-WBC uptrending 21 to 27- Cefepeme 1g q12, d/w team end date    WBC-lateral at 20s    ENDO  DM2 - holding home Metformin  Hyperglycemia - on insulin gtt, FS q1h  CAPILLARY BLOOD GLUCOSE  POCT Blood Glucose.: 184 mg/dL (14 Apr 2020 06:14)      MSK/DERM  - no active issues    DVT PPx: Eliquis (currently held 2/2 cerebral infarcts)  GI PPx: PPI    DISPO: ICU · Assessment	  72y male, acute respiratory failure 2/2 COVID-19    NEURO    acute infarcts-keppra 250mg BID, EEG, when stable peform MRI, EEG- no epileptiform activity    agitation,desynchrony-off propofol, remains off    RASS -4      RESP  Acute Hypoxemic Respiratory Failure;  Intubated, /28/40/5   ABG - ABG - ( 16 Apr 2020 04:30 )  pH, Arterial: 7.29  pH, Blood: x     /  pCO2: 48    /  pO2: 78    / HCO3: 23    / Base Excess: -3.3  /  SaO2: 94   COVID-19/Viral Pneumonia  -repeat swab 4/13 negative, deep tracheal aspirate being collected to be sent--->results to determine if patient candidate for trach/peg  - worsening diffuse bilateral opacities  - continue, Guaifenisen/DM      CARD/VASC    acute hypotension-on low dose levo    Hx of Afib - holding eliquis 2/2 stroke, Cardizem PO 60mg q8, Metoprolol 25mg q8;   CAD - continue Atorvastatin   Hx of HTN- holding Lisinopril       GI/NUTR  - diet: TF @ 50  - PPI  - Senna, Lactulose   Daily weight 4/15- 91.8kg (93.3)    /Renal  LIZZIE + uremia - L IJ [place] - HD 4/10, 4/11 - discuss w/ nephro for HD today  WBC-uptrending 21 to 27  152/6.6  <--, 147/6.6  <--, 138/6.2  <--Evans - UO 50-100cc/hr  off Ethacrynic  started on phoslo  Hx of BPH  148 | 100 | 152  --------------------<175  4.7 | 28 | 6.6  Mg 2.8  Phos  14.1    HEME/ONC  Hgb stable-11.3 (11)  DVT ppx: holding eliquis, d/w restart 2/2 infarct    ID    Afebrile    ICU Vital Signs Last 24 Hrs    T(F): 98.5 (15 Apr 2020 06:01), Max: 99.3 (14 Apr 2020 08:00)    COVID 19-WBC uptrending 21 to 27- Cefepeme 1g q12, d/w team end date    WBC-lateral at 20s    ENDO  DM2 - holding home Metformin  Hyperglycemia - on insulin gtt, FS q1h  CAPILLARY BLOOD GLUCOSE  POCT Blood Glucose.: 184 mg/dL (14 Apr 2020 06:14)      MSK/DERM  - no active issues    DVT PPx: Eliquis (currently held 2/2 cerebral infarcts)  GI PPx: PPI    DISPO: ICU

## 2020-04-17 LAB
ALBUMIN SERPL ELPH-MCNC: 2.6 G/DL — LOW (ref 3.5–5.2)
ALBUMIN SERPL ELPH-MCNC: 2.7 G/DL — LOW (ref 3.5–5.2)
ALP SERPL-CCNC: 105 U/L — SIGNIFICANT CHANGE UP (ref 30–115)
ALP SERPL-CCNC: 111 U/L — SIGNIFICANT CHANGE UP (ref 30–115)
ALT FLD-CCNC: 86 U/L — HIGH (ref 0–41)
ALT FLD-CCNC: 91 U/L — HIGH (ref 0–41)
ANION GAP SERPL CALC-SCNC: 18 MMOL/L — HIGH (ref 7–14)
ANION GAP SERPL CALC-SCNC: 23 MMOL/L — HIGH (ref 7–14)
AST SERPL-CCNC: 60 U/L — HIGH (ref 0–41)
AST SERPL-CCNC: 61 U/L — HIGH (ref 0–41)
BASOPHILS # BLD AUTO: 0.03 K/UL — SIGNIFICANT CHANGE UP (ref 0–0.2)
BASOPHILS # BLD AUTO: 0.03 K/UL — SIGNIFICANT CHANGE UP (ref 0–0.2)
BASOPHILS NFR BLD AUTO: 0.1 % — SIGNIFICANT CHANGE UP (ref 0–1)
BASOPHILS NFR BLD AUTO: 0.2 % — SIGNIFICANT CHANGE UP (ref 0–1)
BILIRUB SERPL-MCNC: 1 MG/DL — SIGNIFICANT CHANGE UP (ref 0.2–1.2)
BILIRUB SERPL-MCNC: 1.2 MG/DL — SIGNIFICANT CHANGE UP (ref 0.2–1.2)
BUN SERPL-MCNC: 116 MG/DL — CRITICAL HIGH (ref 10–20)
BUN SERPL-MCNC: 170 MG/DL — CRITICAL HIGH (ref 10–20)
CALCIUM SERPL-MCNC: 8.6 MG/DL — SIGNIFICANT CHANGE UP (ref 8.5–10.1)
CALCIUM SERPL-MCNC: 8.9 MG/DL — SIGNIFICANT CHANGE UP (ref 8.5–10.1)
CHLORIDE SERPL-SCNC: 104 MMOL/L — SIGNIFICANT CHANGE UP (ref 98–110)
CHLORIDE SERPL-SCNC: 105 MMOL/L — SIGNIFICANT CHANGE UP (ref 98–110)
CO2 SERPL-SCNC: 25 MMOL/L — SIGNIFICANT CHANGE UP (ref 17–32)
CO2 SERPL-SCNC: 27 MMOL/L — SIGNIFICANT CHANGE UP (ref 17–32)
CREAT SERPL-MCNC: 4.4 MG/DL — CRITICAL HIGH (ref 0.7–1.5)
CREAT SERPL-MCNC: 5.6 MG/DL — CRITICAL HIGH (ref 0.7–1.5)
EOSINOPHIL # BLD AUTO: 0.03 K/UL — SIGNIFICANT CHANGE UP (ref 0–0.7)
EOSINOPHIL # BLD AUTO: 0.1 K/UL — SIGNIFICANT CHANGE UP (ref 0–0.7)
EOSINOPHIL NFR BLD AUTO: 0.1 % — SIGNIFICANT CHANGE UP (ref 0–8)
EOSINOPHIL NFR BLD AUTO: 0.5 % — SIGNIFICANT CHANGE UP (ref 0–8)
GAS PNL BLDA: SIGNIFICANT CHANGE UP
GLUCOSE BLDC GLUCOMTR-MCNC: 117 MG/DL — HIGH (ref 70–99)
GLUCOSE BLDC GLUCOMTR-MCNC: 123 MG/DL — HIGH (ref 70–99)
GLUCOSE BLDC GLUCOMTR-MCNC: 139 MG/DL — HIGH (ref 70–99)
GLUCOSE BLDC GLUCOMTR-MCNC: 147 MG/DL — HIGH (ref 70–99)
GLUCOSE BLDC GLUCOMTR-MCNC: 148 MG/DL — HIGH (ref 70–99)
GLUCOSE BLDC GLUCOMTR-MCNC: 152 MG/DL — HIGH (ref 70–99)
GLUCOSE BLDC GLUCOMTR-MCNC: 152 MG/DL — HIGH (ref 70–99)
GLUCOSE BLDC GLUCOMTR-MCNC: 153 MG/DL — HIGH (ref 70–99)
GLUCOSE BLDC GLUCOMTR-MCNC: 153 MG/DL — HIGH (ref 70–99)
GLUCOSE BLDC GLUCOMTR-MCNC: 163 MG/DL — HIGH (ref 70–99)
GLUCOSE BLDC GLUCOMTR-MCNC: 170 MG/DL — HIGH (ref 70–99)
GLUCOSE BLDC GLUCOMTR-MCNC: 176 MG/DL — HIGH (ref 70–99)
GLUCOSE BLDC GLUCOMTR-MCNC: 177 MG/DL — HIGH (ref 70–99)
GLUCOSE BLDC GLUCOMTR-MCNC: 182 MG/DL — HIGH (ref 70–99)
GLUCOSE BLDC GLUCOMTR-MCNC: 187 MG/DL — HIGH (ref 70–99)
GLUCOSE SERPL-MCNC: 149 MG/DL — HIGH (ref 70–99)
GLUCOSE SERPL-MCNC: 170 MG/DL — HIGH (ref 70–99)
GRAM STN FLD: SIGNIFICANT CHANGE UP
HCT VFR BLD CALC: 30.6 % — LOW (ref 42–52)
HCT VFR BLD CALC: 30.9 % — LOW (ref 42–52)
HGB BLD-MCNC: 10 G/DL — LOW (ref 14–18)
HGB BLD-MCNC: 10.4 G/DL — LOW (ref 14–18)
IMM GRANULOCYTES NFR BLD AUTO: 0.4 % — HIGH (ref 0.1–0.3)
IMM GRANULOCYTES NFR BLD AUTO: 0.6 % — HIGH (ref 0.1–0.3)
LDH SERPL L TO P-CCNC: 696 U/L — HIGH (ref 50–242)
LYMPHOCYTES # BLD AUTO: 0.54 K/UL — LOW (ref 1.2–3.4)
LYMPHOCYTES # BLD AUTO: 0.77 K/UL — LOW (ref 1.2–3.4)
LYMPHOCYTES # BLD AUTO: 2.8 % — LOW (ref 20.5–51.1)
LYMPHOCYTES # BLD AUTO: 3.4 % — LOW (ref 20.5–51.1)
MAGNESIUM SERPL-MCNC: 2.4 MG/DL — SIGNIFICANT CHANGE UP (ref 1.8–2.4)
MAGNESIUM SERPL-MCNC: 2.9 MG/DL — HIGH (ref 1.8–2.4)
MCHC RBC-ENTMCNC: 29.2 PG — SIGNIFICANT CHANGE UP (ref 27–31)
MCHC RBC-ENTMCNC: 30.8 PG — SIGNIFICANT CHANGE UP (ref 27–31)
MCHC RBC-ENTMCNC: 32.4 G/DL — SIGNIFICANT CHANGE UP (ref 32–37)
MCHC RBC-ENTMCNC: 34 G/DL — SIGNIFICANT CHANGE UP (ref 32–37)
MCV RBC AUTO: 90.1 FL — SIGNIFICANT CHANGE UP (ref 80–94)
MCV RBC AUTO: 90.5 FL — SIGNIFICANT CHANGE UP (ref 80–94)
MONOCYTES # BLD AUTO: 0.72 K/UL — HIGH (ref 0.1–0.6)
MONOCYTES # BLD AUTO: 0.75 K/UL — HIGH (ref 0.1–0.6)
MONOCYTES NFR BLD AUTO: 3.2 % — SIGNIFICANT CHANGE UP (ref 1.7–9.3)
MONOCYTES NFR BLD AUTO: 3.9 % — SIGNIFICANT CHANGE UP (ref 1.7–9.3)
NEUTROPHILS # BLD AUTO: 17.76 K/UL — HIGH (ref 1.4–6.5)
NEUTROPHILS # BLD AUTO: 20.65 K/UL — HIGH (ref 1.4–6.5)
NEUTROPHILS NFR BLD AUTO: 92.2 % — HIGH (ref 42.2–75.2)
NEUTROPHILS NFR BLD AUTO: 92.6 % — HIGH (ref 42.2–75.2)
NRBC # BLD: 0 /100 WBCS — SIGNIFICANT CHANGE UP (ref 0–0)
NRBC # BLD: 0 /100 WBCS — SIGNIFICANT CHANGE UP (ref 0–0)
PHOSPHATE SERPL-MCNC: 6.1 MG/DL — HIGH (ref 2.1–4.9)
PHOSPHATE SERPL-MCNC: 8.3 MG/DL — HIGH (ref 2.1–4.9)
PLATELET # BLD AUTO: 170 K/UL — SIGNIFICANT CHANGE UP (ref 130–400)
PLATELET # BLD AUTO: 182 K/UL — SIGNIFICANT CHANGE UP (ref 130–400)
POTASSIUM SERPL-MCNC: 3.4 MMOL/L — LOW (ref 3.5–5)
POTASSIUM SERPL-MCNC: 4 MMOL/L — SIGNIFICANT CHANGE UP (ref 3.5–5)
POTASSIUM SERPL-SCNC: 3.4 MMOL/L — LOW (ref 3.5–5)
POTASSIUM SERPL-SCNC: 4 MMOL/L — SIGNIFICANT CHANGE UP (ref 3.5–5)
PROCALCITONIN SERPL-MCNC: 1.38 NG/ML — HIGH (ref 0.02–0.1)
PROT SERPL-MCNC: 5.5 G/DL — LOW (ref 6–8)
PROT SERPL-MCNC: 5.6 G/DL — LOW (ref 6–8)
RBC # BLD: 3.38 M/UL — LOW (ref 4.7–6.1)
RBC # BLD: 3.43 M/UL — LOW (ref 4.7–6.1)
RBC # FLD: 13.7 % — SIGNIFICANT CHANGE UP (ref 11.5–14.5)
RBC # FLD: 13.9 % — SIGNIFICANT CHANGE UP (ref 11.5–14.5)
SARS-COV-2 RNA SPEC QL NAA+PROBE: SIGNIFICANT CHANGE UP
SODIUM SERPL-SCNC: 149 MMOL/L — HIGH (ref 135–146)
SODIUM SERPL-SCNC: 153 MMOL/L — HIGH (ref 135–146)
SPECIMEN SOURCE: SIGNIFICANT CHANGE UP
WBC # BLD: 19.25 K/UL — HIGH (ref 4.8–10.8)
WBC # BLD: 22.33 K/UL — HIGH (ref 4.8–10.8)
WBC # FLD AUTO: 19.25 K/UL — HIGH (ref 4.8–10.8)
WBC # FLD AUTO: 22.33 K/UL — HIGH (ref 4.8–10.8)

## 2020-04-17 PROCEDURE — 93010 ELECTROCARDIOGRAM REPORT: CPT

## 2020-04-17 PROCEDURE — 99232 SBSQ HOSP IP/OBS MODERATE 35: CPT | Mod: CS

## 2020-04-17 PROCEDURE — 71045 X-RAY EXAM CHEST 1 VIEW: CPT | Mod: 26,CS

## 2020-04-17 PROCEDURE — 99291 CRITICAL CARE FIRST HOUR: CPT | Mod: CS,25

## 2020-04-17 PROCEDURE — 36800 INSERTION OF CANNULA: CPT | Mod: CS

## 2020-04-17 PROCEDURE — 99233 SBSQ HOSP IP/OBS HIGH 50: CPT

## 2020-04-17 PROCEDURE — 99497 ADVNCD CARE PLAN 30 MIN: CPT | Mod: CS,25

## 2020-04-17 RX ORDER — METOPROLOL TARTRATE 50 MG
5 TABLET ORAL ONCE
Refills: 0 | Status: COMPLETED | OUTPATIENT
Start: 2020-04-17 | End: 2020-04-17

## 2020-04-17 RX ORDER — PROPOFOL 10 MG/ML
50 INJECTION, EMULSION INTRAVENOUS ONCE
Refills: 0 | Status: COMPLETED | OUTPATIENT
Start: 2020-04-17 | End: 2020-04-17

## 2020-04-17 RX ORDER — DILTIAZEM HCL 120 MG
10 CAPSULE, EXT RELEASE 24 HR ORAL ONCE
Refills: 0 | Status: COMPLETED | OUTPATIENT
Start: 2020-04-17 | End: 2020-04-17

## 2020-04-17 RX ORDER — DILTIAZEM HCL 120 MG
20 CAPSULE, EXT RELEASE 24 HR ORAL ONCE
Refills: 0 | Status: COMPLETED | OUTPATIENT
Start: 2020-04-17 | End: 2020-04-17

## 2020-04-17 RX ORDER — POTASSIUM CHLORIDE 20 MEQ
10 PACKET (EA) ORAL ONCE
Refills: 0 | Status: COMPLETED | OUTPATIENT
Start: 2020-04-17 | End: 2020-04-17

## 2020-04-17 RX ORDER — MIDAZOLAM HYDROCHLORIDE 1 MG/ML
2 INJECTION, SOLUTION INTRAMUSCULAR; INTRAVENOUS ONCE
Refills: 0 | Status: DISCONTINUED | OUTPATIENT
Start: 2020-04-17 | End: 2020-04-17

## 2020-04-17 RX ORDER — PROPOFOL 10 MG/ML
10 INJECTION, EMULSION INTRAVENOUS
Qty: 1000 | Refills: 0 | Status: DISCONTINUED | OUTPATIENT
Start: 2020-04-17 | End: 2020-04-18

## 2020-04-17 RX ORDER — PROPOFOL 10 MG/ML
3 INJECTION, EMULSION INTRAVENOUS ONCE
Refills: 0 | Status: COMPLETED | OUTPATIENT
Start: 2020-04-17 | End: 2020-04-17

## 2020-04-17 RX ORDER — METOPROLOL TARTRATE 50 MG
5 TABLET ORAL ONCE
Refills: 0 | Status: DISCONTINUED | OUTPATIENT
Start: 2020-04-17 | End: 2020-04-17

## 2020-04-17 RX ADMIN — Medication 500 MILLIGRAM(S): at 12:08

## 2020-04-17 RX ADMIN — Medication 500 MILLIGRAM(S): at 05:33

## 2020-04-17 RX ADMIN — Medication 5 MILLIGRAM(S): at 23:15

## 2020-04-17 RX ADMIN — CEFEPIME 100 MILLIGRAM(S): 1 INJECTION, POWDER, FOR SOLUTION INTRAMUSCULAR; INTRAVENOUS at 16:32

## 2020-04-17 RX ADMIN — PROPOFOL 50 MILLIGRAM(S): 10 INJECTION, EMULSION INTRAVENOUS at 03:55

## 2020-04-17 RX ADMIN — Medication 10 MILLILITER(S): at 21:19

## 2020-04-17 RX ADMIN — Medication 20 MILLIGRAM(S): at 17:00

## 2020-04-17 RX ADMIN — CHLORHEXIDINE GLUCONATE 15 MILLILITER(S): 213 SOLUTION TOPICAL at 05:33

## 2020-04-17 RX ADMIN — CHLORHEXIDINE GLUCONATE 15 MILLILITER(S): 213 SOLUTION TOPICAL at 16:32

## 2020-04-17 RX ADMIN — PANTOPRAZOLE SODIUM 40 MILLIGRAM(S): 20 TABLET, DELAYED RELEASE ORAL at 10:51

## 2020-04-17 RX ADMIN — LACTULOSE 20 GRAM(S): 10 SOLUTION ORAL at 16:32

## 2020-04-17 RX ADMIN — Medication 60 MILLIGRAM(S): at 21:20

## 2020-04-17 RX ADMIN — Medication 10 MILLIGRAM(S): at 19:53

## 2020-04-17 RX ADMIN — MIDAZOLAM HYDROCHLORIDE 2 MILLIGRAM(S): 1 INJECTION, SOLUTION INTRAMUSCULAR; INTRAVENOUS at 14:51

## 2020-04-17 RX ADMIN — PROPOFOL 5.7 MICROGRAM(S)/KG/MIN: 10 INJECTION, EMULSION INTRAVENOUS at 19:11

## 2020-04-17 RX ADMIN — Medication 1334 MILLIGRAM(S): at 12:02

## 2020-04-17 RX ADMIN — HEPARIN SODIUM 5000 UNIT(S): 5000 INJECTION INTRAVENOUS; SUBCUTANEOUS at 21:19

## 2020-04-17 RX ADMIN — Medication 60 MILLIGRAM(S): at 12:08

## 2020-04-17 RX ADMIN — Medication 60 MILLIGRAM(S): at 05:33

## 2020-04-17 RX ADMIN — HEPARIN SODIUM 5000 UNIT(S): 5000 INJECTION INTRAVENOUS; SUBCUTANEOUS at 05:32

## 2020-04-17 RX ADMIN — INSULIN HUMAN 1 UNIT(S)/HR: 100 INJECTION, SOLUTION SUBCUTANEOUS at 09:58

## 2020-04-17 RX ADMIN — Medication 20 MILLIGRAM(S): at 05:50

## 2020-04-17 RX ADMIN — Medication 100 MILLIEQUIVALENT(S): at 06:37

## 2020-04-17 RX ADMIN — Medication 5 MILLIGRAM(S): at 19:31

## 2020-04-17 RX ADMIN — PROPOFOL 3 MILLIGRAM(S): 10 INJECTION, EMULSION INTRAVENOUS at 16:41

## 2020-04-17 RX ADMIN — SENNA PLUS 2 TABLET(S): 8.6 TABLET ORAL at 21:20

## 2020-04-17 RX ADMIN — ZINC SULFATE TAB 220 MG (50 MG ZINC EQUIVALENT) 220 MILLIGRAM(S): 220 (50 ZN) TAB at 10:51

## 2020-04-17 RX ADMIN — HEPARIN SODIUM 5000 UNIT(S): 5000 INJECTION INTRAVENOUS; SUBCUTANEOUS at 12:09

## 2020-04-17 RX ADMIN — Medication 500 MILLIGRAM(S): at 21:20

## 2020-04-17 RX ADMIN — Medication 1334 MILLIGRAM(S): at 16:32

## 2020-04-17 RX ADMIN — LEVETIRACETAM 250 MILLIGRAM(S): 250 TABLET, FILM COATED ORAL at 16:32

## 2020-04-17 RX ADMIN — LACTULOSE 20 GRAM(S): 10 SOLUTION ORAL at 05:32

## 2020-04-17 RX ADMIN — Medication 5 MILLIGRAM(S): at 02:55

## 2020-04-17 RX ADMIN — Medication 5 MILLIGRAM(S): at 14:48

## 2020-04-17 RX ADMIN — Medication 10 MILLIGRAM(S): at 03:30

## 2020-04-17 RX ADMIN — Medication 10 MILLILITER(S): at 05:32

## 2020-04-17 RX ADMIN — Medication 1334 MILLIGRAM(S): at 09:58

## 2020-04-17 RX ADMIN — CHLORHEXIDINE GLUCONATE 1 APPLICATION(S): 213 SOLUTION TOPICAL at 05:33

## 2020-04-17 RX ADMIN — Medication 5 MILLIGRAM(S): at 03:00

## 2020-04-17 RX ADMIN — Medication 10 MILLILITER(S): at 12:09

## 2020-04-17 RX ADMIN — LEVETIRACETAM 250 MILLIGRAM(S): 250 TABLET, FILM COATED ORAL at 05:33

## 2020-04-17 RX ADMIN — Medication 10 MILLIGRAM(S): at 03:45

## 2020-04-17 NOTE — PROGRESS NOTE ADULT - ASSESSMENT
74yMale being evaluated for goals of care in setting of COVID + sepsis, on mechanical ventilation with ARDS with fio2 40 % secondary to Cytokine Release Syndrome, end organ damage with acute renal failure on HD, metabolic encephalopathy further complicated by acute ischemic strokes with hemorrhagic conversion.  Hospital day 18    SPoke to pt's wife (Rebecca) and son in law.   Palliative Care services introduced. CLinical condition reviewed. Overall poor prognosis discussed.  Family aware of option to pursue trach/PEG, discussed alternative of vent withdrawal, family with unanimous decision to continue all aggressive/invasive means to sustain patient's life including pursuit of trach/PEG.  Family not interested in discussing any further palliative measures.    No further intervention deemed necessary.    Recommendations:  ongoing PACU mngmnt  full code status  trach/PEG as per surgical guidelines      We will sign off  Please reconsult PRN  [phone] 74yMale being evaluated for goals of care in setting of COVID + sepsis, on mechanical ventilation with ARDS with fio2 40 % secondary to Cytokine Release Syndrome, end organ damage with acute renal failure on HD, metabolic encephalopathy further complicated by acute ischemic strokes with hemorrhagic conversion.  Hospital day 18    SPoke to pt's wife (Rebecca) and son in law.   Palliative Care services introduced. CLinical condition reviewed. Overall poor prognosis discussed.  Family aware of option to pursue trach/PEG, discussed alternative of vent withdrawal, family with unanimous decision to continue all aggressive/invasive means to sustain patient's life including pursuit of trach/PEG.  Family not interested in discussing any further palliative measures.    25 minutes spent discussing advanced care planning  No further intervention deemed necessary.    Recommendations:  ongoing PACU mngmnt  full code status  trach/PEG as per surgical guidelines      We will sign off  Please reconsult PRN  [phone]

## 2020-04-17 NOTE — PROGRESS NOTE ADULT - ASSESSMENT
LIZZIE/ ATN/ hyperkalemia/ respiratory failure / covid positive / high BUN  #off  diuretics , patient is 1.3 liter negative   # non oliguric  # ph noted  feed nepro, on binders, add renagel 3/3/3  # d/c vit C    #  hd today, no uf   # BUN noted : highly catabolic and prerenal, off  diuretics  # sodium noted. start d5 at 75 cc/h   #  on  cefepime to 1 g   # will follow   # overall progress poor

## 2020-04-17 NOTE — PROGRESS NOTE ADULT - SUBJECTIVE AND OBJECTIVE BOX
intubated/ventilated  24 h events noted        PAST HISTORY  --------------------------------------------------------------------------------  No significant changes to PMH, PSH, FHx, SHx, unless otherwise noted    ALLERGIES & MEDICATIONS  --------------------------------------------------------------------------------  Allergies    Bactrim (Unknown)    Intolerances      Standing Inpatient Medications  ascorbic acid 500 milliGRAM(s) Oral every 8 hours  calcium acetate 1334 milliGRAM(s) Oral three times a day with meals  cefepime   IVPB 1000 milliGRAM(s) IV Intermittent <User Schedule>  chlorhexidine 0.12% Liquid 15 milliLiter(s) Oral Mucosa every 12 hours  chlorhexidine 4% Liquid 1 Application(s) Topical <User Schedule>  diltiazem    Tablet 60 milliGRAM(s) Oral every 8 hours  guaifenesin/dextromethorphan  Syrup 10 milliLiter(s) Oral every 8 hours  heparin  Injectable 5000 Unit(s) SubCutaneous every 8 hours  insulin regular Infusion 1 Unit(s)/Hr IV Continuous <Continuous>  lactulose Syrup 20 Gram(s) Oral every 12 hours  levETIRAcetam 250 milliGRAM(s) Oral two times a day  norepinephrine Infusion 0.05 MICROgram(s)/kG/Min IV Continuous <Continuous>  pantoprazole  Injectable 40 milliGRAM(s) IV Push daily  senna 2 Tablet(s) Oral at bedtime  zinc sulfate 220 milliGRAM(s) Oral daily    PRN Inpatient Medications  acetaminophen  Suppository .. 650 milliGRAM(s) Rectal every 6 hours PRN          VITALS/PHYSICAL EXAM  --------------------------------------------------------------------------------  T(C): 36.9 (04-17-20 @ 08:00), Max: 37.1 (04-17-20 @ 00:00)  HR: 105 (04-17-20 @ 08:00) (56 - 140)  BP: 149/67 (04-17-20 @ 08:00) (149/67 - 149/67)  RR: 33 (04-17-20 @ 08:00) (30 - 41)  SpO2: 98% (04-17-20 @ 08:00) (97% - 100%)  Wt(kg): --        04-16-20 @ 07:01  -  04-17-20 @ 07:00  --------------------------------------------------------  IN: 1443.3 mL / OUT: 2752 mL / NET: -1308.7 mL    04-17-20 @ 07:01  -  04-17-20 @ 09:00  --------------------------------------------------------  IN: 54 mL / OUT: 80 mL / NET: -26 mL      Physical Exam:  	Gen: intubated/ventilated  	Vascular access: amelia     LABS/STUDIES  --------------------------------------------------------------------------------              10.4   19.25 >-----------<  170      [04-17-20 @ 00:00]              30.6     153  |  105  |  170  ----------------------------<  149      [04-17-20 @ 00:00]  3.4   |  25  |  5.6        Ca     8.6     [04-17-20 @ 00:00]      Mg     2.9     [04-17-20 @ 00:00]      Phos  8.3     [04-17-20 @ 00:00]    TPro  5.5  /  Alb  2.6  /  TBili  1.0  /  DBili  x   /  AST  61  /  ALT  86  /  AlkPhos  105  [04-17-20 @ 00:00]              [04-16-20 @ 00:30]    Creatinine Trend:  SCr 5.6 [04-17 @ 00:00]  SCr 5.4 [04-16 @ 16:21]  SCr 6.2 [04-16 @ 00:30]  SCr 5.7 [04-15 @ 16:20]  SCr 6.6 [04-15 @ 00:10]    Urinalysis - [03-30-20 @ 22:00]      Color Yellow / Appearance Clear / SG 1.031 / pH 6.0      Gluc Negative / Ketone Trace  / Bili Negative / Urobili <2 mg/dL       Blood Moderate / Protein 300 mg/dL / Leuk Est Negative / Nitrite Negative      RBC 10 / WBC 29 / Hyaline 24 / Gran  / Sq Epi  / Non Sq Epi 22 / Bacteria Negative      Ferritin 1769      [04-14-20 @ 00:25]  HbA1c 7.7      [04-01-20 @ 04:30]

## 2020-04-17 NOTE — PROGRESS NOTE ADULT - ASSESSMENT
72y male, acute respiratory failure 2/2 COVID-19    NEURO    acute infarcts-keppra 250mg BID, EEG, when stable peform MRI, EEG- no epileptiform activity    agitation,desynchrony-off propofol, remains off  +w/d to painful stim    RASS -4      RESP  Acute Hypoxemic Respiratory Failure;  Intubated, /30/35/5   ABG ABG - ( 17 Apr 2020 03:21 )  pH, Arterial: 7.46  pH, Blood: x     /  pCO2: 38    /  pO2: 124   / HCO3: 27    / Base Excess: 3.3   /  SaO2: 98      COVID-19/Viral Pneumonia  -repeat swab 4/13 negative  deep tracheal aspirate sent 4/16--->results to determine if patient candidate for trach/peg  - worsening diffuse bilateral opacities  - continue, Guaifenisen/DM      CARD/VASC    acute hypotension-off levo now    Hx of Afib - holding eliquis 2/2 stroke, Cardizem PO 60mg q8, Metoprolol 25mg q8;   CAD - continue Atorvastatin   Hx of HTN- holding Lisinopril       GI/NUTR  - diet: TF @ 50  - PPI  - Senna, Lactulose   Daily weight 4/17- 91.9kg (93.3)    /Renal  LIZZIE + uremia - HD today  WBC-uptrending 21 to 27  Evans - UO 50-100cc/hr  off Ethacrynic  started on phoslo  Hx of BPH  Comprehensive Metabolic Panel (04.17.20 @ 00:00)    Sodium, Serum: 153 mmol/L    Potassium, Serum: 3.4 mmol/L    Chloride, Serum: 105 mmol/L    Carbon Dioxide, Serum: 25 mmol/L    Anion Gap, Serum: 23 mmol/L    Blood Urea Nitrogen, Serum: 170: Critical value: mg/dL    Creatinine, Serum: 5.6: Critical value: mg/dL    Glucose, Serum: 149 mg/dL    Calcium, Total Serum: 8.6 mg/dL      HEME/ONC  Hgb stable-10.4 stable  DVT ppx: holding eliquis, d/w restart 2/2 infarct    ID    Afebrile     COVID 19-WBC downtrending now 19 from 21 to 27- Cefepeme 1g q24h      ENDO  DM2 - holding home Metformin  Hyperglycemia - on insulin gtt, FS q1h  POCT Blood Glucose.: 117 mg/dL (17 Apr 2020 09:59)  POCT Blood Glucose.: 148 mg/dL (17 Apr 2020 08:59)        MSK/DERM  - no active issues    DVT PPx: Eliquis (currently held 2/2 cerebral infarcts) on hep sub q  GI PPx: PPI    DISPO: ICU

## 2020-04-17 NOTE — PROGRESS NOTE ADULT - SUBJECTIVE AND OBJECTIVE BOX
REQUESTED OF: DR Jean-Baptiste    CLINICAL ISSUE TO BE EVALUATED BY CONSULTANT: Goals of care         KONSTANTIN WYATT 74yMale  HPI:  75 yo M w/ hx of Afib on Eliquis, CAD s/p PCI, DM, BPH, presents with 1 week of fever, cough, and progressive SOB. Admits to watery diarrhea for 3 days. Denies chest pain. 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 He has exposure to COVID positive family member at the wedding. No travel hx. Pt pulse ox in 80s in the field per EMS. Of note, per patient he had recent normal stress test recently.    COVID PCR sent, labs show lymphopenia and transaminitis, CXR shows b/l lung opacity, requiring NRB (30 Mar 2020 16:35)        PAST MEDICAL & SURGICAL HISTORY:  Afib  DM (diabetes mellitus)  BPH (benign prostatic hyperplasia)  CAD (coronary artery disease)        PHYSICAL EXAM:  DEFERRED         T(C): 37.9, Max: 37.9 (14:00)  HR: 111 (56 - 140)  BP: 149/67 (149/67 - 149/67)  RR: 31 (30 - 41)  SpO2: 98% (96% - 100%)      LABS/STUDIES:  04-17    153<H>  |  105  |  170<HH>  ----------------------------<  149<H>  3.4<L>   |  25  |  5.6<HH>    Ca    8.6      17 Apr 2020 00:00  Phos  8.3     04-17  Mg     2.9     04-17    TPro  5.5<L>  /  Alb  2.6<L>  /  TBili  1.0  /  DBili  x   /  AST  61<H>  /  ALT  86<H>  /  AlkPhos  105  04-17                            10.4   19.25 )-----------( 170      ( 17 Apr 2020 00:00 )             30.6       MEDICATIONS  (STANDING):  ascorbic acid 500 milliGRAM(s) Oral every 8 hours  calcium acetate 1334 milliGRAM(s) Oral three times a day with meals  cefepime   IVPB 1000 milliGRAM(s) IV Intermittent <User Schedule>  chlorhexidine 0.12% Liquid 15 milliLiter(s) Oral Mucosa every 12 hours  chlorhexidine 4% Liquid 1 Application(s) Topical <User Schedule>  diltiazem    Tablet 60 milliGRAM(s) Oral every 8 hours  guaifenesin/dextromethorphan  Syrup 10 milliLiter(s) Oral every 8 hours  heparin  Injectable 5000 Unit(s) SubCutaneous every 8 hours  insulin regular Infusion 1 Unit(s)/Hr (1 mL/Hr) IV Continuous <Continuous>  lactulose Syrup 20 Gram(s) Oral every 12 hours  levETIRAcetam 250 milliGRAM(s) Oral two times a day  norepinephrine Infusion 0.05 MICROgram(s)/kG/Min (8.91 mL/Hr) IV Continuous <Continuous>  pantoprazole  Injectable 40 milliGRAM(s) IV Push daily  senna 2 Tablet(s) Oral at bedtime  zinc sulfate 220 milliGRAM(s) Oral daily    MEDICATIONS  (PRN):  acetaminophen  Suppository .. 650 milliGRAM(s) Rectal every 6 hours PRN Temp greater or equal to 38.5C (101.3F)        PPS (Palliative Performance Scale): 10

## 2020-04-17 NOTE — CHART NOTE - NSCHARTNOTEFT_GEN_A_CORE
Spoke with wife and son, and addressed all of their concerns.  All labs, HD and vent settings discussed.  Family wants tracheostomy, and awaits the 2nd tracheal swab.  Family requests remdicivir and relayed to them that this is not available but will follow again with team.      Marie Ralph MD

## 2020-04-17 NOTE — PROGRESS NOTE ADULT - SUBJECTIVE AND OBJECTIVE BOX
KONSTANTIN WYATT  [id number]  74y Male    Indication for ICU admission: Acute Hypoxemic Respiratory Failure, clinical COVID19 (false negatvie)    Admit Date:20  ICU Date: 20  OR Date:    Bactrim (Unknown)    PAST MEDICAL & SURGICAL HISTORY:  Afib  DM (diabetes mellitus)  BPH (benign prostatic hyperplasia)  CAD (coronary artery disease)    Home Medications:  Cartia  mg/24 hours oral capsule, extended release: 1 cap(s) orally once a day (30 Mar 2020 18:04)  Eliquis 5 mg oral tablet: 1 tab(s) orally 2 times a day (30 Mar 2020 18:04)  Lipitor 40 mg oral tablet: 1 tab(s) orally once a day (30 Mar 2020 18:04)  lisinopril 40 mg oral tablet: 1 tab(s) orally once a day (30 Mar 2020 18:04)  metFORMIN 750 mg oral tablet, extended release: 1 tab(s) orally once a day (30 Mar 2020 18:04)    24HRS EVENT: No events overnight    NEURO    sedation-no gtts, propofol pushes PRN withdraws to pain, except lue    -Prop push 5 x1 ON     HCT -multiple acute hemorraghic infarcts, neuro consulted contacted Dr. Portillo, recs     Keppra 250mg BID 2/ poor renal function, MRI, MRA    RASS -4, pupils reactive, moving extremities    Pain control: Acetaminophen 650mg PO q6 PRN  neurology rec EEG- no epileptiform activity    RESP  Acute Hypoxemic Respiratory Failure;  - Intubated: /30/35/5    COVID-19/Viral Pneumonia, pending reswab sent   ABG - ( 2020 03:21 )  pH, Arterial: 7.46  pH, Blood: x     /  pCO2: 38    /  pO2: 124   / HCO3: 27    / Base Excess: 3.3   /  SaO2: 98       CXR: unchanged diffuse bilateral opacities    On Guanifenisen  DTA: RN resent  received by lab    [facility]    [facility] -  on PO Cardizem 60mg q8h, and  PO Lopressor 25 mg q 8 hrs   ON Afib RVR to 140s, got metoprolol 5 x2, cardizem 10 x2, am PO cardizem 60    hypotension -  off levo now    Hx of Afib - Diltiazem. BB, currently holding Apixaban 2/2 HCT findings    Hx of CAD - Atorvastatin 40mg PO qhs    Hx of HTN - hold Lisinopril   Qtc: 522    GI/NUTR   Diet: TF (glucerna) @ 50mL/hr    PPI  Senna     Daily weight (4/10) 98.7kg,                          ( ) 95.4kg                         ( ) 93.3 kg                         ( ) 92.3 kg                         () 91.9kg    /Renal    LIZZIE; worsening, BUN / Cr:  153<H>  |  105  |  170<HH>  ----------------------------<  149<H>  3.4<L>   |  25  |  5.6<HH>    Ca    8.6      2020 00:00  Phos  8.3     -  Mg     2.9     -    IVL    Evans, non-oliguric renal failure UOP: 100-200cc/hr    d/cd Ethacrynic acid     HD : held due to acess, has new R fem shelbi, HD sched for  AM    Hx of BPH  got 10 of potassium , f/u repeat      HEME/ONC    Holding ELIQUIS             10.4   19.25 )-----------( 170      ( 2020 00:00 )             30.6     ID    afebrile    WBC 19.25    COVID-19 +  - restarted on Cefepime, d/w team regarding end date  - completed course of Hydroxychloroquine & Azithromycin  - Ascorbic acid, Zinc  - D-dimer uptrending 11,529 >50849>8421>5852,  ,     ENDO  DM2; uncontrolled, holding home Metformin  off insulin gtt  PM    MSK/DERM  - no active issues    DVT PPx  - on hepsub q  - off Apixaban     GI PPx  -  Pantoprazole 40mg PO qac      ***Tubes/Lines/Drains  ***  Peripheral IV  Central Venous Line  Right IJ TLC  	Date 3/31/20  R fem Douglas   Arterial Line  Right radial A-line		                Date: 3/31/20  Urnary Catheter		Indication: Strict I&O    Date Placed: 3/31/20  ETT  OGT    REVIEW OF SYSTEMS    [ ] A ten-point review of systems was otherwise negative except as noted.  [x ] Due to altered mental status/intubation, subjective information were not able to be obtained from the patient. History was obtained, to the extent possible, from review of the chart and collateral sources of information.    Daily     Daily Weight in k.9 (2020 05:00)    Diet, NPO with Tube Feed:   Tube Feeding Modality: Orogastric  Glucerna 1.2 Choco  Total Volume for 24 Hours (mL): 1200  Continuous  Starting Tube Feed Rate mL per Hour: 30  Increase Tube Feed Rate by (mL): 10     Every hour  Until Goal Tube Feed Rate (mL per Hour): 50  Tube Feed Duration (in Hours): 24  Tube Feed Start Time: 13:00 (20 @ 17:56)      CURRENT MEDS:  Neurologic Medications  acetaminophen  Suppository .. 650 milliGRAM(s) Rectal every 6 hours PRN Temp greater or equal to 38.5C (101.3F)  levETIRAcetam 250 milliGRAM(s) Oral two times a day    Respiratory Medications  guaifenesin/dextromethorphan  Syrup 10 milliLiter(s) Oral every 8 hours    Cardiovascular Medications  diltiazem    Tablet 60 milliGRAM(s) Oral every 8 hours  norepinephrine Infusion 0.05 MICROgram(s)/kG/Min IV Continuous <Continuous>    Gastrointestinal Medications  ascorbic acid 500 milliGRAM(s) Oral every 8 hours  calcium acetate 1334 milliGRAM(s) Oral three times a day with meals  lactulose Syrup 20 Gram(s) Oral every 12 hours  pantoprazole  Injectable 40 milliGRAM(s) IV Push daily  senna 2 Tablet(s) Oral at bedtime  zinc sulfate 220 milliGRAM(s) Oral daily    Genitourinary Medications    Hematologic/Oncologic Medications  heparin  Injectable 5000 Unit(s) SubCutaneous every 8 hours    Antimicrobial/Immunologic Medications  cefepime   IVPB 1000 milliGRAM(s) IV Intermittent <User Schedule>    Endocrine/Metabolic Medications  insulin regular Infusion 1 Unit(s)/Hr IV Continuous <Continuous>    Topical/Other Medications  chlorhexidine 0.12% Liquid 15 milliLiter(s) Oral Mucosa every 12 hours  chlorhexidine 4% Liquid 1 Application(s) Topical <User Schedule>      ICU Vital Signs Last 24 Hrs  T(C): 36.9 (2020 08:00), Max: 37.1 (2020 00:00)  T(F): 98.4 (2020 08:00), Max: 98.8 (2020 04:00)  HR: 109 (2020 09:55) (56 - 140)  BP: 149/67 (2020 08:00) (149/67 - 149/67)  BP(mean): --  ABP: 130/50 (2020 09:55) (105/60 - 178/70)  ABP(mean): 70 (2020 09:55) (70 - 102)  RR: 32 (2020 09:55) (30 - 41)  SpO2: 96% (2020 09:55) (96% - 100%)      Adult Advanced Hemodynamics Last 24 Hrs  CVP(mm Hg): --  CVP(cm H2O): --  CO: --  CI: --  PA: --  PA(mean): --  PCWP: --  SVR: --  SVRI: --  PVR: --  PVRI: --    Mode: AC/ CMV (Assist Control/ Continuous Mandatory Ventilation)  RR (machine): 30  TV (machine): 500  FiO2: 30  PEEP: 5  ITime: 1  MAP: 11  PIP: 27    ABG - ( 2020 03:21 )  pH, Arterial: 7.46  pH, Blood: x     /  pCO2: 38    /  pO2: 124   / HCO3: 27    / Base Excess: 3.3   /  SaO2: 98                  I&O's Summary    2020 07:  -  2020 07:00  --------------------------------------------------------  IN: 1443.3 mL / OUT: 2752 mL / NET: -1308.7 mL    2020 07:  -  2020 10:24  --------------------------------------------------------  IN: 133 mL / OUT: 270 mL / NET: -137 mL      I&O's Detail    2020 07:  -  2020 07:00  --------------------------------------------------------  IN:    Enteral Tube Flush: 100 mL    Glucerna: 1200 mL    insulin regular Infusion: 68 mL    IV PiggyBack: 50 mL    norepinephrine Infusion: 25.3 mL  Total IN: 1443.3 mL    OUT:    Indwelling Catheter - Urethral: 2752 mL  Total OUT: 2752 mL    Total NET: -1308.7 mL      2020 07:01  -  2020 10:24  --------------------------------------------------------  IN:    Enteral Tube Flush: 25 mL    Glucerna: 100 mL    insulin regular Infusion: 8 mL  Total IN: 133 mL    OUT:    Indwelling Catheter - Urethral: 270 mL  Total OUT: 270 mL    Total NET: -137 mL          PHYSICAL EXAM:    +w/d to pain, seen moving LUE spontaneously      POCT Blood Glucose.: 117 mg/dL (2020 09:59)  POCT Blood Glucose.: 148 mg/dL (2020 08:59)  POCT Blood Glucose.: 153 mg/dL (2020 08:03)  POCT Blood Glucose.: 152 mg/dL (2020 06:00)  POCT Blood Glucose.: 176 mg/dL (2020 02:01)  POCT Blood Glucose.: 140 mg/dL (2020 22:59)  POCT Blood Glucose.: 104 mg/dL (2020 21:31)  POCT Blood Glucose.: 134 mg/dL (2020 19:58)  POCT Blood Glucose.: 162 mg/dL (2020 19:12)  POCT Blood Glucose.: 166 mg/dL (2020 18:06)  POCT Blood Glucose.: 199 mg/dL (2020 16:35)  POCT Blood Glucose.: 214 mg/dL (2020 14:47)  POCT Blood Glucose.: 198 mg/dL (2020 13:42)  POCT Blood Glucose.: 177 mg/dL (2020 12:11)  POCT Blood Glucose.: 192 mg/dL (2020 11:10)                          10.4   19.25 )-----------( 170      ( 2020 00:00 )             30.6       04-17    153<H>  |  105  |  170<HH>  ----------------------------<  149<H>  3.4<L>   |  25  |  5.6<HH>    Ca    8.6      2020 00:00  Phos  8.3     -  Mg     2.9     -    TPro  5.5<L>  /  Alb  2.6<L>  /  TBili  1.0  /  DBili  x   /  AST  61<H>  /  ALT  86<H>  /  AlkPhos  105  -17                Culture - Sputum (collected 15 Apr 2020 16:30)  Source: .Sputum Sputum  Gram Stain (2020 06:32):    Numerous polymorphonuclear leukocytes per low power field    No Squamous epithelial cells per low power field    Moderate Gram positive cocci in pairs seen per oil power field

## 2020-04-18 LAB
-  AMPICILLIN/SULBACTAM: SIGNIFICANT CHANGE UP
-  CEFAZOLIN: SIGNIFICANT CHANGE UP
-  CLINDAMYCIN: SIGNIFICANT CHANGE UP
-  ERYTHROMYCIN: SIGNIFICANT CHANGE UP
-  GENTAMICIN: SIGNIFICANT CHANGE UP
-  LINEZOLID: SIGNIFICANT CHANGE UP
-  OXACILLIN: SIGNIFICANT CHANGE UP
-  PENICILLIN: SIGNIFICANT CHANGE UP
-  RIFAMPIN: SIGNIFICANT CHANGE UP
-  TETRACYCLINE: SIGNIFICANT CHANGE UP
-  TRIMETHOPRIM/SULFAMETHOXAZOLE: SIGNIFICANT CHANGE UP
-  VANCOMYCIN: SIGNIFICANT CHANGE UP
ALBUMIN SERPL ELPH-MCNC: 2.5 G/DL — LOW (ref 3.5–5.2)
ALBUMIN SERPL ELPH-MCNC: 2.7 G/DL — LOW (ref 3.5–5.2)
ALP SERPL-CCNC: 107 U/L — SIGNIFICANT CHANGE UP (ref 30–115)
ALP SERPL-CCNC: 99 U/L — SIGNIFICANT CHANGE UP (ref 30–115)
ALT FLD-CCNC: 76 U/L — HIGH (ref 0–41)
ALT FLD-CCNC: 85 U/L — HIGH (ref 0–41)
ANION GAP SERPL CALC-SCNC: 17 MMOL/L — HIGH (ref 7–14)
ANION GAP SERPL CALC-SCNC: 18 MMOL/L — HIGH (ref 7–14)
APTT BLD: 28 SEC — SIGNIFICANT CHANGE UP (ref 27–39.2)
AST SERPL-CCNC: 44 U/L — HIGH (ref 0–41)
AST SERPL-CCNC: 52 U/L — HIGH (ref 0–41)
BILIRUB SERPL-MCNC: 0.9 MG/DL — SIGNIFICANT CHANGE UP (ref 0.2–1.2)
BILIRUB SERPL-MCNC: 1.1 MG/DL — SIGNIFICANT CHANGE UP (ref 0.2–1.2)
BUN SERPL-MCNC: 124 MG/DL — CRITICAL HIGH (ref 10–20)
BUN SERPL-MCNC: 130 MG/DL — CRITICAL HIGH (ref 10–20)
CALCIUM SERPL-MCNC: 8.6 MG/DL — SIGNIFICANT CHANGE UP (ref 8.5–10.1)
CALCIUM SERPL-MCNC: 8.9 MG/DL — SIGNIFICANT CHANGE UP (ref 8.5–10.1)
CHLORIDE SERPL-SCNC: 104 MMOL/L — SIGNIFICANT CHANGE UP (ref 98–110)
CHLORIDE SERPL-SCNC: 109 MMOL/L — SIGNIFICANT CHANGE UP (ref 98–110)
CO2 SERPL-SCNC: 25 MMOL/L — SIGNIFICANT CHANGE UP (ref 17–32)
CO2 SERPL-SCNC: 26 MMOL/L — SIGNIFICANT CHANGE UP (ref 17–32)
CREAT SERPL-MCNC: 4.8 MG/DL — CRITICAL HIGH (ref 0.7–1.5)
CREAT SERPL-MCNC: 4.8 MG/DL — CRITICAL HIGH (ref 0.7–1.5)
CRP SERPL-MCNC: 8.26 MG/DL — HIGH (ref 0–0.4)
CULTURE RESULTS: SIGNIFICANT CHANGE UP
D DIMER BLD IA.RAPID-MCNC: 2370 NG/ML DDU — HIGH (ref 0–230)
FERRITIN SERPL-MCNC: 2437 NG/ML — HIGH (ref 30–400)
GAS PNL BLDA: SIGNIFICANT CHANGE UP
GAS PNL BLDA: SIGNIFICANT CHANGE UP
GLUCOSE BLDC GLUCOMTR-MCNC: 122 MG/DL — HIGH (ref 70–99)
GLUCOSE BLDC GLUCOMTR-MCNC: 126 MG/DL — HIGH (ref 70–99)
GLUCOSE BLDC GLUCOMTR-MCNC: 138 MG/DL — HIGH (ref 70–99)
GLUCOSE BLDC GLUCOMTR-MCNC: 148 MG/DL — HIGH (ref 70–99)
GLUCOSE BLDC GLUCOMTR-MCNC: 159 MG/DL — HIGH (ref 70–99)
GLUCOSE BLDC GLUCOMTR-MCNC: 162 MG/DL — HIGH (ref 70–99)
GLUCOSE BLDC GLUCOMTR-MCNC: 179 MG/DL — HIGH (ref 70–99)
GLUCOSE BLDC GLUCOMTR-MCNC: 219 MG/DL — HIGH (ref 70–99)
GLUCOSE SERPL-MCNC: 141 MG/DL — HIGH (ref 70–99)
GLUCOSE SERPL-MCNC: 172 MG/DL — HIGH (ref 70–99)
INR BLD: 1.26 RATIO — SIGNIFICANT CHANGE UP (ref 0.65–1.3)
MAGNESIUM SERPL-MCNC: 2.5 MG/DL — HIGH (ref 1.8–2.4)
MAGNESIUM SERPL-MCNC: 2.5 MG/DL — HIGH (ref 1.8–2.4)
MAGNESIUM SERPL-MCNC: 2.7 MG/DL — HIGH (ref 1.8–2.4)
METHOD TYPE: SIGNIFICANT CHANGE UP
ORGANISM # SPEC MICROSCOPIC CNT: SIGNIFICANT CHANGE UP
ORGANISM # SPEC MICROSCOPIC CNT: SIGNIFICANT CHANGE UP
PHOSPHATE SERPL-MCNC: 6.1 MG/DL — HIGH (ref 2.1–4.9)
PHOSPHATE SERPL-MCNC: 6.5 MG/DL — HIGH (ref 2.1–4.9)
PHOSPHATE SERPL-MCNC: 6.6 MG/DL — HIGH (ref 2.1–4.9)
POTASSIUM SERPL-MCNC: 3.4 MMOL/L — LOW (ref 3.5–5)
POTASSIUM SERPL-MCNC: 3.8 MMOL/L — SIGNIFICANT CHANGE UP (ref 3.5–5)
POTASSIUM SERPL-SCNC: 3.4 MMOL/L — LOW (ref 3.5–5)
POTASSIUM SERPL-SCNC: 3.8 MMOL/L — SIGNIFICANT CHANGE UP (ref 3.5–5)
PROCALCITONIN SERPL-MCNC: 1.58 NG/ML — HIGH (ref 0.02–0.1)
PROT SERPL-MCNC: 5.5 G/DL — LOW (ref 6–8)
PROT SERPL-MCNC: 5.6 G/DL — LOW (ref 6–8)
PROTHROM AB SERPL-ACNC: 14.5 SEC — HIGH (ref 9.95–12.87)
SODIUM SERPL-SCNC: 148 MMOL/L — HIGH (ref 135–146)
SODIUM SERPL-SCNC: 151 MMOL/L — HIGH (ref 135–146)
SPECIMEN SOURCE: SIGNIFICANT CHANGE UP

## 2020-04-18 PROCEDURE — 99232 SBSQ HOSP IP/OBS MODERATE 35: CPT

## 2020-04-18 PROCEDURE — 31730 INTRO WINDPIPE WIRE/TUBE: CPT | Mod: CS

## 2020-04-18 PROCEDURE — 93010 ELECTROCARDIOGRAM REPORT: CPT

## 2020-04-18 PROCEDURE — 71045 X-RAY EXAM CHEST 1 VIEW: CPT | Mod: 26

## 2020-04-18 PROCEDURE — 99291 CRITICAL CARE FIRST HOUR: CPT | Mod: CS,24,57,25

## 2020-04-18 RX ORDER — BACITRACIN ZINC 500 UNIT/G
1 OINTMENT IN PACKET (EA) TOPICAL
Refills: 0 | Status: DISCONTINUED | OUTPATIENT
Start: 2020-04-18 | End: 2020-05-05

## 2020-04-18 RX ORDER — POTASSIUM CHLORIDE 20 MEQ
20 PACKET (EA) ORAL
Refills: 0 | Status: COMPLETED | OUTPATIENT
Start: 2020-04-18 | End: 2020-04-18

## 2020-04-18 RX ORDER — FENTANYL CITRATE 50 UG/ML
50 INJECTION INTRAVENOUS ONCE
Refills: 0 | Status: DISCONTINUED | OUTPATIENT
Start: 2020-04-18 | End: 2020-04-18

## 2020-04-18 RX ORDER — PANTOPRAZOLE SODIUM 20 MG/1
40 TABLET, DELAYED RELEASE ORAL DAILY
Refills: 0 | Status: DISCONTINUED | OUTPATIENT
Start: 2020-04-18 | End: 2020-05-05

## 2020-04-18 RX ORDER — MEROPENEM 1 G/30ML
500 INJECTION INTRAVENOUS EVERY 12 HOURS
Refills: 0 | Status: COMPLETED | OUTPATIENT
Start: 2020-04-18 | End: 2020-04-25

## 2020-04-18 RX ORDER — DILTIAZEM HCL 120 MG
10 CAPSULE, EXT RELEASE 24 HR ORAL ONCE
Refills: 0 | Status: COMPLETED | OUTPATIENT
Start: 2020-04-18 | End: 2020-04-18

## 2020-04-18 RX ORDER — PROPOFOL 10 MG/ML
10 INJECTION, EMULSION INTRAVENOUS ONCE
Refills: 0 | Status: COMPLETED | OUTPATIENT
Start: 2020-04-18 | End: 2020-04-18

## 2020-04-18 RX ORDER — VANCOMYCIN HCL 1 G
VIAL (EA) INTRAVENOUS
Refills: 0 | Status: DISCONTINUED | OUTPATIENT
Start: 2020-04-18 | End: 2020-04-24

## 2020-04-18 RX ORDER — METOPROLOL TARTRATE 50 MG
25 TABLET ORAL EVERY 8 HOURS
Refills: 0 | Status: DISCONTINUED | OUTPATIENT
Start: 2020-04-18 | End: 2020-04-28

## 2020-04-18 RX ORDER — VANCOMYCIN HCL 1 G
1000 VIAL (EA) INTRAVENOUS ONCE
Refills: 0 | Status: COMPLETED | OUTPATIENT
Start: 2020-04-18 | End: 2020-04-18

## 2020-04-18 RX ORDER — VANCOMYCIN HCL 1 G
1000 VIAL (EA) INTRAVENOUS EVERY 24 HOURS
Refills: 0 | Status: DISCONTINUED | OUTPATIENT
Start: 2020-04-19 | End: 2020-04-24

## 2020-04-18 RX ORDER — CISATRACURIUM BESYLATE 2 MG/ML
10 INJECTION INTRAVENOUS ONCE
Refills: 0 | Status: COMPLETED | OUTPATIENT
Start: 2020-04-18 | End: 2020-04-18

## 2020-04-18 RX ORDER — CHLORHEXIDINE GLUCONATE 213 G/1000ML
15 SOLUTION TOPICAL
Refills: 0 | Status: DISCONTINUED | OUTPATIENT
Start: 2020-04-18 | End: 2020-04-19

## 2020-04-18 RX ORDER — METOPROLOL TARTRATE 50 MG
5 TABLET ORAL ONCE
Refills: 0 | Status: COMPLETED | OUTPATIENT
Start: 2020-04-18 | End: 2020-04-18

## 2020-04-18 RX ORDER — MIDODRINE HYDROCHLORIDE 2.5 MG/1
10 TABLET ORAL EVERY 8 HOURS
Refills: 0 | Status: DISCONTINUED | OUTPATIENT
Start: 2020-04-18 | End: 2020-04-23

## 2020-04-18 RX ORDER — MIDAZOLAM HYDROCHLORIDE 1 MG/ML
2 INJECTION, SOLUTION INTRAMUSCULAR; INTRAVENOUS ONCE
Refills: 0 | Status: DISCONTINUED | OUTPATIENT
Start: 2020-04-18 | End: 2020-04-18

## 2020-04-18 RX ADMIN — Medication 50 MILLIEQUIVALENT(S): at 10:45

## 2020-04-18 RX ADMIN — Medication 5 MILLIGRAM(S): at 03:40

## 2020-04-18 RX ADMIN — HEPARIN SODIUM 5000 UNIT(S): 5000 INJECTION INTRAVENOUS; SUBCUTANEOUS at 05:10

## 2020-04-18 RX ADMIN — Medication 500 MILLIGRAM(S): at 21:30

## 2020-04-18 RX ADMIN — HEPARIN SODIUM 5000 UNIT(S): 5000 INJECTION INTRAVENOUS; SUBCUTANEOUS at 21:28

## 2020-04-18 RX ADMIN — Medication 1 APPLICATION(S): at 18:33

## 2020-04-18 RX ADMIN — MIDODRINE HYDROCHLORIDE 10 MILLIGRAM(S): 2.5 TABLET ORAL at 21:29

## 2020-04-18 RX ADMIN — MIDODRINE HYDROCHLORIDE 10 MILLIGRAM(S): 2.5 TABLET ORAL at 10:38

## 2020-04-18 RX ADMIN — Medication 5 MILLILITER(S): at 13:35

## 2020-04-18 RX ADMIN — HEPARIN SODIUM 5000 UNIT(S): 5000 INJECTION INTRAVENOUS; SUBCUTANEOUS at 13:45

## 2020-04-18 RX ADMIN — MEROPENEM 100 MILLIGRAM(S): 1 INJECTION INTRAVENOUS at 17:23

## 2020-04-18 RX ADMIN — CISATRACURIUM BESYLATE 10 MILLIGRAM(S): 2 INJECTION INTRAVENOUS at 12:15

## 2020-04-18 RX ADMIN — Medication 60 MILLIGRAM(S): at 05:09

## 2020-04-18 RX ADMIN — Medication 250 MILLIGRAM(S): at 21:29

## 2020-04-18 RX ADMIN — LEVETIRACETAM 250 MILLIGRAM(S): 250 TABLET, FILM COATED ORAL at 05:08

## 2020-04-18 RX ADMIN — MIDODRINE HYDROCHLORIDE 10 MILLIGRAM(S): 2.5 TABLET ORAL at 17:24

## 2020-04-18 RX ADMIN — PROPOFOL 10 MILLIGRAM(S): 10 INJECTION, EMULSION INTRAVENOUS at 12:15

## 2020-04-18 RX ADMIN — Medication 60 MILLIGRAM(S): at 13:41

## 2020-04-18 RX ADMIN — Medication 10 MILLILITER(S): at 21:28

## 2020-04-18 RX ADMIN — MIDAZOLAM HYDROCHLORIDE 2 MILLIGRAM(S): 1 INJECTION, SOLUTION INTRAMUSCULAR; INTRAVENOUS at 12:15

## 2020-04-18 RX ADMIN — FENTANYL CITRATE 50 MICROGRAM(S): 50 INJECTION INTRAVENOUS at 12:15

## 2020-04-18 RX ADMIN — Medication 60 MILLIGRAM(S): at 21:32

## 2020-04-18 RX ADMIN — Medication 1334 MILLIGRAM(S): at 16:43

## 2020-04-18 RX ADMIN — LACTULOSE 20 GRAM(S): 10 SOLUTION ORAL at 05:08

## 2020-04-18 RX ADMIN — Medication 50 MILLIEQUIVALENT(S): at 13:00

## 2020-04-18 RX ADMIN — Medication 10 MILLILITER(S): at 05:08

## 2020-04-18 RX ADMIN — Medication 10 MILLIGRAM(S): at 04:19

## 2020-04-18 RX ADMIN — CHLORHEXIDINE GLUCONATE 1 APPLICATION(S): 213 SOLUTION TOPICAL at 05:09

## 2020-04-18 RX ADMIN — Medication 500 MILLIGRAM(S): at 05:09

## 2020-04-18 RX ADMIN — Medication 25 MILLIGRAM(S): at 05:08

## 2020-04-18 RX ADMIN — CHLORHEXIDINE GLUCONATE 15 MILLILITER(S): 213 SOLUTION TOPICAL at 05:09

## 2020-04-18 RX ADMIN — CHLORHEXIDINE GLUCONATE 15 MILLILITER(S): 213 SOLUTION TOPICAL at 18:12

## 2020-04-18 RX ADMIN — MEROPENEM 100 MILLIGRAM(S): 1 INJECTION INTRAVENOUS at 10:39

## 2020-04-18 RX ADMIN — Medication 1334 MILLIGRAM(S): at 09:13

## 2020-04-18 RX ADMIN — Medication 500 MILLIGRAM(S): at 13:37

## 2020-04-18 RX ADMIN — ZINC SULFATE TAB 220 MG (50 MG ZINC EQUIVALENT) 220 MILLIGRAM(S): 220 (50 ZN) TAB at 16:45

## 2020-04-18 RX ADMIN — Medication 25 MILLIGRAM(S): at 13:41

## 2020-04-18 RX ADMIN — CHLORHEXIDINE GLUCONATE 15 MILLILITER(S): 213 SOLUTION TOPICAL at 16:47

## 2020-04-18 RX ADMIN — Medication 25 MILLIGRAM(S): at 21:40

## 2020-04-18 RX ADMIN — LEVETIRACETAM 250 MILLIGRAM(S): 250 TABLET, FILM COATED ORAL at 17:24

## 2020-04-18 RX ADMIN — PANTOPRAZOLE SODIUM 40 MILLIGRAM(S): 20 TABLET, DELAYED RELEASE ORAL at 11:50

## 2020-04-18 NOTE — PROGRESS NOTE ADULT - ASSESSMENT
72y male, acute respiratory failure 2/2 COVID-19    NEURO    acute infarcts-keppra 250mg BID, EEG, when stable peform MRI, EEG- no epileptiform activity    agitation,desynchrony-was on low dose prop overnight off today  +w/d to painful stim    RASS -4      RESP  Acute Hypoxemic Respiratory Failure;  Intubated, /30/35/5   ABGABG - ( 18 Apr 2020 03:05 )  pH, Arterial: 7.54  pH, Blood: x     /  pCO2: 33    /  pO2: 82    / HCO3: 28    / Base Excess: 5.6   /  SaO2: 97      COVID-19/Viral Pneumonia  -repeat swab 4/13 negative  deep tracheal aspirate sent 4/16--->neg  trach today  - continue, Guaifenisen/DM      CARD/VASC    acute hypotension-was on low dose levo\  addedd midodrine    Hx of Afib - holding eliquis 2/2 stroke, Cardizem PO 60mg q8, Metoprolol 25mg q8;   CAD - continue Atorvastatin   Hx of HTN- holding Lisinopril       GI/NUTR  - diet: TF @ 50  - PPI  - Senna, Lactulose   Daily weight 4/18 88.7 kg    /Renal  LIZZIE + uremia - HD 4/17, no HD 4/18  WBC-uptrending 22 today  Evans - UO 50-100cc/hr  off Ethacrynic, on phoslo  Hx of BPH  04-18    148<H>  |  104  |  124<HH>  ----------------------------<  141<H>  3.4<L>   |  26  |  4.8<HH>    Ca    8.9      18 Apr 2020 02:38  Phos  6.1     04-18  Mg     2.5     04-18    TPro  5.5<L>  /  Alb  2.7<L>  /  TBili  1.1  /  DBili  x   /  AST  52<H>  /  ALT  85<H>  /  AlkPhos  107  04-18      HEME/ONC  Hgb stable-10.4 stable  DVT ppx: holding eliquis, d/w restart 2/2 infarct    ID    Afebrile     COVID 22-WBC uptrendfing now  changed cefepime to meropenum      ENDO  DM2 - holding home Metformin  Hyperglycemia - on insulin gtt, FS q1h  CAPILLARY BLOOD GLUCOSE  POCT Blood Glucose.: 138 mg/dL (18 Apr 2020 11:10)  POCT Blood Glucose.: 117 mg/dL (17 Apr 2020 09:59)  POCT Blood Glucose.: 148 mg/dL (17 Apr 2020 08:59)    MSK/DERM  - no active issues    DVT PPx: Eliquis (currently held 2/2 cerebral infarcts) on hep sub q  GI PPx: PPI    DISPO: ICU

## 2020-04-18 NOTE — PROGRESS NOTE ADULT - SUBJECTIVE AND OBJECTIVE BOX
intubated/ventilated  s/p hd yesterday         PAST HISTORY  --------------------------------------------------------------------------------  No significant changes to PMH, PSH, FHx, SHx, unless otherwise noted    ALLERGIES & MEDICATIONS  --------------------------------------------------------------------------------  Allergies    Bactrim (Unknown)    Intolerances      Standing Inpatient Medications  ascorbic acid 500 milliGRAM(s) Oral every 8 hours  calcium acetate 1334 milliGRAM(s) Oral three times a day with meals  cefepime   IVPB 1000 milliGRAM(s) IV Intermittent <User Schedule>  chlorhexidine 0.12% Liquid 15 milliLiter(s) Oral Mucosa every 12 hours  chlorhexidine 4% Liquid 1 Application(s) Topical <User Schedule>  diltiazem    Tablet 60 milliGRAM(s) Oral every 8 hours  guaifenesin/dextromethorphan  Syrup 10 milliLiter(s) Oral every 8 hours  heparin  Injectable 5000 Unit(s) SubCutaneous every 8 hours  insulin regular Infusion 1 Unit(s)/Hr IV Continuous <Continuous>  lactulose Syrup 20 Gram(s) Oral every 12 hours  levETIRAcetam 250 milliGRAM(s) Oral two times a day  metoprolol tartrate 25 milliGRAM(s) Oral every 8 hours  norepinephrine Infusion 0.05 MICROgram(s)/kG/Min IV Continuous <Continuous>  pantoprazole  Injectable 40 milliGRAM(s) IV Push daily  propofol Infusion 10 MICROgram(s)/kG/Min IV Continuous <Continuous>  senna 2 Tablet(s) Oral at bedtime  zinc sulfate 220 milliGRAM(s) Oral daily    PRN Inpatient Medications  acetaminophen  Suppository .. 650 milliGRAM(s) Rectal every 6 hours PRN          VITALS/PHYSICAL EXAM  --------------------------------------------------------------------------------  T(C): 37.2 (04-18-20 @ 04:00), Max: 37.9 (04-17-20 @ 14:00)  HR: 130 (04-18-20 @ 04:00) (74 - 150)  BP: 149/67 (04-17-20 @ 08:00) (149/67 - 149/67)  RR: 30 (04-18-20 @ 04:00) (30 - 35)  SpO2: 96% (04-18-20 @ 04:00) (96% - 99%)  Wt(kg): --        04-16-20 @ 07:01  -  04-17-20 @ 07:00  --------------------------------------------------------  IN: 1443.3 mL / OUT: 2752 mL / NET: -1308.7 mL    04-17-20 @ 07:01  -  04-18-20 @ 05:38  --------------------------------------------------------  IN: 1480.4 mL / OUT: 1280 mL / NET: 200.4 mL      Physical Exam:  	Gen: intubated/ventilated   	Vascular access:amelia     LABS/STUDIES  --------------------------------------------------------------------------------              10.0   22.33 >-----------<  182      [04-17-20 @ 16:00]              30.9     148  |  104  |  124  ----------------------------<  141      [04-18-20 @ 02:38]  3.4   |  26  |  4.8        Ca     8.9     [04-18-20 @ 02:38]      Mg     2.5     [04-18-20 @ 02:38]      Phos  6.1     [04-18-20 @ 02:38]    TPro  5.5  /  Alb  2.7  /  TBili  1.1  /  DBili  x   /  AST  52  /  ALT  85  /  AlkPhos  107  [04-18-20 @ 02:38]    PT/INR: PT 14.50, INR 1.26       [04-17-20 @ 16:00]  PTT: 28.0       [04-17-20 @ 16:00]          [04-17-20 @ 15:20]    Creatinine Trend:  SCr 4.8 [04-18 @ 02:38]  SCr 4.4 [04-17 @ 15:20]  SCr 5.6 [04-17 @ 00:00]  SCr 5.4 [04-16 @ 16:21]  SCr 6.2 [04-16 @ 00:30]    Urinalysis - [03-30-20 @ 22:00]      Color Yellow / Appearance Clear / SG 1.031 / pH 6.0      Gluc Negative / Ketone Trace  / Bili Negative / Urobili <2 mg/dL       Blood Moderate / Protein 300 mg/dL / Leuk Est Negative / Nitrite Negative      RBC 10 / WBC 29 / Hyaline 24 / Gran  / Sq Epi  / Non Sq Epi 22 / Bacteria Negative      Ferritin 1769      [04-14-20 @ 00:25]  HbA1c 7.7      [04-01-20 @ 04:30]

## 2020-04-18 NOTE — PROGRESS NOTE ADULT - SUBJECTIVE AND OBJECTIVE BOX
KONSTANTIN WYATT  [id number]  74y Male    Indication for ICU admission: Acute Hypoxemic Respiratory Failure, clinical COVID19 (false negatvie)    Admit Date:20  ICU Date: 20  OR Date:    Bactrim (Unknown)    PAST MEDICAL & SURGICAL HISTORY:  Afib  DM (diabetes mellitus)  BPH (benign prostatic hyperplasia)  CAD (coronary artery disease)    Home Medications:  Cartia  mg/24 hours oral capsule, extended release: 1 cap(s) orally once a day (30 Mar 2020 18:04)  Eliquis 5 mg oral tablet: 1 tab(s) orally 2 times a day (30 Mar 2020 18:04)  Lipitor 40 mg oral tablet: 1 tab(s) orally once a day (30 Mar 2020 18:04)  lisinopril 40 mg oral tablet: 1 tab(s) orally once a day (30 Mar 2020 18:04)  metFORMIN 750 mg oral tablet, extended release: 1 tab(s) orally once a day (30 Mar 2020 18:04)    24HRS EVENT:   Was in Afib RVR in the beginning of the night. Got Lopressor 5 IVP followed by Cardizem 10 IVP at 7pm.  Then received Cardizem 10 IVP at 9pm follow by Lopressor 5 IVP at 11pm, which rate controlled him. Rebound Afib RVR to HR 130s, gave Lopressor 5 IVP @ 3:30am. Went into Afib RBR @ 4am, gave Cardizem 10 IVP. Reinstated metoprolol 25 q8. DTA was negative for COVID. Plan is for percutaneous tracheostomy bedside in the PACU.  Wife was called at 10:30PM and did not answer. Will recall in AM. K was 3.4, did not replete due to Cr 4.8 on HD    NEURO    sedation---on propofol gtt, turned off on rounds    HCT -multiple acute hemorraghic infarcts, neuro consulted contacted Dr. Portillo, recs     Keppra 250mg BID 2/2 poor renal function, MRI, MRA    RASS -4, pupils reactive, moving extremities    Pain control: Acetaminophen 650mg PO q6 PRN  neurology rec EEG- no epileptiform activity    RESP  Acute Hypoxemic Respiratory Failure;  - Intubated: /30/35/5  - COVID-19/Viral Pneumonia--DTA  negative  - AM AB.54/33/82/28/97% lac 1.0    -CXR: unchanged diffuse bilateral opacities  -On Guanifenisen  plan for trach today    CARD/VASC   Acute tachycardia, hx of Afib---- on PO Cardizem 60mg q8h, and PO Lopressor 25 mg q 8  (reinstated ON, was discontinued on , might be why he is not rate controlled)  Afib RVR to 140s, received metop 5 IVP x3, cardizem 10 IVP x2  Hypotension -  on levo   started midodrine 10q8h for hypotension    Hx of Afib - Diltiazem. BB, currently holding Apixaban 2/2 HCT findings    Hx of CAD - Atorvastatin 40mg PO qhs    Hx of HTN - hold Lisinopril   QTc: 498 ()    GI/NUTR   Made NPO for possible trach today   Had BM ON    Diet: TF (glucerna) @ 50mL/hr    PPI  Senna     Daily weight (4/10) 98.7kg,                          ( ) 95.4kg                         ( ) 93.3 kg                         ( ) 92.3 kg                         () 91.9kg  	       () 88.7kg    /Renal  LIZZIE;  BUN / Cr improved after HD, kept even , no HD today  Lytes-Na 148 // K 3.4 // Phos 6.1 //  Mag 2.5 (did not replete 2/ Cr 4.8 on HD, stat EKG done)  repleting K, f/u bmp  BUN/Cr 124/4.8  IVL  Evans, non-oliguric renal failure UOP: 75 cc/hr  d/cd Ethacrynic acid   HD : kept even   Hx of BPH      HEME/ONC  Holding ELIQUIS  Hgb lateral at 10     ID    afebrile    WBC 19.25 > 22    COVID-19 +  - changed cefepime to Ksenia  - completed course of Hydroxychloroquine & Azithromycin  - Ascorbic acid, Zinc  - D-dimer downtrending 2,640 > 2370    ENDO  DM2; uncontrolled, holding home Metformin  Insulin gtt at 1    MSK/DERM  -No active issues    DVT PPx  - on hepsub q  - off Apixaban     GI PPx  -  Pantoprazole 40mg PO qac      ***Tubes/Lines/Drains  ***  Peripheral IV  Central Venous Line  Right IJ TLC  	Date 3/31/20  R fem [place]   Arterial Line  Right radial A-line		                Date: 3/31/20  Urnary Catheter		Indication: Strict I&O    Date Placed: 3/31/20  ETT  OGT    REVIEW OF SYSTEMS    [ ] A ten-point review of systems was otherwise negative except as noted.  [x ] Due to altered mental status/intubation, subjective information were not able to be obtained from the patient. History was obtained, to the extent possible, from review of the chart       Daily     Daily Weight in k.7 (2020 06:00)    Diet, NPO with Tube Feed:   Tube Feeding Modality: Orogastric  Glucerna 1.2 Choco  Total Volume for 24 Hours (mL): 1200  Continuous  Starting Tube Feed Rate mL per Hour: 30  Increase Tube Feed Rate by (mL): 10     Every hour  Until Goal Tube Feed Rate (mL per Hour): 50  Tube Feed Duration (in Hours): 24  Tube Feed Start Time: 13:00 (20 @ 17:56)      CURRENT MEDS:  Neurologic Medications  acetaminophen  Suppository .. 650 milliGRAM(s) Rectal every 6 hours PRN Temp greater or equal to 38.5C (101.3F)  levETIRAcetam 250 milliGRAM(s) Oral two times a day    Respiratory Medications  guaifenesin/dextromethorphan  Syrup 10 milliLiter(s) Oral every 8 hours    Cardiovascular Medications  diltiazem    Tablet 60 milliGRAM(s) Oral every 8 hours  metoprolol tartrate 25 milliGRAM(s) Oral every 8 hours  midodrine 10 milliGRAM(s) Oral every 8 hours  norepinephrine Infusion 0.05 MICROgram(s)/kG/Min IV Continuous <Continuous>    Gastrointestinal Medications  ascorbic acid 500 milliGRAM(s) Oral every 8 hours  calcium acetate 1334 milliGRAM(s) Oral three times a day with meals  lactulose Syrup 20 Gram(s) Oral every 12 hours  pantoprazole   Suspension 40 milliGRAM(s) Oral daily  potassium chloride  20 mEq/100 mL IVPB 20 milliEquivalent(s) IV Intermittent every 2 hours  senna 2 Tablet(s) Oral at bedtime  zinc sulfate 220 milliGRAM(s) Oral daily    Genitourinary Medications    Hematologic/Oncologic Medications  heparin  Injectable 5000 Unit(s) SubCutaneous every 8 hours    Antimicrobial/Immunologic Medications  meropenem  IVPB 500 milliGRAM(s) IV Intermittent every 12 hours    Endocrine/Metabolic Medications  insulin regular Infusion 1 Unit(s)/Hr IV Continuous <Continuous>    Topical/Other Medications  chlorhexidine 0.12% Liquid 15 milliLiter(s) Oral Mucosa every 12 hours  chlorhexidine 4% Liquid 1 Application(s) Topical <User Schedule>      ICU Vital Signs Last 24 Hrs  T(C): 38 (2020 08:00), Max: 38 (2020 08:00)  T(F): 100.4 (2020 08:00), Max: 100.4 (2020 08:00)  HR: 103 (2020 08:00) (74 - 150)  BP: --  BP(mean): --  ABP: 138/61 (2020 08:00) (116/60 - 164/76)  ABP(mean): 82 (2020 08:00) (73 - 100)  RR: 30 (2020 08:00) (30 - 35)  SpO2: 96% (2020 08:00) (96% - 99%)      Adult Advanced Hemodynamics Last 24 Hrs  CVP(mm Hg): --  CVP(cm H2O): --  CO: --  CI: --  PA: --  PA(mean): --  PCWP: --  SVR: --  SVRI: --  PVR: --  PVRI: --    Mode: AC/ CMV (Assist Control/ Continuous Mandatory Ventilation)  RR (machine): 30  TV (machine): 500  FiO2: 30  PEEP: 5  ITime: 1  MAP: 13  PIP: 25    ABG - ( 2020 03:05 )  pH, Arterial: 7.54  pH, Blood: x     /  pCO2: 33    /  pO2: 82    / HCO3: 28    / Base Excess: 5.6   /  SaO2: 97                  I&O's Summary    2020 07:01  -  2020 07:00  --------------------------------------------------------  IN: 1601.5 mL / OUT: 1525 mL / NET: 76.5 mL    2020 07:01  -  2020 12:05  --------------------------------------------------------  IN: 235.1 mL / OUT: 240 mL / NET: -4.9 mL      I&O's Detail    2020 07:01  -  2020 07:00  --------------------------------------------------------  IN:    Enteral Tube Flush: 100 mL    Glucerna: 1100 mL    insulin regular Infusion: 37 mL    IV PiggyBack: 50 mL    norepinephrine Infusion: 234 mL    propofol Infusion: 80.5 mL  Total IN: 1601.5 mL    OUT:    Indwelling Catheter - Urethral: 1525 mL  Total OUT: 1525 mL    Total NET: 76.5 mL      2020 07:01  -  2020 12:05  --------------------------------------------------------  IN:    insulin regular Infusion: 3 mL    IV PiggyBack: 200 mL    norepinephrine Infusion: 32.1 mL  Total IN: 235.1 mL    OUT:    Indwelling Catheter - Urethral: 240 mL  Total OUT: 240 mL    Total NET: -4.9 mL          LABS:  CAPILLARY BLOOD GLUCOSE      POCT Blood Glucose.: 138 mg/dL (2020 11:10)  POCT Blood Glucose.: 162 mg/dL (2020 05:34)  POCT Blood Glucose.: 163 mg/dL (2020 21:10)  POCT Blood Glucose.: 152 mg/dL (2020 19:00)  POCT Blood Glucose.: 147 mg/dL (2020 18:01)  POCT Blood Glucose.: 139 mg/dL (2020 17:06)  POCT Blood Glucose.: 153 mg/dL (2020 16:08)  POCT Blood Glucose.: 170 mg/dL (2020 15:09)  POCT Blood Glucose.: 187 mg/dL (2020 14:18)  POCT Blood Glucose.: 182 mg/dL (2020 13:07)                          10.0   22.33 )-----------( 182      ( 2020 16:00 )             30.9       04-18    148<H>  |  104  |  124<HH>  ----------------------------<  141<H>  3.4<L>   |  26  |  4.8<HH>    Ca    8.9      2020 02:38  Phos  6.1     04-18  Mg     2.5     04-18    TPro  5.5<L>  /  Alb  2.7<L>  /  TBili  1.1  /  DBili  x   /  AST  52<H>  /  ALT  85<H>  /  AlkPhos  107  04-18      PT/INR - ( 2020 16:00 )   PT: 14.50 sec;   INR: 1.26 ratio         PTT - ( 2020 16:00 )  PTT:28.0 sec          Culture - Sputum (collected 15 Apr 2020 16:30)  Source: .Sputum Sputum  Gram Stain (2020 06:32):    Numerous polymorphonuclear leukocytes per low power field    No Squamous epithelial cells per low power field    Moderate Gram positive cocci in pairs seen per oil power field  Preliminary Report (2020 21:45):    Moderate Staphylococcus aureus

## 2020-04-18 NOTE — PROGRESS NOTE ADULT - SUBJECTIVE AND OBJECTIVE BOX
Neurology Progress Note    Interval History:    Pt seen in f/u for multifocal strokes, encephalopathy, clinical Covid.    T(F): 99.4 (04-18-20 @ 12:00), Max: 100.4 (04-18-20 @ 08:00)  HR: 118 (04-18-20 @ 12:00) (74 - 150)  BP: 132/73 (04-18-20 @ 12:00) (132/73 - 132/73)  RR: 30 (04-18-20 @ 12:00) (30 - 35)  SpO2: 97% (04-18-20 @ 12:00) (96% - 99%)    Neurological Exam:   Mental status: Intubated/sedated on nothing.  Not following commands.  Cranial nerves: Pupils equally round and reactive to light.  No versive gaze.  (+) Doll's eyes.  Does not blink to threat.  Motor:  No spontaneous movements.  No abnormal movements or tremors.    Sensation: No withdrawal to pain.      MEDICATIONS  (STANDING):  ascorbic acid 500 milliGRAM(s) Oral every 8 hours  calcium acetate 1334 milliGRAM(s) Oral three times a day with meals  chlorhexidine 0.12% Liquid 15 milliLiter(s) Oral Mucosa every 12 hours  chlorhexidine 4% Liquid 1 Application(s) Topical <User Schedule>  diltiazem    Tablet 60 milliGRAM(s) Oral every 8 hours  guaifenesin/dextromethorphan  Syrup 10 milliLiter(s) Oral every 8 hours  heparin  Injectable 5000 Unit(s) SubCutaneous every 8 hours  insulin regular Infusion 1 Unit(s)/Hr (1 mL/Hr) IV Continuous <Continuous>  lactulose Syrup 20 Gram(s) Oral every 12 hours  levETIRAcetam 250 milliGRAM(s) Oral two times a day  meropenem  IVPB 500 milliGRAM(s) IV Intermittent every 12 hours  metoprolol tartrate 25 milliGRAM(s) Oral every 8 hours  midodrine 10 milliGRAM(s) Oral every 8 hours  norepinephrine Infusion 0.05 MICROgram(s)/kG/Min (8.91 mL/Hr) IV Continuous <Continuous>  pantoprazole   Suspension 40 milliGRAM(s) Oral daily  senna 2 Tablet(s) Oral at bedtime  zinc sulfate 220 milliGRAM(s) Oral daily    Labs:  CBC Full  -  ( 17 Apr 2020 16:00 )  WBC Count : 22.33 K/uL  RBC Count : 3.43 M/uL  Hemoglobin : 10.0 g/dL  Hematocrit : 30.9 %  Platelet Count - Automated : 182 K/uL  Mean Cell Volume : 90.1 fL  Mean Cell Hemoglobin : 29.2 pg  Mean Cell Hemoglobin Concentration : 32.4 g/dL  Auto Neutrophil # : 20.65 K/uL  Auto Lymphocyte # : 0.77 K/uL  Auto Monocyte # : 0.72 K/uL  Auto Eosinophil # : 0.03 K/uL  Auto Basophil # : 0.03 K/uL  Auto Neutrophil % : 92.6 %  Auto Lymphocyte % : 3.4 %  Auto Monocyte % : 3.2 %  Auto Eosinophil % : 0.1 %  Auto Basophil % : 0.1 %    04-18    148<H>  |  104  |  124<HH>  ----------------------------<  141<H>  3.4<L>   |  26  |  4.8<HH>    Ca    8.9      18 Apr 2020 02:38  Phos  6.1     04-18  Mg     2.5     04-18    TPro  5.5<L>  /  Alb  2.7<L>  /  TBili  1.1  /  DBili  x   /  AST  52<H>  /  ALT  85<H>  /  AlkPhos  107  04-18    LIVER FUNCTIONS - ( 18 Apr 2020 02:38 )  Alb: 2.7 g/dL / Pro: 5.5 g/dL / ALK PHOS: 107 U/L / ALT: 85 U/L / AST: 52 U/L / GGT: x           PT/INR - ( 17 Apr 2020 16:00 )   PT: 14.50 sec;   INR: 1.26 ratio         PTT - ( 17 Apr 2020 16:00 )  PTT:28.0 sec      Assessment:  This is a 74y Male w/ h/o acute respiratory failure and suspected CoVid-19.  He has a hx of anticogulation for a-fib and was found to have multifocal infarcts including left frontal, right occipital and left cerebellum of suspected cardioembolic origin.  Due to hemorrhagic transformation he is no longer on anticoagulation.  MRI brain and LP with cytokine panel has been recommended to assess for necrotizing encephalopathy secondary to Covid-19.  No thalamic involvement is seen on head CT however.    Plan:  1.  Repeat head CT to assess for bilateral thalamic involvement and exclude progression in the infarcts and bleeding.  2.  Maintain SBP < 160.  3.  Avoid all anticoagulation and antiplatelet medication.  4.  Continue low dose levetiracetam 250 mg bid.  5.  Please check am trough levetiracetam level.

## 2020-04-18 NOTE — PROGRESS NOTE ADULT - ASSESSMENT
LIZZIE/ ATN/ hyperkalemia/ respiratory failure / covid positive / high BUN  #off  diuretics , patient is 1.3 liter negative   # non oliguric  # ph  improving  feed nepro, on binders, add renagel 3/3/3  # d/c vit C    #  s/p hd yesterday, no hd today   # BUN noted : highly catabolic and prerenal, off  diuretics  # sodium improving   #  on  cefepime to 1 g   # palliative care notes appreciated   # will follow   # overall progress poor

## 2020-04-19 LAB
ALBUMIN SERPL ELPH-MCNC: 2.6 G/DL — LOW (ref 3.5–5.2)
ALBUMIN SERPL ELPH-MCNC: 2.7 G/DL — LOW (ref 3.5–5.2)
ALP SERPL-CCNC: 62 U/L — SIGNIFICANT CHANGE UP (ref 30–115)
ALP SERPL-CCNC: 99 U/L — SIGNIFICANT CHANGE UP (ref 30–115)
ALT FLD-CCNC: 83 U/L — HIGH (ref 0–41)
ALT FLD-CCNC: 98 U/L — HIGH (ref 0–41)
ANION GAP SERPL CALC-SCNC: 15 MMOL/L — HIGH (ref 7–14)
ANION GAP SERPL CALC-SCNC: 16 MMOL/L — HIGH (ref 7–14)
ANION GAP SERPL CALC-SCNC: 18 MMOL/L — HIGH (ref 7–14)
APTT BLD: 26.3 SEC — LOW (ref 27–39.2)
AST SERPL-CCNC: 55 U/L — HIGH (ref 0–41)
AST SERPL-CCNC: 63 U/L — HIGH (ref 0–41)
BASOPHILS # BLD AUTO: 0.04 K/UL — SIGNIFICANT CHANGE UP (ref 0–0.2)
BASOPHILS NFR BLD AUTO: 0.2 % — SIGNIFICANT CHANGE UP (ref 0–1)
BILIRUB SERPL-MCNC: 0.8 MG/DL — SIGNIFICANT CHANGE UP (ref 0.2–1.2)
BILIRUB SERPL-MCNC: 0.9 MG/DL — SIGNIFICANT CHANGE UP (ref 0.2–1.2)
BUN SERPL-MCNC: 134 MG/DL — CRITICAL HIGH (ref 10–20)
BUN SERPL-MCNC: 135 MG/DL — CRITICAL HIGH (ref 10–20)
BUN SERPL-MCNC: 136 MG/DL — CRITICAL HIGH (ref 10–20)
CALCIUM SERPL-MCNC: 8.5 MG/DL — SIGNIFICANT CHANGE UP (ref 8.5–10.1)
CALCIUM SERPL-MCNC: 8.6 MG/DL — SIGNIFICANT CHANGE UP (ref 8.5–10.1)
CALCIUM SERPL-MCNC: 8.9 MG/DL — SIGNIFICANT CHANGE UP (ref 8.5–10.1)
CHLORIDE SERPL-SCNC: 108 MMOL/L — SIGNIFICANT CHANGE UP (ref 98–110)
CHLORIDE SERPL-SCNC: 111 MMOL/L — HIGH (ref 98–110)
CHLORIDE SERPL-SCNC: 113 MMOL/L — HIGH (ref 98–110)
CO2 SERPL-SCNC: 26 MMOL/L — SIGNIFICANT CHANGE UP (ref 17–32)
CO2 SERPL-SCNC: 26 MMOL/L — SIGNIFICANT CHANGE UP (ref 17–32)
CO2 SERPL-SCNC: 27 MMOL/L — SIGNIFICANT CHANGE UP (ref 17–32)
CREAT SERPL-MCNC: 4.3 MG/DL — CRITICAL HIGH (ref 0.7–1.5)
CREAT SERPL-MCNC: 4.3 MG/DL — CRITICAL HIGH (ref 0.7–1.5)
CREAT SERPL-MCNC: 4.9 MG/DL — CRITICAL HIGH (ref 0.7–1.5)
CRP SERPL-MCNC: 9.02 MG/DL — HIGH (ref 0–0.4)
D DIMER BLD IA.RAPID-MCNC: 2336 NG/ML DDU — HIGH (ref 0–230)
EOSINOPHIL # BLD AUTO: 0.05 K/UL — SIGNIFICANT CHANGE UP (ref 0–0.7)
EOSINOPHIL NFR BLD AUTO: 0.3 % — SIGNIFICANT CHANGE UP (ref 0–8)
GAS PNL BLDA: SIGNIFICANT CHANGE UP
GLUCOSE BLDC GLUCOMTR-MCNC: 140 MG/DL — HIGH (ref 70–99)
GLUCOSE BLDC GLUCOMTR-MCNC: 140 MG/DL — HIGH (ref 70–99)
GLUCOSE BLDC GLUCOMTR-MCNC: 144 MG/DL — HIGH (ref 70–99)
GLUCOSE BLDC GLUCOMTR-MCNC: 158 MG/DL — HIGH (ref 70–99)
GLUCOSE BLDC GLUCOMTR-MCNC: 160 MG/DL — HIGH (ref 70–99)
GLUCOSE BLDC GLUCOMTR-MCNC: 176 MG/DL — HIGH (ref 70–99)
GLUCOSE BLDC GLUCOMTR-MCNC: 193 MG/DL — HIGH (ref 70–99)
GLUCOSE BLDC GLUCOMTR-MCNC: 195 MG/DL — HIGH (ref 70–99)
GLUCOSE BLDC GLUCOMTR-MCNC: 211 MG/DL — HIGH (ref 70–99)
GLUCOSE BLDC GLUCOMTR-MCNC: 230 MG/DL — HIGH (ref 70–99)
GLUCOSE SERPL-MCNC: 147 MG/DL — HIGH (ref 70–99)
GLUCOSE SERPL-MCNC: 161 MG/DL — HIGH (ref 70–99)
GLUCOSE SERPL-MCNC: 233 MG/DL — HIGH (ref 70–99)
HCT VFR BLD CALC: 28.8 % — LOW (ref 42–52)
HGB BLD-MCNC: 9.5 G/DL — LOW (ref 14–18)
IMM GRANULOCYTES NFR BLD AUTO: 0.6 % — HIGH (ref 0.1–0.3)
INR BLD: 1.3 RATIO — SIGNIFICANT CHANGE UP (ref 0.65–1.3)
LYMPHOCYTES # BLD AUTO: 0.54 K/UL — LOW (ref 1.2–3.4)
LYMPHOCYTES # BLD AUTO: 2.8 % — LOW (ref 20.5–51.1)
MAGNESIUM SERPL-MCNC: 2.5 MG/DL — HIGH (ref 1.8–2.4)
MAGNESIUM SERPL-MCNC: 2.6 MG/DL — HIGH (ref 1.8–2.4)
MAGNESIUM SERPL-MCNC: 2.6 MG/DL — HIGH (ref 1.8–2.4)
MCHC RBC-ENTMCNC: 30.4 PG — SIGNIFICANT CHANGE UP (ref 27–31)
MCHC RBC-ENTMCNC: 33 G/DL — SIGNIFICANT CHANGE UP (ref 32–37)
MCV RBC AUTO: 92 FL — SIGNIFICANT CHANGE UP (ref 80–94)
MONOCYTES # BLD AUTO: 0.81 K/UL — HIGH (ref 0.1–0.6)
MONOCYTES NFR BLD AUTO: 4.2 % — SIGNIFICANT CHANGE UP (ref 1.7–9.3)
NEUTROPHILS # BLD AUTO: 17.94 K/UL — HIGH (ref 1.4–6.5)
NEUTROPHILS NFR BLD AUTO: 91.9 % — HIGH (ref 42.2–75.2)
NRBC # BLD: 0 /100 WBCS — SIGNIFICANT CHANGE UP (ref 0–0)
PHOSPHATE SERPL-MCNC: 5.9 MG/DL — HIGH (ref 2.1–4.9)
PHOSPHATE SERPL-MCNC: 6 MG/DL — HIGH (ref 2.1–4.9)
PHOSPHATE SERPL-MCNC: 6.4 MG/DL — HIGH (ref 2.1–4.9)
PLATELET # BLD AUTO: 162 K/UL — SIGNIFICANT CHANGE UP (ref 130–400)
POTASSIUM SERPL-MCNC: 3.5 MMOL/L — SIGNIFICANT CHANGE UP (ref 3.5–5)
POTASSIUM SERPL-MCNC: 3.7 MMOL/L — SIGNIFICANT CHANGE UP (ref 3.5–5)
POTASSIUM SERPL-MCNC: 3.8 MMOL/L — SIGNIFICANT CHANGE UP (ref 3.5–5)
POTASSIUM SERPL-SCNC: 3.5 MMOL/L — SIGNIFICANT CHANGE UP (ref 3.5–5)
POTASSIUM SERPL-SCNC: 3.7 MMOL/L — SIGNIFICANT CHANGE UP (ref 3.5–5)
POTASSIUM SERPL-SCNC: 3.8 MMOL/L — SIGNIFICANT CHANGE UP (ref 3.5–5)
PROCALCITONIN SERPL-MCNC: 1.92 NG/ML — HIGH (ref 0.02–0.1)
PROT SERPL-MCNC: 5.6 G/DL — LOW (ref 6–8)
PROT SERPL-MCNC: 5.6 G/DL — LOW (ref 6–8)
PROTHROM AB SERPL-ACNC: 15 SEC — HIGH (ref 9.95–12.87)
RBC # BLD: 3.13 M/UL — LOW (ref 4.7–6.1)
RBC # FLD: 14.1 % — SIGNIFICANT CHANGE UP (ref 11.5–14.5)
SODIUM SERPL-SCNC: 149 MMOL/L — HIGH (ref 135–146)
SODIUM SERPL-SCNC: 155 MMOL/L — HIGH (ref 135–146)
SODIUM SERPL-SCNC: 156 MMOL/L — HIGH (ref 135–146)
VANCOMYCIN FLD-MCNC: 11.4 UG/ML — HIGH (ref 5–10)
WBC # BLD: 19.49 K/UL — HIGH (ref 4.8–10.8)
WBC # FLD AUTO: 19.49 K/UL — HIGH (ref 4.8–10.8)

## 2020-04-19 PROCEDURE — 99232 SBSQ HOSP IP/OBS MODERATE 35: CPT

## 2020-04-19 PROCEDURE — 71045 X-RAY EXAM CHEST 1 VIEW: CPT | Mod: 26

## 2020-04-19 PROCEDURE — 93010 ELECTROCARDIOGRAM REPORT: CPT

## 2020-04-19 PROCEDURE — 99231 SBSQ HOSP IP/OBS SF/LOW 25: CPT

## 2020-04-19 PROCEDURE — 99291 CRITICAL CARE FIRST HOUR: CPT | Mod: CS

## 2020-04-19 RX ORDER — GLUCAGON INJECTION, SOLUTION 0.5 MG/.1ML
1 INJECTION, SOLUTION SUBCUTANEOUS ONCE
Refills: 0 | Status: DISCONTINUED | OUTPATIENT
Start: 2020-04-19 | End: 2020-04-19

## 2020-04-19 RX ORDER — DEXTROSE 50 % IN WATER 50 %
15 SYRINGE (ML) INTRAVENOUS ONCE
Refills: 0 | Status: DISCONTINUED | OUTPATIENT
Start: 2020-04-19 | End: 2020-04-19

## 2020-04-19 RX ORDER — SEVELAMER CARBONATE 2400 MG/1
800 POWDER, FOR SUSPENSION ORAL
Refills: 0 | Status: COMPLETED | OUTPATIENT
Start: 2020-04-19 | End: 2020-04-22

## 2020-04-19 RX ORDER — SODIUM CHLORIDE 9 MG/ML
1000 INJECTION, SOLUTION INTRAVENOUS
Refills: 0 | Status: DISCONTINUED | OUTPATIENT
Start: 2020-04-19 | End: 2020-04-19

## 2020-04-19 RX ORDER — ALTEPLASE 100 MG
2 KIT INTRAVENOUS ONCE
Refills: 0 | Status: COMPLETED | OUTPATIENT
Start: 2020-04-19 | End: 2020-04-19

## 2020-04-19 RX ORDER — INSULIN HUMAN 100 [IU]/ML
INJECTION, SOLUTION SUBCUTANEOUS EVERY 4 HOURS
Refills: 0 | Status: DISCONTINUED | OUTPATIENT
Start: 2020-04-19 | End: 2020-04-19

## 2020-04-19 RX ORDER — INSULIN HUMAN 100 [IU]/ML
5 INJECTION, SOLUTION SUBCUTANEOUS ONCE
Refills: 0 | Status: COMPLETED | OUTPATIENT
Start: 2020-04-19 | End: 2020-04-19

## 2020-04-19 RX ORDER — DEXTROSE 50 % IN WATER 50 %
12.5 SYRINGE (ML) INTRAVENOUS ONCE
Refills: 0 | Status: DISCONTINUED | OUTPATIENT
Start: 2020-04-19 | End: 2020-04-19

## 2020-04-19 RX ORDER — DEXTROSE 50 % IN WATER 50 %
25 SYRINGE (ML) INTRAVENOUS ONCE
Refills: 0 | Status: DISCONTINUED | OUTPATIENT
Start: 2020-04-19 | End: 2020-04-19

## 2020-04-19 RX ORDER — DEXTROSE 50 % IN WATER 50 %
25 SYRINGE (ML) INTRAVENOUS ONCE
Refills: 0 | Status: DISCONTINUED | OUTPATIENT
Start: 2020-04-19 | End: 2020-05-05

## 2020-04-19 RX ORDER — INSULIN HUMAN 100 [IU]/ML
7 INJECTION, SOLUTION SUBCUTANEOUS ONCE
Refills: 0 | Status: COMPLETED | OUTPATIENT
Start: 2020-04-19 | End: 2020-04-19

## 2020-04-19 RX ADMIN — Medication 60 MILLIGRAM(S): at 21:03

## 2020-04-19 RX ADMIN — Medication 250 MILLIGRAM(S): at 18:41

## 2020-04-19 RX ADMIN — Medication 60 MILLIGRAM(S): at 13:04

## 2020-04-19 RX ADMIN — MIDODRINE HYDROCHLORIDE 10 MILLIGRAM(S): 2.5 TABLET ORAL at 13:03

## 2020-04-19 RX ADMIN — SEVELAMER CARBONATE 800 MILLIGRAM(S): 2400 POWDER, FOR SUSPENSION ORAL at 18:06

## 2020-04-19 RX ADMIN — Medication 1 APPLICATION(S): at 06:33

## 2020-04-19 RX ADMIN — Medication 25 MILLIGRAM(S): at 06:29

## 2020-04-19 RX ADMIN — CHLORHEXIDINE GLUCONATE 15 MILLILITER(S): 213 SOLUTION TOPICAL at 06:33

## 2020-04-19 RX ADMIN — ALTEPLASE 2 MILLIGRAM(S): KIT at 17:55

## 2020-04-19 RX ADMIN — Medication 60 MILLIGRAM(S): at 06:32

## 2020-04-19 RX ADMIN — INSULIN HUMAN 7 UNIT(S): 100 INJECTION, SOLUTION SUBCUTANEOUS at 13:30

## 2020-04-19 RX ADMIN — Medication 25 MILLIGRAM(S): at 13:03

## 2020-04-19 RX ADMIN — CHLORHEXIDINE GLUCONATE 1 APPLICATION(S): 213 SOLUTION TOPICAL at 06:34

## 2020-04-19 RX ADMIN — Medication 500 MILLIGRAM(S): at 06:32

## 2020-04-19 RX ADMIN — MIDODRINE HYDROCHLORIDE 10 MILLIGRAM(S): 2.5 TABLET ORAL at 21:01

## 2020-04-19 RX ADMIN — LACTULOSE 20 GRAM(S): 10 SOLUTION ORAL at 06:31

## 2020-04-19 RX ADMIN — HEPARIN SODIUM 5000 UNIT(S): 5000 INJECTION INTRAVENOUS; SUBCUTANEOUS at 13:25

## 2020-04-19 RX ADMIN — SENNA PLUS 2 TABLET(S): 8.6 TABLET ORAL at 21:02

## 2020-04-19 RX ADMIN — PANTOPRAZOLE SODIUM 40 MILLIGRAM(S): 20 TABLET, DELAYED RELEASE ORAL at 13:06

## 2020-04-19 RX ADMIN — CHLORHEXIDINE GLUCONATE 15 MILLILITER(S): 213 SOLUTION TOPICAL at 18:04

## 2020-04-19 RX ADMIN — MEROPENEM 100 MILLIGRAM(S): 1 INJECTION INTRAVENOUS at 06:30

## 2020-04-19 RX ADMIN — Medication 500 MILLIGRAM(S): at 13:03

## 2020-04-19 RX ADMIN — Medication 10 MILLILITER(S): at 06:31

## 2020-04-19 RX ADMIN — Medication 1334 MILLIGRAM(S): at 18:04

## 2020-04-19 RX ADMIN — INSULIN HUMAN 5 UNIT(S): 100 INJECTION, SOLUTION SUBCUTANEOUS at 11:53

## 2020-04-19 RX ADMIN — Medication 1334 MILLIGRAM(S): at 12:38

## 2020-04-19 RX ADMIN — LEVETIRACETAM 250 MILLIGRAM(S): 250 TABLET, FILM COATED ORAL at 06:30

## 2020-04-19 RX ADMIN — HEPARIN SODIUM 5000 UNIT(S): 5000 INJECTION INTRAVENOUS; SUBCUTANEOUS at 06:31

## 2020-04-19 RX ADMIN — Medication 500 MILLIGRAM(S): at 21:02

## 2020-04-19 RX ADMIN — MEROPENEM 100 MILLIGRAM(S): 1 INJECTION INTRAVENOUS at 18:40

## 2020-04-19 RX ADMIN — Medication 25 MILLIGRAM(S): at 21:02

## 2020-04-19 RX ADMIN — LEVETIRACETAM 250 MILLIGRAM(S): 250 TABLET, FILM COATED ORAL at 18:06

## 2020-04-19 RX ADMIN — ZINC SULFATE TAB 220 MG (50 MG ZINC EQUIVALENT) 220 MILLIGRAM(S): 220 (50 ZN) TAB at 12:51

## 2020-04-19 RX ADMIN — LACTULOSE 20 GRAM(S): 10 SOLUTION ORAL at 18:06

## 2020-04-19 RX ADMIN — HEPARIN SODIUM 5000 UNIT(S): 5000 INJECTION INTRAVENOUS; SUBCUTANEOUS at 21:01

## 2020-04-19 RX ADMIN — MIDODRINE HYDROCHLORIDE 10 MILLIGRAM(S): 2.5 TABLET ORAL at 06:32

## 2020-04-19 RX ADMIN — Medication 1 APPLICATION(S): at 18:02

## 2020-04-19 NOTE — CONSULT NOTE ADULT - SUBJECTIVE AND OBJECTIVE BOX
KONSTANTIN WYATT 74yMale  HPI:  75 yo M w/ hx of Afib on Eliquis, CAD s/p PCI, DM, BPH, presents with 1 week of fever, cough, and progressive SOB. Admits to watery diarrhea for 3 days. Denies chest pain.   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   Pt Covid (+) now COVID-19 PCR: NotDetec (16 Apr 2020 19:05)    PAST MEDICAL & SURGICAL HISTORY:  Afib  DM (diabetes mellitus)  BPH (benign prostatic hyperplasia)  CAD (coronary artery disease)    Pt s/p tracheostomy   Burn consultation requested for lip ulceration       PHYSICAL EXAM:  S/P tracheostomy         T(C): 36, Max: 37.3 (20:00)  HR: 114 (78 - 165)  BP: --  RR: 30 (30 - 30)  SpO2: 100% (97% - 100%)      LABS/STUDIES:                        10.1   21.57 )-----------( 183      ( 16 Apr 2020 00:30 )             30.5     EXAM:     Lower lip ~ 3  x 1.5cm ulcer involving dry and wet mucosa, vermillion border intact   crusted drainage and slight oozing with cleaning

## 2020-04-19 NOTE — CONSULT NOTE ADULT - ASSESSMENT
Lower lip ulceration   No surgical intervention   Rec - keep site moist - Bacitracin ointment or vit A & D ointment

## 2020-04-19 NOTE — PROGRESS NOTE ADULT - SUBJECTIVE AND OBJECTIVE BOX
Nephrology progress note    Patient was seen and examined, events over the last 24 h noted .  HD friday  Cr stable today   1.8 L UOP yesterday    Allergies:  Bactrim (Unknown)    Hospital Medications:   MEDICATIONS  (STANDING):  ascorbic acid 500 milliGRAM(s) Oral every 8 hours  BACItracin   Ointment 1 Application(s) Topical two times a day  calcium acetate 1334 milliGRAM(s) Oral three times a day with meals  chlorhexidine 0.12% Liquid 15 milliLiter(s) Oral Mucosa every 12 hours  chlorhexidine 0.12% Liquid 15 milliLiter(s) Oral Mucosa two times a day  chlorhexidine 4% Liquid 1 Application(s) Topical <User Schedule>  diltiazem    Tablet 60 milliGRAM(s) Oral every 8 hours  heparin  Injectable 5000 Unit(s) SubCutaneous every 8 hours  insulin regular Infusion 1 Unit(s)/Hr (1 mL/Hr) IV Continuous <Continuous>  lactulose Syrup 20 Gram(s) Oral every 12 hours  levETIRAcetam 250 milliGRAM(s) Oral two times a day  meropenem  IVPB 500 milliGRAM(s) IV Intermittent every 12 hours  metoprolol tartrate 25 milliGRAM(s) Oral every 8 hours  midodrine 10 milliGRAM(s) Oral every 8 hours  pantoprazole   Suspension 40 milliGRAM(s) Oral daily  senna 2 Tablet(s) Oral at bedtime  vancomycin  IVPB      vancomycin  IVPB 1000 milliGRAM(s) IV Intermittent every 24 hours  zinc sulfate 220 milliGRAM(s) Oral daily        VITALS:  T(F): 98.3 (04-19-20 @ 08:00), Max: 99.4 (04-18-20 @ 12:00)  HR: 99 (04-19-20 @ 08:00)  BP: 123/62 (04-18-20 @ 16:00)  RR: 30 (04-19-20 @ 08:00)  SpO2: 99% (04-19-20 @ 08:00)  Wt(kg): --    04-17 @ 07:01  -  04-18 @ 07:00  --------------------------------------------------------  IN: 1601.5 mL / OUT: 1525 mL / NET: 76.5 mL    04-18 @ 07:01  -  04-19 @ 07:00  --------------------------------------------------------  IN: 1335.1 mL / OUT: 1791 mL / NET: -455.9 mL    04-19 @ 07:01  -  04-19 @ 11:02  --------------------------------------------------------  IN: 151 mL / OUT: 525 mL / NET: -374 mL          PHYSICAL EXAM:  	Gen: intubated/ventilated   	Vascular access:udall     LABS:  04-19    149<H>  |  108  |  134<HH>  ----------------------------<  233<H>  3.8   |  26  |  4.9<HH>    Ca    8.5      19 Apr 2020 01:00  Phos  6.4     04-19  Mg     2.5     04-19    TPro  5.6<L>  /  Alb  2.7<L>  /  TBili  0.9  /  DBili      /  AST  55<H>  /  ALT  83<H>  /  AlkPhos  99  04-19                          9.5    19.49 )-----------( 162      ( 19 Apr 2020 01:00 )             28.8       Urine Studies:      RADIOLOGY & ADDITIONAL STUDIES:

## 2020-04-19 NOTE — CONSULT NOTE ADULT - ATTENDING COMMENTS
intubated and sedated   continue vent support   BP support   trickle feeds   continue antibiotics   continue ICU Care
I was Physically Present for the key portions of the evaluation   I agree with the above History  , Physical examination Assessment and plan   I have Reviewed , Modified or appended where appropriate.  Please check A and P as above   1- LZIZIE/ ATN/ hyperkalemia/ respiratory failure / covid positive / high BUN  Non oliguric now  can use Edecrin 50 mg IV once tonight   follow UO and BMP  renal bladder sono  may need RRT in AM  MV as per SICU team  will follow  please call with any question
r
Seen and examined with the pulmonary fellow at the bed side.  Impression and plan as outlined above.

## 2020-04-19 NOTE — PROGRESS NOTE ADULT - ASSESSMENT
72y male, acute respiratory failure 2/2 COVID-19    NEURO    Acute infarcts- keppra 250mg BID, EEG, when stable perform MRI, EEG- no epileptiform activit. Neuro recommending repeat CT head: no needed PRNs, and per  Gave no head CT today, will consider tomorrow    No agitation, RASS -4    RESP  Acute Hypoxemic Respiratory Failure; Intubated, /30/40/5 plat19, peak 22  ABG 7.43/41/147, bicarb 27, 99% sat lact 1.0   COVID-19/Viral Pneumonia  -repeat swab 4/13 negative  4/15 DTA: MRSA +, on vanco and nikky   s/p Trach 4/18   -DC Guaifenesin/ DM       CARD/VASC  - acute hypotension- now off of levophed, on midodrine    Hx of Afib, AFIB RVR on this admission - holding Eliquis 2/2 stroke, Cardizem PO 60mg q8, Metoprolol 25mg q8;   CAD - continue Atorvastatin   Hx of HTN- holding Lisinopril   - Sinus with PACs on EKG this AM    GI/NUTR  - diet: TF @ 50 via NGT, Vit C, ZInc  - PPI  - Senna, Lactulose bowel regimen- Last BM 4/18   Daily weight 4/18 88.7 kg-> 89kg (Admission 99kg)     /Renal  LIZZIE + uremia - HD 4/17 kept even, no HD 4/18; on phoslo. Follow up nephrology regarding timing of next HD session. [place] in Right femoral   Evans - UO 60-175cc/hr; net negative 450ml  off Ethacrynic, on phoslo  Hx of BPH  Monitor electrolytes, Na 149, K 3.8, Mg 2.5, Phos 6.4      HEME/ONC  Hgb stable- 9.5 (10.4), continue to monitor   DVT ppx: holding eliquis, d/w Neurology regarding ability to restart 2/2 infarct.   D dimer: 2336 (2370)     ID    Afebrile, Tmax 100.4     COVID 19- intubated, on precautions, WBC 22.3-> 19.5, continue to trend.    procalcitonin 1.38-> 1.58-> 1.92, rising. Continue to monitor   DTA 4/15: + MRSA pneumonia: on meropenem and vancomycin, check vanco level tomorrow and random level today to check not in toxicity. Check level 4/21         ENDO  DM2 - holding home Metformin  Hyperglycemia - on insulin gtt, FS q1h;  (122-230)    MSK/DERM  - no active issues    DVT PPx: Eliquis (currently held 2/2 cerebral infarcts) on hep sub q  GI PPx: PPI    DISPO: ICU 72y male, acute respiratory failure 2/2 COVID-19    NEURO    Acute infarcts- keppra 250mg BID, EEG, when stable perform MRI, EEG- no epileptiform activit. Neuro recommending repeat CT head: no needed PRNs, and per  Gave no head CT today, will consider tomorrow    No agitation, RASS -4    RESP  Acute Hypoxemic Respiratory Failure; Intubated, /30/40/5 plat19, peak 22- change to PSV and check tolerance.   ABG 7.43/41/147, bicarb 27, 99% sat lact 1.0   COVID-19/Viral Pneumonia  -repeat swab 4/13 negative  4/15 DTA: MRSA +, on vanco and nikky   s/p Trach 4/18   -DC Guaifenesin/ DM       CARD/VASC  - acute hypotension- now off of levophed, on midodrine    Hx of Afib, AFIB RVR on this admission - holding Eliquis 2/2 stroke, Cardizem PO 60mg q8, Metoprolol 25mg q8   CAD - continue Atorvastatin   Hx of HTN- holding Lisinopril   - Sinus with PACs on EKG this AM    GI/NUTR  - diet: TF @ 50 via NGT, Vit C, Zinc  - PPI  - Senna, Lactulose bowel regimen- Last BM 4/18   Daily weight 4/18 88.7 kg-> 89kg (Admission 99kg)     /Renal  LIZZIE + uremia - HD 4/17 kept even, no HD 4/18; on phoslo. Follow up nephrology regarding timing of next HD session. [place] in Right femoral   Evans - UO 60-175cc/hr; net negative 450ml  off Ethacrynic, on phoslo  Hx of BPH  Monitor electrolytes, Na 149, K 3.8, Mg 2.5, Phos 6.4      HEME/ONC  Hgb stable- 9.5 (10.4), continue to monitor   DVT ppx: holding eliquis, d/w Neurology regarding ability to restart 2/2 infarct.   D dimer: 2336 (2370)     ID    Afebrile, Tmax 100.4     COVID 19- intubated, on precautions, WBC 22.3-> 19.5, continue to trend.    procalcitonin 1.38-> 1.58-> 1.92, rising. Continue to monitor   DTA 4/15: + MRSA pneumonia: on meropenem and vancomycin, check vanco level tomorrow and random level today to check not in toxicity. Check level 4/21     ENDO  DM2 - holding home Metformin  Hyperglycemia - on insulin gtt, FS q1h;  (122-230)    MSK/DERM  - no active issues    DVT PPx: Eliquis (currently held 2/2 cerebral infarcts) on SQH   GI PPx: PPI    DISPO: ICU

## 2020-04-19 NOTE — PROGRESS NOTE ADULT - ASSESSMENT
LIZZIE/ ATN/ hyperkalemia/ respiratory failure / covid positive / high BUN  #off  diuretics , patient is negative non-oliguric   # ph  improving  feed nepro, on binders, add renagel 3/3/3  # d/c vit C  given sever LIZZIE  #  s/p hd friday no hd today Cr stable non-oliguric may be able to hold off yong lreeval tomorrow keep u dall  # BUN noted : highly catabolic and prerenal, off  diuretics  # sodium improving   #  on  cefepime to 1 g   # palliative care notes appreciated   # will follow   # overall progress poor

## 2020-04-19 NOTE — CHART NOTE - NSCHARTNOTEFT_GEN_A_CORE
Spoke with Opal, wife and all concerns and questions answered.   Wife states that he received metoprolol years ago and was "wiped" out.    Marie Ralph MD

## 2020-04-19 NOTE — PROGRESS NOTE ADULT - SUBJECTIVE AND OBJECTIVE BOX
Neurology Progress Note    Interval History:    Pt with Covid-19 and respiratory failure is seen in f/u for multifocal stroke.    T(F): 98.7 (04-19-20 @ 16:00), Max: 98.7 (04-19-20 @ 16:00)  HR: 90 (04-19-20 @ 20:00) (73 - 115)  BP: --  RR: 30 (04-19-20 @ 20:00) (30 - 31)  SpO2: 100% (04-19-20 @ 20:00) (99% - 100%)    Neurological Exam:   Mental status: Intubated/sedated on nothing.  Not following commands.  Eyes open and pt blinks.  Cranial nerves: Pupils equally round and reactive to light.  Pt tended to look to the right.  (+) Doll's eyes.  Corneals intact.    Motor:  No spontaneous movements.  No abnormal movements or tremors.    Sensation: No withdrawal to pain.      MEDICATIONS  (STANDING):  ascorbic acid 500 milliGRAM(s) Oral every 8 hours  BACItracin   Ointment 1 Application(s) Topical two times a day  calcium acetate 1334 milliGRAM(s) Oral three times a day with meals  chlorhexidine 0.12% Liquid 15 milliLiter(s) Oral Mucosa every 12 hours  chlorhexidine 4% Liquid 1 Application(s) Topical <User Schedule>  dextrose 50% Injectable 25 Gram(s) IV Push once  diltiazem    Tablet 60 milliGRAM(s) Oral every 8 hours  heparin  Injectable 5000 Unit(s) SubCutaneous every 8 hours  insulin regular Infusion 1 Unit(s)/Hr (1 mL/Hr) IV Continuous <Continuous>  lactulose Syrup 20 Gram(s) Oral every 12 hours  levETIRAcetam 250 milliGRAM(s) Oral two times a day  meropenem  IVPB 500 milliGRAM(s) IV Intermittent every 12 hours  metoprolol tartrate 25 milliGRAM(s) Oral every 8 hours  midodrine 10 milliGRAM(s) Oral every 8 hours  pantoprazole   Suspension 40 milliGRAM(s) Oral daily  senna 2 Tablet(s) Oral at bedtime  sevelamer carbonate 800 milliGRAM(s) Oral three times a day with meals  vancomycin  IVPB      vancomycin  IVPB 1000 milliGRAM(s) IV Intermittent every 24 hours  zinc sulfate 220 milliGRAM(s) Oral daily      Labs:  CBC Full  -  ( 19 Apr 2020 01:00 )  WBC Count : 19.49 K/uL  RBC Count : 3.13 M/uL  Hemoglobin : 9.5 g/dL  Hematocrit : 28.8 %  Platelet Count - Automated : 162 K/uL  Mean Cell Volume : 92.0 fL  Mean Cell Hemoglobin : 30.4 pg  Mean Cell Hemoglobin Concentration : 33.0 g/dL  Auto Neutrophil # : 17.94 K/uL  Auto Lymphocyte # : 0.54 K/uL  Auto Monocyte # : 0.81 K/uL  Auto Eosinophil # : 0.05 K/uL  Auto Basophil # : 0.04 K/uL  Auto Neutrophil % : 91.9 %  Auto Lymphocyte % : 2.8 %  Auto Monocyte % : 4.2 %  Auto Eosinophil % : 0.3 %  Auto Basophil % : 0.2 %    04-19    155<H>  |  113<H>  |  136<HH>  ----------------------------<  161<H>  3.7   |  26  |  4.3<HH>    Ca    8.9      19 Apr 2020 18:09  Phos  6.0     04-19  Mg     2.6     04-19    TPro  5.6<L>  /  Alb  2.6<L>  /  TBili  0.8  /  DBili  x   /  AST  63<H>  /  ALT  98<H>  /  AlkPhos  62  04-19    LIVER FUNCTIONS - ( 19 Apr 2020 17:04 )  Alb: 2.6 g/dL / Pro: 5.6 g/dL / ALK PHOS: 62 U/L / ALT: 98 U/L / AST: 63 U/L / GGT: x           PT/INR - ( 19 Apr 2020 01:00 )   PT: 15.00 sec;   INR: 1.30 ratio         PTT - ( 19 Apr 2020 01:00 )  PTT:26.3 sec      Assessment:  This is a 74y Male Covid+ with multifocal acute infarcts on head CT with hemorrhagic components.     Plan:  1.  Keep patient off of anticoagulation and antiplatelet therapy.  2.  After repeat head CT tomorrow reassess risks/benefits of DVT prophylaxis dosing of heparin vs low dose antiplatelet therapy.  3.  Serum cytokine assay (IL-1eta, IL-2, IL-6, IL-10, IL-17, TNF-alpha, IFN-gamma, IP-10). Neurology Progress Note    Interval History:    Pt with Covid-19 and respiratory failure is seen in f/u for multifocal stroke.    T(F): 98.7 (04-19-20 @ 16:00), Max: 98.7 (04-19-20 @ 16:00)  HR: 90 (04-19-20 @ 20:00) (73 - 115)  BP: --  RR: 30 (04-19-20 @ 20:00) (30 - 31)  SpO2: 100% (04-19-20 @ 20:00) (99% - 100%)    Neurological Exam:   Mental status: Intubated/sedated on nothing.  Not following commands.  Eyes open and pt blinks.  Cranial nerves: Pupils equally round and reactive to light.  Pt tended to look to the right.  (+) Doll's eyes.  Corneals intact.    Motor:  No spontaneous movements.  No abnormal movements or tremors.    Sensation: No withdrawal to pain.      MEDICATIONS  (STANDING):  ascorbic acid 500 milliGRAM(s) Oral every 8 hours  BACItracin   Ointment 1 Application(s) Topical two times a day  calcium acetate 1334 milliGRAM(s) Oral three times a day with meals  chlorhexidine 0.12% Liquid 15 milliLiter(s) Oral Mucosa every 12 hours  chlorhexidine 4% Liquid 1 Application(s) Topical <User Schedule>  dextrose 50% Injectable 25 Gram(s) IV Push once  diltiazem    Tablet 60 milliGRAM(s) Oral every 8 hours  heparin  Injectable 5000 Unit(s) SubCutaneous every 8 hours  insulin regular Infusion 1 Unit(s)/Hr (1 mL/Hr) IV Continuous <Continuous>  lactulose Syrup 20 Gram(s) Oral every 12 hours  levETIRAcetam 250 milliGRAM(s) Oral two times a day  meropenem  IVPB 500 milliGRAM(s) IV Intermittent every 12 hours  metoprolol tartrate 25 milliGRAM(s) Oral every 8 hours  midodrine 10 milliGRAM(s) Oral every 8 hours  pantoprazole   Suspension 40 milliGRAM(s) Oral daily  senna 2 Tablet(s) Oral at bedtime  sevelamer carbonate 800 milliGRAM(s) Oral three times a day with meals  vancomycin  IVPB      vancomycin  IVPB 1000 milliGRAM(s) IV Intermittent every 24 hours  zinc sulfate 220 milliGRAM(s) Oral daily      Labs:  CBC Full  -  ( 19 Apr 2020 01:00 )  WBC Count : 19.49 K/uL  RBC Count : 3.13 M/uL  Hemoglobin : 9.5 g/dL  Hematocrit : 28.8 %  Platelet Count - Automated : 162 K/uL  Mean Cell Volume : 92.0 fL  Mean Cell Hemoglobin : 30.4 pg  Mean Cell Hemoglobin Concentration : 33.0 g/dL  Auto Neutrophil # : 17.94 K/uL  Auto Lymphocyte # : 0.54 K/uL  Auto Monocyte # : 0.81 K/uL  Auto Eosinophil # : 0.05 K/uL  Auto Basophil # : 0.04 K/uL  Auto Neutrophil % : 91.9 %  Auto Lymphocyte % : 2.8 %  Auto Monocyte % : 4.2 %  Auto Eosinophil % : 0.3 %  Auto Basophil % : 0.2 %    04-19    155<H>  |  113<H>  |  136<HH>  ----------------------------<  161<H>  3.7   |  26  |  4.3<HH>    Ca    8.9      19 Apr 2020 18:09  Phos  6.0     04-19  Mg     2.6     04-19    TPro  5.6<L>  /  Alb  2.6<L>  /  TBili  0.8  /  DBili  x   /  AST  63<H>  /  ALT  98<H>  /  AlkPhos  62  04-19    LIVER FUNCTIONS - ( 19 Apr 2020 17:04 )  Alb: 2.6 g/dL / Pro: 5.6 g/dL / ALK PHOS: 62 U/L / ALT: 98 U/L / AST: 63 U/L / GGT: x           PT/INR - ( 19 Apr 2020 01:00 )   PT: 15.00 sec;   INR: 1.30 ratio         PTT - ( 19 Apr 2020 01:00 )  PTT:26.3 sec      Assessment:  This is a 74y Male Covid+ with multifocal acute infarcts on head CT with hemorrhagic components.     Plan:  1.  Keep patient off of anticoagulation and antiplatelet therapy.  2.  After repeat head CT tomorrow reassess risks/benefits of DVT prophylaxis dosing of heparin vs low dose antiplatelet therapy.  3.  Serum cytokine assay (IL-1eta, IL-2, IL-6, IL-10, IL-17, TNF-alpha, IFN-gamma, IP-10).  4.  Please order repeat EEG for tomorrow (4/20).  5.  MRI brain, MRA head/neck w/o contrast if not otherwise contraindicated.

## 2020-04-19 NOTE — PROGRESS NOTE ADULT - SUBJECTIVE AND OBJECTIVE BOX
KONSTANTIN WYATT  [id number]  74y Male    Indication for ICU admission: Acute Hypoxemic Respiratory Failure, clinical COVID19 (false negatvie)    Admit Date:20  ICU Date: 20  OR Date:    Bactrim (Unknown)    PAST MEDICAL & SURGICAL HISTORY:  Afib  DM (diabetes mellitus)  BPH (benign prostatic hyperplasia)  CAD (coronary artery disease)    Home Medications:  Cartia  mg/24 hours oral capsule, extended release: 1 cap(s) orally once a day (30 Mar 2020 18:04)  Eliquis 5 mg oral tablet: 1 tab(s) orally 2 times a day (30 Mar 2020 18:04)  Lipitor 40 mg oral tablet: 1 tab(s) orally once a day (30 Mar 2020 18:04)  lisinopril 40 mg oral tablet: 1 tab(s) orally once a day (30 Mar 2020 18:04)  metFORMIN 750 mg oral tablet, extended release: 1 tab(s) orally once a day (30 Mar 2020 18:04)    24HRS EVENT:   No acute events ON.     NEURO    sedation---off all drips, only getting prn prop and versed pushes, No pushes needed ON    HCT -multiple acute hemorraghic infarcts, neuro consulted contacted Dr. Portillo, recs     Keppra 250mg BID / poor renal function, MRI, MRA    RASS -4, pupils reactive, moving extremities    Pain control: Acetaminophen 650mg PO q6 PRN  neurology rec EEG- no epileptiform activity    RESP  trached   Acute Hypoxemic Respiratory Failure;  COVID-19/Viral Pneumonia--DTA  negative for COVID  Culture - Sputum . (04.15.20 @ 16:30)  Moderate Gram positive cocci in pairs seen per oil power field    Organism: Methicillin resistant Staphylococcus aureus -- started on Vanc  - Intubated/ trached: /30/50/5  - AM AB.43/41/147/27/99% lac 1.0  -CXR: unchanged diffuse bilateral opacities  -On Guanifenisen    CARD/VASC   Acute tachycardia, hx of Afib---- on PO Cardizem 60mg q8h, and PO Lopressor 25 mg q 8  Hypotension -  off levo, midodrine 10q8h for hypotension    Hx of Afib - Diltiazem. BB, currently holding Apixaban 2/2 HCT findings    Hx of CAD - Atorvastatin 40mg PO qhs    Hx of HTN - hold Lisinopril   QTc: ________    GI/NUTR  NGT in place   Had BM ON    Diet: TF (glucerna) @ 50mL/hr    PPI  Senna     Daily weight (4/10) 98.7kg,                          ( ) 95.4kg                         ( ) 93.3 kg                         ( ) 92.3 kg                         () 91.9kg  	       () 88.7kg                         ()     /Renal  LIZZIE;  BUN / Cr improved after HD, kept even   BUN/Cr 134/4  Lytes-Na 149 // K 3.8 // Phos 6.4 //  Mag 2.5  IVL  Evans, non-oliguric renal failure UOP: 60-175cc/hr  HD : kept even   Hx of BPH      HEME/ONC  Holding ELIQUIS  Hgb lateral at 10.4    ID    afebrile    WBC 19.25 > 22     Procal 1.92    COVID-19 +  - changed cefepime to Ksenia [id number]  MRSA in sputum   started vanco 1gm q24h, will need level   - completed course of Hydroxychloroquine & Azithromycin  - Ascorbic acid, Zinc  - D-dimer downtrending 2,640 > 2370 > 2336    ENDO  DM2; uncontrolled, holding home Metformin  Insulin gtt restarted  POCT Blood Glucose    MSK/DERM  -burn consult for lip lac- bacitracin and peridex    DVT PPx  - on hepsub q  - off Apixaban     GI PPx  -  Pantoprazole 40mg PO qac      ***Tubes/Lines/Drains  ***  Peripheral IV  Central Venous Line  Right IJ TLC  	Date 3/31/20  R fem [place]   Arterial Line  Right radial A-line		                Date: 3/31/20  Urnary Catheter		Indication: Strict I&O    Date Placed: 3/31/20  ETT  OGT    REVIEW OF SYSTEMS    [ ] A ten-point review of systems was otherwise negative except as noted.  [x ] Due to altered mental status/intubation, subjective information were not able to be obtained from the patient. History was obtained, to the extent possible, from review of the chart and collateral sources of information.    Daily     Daily     Diet, NPO with Tube Feed:   Tube Feeding Modality: Orogastric  Glucerna 1.2 Choco  Total Volume for 24 Hours (mL): 1200  Continuous  Starting Tube Feed Rate mL per Hour: 30  Increase Tube Feed Rate by (mL): 10     Every hour  Until Goal Tube Feed Rate (mL per Hour): 50  Tube Feed Duration (in Hours): 24  Tube Feed Start Time: 13:00 (20 @ 17:56)      CURRENT MEDS:  Neurologic Medications  acetaminophen  Suppository .. 650 milliGRAM(s) Rectal every 6 hours PRN Temp greater or equal to 38.5C (101.3F)  levETIRAcetam 250 milliGRAM(s) Oral two times a day    Respiratory Medications  guaifenesin/dextromethorphan  Syrup 10 milliLiter(s) Oral every 8 hours    Cardiovascular Medications  diltiazem    Tablet 60 milliGRAM(s) Oral every 8 hours  metoprolol tartrate 25 milliGRAM(s) Oral every 8 hours  midodrine 10 milliGRAM(s) Oral every 8 hours    Gastrointestinal Medications  ascorbic acid 500 milliGRAM(s) Oral every 8 hours  calcium acetate 1334 milliGRAM(s) Oral three times a day with meals  lactulose Syrup 20 Gram(s) Oral every 12 hours  pantoprazole   Suspension 40 milliGRAM(s) Oral daily  senna 2 Tablet(s) Oral at bedtime  zinc sulfate 220 milliGRAM(s) Oral daily    Genitourinary Medications    Hematologic/Oncologic Medications  heparin  Injectable 5000 Unit(s) SubCutaneous every 8 hours    Antimicrobial/Immunologic Medications  meropenem  IVPB 500 milliGRAM(s) IV Intermittent every 12 hours  vancomycin  IVPB      vancomycin  IVPB 1000 milliGRAM(s) IV Intermittent every 24 hours    Endocrine/Metabolic Medications  insulin regular Infusion 1 Unit(s)/Hr IV Continuous <Continuous>    Topical/Other Medications  BACItracin   Ointment 1 Application(s) Topical two times a day  chlorhexidine 0.12% Liquid 15 milliLiter(s) Oral Mucosa every 12 hours  chlorhexidine 0.12% Liquid 15 milliLiter(s) Oral Mucosa two times a day  chlorhexidine 4% Liquid 1 Application(s) Topical <User Schedule>      ICU Vital Signs Last 24 Hrs  T(C): 36.8 (2020 20:00), Max: 38 (2020 08:00)  T(F): 98.2 (2020 20:00), Max: 100.4 (2020 08:00)  HR: 89 (2020 04:00) (78 - 120)  BP: 123/62 (2020 16:00) (123/62 - 138/63)  BP(mean): --  ABP: 156/64 (2020 04:00) (128/54 - 156/64)  ABP(mean): 90 (2020 04:00) (75 - 92)  RR: 30 (2020 16:00) (30 - 30)  SpO2: 99% (2020 07:43) (96% - 100%)      Adult Advanced Hemodynamics Last 24 Hrs  CVP(mm Hg): --  CVP(cm H2O): --  CO: --  CI: --  PA: --  PA(mean): --  PCWP: --  SVR: --  SVRI: --  PVR: --  PVRI: --    Mode: AC/ CMV (Assist Control/ Continuous Mandatory Ventilation)  RR (machine): 30  TV (machine): 500  FiO2: 40  PEEP: 5  ITime: 1  MAP: 13  PIP: 23    ABG - ( 2020 04:03 )  pH, Arterial: 7.43  pH, Blood: x     /  pCO2: 41    /  pO2: 147   / HCO3: 27    / Base Excess: 2.6   /  SaO2: 99                  I&O's Summary    2020 07:  -  2020 07:00  --------------------------------------------------------  IN: 1335.1 mL / OUT: 1791 mL / NET: -455.9 mL      I&O's Detail    2020 07:  -  2020 07:00  --------------------------------------------------------  IN:    Enteral Tube Flush: 240 mL    Glucerna: 650 mL    insulin regular Infusion: 13 mL    IV PiggyBack: 400 mL    norepinephrine Infusion: 32.1 mL  Total IN: 1335.1 mL    OUT:    Indwelling Catheter - Urethral: 1791 mL  Total OUT: 1791 mL    Total NET: -455.9 mL          PHYSICAL EXAM:    General/Neuro  RASS:             GCS:     = E   / V   / M      Deficits:                             alert & oriented x 3, no focal deficits  Pupils:    Lungs:      clear to auscultation, Normal expansion/effort.     Cardiovascular : S1, S2.  Regular rate and rhythm.  Peripheral edema   Cardiac Rhythm: Normal Sinus Rhythm    GI: Abdomen soft, Non-tender, Non-distended.    Gastrostomy / Jejunostomy tube in place.  Nasogastric tube in place.  Colostomy / Ileostomy.    Wound:    Extremities: Extremities warm, pink, well-perfused. Pulses:Rt     Lt    Derm: Good skin turgor, no skin breakdown.      :       Evans catheter in place.      CXR:     LABS:  CAPILLARY BLOOD GLUCOSE      POCT Blood Glucose.: 158 mg/dL (2020 06:14)  POCT Blood Glucose.: 230 mg/dL (2020 01:40)  POCT Blood Glucose.: 122 mg/dL (2020 19:01)  POCT Blood Glucose.: 126 mg/dL (2020 18:06)  POCT Blood Glucose.: 148 mg/dL (2020 17:20)  POCT Blood Glucose.: 179 mg/dL (2020 16:11)  POCT Blood Glucose.: 219 mg/dL (2020 14:54)  POCT Blood Glucose.: 159 mg/dL (2020 12:46)  POCT Blood Glucose.: 138 mg/dL (2020 11:10)                          9.5    19.49 )-----------( 162      ( 2020 01:00 )             28.8       -19    149<H>  |  108  |  134<HH>  ----------------------------<  233<H>  3.8   |  26  |  4.9<HH>    Ca    8.5      2020 01:00  Phos  6.4     -  Mg     2.5         TPro  5.6<L>  /  Alb  2.7<L>  /  TBili  0.9  /  DBili  x   /  AST  55<H>  /  ALT  83<H>  /  AlkPhos  99        PT/INR - ( 2020 01:00 )   PT: 15.00 sec;   INR: 1.30 ratio         PTT - ( 2020 01:00 )  PTT:26.3 sec KONSTANTIN WYATT  [id number]  74y Male    Indication for ICU admission: Acute Hypoxemic Respiratory Failure, clinical COVID19 (false negatvie)    Admit Date:20  ICU Date: 20  OR Date:    Bactrim (Unknown)    PAST MEDICAL & SURGICAL HISTORY:  Afib  DM (diabetes mellitus)  BPH (benign prostatic hyperplasia)  CAD (coronary artery disease)    Home Medications:  Cartia  mg/24 hours oral capsule, extended release: 1 cap(s) orally once a day (30 Mar 2020 18:04)  Eliquis 5 mg oral tablet: 1 tab(s) orally 2 times a day (30 Mar 2020 18:04)  Lipitor 40 mg oral tablet: 1 tab(s) orally once a day (30 Mar 2020 18:04)  lisinopril 40 mg oral tablet: 1 tab(s) orally once a day (30 Mar 2020 18:04)  metFORMIN 750 mg oral tablet, extended release: 1 tab(s) orally once a day (30 Mar 2020 18:04)    24HRS EVENT:   No acute events ON.     NEURO    sedation---off all drips, only getting prn prop and versed pushes, No pushes needed ON    HCT -multiple acute hemorraghic infarcts, neuro consulted contacted Dr. Portillo, recs     Keppra 250mg BID  poor renal function, MRI, MRA    RASS -4, pupils reactive, moving extremities    Pain control: Acetaminophen 650mg PO q6 PRN  neurology rec EEG- no epileptiform activity    RESP  trached   Acute Hypoxemic Respiratory Failure;  COVID-19/Viral Pneumonia--DTA  negative for COVID  Culture - Sputum . (04.15.20 @ 16:30)  Moderate Gram positive cocci in pairs seen per oil power field    Organism: Methicillin resistant Staphylococcus aureus -- started on Vanc  - Intubated/ trached: /30/50/5  - AM AB.43/41/147/27/99% lac 1.0  -CXR: unchanged diffuse bilateral opacities  -On Guanifenisen    CARD/VASC   Acute tachycardia, hx of Afib---- on PO Cardizem 60mg q8h, and PO Lopressor 25 mg q 8  Hypotension -  off levo, midodrine 10q8h for hypotension    Hx of Afib, with Afib RVR during this admission - Diltiazem. BB, currently holding Apixaban 2/2 HCT findings    Hx of CAD - Atorvastatin 40mg PO qhs    Hx of HTN - hold Lisinopril   QTc: ________    GI/NUTR  NGT in place   Had BM ON    Diet: TF (glucerna) @ 50mL/hr    PPI  Senna     Daily weight (4/10) 98.7kg,                          ( ) 95.4kg                         ( ) 93.3 kg                         ( ) 92.3 kg                         () 91.9kg  	       () 88.7kg                         ()     /Renal  LIZZIE;  BUN / Cr improved after HD, kept even   BUN/Cr 134/4  Lytes-Na 149 // K 3.8 // Phos 6.4 //  Mag 2.5  IVL  Evans, non-oliguric renal failure UOP: 60-175cc/hr  HD : kept even   Hx of BPH      HEME/ONC  Holding ELIQUIS  Hgb lateral at 10.4    ID    afebrile    WBC 19.25 > 22     Procal 1.92    COVID-19 +  - changed cefepime to Ksenia [id number]  MRSA in sputum   started vanco 1gm q24h, will need level   - completed course of Hydroxychloroquine & Azithromycin  - Ascorbic acid, Zinc  - D-dimer downtrending 2,640 > 2370 > 2336    ENDO  DM2; uncontrolled, holding home Metformin  Insulin gtt restarted  POCT Blood Glucose    MSK/DERM  -burn consult for lip lac- bacitracin and peridex    DVT PPx  - on hepsub q  - off Apixaban     GI PPx  -  Pantoprazole 40mg PO qac      ***Tubes/Lines/Drains  ***  Peripheral IV  Central Venous Line  Right IJ TLC  	Date 3/31/20  R fem [place]   Arterial Line  Right radial A-line		                Date: 3/31/20  Urnary Catheter		Indication: Strict I&O    Date Placed: 3/31/20  ETT  OGT    REVIEW OF SYSTEMS    [ ] A ten-point review of systems was otherwise negative except as noted.  [x ] Due to altered mental status/intubation, subjective information were not able to be obtained from the patient. History was obtained, to the extent possible, from review of the chart and collateral sources of information.    Daily     Daily     Diet, NPO with Tube Feed:   Tube Feeding Modality: Orogastric  Glucerna 1.2 Choco  Total Volume for 24 Hours (mL): 1200  Continuous  Starting Tube Feed Rate mL per Hour: 30  Increase Tube Feed Rate by (mL): 10     Every hour  Until Goal Tube Feed Rate (mL per Hour): 50  Tube Feed Duration (in Hours): 24  Tube Feed Start Time: 13:00 (20 @ 17:56)      CURRENT MEDS:  Neurologic Medications  acetaminophen  Suppository .. 650 milliGRAM(s) Rectal every 6 hours PRN Temp greater or equal to 38.5C (101.3F)  levETIRAcetam 250 milliGRAM(s) Oral two times a day    Respiratory Medications  guaifenesin/dextromethorphan  Syrup 10 milliLiter(s) Oral every 8 hours    Cardiovascular Medications  diltiazem    Tablet 60 milliGRAM(s) Oral every 8 hours  metoprolol tartrate 25 milliGRAM(s) Oral every 8 hours  midodrine 10 milliGRAM(s) Oral every 8 hours    Gastrointestinal Medications  ascorbic acid 500 milliGRAM(s) Oral every 8 hours  calcium acetate 1334 milliGRAM(s) Oral three times a day with meals  lactulose Syrup 20 Gram(s) Oral every 12 hours  pantoprazole   Suspension 40 milliGRAM(s) Oral daily  senna 2 Tablet(s) Oral at bedtime  zinc sulfate 220 milliGRAM(s) Oral daily    Genitourinary Medications    Hematologic/Oncologic Medications  heparin  Injectable 5000 Unit(s) SubCutaneous every 8 hours    Antimicrobial/Immunologic Medications  meropenem  IVPB 500 milliGRAM(s) IV Intermittent every 12 hours  vancomycin  IVPB      vancomycin  IVPB 1000 milliGRAM(s) IV Intermittent every 24 hours    Endocrine/Metabolic Medications  insulin regular Infusion 1 Unit(s)/Hr IV Continuous <Continuous>    Topical/Other Medications  BACItracin   Ointment 1 Application(s) Topical two times a day  chlorhexidine 0.12% Liquid 15 milliLiter(s) Oral Mucosa every 12 hours  chlorhexidine 0.12% Liquid 15 milliLiter(s) Oral Mucosa two times a day  chlorhexidine 4% Liquid 1 Application(s) Topical <User Schedule>      ICU Vital Signs Last 24 Hrs  T(C): 36.8 (2020 20:00), Max: 38 (2020 08:00)  T(F): 98.2 (2020 20:00), Max: 100.4 (2020 08:00)  HR: 89 (2020 04:00) (78 - 120)  BP: 123/62 (2020 16:00) (123/62 - 138/63)  BP(mean): --  ABP: 156/64 (2020 04:00) (128/54 - 156/64)  ABP(mean): 90 (2020 04:00) (75 - 92)  RR: 30 (2020 16:00) (30 - 30)  SpO2: 99% (2020 07:43) (96% - 100%)      Mode: AC/ CMV (Assist Control/ Continuous Mandatory Ventilation)  RR (machine): 30  TV (machine): 500  FiO2: 40  PEEP: 5  ITime: 1  MAP: 13  PIP: 23    ABG - ( 2020 04:03 )  pH, Arterial: 7.43  pH, Blood: x     /  pCO2: 41    /  pO2: 147   / HCO3: 27    / Base Excess: 2.6   /  SaO2: 99            I&O's Summary    2020 07:  -  2020 07:00  --------------------------------------------------------  IN: 1335.1 mL / OUT: 1791 mL / NET: -455.9 mL      I&O's Detail    2020 07:01  -  2020 07:00  --------------------------------------------------------  IN:    Enteral Tube Flush: 240 mL    Glucerna: 650 mL    insulin regular Infusion: 13 mL    IV PiggyBack: 400 mL    norepinephrine Infusion: 32.1 mL  Total IN: 1335.1 mL    OUT:    Indwelling Catheter - Urethral: 1791 mL  Total OUT: 1791 mL    Total NET: -455.9 mL          PHYSICAL EXAM:    General/Neuro  Deficits:   lip lac  - Withdraws in BL LE, pupils dilated and symmetric. Spontaneous head turning, not following commands, not opening eyes.    Lungs:  trach in place, on vent. Synchronous, clear to auscultation    Cardiovascular : S1, S2.  Irregular,  + symmetric mild peripheral edema   Cardiac Rhythm: sinus with PACs    GI: Abdomen soft, nontender, nondistended, no rebound   orogastric tube, on tube feeds     Extremities: Extremities warm, pink, well-perfused. Edematous     Derm: Good skin turgor, no skin breakdown.      :       Evans catheter in place.      CXR:     LABS:  CAPILLARY BLOOD GLUCOSE  POCT Blood Glucose.: 158 mg/dL (2020 06:14)  POCT Blood Glucose.: 230 mg/dL (2020 01:40)  POCT Blood Glucose.: 122 mg/dL (2020 19:01)  POCT Blood Glucose.: 126 mg/dL (2020 18:06)  POCT Blood Glucose.: 148 mg/dL (2020 17:20)  POCT Blood Glucose.: 179 mg/dL (2020 16:11)  POCT Blood Glucose.: 219 mg/dL (2020 14:54)  POCT Blood Glucose.: 159 mg/dL (2020 12:46)  POCT Blood Glucose.: 138 mg/dL (2020 11:10)                          9.5    19.49 )-----------( 162      ( 2020 01:00 )             28.8       04-19    149<H>  |  108  |  134<HH>  ----------------------------<  233<H>  3.8   |  26  |  4.9<HH>    Ca    8.5      2020 01:00  Phos  6.4     04-19  Mg     2.5     04-19    TPro  5.6<L>  /  Alb  2.7<L>  /  TBili  0.9  /  DBili  x   /  AST  55<H>  /  ALT  83<H>  /  AlkPhos  99  04-19      PT/INR - ( 2020 01:00 )   PT: 15.00 sec;   INR: 1.30 ratio         PTT - ( :00 )  PTT:26.3 sec KONSTANTIN WYATT  [id number]  74y Male    Indication for ICU admission: Acute Hypoxemic Respiratory Failure, clinical COVID19 (false negatvie)    Admit Date:20  ICU Date: 20  OR Date:    Bactrim (Unknown)    PAST MEDICAL & SURGICAL HISTORY:  Afib  DM (diabetes mellitus)  BPH (benign prostatic hyperplasia)  CAD (coronary artery disease)    Home Medications:  Cartia  mg/24 hours oral capsule, extended release: 1 cap(s) orally once a day (30 Mar 2020 18:04)  Eliquis 5 mg oral tablet: 1 tab(s) orally 2 times a day (30 Mar 2020 18:04)  Lipitor 40 mg oral tablet: 1 tab(s) orally once a day (30 Mar 2020 18:04)  lisinopril 40 mg oral tablet: 1 tab(s) orally once a day (30 Mar 2020 18:04)  metFORMIN 750 mg oral tablet, extended release: 1 tab(s) orally once a day (30 Mar 2020 18:04)    24HRS EVENT:   No acute events ON.     NEURO    sedation---off all drips, only getting prn prop and versed pushes, No pushes needed ON    HCT -multiple acute hemorraghic infarcts, neuro consulted contacted Dr. Portillo, recs     Keppra 250mg BID  poor renal function, MRI, MRA    RASS -4, pupils reactive, moving extremities    Pain control: Acetaminophen 650mg PO q6 PRN  neurology rec EEG- no epileptiform activity    RESP  trached   Acute Hypoxemic Respiratory Failure;  COVID-19/Viral Pneumonia--DTA  negative for COVID  Culture - Sputum . (04.15.20 @ 16:30)  Moderate Gram positive cocci in pairs seen per oil power field    Organism: Methicillin resistant Staphylococcus aureus -- started on Vanc  - Intubated/ trached: /30/50/5  - AM AB.43/41/147/27/99% lac 1.0  -CXR: unchanged diffuse bilateral opacities  -On Guanifenisen    CARD/VASC   Acute tachycardia, hx of Afib---- on PO Cardizem 60mg q8h, and PO Lopressor 25 mg q 8  Hypotension -  off levo, midodrine 10q8h for hypotension    Hx of Afib, with Afib RVR during this admission - Diltiazem. BB, currently holding Apixaban 2/2 HCT findings    Hx of CAD - Atorvastatin 40mg PO qhs    Hx of HTN - hold Lisinopril   QTc: ________    GI/NUTR  NGT in place   Had BM ON    Diet: TF (glucerna) @ 50mL/hr    PPI  Senna     Daily weight (4/10) 98.7kg,                          ( ) 95.4kg                         ( ) 93.3 kg                         ( ) 92.3 kg                         () 91.9kg  	       () 88.7kg                         ()     /Renal  LIZZIE;  BUN / Cr improved after HD, kept even   BUN/Cr 134/4  Lytes-Na 149 // K 3.8 // Phos 6.4 //  Mag 2.5  IVL  Evans, non-oliguric renal failure UOP: 60-175cc/hr  HD : kept even   Hx of BPH      HEME/ONC  Holding ELIQUIS  Hgb lateral at 10.4    ID    afebrile    WBC 19.25 > 22     Procal 1.92    COVID-19 +  - changed cefepime to Ksenia [id number]  MRSA in sputum   started vanco 1gm q24h, will need level   - completed course of Hydroxychloroquine & Azithromycin  - Ascorbic acid, Zinc  - D-dimer downtrending 2,640 > 2370 > 2336    ENDO  DM2; uncontrolled, holding home Metformin  Insulin gtt restarted  POCT Blood Glucose    MSK/DERM  -burn consult for lip lac- bacitracin and peridex    DVT PPx  - on hepsub q  - off Apixaban     GI PPx  -  Pantoprazole 40mg PO qac      ***Tubes/Lines/Drains  ***  Peripheral IV  Central Venous Line  Right IJ TLC  	Date 3/31/20  R fem [place]   Arterial Line  Right radial A-line		                Date: 3/31/20  Urnary Catheter		Indication: Strict I&O    Date Placed: 3/31/20  trach  NGT    REVIEW OF SYSTEMS    [ ] A ten-point review of systems was otherwise negative except as noted.  [x ] Due to altered mental status/intubation, subjective information were not able to be obtained from the patient. History was obtained, to the extent possible, from review of the chart and collateral sources of information.    Daily     Daily     Diet, NPO with Tube Feed:   Tube Feeding Modality: Orogastric  Glucerna 1.2 Choco  Total Volume for 24 Hours (mL): 1200  Continuous  Starting Tube Feed Rate mL per Hour: 30  Increase Tube Feed Rate by (mL): 10     Every hour  Until Goal Tube Feed Rate (mL per Hour): 50  Tube Feed Duration (in Hours): 24  Tube Feed Start Time: 13:00 (20 @ 17:56)      CURRENT MEDS:  Neurologic Medications  acetaminophen  Suppository .. 650 milliGRAM(s) Rectal every 6 hours PRN Temp greater or equal to 38.5C (101.3F)  levETIRAcetam 250 milliGRAM(s) Oral two times a day    Respiratory Medications  guaifenesin/dextromethorphan  Syrup 10 milliLiter(s) Oral every 8 hours    Cardiovascular Medications  diltiazem    Tablet 60 milliGRAM(s) Oral every 8 hours  metoprolol tartrate 25 milliGRAM(s) Oral every 8 hours  midodrine 10 milliGRAM(s) Oral every 8 hours    Gastrointestinal Medications  ascorbic acid 500 milliGRAM(s) Oral every 8 hours  calcium acetate 1334 milliGRAM(s) Oral three times a day with meals  lactulose Syrup 20 Gram(s) Oral every 12 hours  pantoprazole   Suspension 40 milliGRAM(s) Oral daily  senna 2 Tablet(s) Oral at bedtime  zinc sulfate 220 milliGRAM(s) Oral daily    Genitourinary Medications    Hematologic/Oncologic Medications  heparin  Injectable 5000 Unit(s) SubCutaneous every 8 hours    Antimicrobial/Immunologic Medications  meropenem  IVPB 500 milliGRAM(s) IV Intermittent every 12 hours  vancomycin  IVPB      vancomycin  IVPB 1000 milliGRAM(s) IV Intermittent every 24 hours    Endocrine/Metabolic Medications  insulin regular Infusion 1 Unit(s)/Hr IV Continuous <Continuous>    Topical/Other Medications  BACItracin   Ointment 1 Application(s) Topical two times a day  chlorhexidine 0.12% Liquid 15 milliLiter(s) Oral Mucosa every 12 hours  chlorhexidine 0.12% Liquid 15 milliLiter(s) Oral Mucosa two times a day  chlorhexidine 4% Liquid 1 Application(s) Topical <User Schedule>      ICU Vital Signs Last 24 Hrs  T(C): 36.8 (2020 20:00), Max: 38 (2020 08:00)  T(F): 98.2 (2020 20:00), Max: 100.4 (2020 08:00)  HR: 89 (2020 04:00) (78 - 120)  BP: 123/62 (2020 16:00) (123/62 - 138/63)  BP(mean): --  ABP: 156/64 (2020 04:00) (128/54 - 156/64)  ABP(mean): 90 (2020 04:00) (75 - 92)  RR: 30 (2020 16:00) (30 - 30)  SpO2: 99% (2020 07:43) (96% - 100%)      Mode: AC/ CMV (Assist Control/ Continuous Mandatory Ventilation)  RR (machine): 30  TV (machine): 500  FiO2: 40  PEEP: 5  ITime: 1  MAP: 13  PIP: 23    ABG - ( 2020 04:03 )  pH, Arterial: 7.43  pH, Blood: x     /  pCO2: 41    /  pO2: 147   / HCO3: 27    / Base Excess: 2.6   /  SaO2: 99            I&O's Summary    2020 07:  -  2020 07:00  --------------------------------------------------------  IN: 1335.1 mL / OUT: 1791 mL / NET: -455.9 mL      I&O's Detail    2020 07:01  -  2020 07:00  --------------------------------------------------------  IN:    Enteral Tube Flush: 240 mL    Glucerna: 650 mL    insulin regular Infusion: 13 mL    IV PiggyBack: 400 mL    norepinephrine Infusion: 32.1 mL  Total IN: 1335.1 mL    OUT:    Indwelling Catheter - Urethral: 1791 mL  Total OUT: 1791 mL    Total NET: -455.9 mL          PHYSICAL EXAM:    General/Neuro  Deficits:   lip lac  - Withdraws in BL LE, pupils dilated and symmetric. Spontaneous head turning, not following commands, not opening eyes.    Lungs:  trach in place, on vent. Synchronous, clear to auscultation    Cardiovascular : S1, S2.  Irregular,  + symmetric mild peripheral edema   Cardiac Rhythm: sinus with PACs    GI: Abdomen soft, nontender, nondistended, no rebound   orogastric tube, on tube feeds     Extremities: Extremities warm, pink, well-perfused. Edematous     Derm: Good skin turgor, no skin breakdown.      :       Evans catheter in place.      CXR:     LABS:  CAPILLARY BLOOD GLUCOSE  POCT Blood Glucose.: 158 mg/dL (2020 06:14)  POCT Blood Glucose.: 230 mg/dL (2020 01:40)  POCT Blood Glucose.: 122 mg/dL (2020 19:01)  POCT Blood Glucose.: 126 mg/dL (2020 18:06)  POCT Blood Glucose.: 148 mg/dL (2020 17:20)  POCT Blood Glucose.: 179 mg/dL (2020 16:11)  POCT Blood Glucose.: 219 mg/dL (2020 14:54)  POCT Blood Glucose.: 159 mg/dL (2020 12:46)  POCT Blood Glucose.: 138 mg/dL (2020 11:10)                          9.5    19.49 )-----------( 162      ( 2020 01:00 )             28.8       04-19    149<H>  |  108  |  134<HH>  ----------------------------<  233<H>  3.8   |  26  |  4.9<HH>    Ca    8.5      2020 01:00  Phos  6.4     04-19  Mg     2.5     04-19    TPro  5.6<L>  /  Alb  2.7<L>  /  TBili  0.9  /  DBili  x   /  AST  55<H>  /  ALT  83<H>  /  AlkPhos  99  04-19      PT/INR - ( 2020 01:00 )   PT: 15.00 sec;   INR: 1.30 ratio         PTT - ( : )  PTT:26.3 sec

## 2020-04-20 LAB
ALBUMIN SERPL ELPH-MCNC: 2.6 G/DL — LOW (ref 3.5–5.2)
ALP SERPL-CCNC: 102 U/L — SIGNIFICANT CHANGE UP (ref 30–115)
ALT FLD-CCNC: 120 U/L — HIGH (ref 0–41)
ANION GAP SERPL CALC-SCNC: 17 MMOL/L — HIGH (ref 7–14)
ANION GAP SERPL CALC-SCNC: 18 MMOL/L — HIGH (ref 7–14)
APTT BLD: 26.5 SEC — LOW (ref 27–39.2)
APTT BLD: 26.6 SEC — LOW (ref 27–39.2)
AST SERPL-CCNC: 84 U/L — HIGH (ref 0–41)
BASE EXCESS BLDA CALC-SCNC: 3.5 MMOL/L — HIGH (ref -2–2)
BASOPHILS # BLD AUTO: 0.03 K/UL — SIGNIFICANT CHANGE UP (ref 0–0.2)
BASOPHILS NFR BLD AUTO: 0.2 % — SIGNIFICANT CHANGE UP (ref 0–1)
BILIRUB SERPL-MCNC: 0.7 MG/DL — SIGNIFICANT CHANGE UP (ref 0.2–1.2)
BUN SERPL-MCNC: 136 MG/DL — CRITICAL HIGH (ref 10–20)
BUN SERPL-MCNC: 138 MG/DL — CRITICAL HIGH (ref 10–20)
CALCIUM SERPL-MCNC: 8.7 MG/DL — SIGNIFICANT CHANGE UP (ref 8.5–10.1)
CALCIUM SERPL-MCNC: 8.8 MG/DL — SIGNIFICANT CHANGE UP (ref 8.5–10.1)
CHLORIDE SERPL-SCNC: 112 MMOL/L — HIGH (ref 98–110)
CHLORIDE SERPL-SCNC: 113 MMOL/L — HIGH (ref 98–110)
CO2 SERPL-SCNC: 24 MMOL/L — SIGNIFICANT CHANGE UP (ref 17–32)
CO2 SERPL-SCNC: 25 MMOL/L — SIGNIFICANT CHANGE UP (ref 17–32)
CREAT SERPL-MCNC: 4 MG/DL — HIGH (ref 0.7–1.5)
CREAT SERPL-MCNC: 4.7 MG/DL — CRITICAL HIGH (ref 0.7–1.5)
EOSINOPHIL # BLD AUTO: 0.22 K/UL — SIGNIFICANT CHANGE UP (ref 0–0.7)
EOSINOPHIL NFR BLD AUTO: 1.8 % — SIGNIFICANT CHANGE UP (ref 0–8)
GLUCOSE BLDC GLUCOMTR-MCNC: 117 MG/DL — HIGH (ref 70–99)
GLUCOSE BLDC GLUCOMTR-MCNC: 129 MG/DL — HIGH (ref 70–99)
GLUCOSE BLDC GLUCOMTR-MCNC: 134 MG/DL — HIGH (ref 70–99)
GLUCOSE BLDC GLUCOMTR-MCNC: 145 MG/DL — HIGH (ref 70–99)
GLUCOSE BLDC GLUCOMTR-MCNC: 146 MG/DL — HIGH (ref 70–99)
GLUCOSE BLDC GLUCOMTR-MCNC: 147 MG/DL — HIGH (ref 70–99)
GLUCOSE BLDC GLUCOMTR-MCNC: 161 MG/DL — HIGH (ref 70–99)
GLUCOSE BLDC GLUCOMTR-MCNC: 166 MG/DL — HIGH (ref 70–99)
GLUCOSE BLDC GLUCOMTR-MCNC: 198 MG/DL — HIGH (ref 70–99)
GLUCOSE SERPL-MCNC: 129 MG/DL — HIGH (ref 70–99)
GLUCOSE SERPL-MCNC: 167 MG/DL — HIGH (ref 70–99)
HCO3 BLDA-SCNC: 27 MMOL/L — SIGNIFICANT CHANGE UP (ref 23–27)
HCT VFR BLD CALC: 27.6 % — LOW (ref 42–52)
HCT VFR BLD CALC: 29 % — LOW (ref 42–52)
HGB BLD-MCNC: 8.8 G/DL — LOW (ref 14–18)
HGB BLD-MCNC: 9.5 G/DL — LOW (ref 14–18)
IMM GRANULOCYTES NFR BLD AUTO: 0.3 % — SIGNIFICANT CHANGE UP (ref 0.1–0.3)
INR BLD: 1.2 RATIO — SIGNIFICANT CHANGE UP (ref 0.65–1.3)
INR BLD: 1.25 RATIO — SIGNIFICANT CHANGE UP (ref 0.65–1.3)
LYMPHOCYTES # BLD AUTO: 0.54 K/UL — LOW (ref 1.2–3.4)
LYMPHOCYTES # BLD AUTO: 4.4 % — LOW (ref 20.5–51.1)
MAGNESIUM SERPL-MCNC: 2.4 MG/DL — SIGNIFICANT CHANGE UP (ref 1.8–2.4)
MAGNESIUM SERPL-MCNC: 2.6 MG/DL — HIGH (ref 1.8–2.4)
MCHC RBC-ENTMCNC: 29.4 PG — SIGNIFICANT CHANGE UP (ref 27–31)
MCHC RBC-ENTMCNC: 30.6 PG — SIGNIFICANT CHANGE UP (ref 27–31)
MCHC RBC-ENTMCNC: 31.9 G/DL — LOW (ref 32–37)
MCHC RBC-ENTMCNC: 32.8 G/DL — SIGNIFICANT CHANGE UP (ref 32–37)
MCV RBC AUTO: 92.3 FL — SIGNIFICANT CHANGE UP (ref 80–94)
MCV RBC AUTO: 93.5 FL — SIGNIFICANT CHANGE UP (ref 80–94)
MONOCYTES # BLD AUTO: 0.78 K/UL — HIGH (ref 0.1–0.6)
MONOCYTES NFR BLD AUTO: 6.4 % — SIGNIFICANT CHANGE UP (ref 1.7–9.3)
NEUTROPHILS # BLD AUTO: 10.53 K/UL — HIGH (ref 1.4–6.5)
NEUTROPHILS NFR BLD AUTO: 86.9 % — HIGH (ref 42.2–75.2)
NRBC # BLD: 0 /100 WBCS — SIGNIFICANT CHANGE UP (ref 0–0)
NRBC # BLD: 0 /100 WBCS — SIGNIFICANT CHANGE UP (ref 0–0)
PCO2 BLDA: 36 MMHG — LOW (ref 38–42)
PH BLDA: 7.48 — HIGH (ref 7.38–7.42)
PHOSPHATE SERPL-MCNC: 5.5 MG/DL — HIGH (ref 2.1–4.9)
PHOSPHATE SERPL-MCNC: 6 MG/DL — HIGH (ref 2.1–4.9)
PLATELET # BLD AUTO: 192 K/UL — SIGNIFICANT CHANGE UP (ref 130–400)
PLATELET # BLD AUTO: 225 K/UL — SIGNIFICANT CHANGE UP (ref 130–400)
PO2 BLDA: 118 MMHG — HIGH (ref 78–95)
POTASSIUM SERPL-MCNC: 3.6 MMOL/L — SIGNIFICANT CHANGE UP (ref 3.5–5)
POTASSIUM SERPL-MCNC: 3.7 MMOL/L — SIGNIFICANT CHANGE UP (ref 3.5–5)
POTASSIUM SERPL-SCNC: 3.6 MMOL/L — SIGNIFICANT CHANGE UP (ref 3.5–5)
POTASSIUM SERPL-SCNC: 3.7 MMOL/L — SIGNIFICANT CHANGE UP (ref 3.5–5)
PROCALCITONIN SERPL-MCNC: 1.13 NG/ML — HIGH (ref 0.02–0.1)
PROCALCITONIN SERPL-MCNC: 1.49 NG/ML — HIGH (ref 0.02–0.1)
PROT SERPL-MCNC: 5.7 G/DL — LOW (ref 6–8)
PROTHROM AB SERPL-ACNC: 13.8 SEC — HIGH (ref 9.95–12.87)
PROTHROM AB SERPL-ACNC: 14.4 SEC — HIGH (ref 9.95–12.87)
RBC # BLD: 2.99 M/UL — LOW (ref 4.7–6.1)
RBC # BLD: 3.1 M/UL — LOW (ref 4.7–6.1)
RBC # FLD: 13.9 % — SIGNIFICANT CHANGE UP (ref 11.5–14.5)
RBC # FLD: 14 % — SIGNIFICANT CHANGE UP (ref 11.5–14.5)
SAO2 % BLDA: 99 % — HIGH (ref 94–98)
SODIUM SERPL-SCNC: 154 MMOL/L — HIGH (ref 135–146)
SODIUM SERPL-SCNC: 155 MMOL/L — HIGH (ref 135–146)
VANCOMYCIN TROUGH SERPL-MCNC: 18.9 UG/ML — HIGH (ref 5–10)
WBC # BLD: 12.14 K/UL — HIGH (ref 4.8–10.8)
WBC # BLD: 15.6 K/UL — HIGH (ref 4.8–10.8)
WBC # FLD AUTO: 12.14 K/UL — HIGH (ref 4.8–10.8)
WBC # FLD AUTO: 15.6 K/UL — HIGH (ref 4.8–10.8)

## 2020-04-20 PROCEDURE — 93010 ELECTROCARDIOGRAM REPORT: CPT | Mod: 77

## 2020-04-20 PROCEDURE — 93010 ELECTROCARDIOGRAM REPORT: CPT

## 2020-04-20 PROCEDURE — 70450 CT HEAD/BRAIN W/O DYE: CPT | Mod: 26

## 2020-04-20 PROCEDURE — 71045 X-RAY EXAM CHEST 1 VIEW: CPT | Mod: 26

## 2020-04-20 PROCEDURE — 99291 CRITICAL CARE FIRST HOUR: CPT | Mod: CS

## 2020-04-20 RX ORDER — DILTIAZEM HCL 120 MG
60 CAPSULE, EXT RELEASE 24 HR ORAL EVERY 6 HOURS
Refills: 0 | Status: DISCONTINUED | OUTPATIENT
Start: 2020-04-21 | End: 2020-04-28

## 2020-04-20 RX ORDER — DILTIAZEM HCL 120 MG
10 CAPSULE, EXT RELEASE 24 HR ORAL ONCE
Refills: 0 | Status: COMPLETED | OUTPATIENT
Start: 2020-04-20 | End: 2020-04-20

## 2020-04-20 RX ORDER — METOPROLOL TARTRATE 50 MG
5 TABLET ORAL
Refills: 0 | Status: COMPLETED | OUTPATIENT
Start: 2020-04-20 | End: 2020-04-20

## 2020-04-20 RX ADMIN — Medication 1334 MILLIGRAM(S): at 10:32

## 2020-04-20 RX ADMIN — Medication 1 APPLICATION(S): at 05:08

## 2020-04-20 RX ADMIN — CHLORHEXIDINE GLUCONATE 1 APPLICATION(S): 213 SOLUTION TOPICAL at 05:07

## 2020-04-20 RX ADMIN — LACTULOSE 20 GRAM(S): 10 SOLUTION ORAL at 17:07

## 2020-04-20 RX ADMIN — LACTULOSE 20 GRAM(S): 10 SOLUTION ORAL at 05:07

## 2020-04-20 RX ADMIN — Medication 25 MILLIGRAM(S): at 13:22

## 2020-04-20 RX ADMIN — SEVELAMER CARBONATE 800 MILLIGRAM(S): 2400 POWDER, FOR SUSPENSION ORAL at 17:08

## 2020-04-20 RX ADMIN — Medication 25 MILLIGRAM(S): at 21:03

## 2020-04-20 RX ADMIN — MIDODRINE HYDROCHLORIDE 10 MILLIGRAM(S): 2.5 TABLET ORAL at 05:08

## 2020-04-20 RX ADMIN — HEPARIN SODIUM 5000 UNIT(S): 5000 INJECTION INTRAVENOUS; SUBCUTANEOUS at 05:04

## 2020-04-20 RX ADMIN — Medication 1 APPLICATION(S): at 17:15

## 2020-04-20 RX ADMIN — MIDODRINE HYDROCHLORIDE 10 MILLIGRAM(S): 2.5 TABLET ORAL at 21:03

## 2020-04-20 RX ADMIN — HEPARIN SODIUM 5000 UNIT(S): 5000 INJECTION INTRAVENOUS; SUBCUTANEOUS at 15:07

## 2020-04-20 RX ADMIN — Medication 250 MILLIGRAM(S): at 18:31

## 2020-04-20 RX ADMIN — SEVELAMER CARBONATE 800 MILLIGRAM(S): 2400 POWDER, FOR SUSPENSION ORAL at 15:11

## 2020-04-20 RX ADMIN — MEROPENEM 100 MILLIGRAM(S): 1 INJECTION INTRAVENOUS at 16:45

## 2020-04-20 RX ADMIN — HEPARIN SODIUM 5000 UNIT(S): 5000 INJECTION INTRAVENOUS; SUBCUTANEOUS at 21:02

## 2020-04-20 RX ADMIN — CHLORHEXIDINE GLUCONATE 15 MILLILITER(S): 213 SOLUTION TOPICAL at 17:08

## 2020-04-20 RX ADMIN — LEVETIRACETAM 250 MILLIGRAM(S): 250 TABLET, FILM COATED ORAL at 05:06

## 2020-04-20 RX ADMIN — CHLORHEXIDINE GLUCONATE 15 MILLILITER(S): 213 SOLUTION TOPICAL at 05:04

## 2020-04-20 RX ADMIN — Medication 60 MILLIGRAM(S): at 13:21

## 2020-04-20 RX ADMIN — MEROPENEM 100 MILLIGRAM(S): 1 INJECTION INTRAVENOUS at 05:06

## 2020-04-20 RX ADMIN — PANTOPRAZOLE SODIUM 40 MILLIGRAM(S): 20 TABLET, DELAYED RELEASE ORAL at 13:18

## 2020-04-20 RX ADMIN — Medication 5 MILLIGRAM(S): at 21:02

## 2020-04-20 RX ADMIN — Medication 500 MILLIGRAM(S): at 05:04

## 2020-04-20 RX ADMIN — SEVELAMER CARBONATE 800 MILLIGRAM(S): 2400 POWDER, FOR SUSPENSION ORAL at 10:17

## 2020-04-20 RX ADMIN — Medication 10 MILLIGRAM(S): at 21:01

## 2020-04-20 RX ADMIN — Medication 5 MILLIGRAM(S): at 21:47

## 2020-04-20 RX ADMIN — Medication 60 MILLIGRAM(S): at 05:07

## 2020-04-20 RX ADMIN — SENNA PLUS 2 TABLET(S): 8.6 TABLET ORAL at 21:03

## 2020-04-20 RX ADMIN — INSULIN HUMAN 1 UNIT(S)/HR: 100 INJECTION, SOLUTION SUBCUTANEOUS at 13:16

## 2020-04-20 RX ADMIN — Medication 25 MILLIGRAM(S): at 05:05

## 2020-04-20 RX ADMIN — Medication 1334 MILLIGRAM(S): at 13:17

## 2020-04-20 RX ADMIN — ZINC SULFATE TAB 220 MG (50 MG ZINC EQUIVALENT) 220 MILLIGRAM(S): 220 (50 ZN) TAB at 13:19

## 2020-04-20 RX ADMIN — Medication 1334 MILLIGRAM(S): at 17:08

## 2020-04-20 RX ADMIN — LEVETIRACETAM 250 MILLIGRAM(S): 250 TABLET, FILM COATED ORAL at 17:08

## 2020-04-20 NOTE — CHART NOTE - NSCHARTNOTEFT_GEN_A_CORE
Spoke with son  Harvinder who will be the primary communication source as the wife is becoming increasing overwhelmed,   [phone].    Marie Ralph MD

## 2020-04-20 NOTE — PROGRESS NOTE ADULT - SUBJECTIVE AND OBJECTIVE BOX
Nephrology progress note    THIS IS AN INCOMPLETE NOTE . FULL NOTE TO FOLLOW SHORTLY    Patient is seen and examined, events over the last 24 h noted .    Allergies:  Bactrim (Unknown)    Hospital Medications:   MEDICATIONS  (STANDING):  ascorbic acid 500 milliGRAM(s) Oral every 8 hours  BACItracin   Ointment 1 Application(s) Topical two times a day  calcium acetate 1334 milliGRAM(s) Oral three times a day with meals  chlorhexidine 0.12% Liquid 15 milliLiter(s) Oral Mucosa every 12 hours  chlorhexidine 4% Liquid 1 Application(s) Topical <User Schedule>  dextrose 50% Injectable 25 Gram(s) IV Push once  diltiazem    Tablet 60 milliGRAM(s) Oral every 8 hours  heparin  Injectable 5000 Unit(s) SubCutaneous every 8 hours  insulin regular Infusion 1 Unit(s)/Hr (1 mL/Hr) IV Continuous <Continuous>  lactulose Syrup 20 Gram(s) Oral every 12 hours  levETIRAcetam 250 milliGRAM(s) Oral two times a day  meropenem  IVPB 500 milliGRAM(s) IV Intermittent every 12 hours  metoprolol tartrate 25 milliGRAM(s) Oral every 8 hours  midodrine 10 milliGRAM(s) Oral every 8 hours  pantoprazole   Suspension 40 milliGRAM(s) Oral daily  senna 2 Tablet(s) Oral at bedtime  sevelamer carbonate 800 milliGRAM(s) Oral three times a day with meals  vancomycin  IVPB      vancomycin  IVPB 1000 milliGRAM(s) IV Intermittent every 24 hours  zinc sulfate 220 milliGRAM(s) Oral daily        VITALS:  T(F): 98.6 (04-20-20 @ 04:00), Max: 98.7 (04-19-20 @ 16:00)  HR: 83 (04-20-20 @ 04:00)  BP: --  RR: 30 (04-20-20 @ 04:00)  SpO2: 100% (04-20-20 @ 07:46)  Wt(kg): --    04-18 @ 07:01  -  04-19 @ 07:00  --------------------------------------------------------  IN: 1335.1 mL / OUT: 1791 mL / NET: -455.9 mL    04-19 @ 07:01  -  04-20 @ 07:00  --------------------------------------------------------  IN: 1270 mL / OUT: 2190 mL / NET: -920 mL    04-20 @ 07:01  -  04-20 @ 08:38  --------------------------------------------------------  IN: 0 mL / OUT: 175 mL / NET: -175 mL          PHYSICAL EXAM:  Constitutional: NAD  HEENT: anicteric sclera, oropharynx clear, MMM  Neck: No JVD  Respiratory: CTAB, no wheezes, rales or rhonchi  Cardiovascular: S1, S2, RRR  Gastrointestinal: BS+, soft, NT/ND  Extremities: No cyanosis or clubbing. No peripheral edema  :  No santana.   Skin: No rashes    LABS:  04-20    154<H>  |  112<H>  |  138<HH>  ----------------------------<  129<H>  3.6   |  25  |  4.7<HH>  Creatinine Trend: 4.7<--, 4.3<--, 4.3<--, 4.9<--, 4.8<--, 4.8<--  SODIUM TREND:  Sodium 154 [04-20 @ 01:00]  Sodium 155 [04-19 @ 18:09]  Sodium 156 [04-19 @ 17:04]  Sodium 149 [04-19 @ 01:00]  Sodium 151 [04-18 @ 17:11]  Sodium 148 [04-18 @ 02:38]  Sodium 149 [04-17 @ 15:20]  Sodium 153 [04-17 @ 00:00]  Sodium 152 [04-16 @ 16:21]  Sodium 151 [04-16 @ 00:30]    Ca    8.7      20 Apr 2020 01:00  Phos  6.0     04-20  Mg     2.6     04-20    TPro  5.7<L>  /  Alb  2.6<L>  /  TBili  0.7  /  DBili      /  AST  84<H>  /  ALT  120<H>  /  AlkPhos  102  04-20                          8.8    15.60 )-----------( 192      ( 20 Apr 2020 01:00 )             27.6       Urine Studies:      RADIOLOGY & ADDITIONAL STUDIES:

## 2020-04-20 NOTE — PROGRESS NOTE ADULT - ASSESSMENT
LIZZIE/ ATN/ hyperkalemia/ respiratory failure / covid positive / high BUN  #off  diuretic   # ph  improving  feed nepro, on binders, add renagel 3/3/3  # d/c vit C    #  s/p hd friday no hd today , creatinine atble   # BUN noted : highly catabolic and prerenal, off  diuretics  # sodium  noted, d5 at 75 cc/h, free water with feed   # vanco level noted   # palliative care notes appreciated   # will follow   # overall progress poor

## 2020-04-20 NOTE — PROGRESS NOTE ADULT - SUBJECTIVE AND OBJECTIVE BOX
KONSTANTIN WYATT  [id number]  74y Male    Indication for ICU admission: Acute Hypoxemic Respiratory Failure, clinical COVID19 (false negatvie)    Admit Date:20  ICU Date: 20  OR Date:    Bactrim (Unknown)    PAST MEDICAL & SURGICAL HISTORY:  Afib  DM (diabetes mellitus)  BPH (benign prostatic hyperplasia)  CAD (coronary artery disease)    Home Medications:  Cartia  mg/24 hours oral capsule, extended release: 1 cap(s) orally once a day (30 Mar 2020 18:04)  Eliquis 5 mg oral tablet: 1 tab(s) orally 2 times a day (30 Mar 2020 18:04)  Lipitor 40 mg oral tablet: 1 tab(s) orally once a day (30 Mar 2020 18:04)  lisinopril 40 mg oral tablet: 1 tab(s) orally once a day (30 Mar 2020 18:04)  metFORMIN 750 mg oral tablet, extended release: 1 tab(s) orally once a day (30 Mar 2020 18:04)    24HRS EVENT:   No acute events ON.    NEURO    Not on sedation, PRN as needed- none needed overnight    HCT -multiple acute hemorraghic infarcts, neuro consulted contacted rupert Motley     Keppra 250mg BID 2/2 poor renal function, MRI, MRA. Unable to obtain angio imaging 2/2 renal injury. EEG with no epileptiform activity        -Neurology recalled, evaluated, possible brainstem ischemia, recommended MRI noncon, CT head noncon    RASS -4, pupils reactive, moving extremities    Pain control: Acetaminophen 650mg PO q6 PRN    RESP  trached , repeat COVID negative   Acute Hypoxemic Respiratory Failure, on vent   COVID-19/Viral Pneumonia--repeat   negative for COVID  - trach, on vent: 500/30/40/5, no need for PM ABG  AM ABG : 7.48/36/118/27/99% lac 0.9  Attempted PSV : tolerated 2 hrs, RR 30s, TV 500s then apnea- returned to   Daily CXR   DC robitussin     CARD/VASC  Acute tachycardia, hx of Afib, AFIB RVR on this admission -- on PO Cardizem 60mg q8h, and PO Lopressor 25 mg q 8. Eliquis held 2/2 hemorrhagic stroke. Now in NSR with PACs  Hypotension - off levo, midodrine 10q8h for hypotension, resolved     Hx of Afib - Diltiazem. BB, currently holding Apixaban 2/2 HCT findings    Hx of CAD - Atorvastatin 40mg PO qhs    Hx of HTN - hold Lisinopril   QTc: _468_ NSR w PAC, incomplete RBBB  -> TLC 2 of 3 lumens not drawing back, placed cathflow     GI/NUTR  NGT, VitC, Zinc, tolerating TF Glucerna @50   Had BM ON     PPI  Senna     Daily weight (4/10) 98.7kg,                          ( ) 95.4kg                         ( ) 93.3 kg                         ( ) 92.3 kg                         () 91.9kg  	       () 88.7kg                         () 89kg                         () 89.1kg  /Renal  LIZZIE with uremia: BUN / Cr improved after HD, kept even , no session since, via right femoral [place]   Evans -nonoliguric renal failure,  UOP 75ml/hr  BUN/Cr 134/4-> 136/4.3 > 138/4.7   Lytes-Na 154 // K 3.6 // Phos 6.0 //  Mag 2.6 (K not repleted 2/2 on dialysis for lytes)  Hyperphosphatemia, On Renegel   Hypernatremia 155 > 154, on free water 250 Q6H   HD : kept even   Hx of BPH    HEME/ONC  Holding ELIQUIS- repeat head CT and MRI noncon per neurology  and reevaluate possible heparin gtt initiation on results   Hgb stable 8.8 < 9.4     ID    Afebrile    WBC downtrending 22 > 19.5 > 15    Procal 1.92    COVID-19 +  - changed cefepime to Ksenia [id number]  MRSA Pneumonia  - on vanco 1gm q24h, will need level . Last level 11.4  - completed course of Hydroxychloroquine & Azithromycin  - Ascorbic acid, Zinc  - D-dimer downtrending 2,640 > 2370 > 2336    ENDO  DM2; uncontrolled, holding home Metformin. Off insulin gtt @ 3am, back on insulin gtt in AM @ 2  POCT Blood Glucose    MSK/DERM  -burn consult for lip lac- bacitracin and peridex    DVT PPx  - on hepsub q  - off Apixaban     GI PPx  -  Pantoprazole 40mg PO qac      ***Tubes/Lines/Drains  ***  Peripheral IV  Central Venous Line  Right IJ TLC  	Date 3/31/20  R fem [place]   Arterial Line  Right radial A-line		                Date: 3/31/20  Urnary Catheter		Indication: Strict I&O    Date Placed: 3/31/20  ETT  OGT    REVIEW OF SYSTEMS    [ ] A ten-point review of systems was otherwise negative except as noted.  [x ] Due to altered mental status/intubation, subjective information were not able to be obtained from the patient. History was obtained, to the extent possible, from review of the chart and collateral sources of information.    Daily     Daily Weight in k.1 (2020 06:36)    Diet, NPO with Tube Feed:   Tube Feeding Modality: Orogastric  Glucerna 1.2 Choco  Total Volume for 24 Hours (mL): 1200  Continuous  Starting Tube Feed Rate mL per Hour: 30  Increase Tube Feed Rate by (mL): 10     Every hour  Until Goal Tube Feed Rate (mL per Hour): 50  Tube Feed Duration (in Hours): 24  Tube Feed Start Time: 13:00 (20 @ 17:56)      CURRENT MEDS:  Neurologic Medications  acetaminophen  Suppository .. 650 milliGRAM(s) Rectal every 6 hours PRN Temp greater or equal to 38.5C (101.3F)  levETIRAcetam 250 milliGRAM(s) Oral two times a day    Respiratory Medications    Cardiovascular Medications  diltiazem    Tablet 60 milliGRAM(s) Oral every 8 hours  metoprolol tartrate 25 milliGRAM(s) Oral every 8 hours  midodrine 10 milliGRAM(s) Oral every 8 hours    Gastrointestinal Medications  ascorbic acid 500 milliGRAM(s) Oral every 8 hours  calcium acetate 1334 milliGRAM(s) Oral three times a day with meals  lactulose Syrup 20 Gram(s) Oral every 12 hours  pantoprazole   Suspension 40 milliGRAM(s) Oral daily  senna 2 Tablet(s) Oral at bedtime  zinc sulfate 220 milliGRAM(s) Oral daily    Genitourinary Medications    Hematologic/Oncologic Medications  heparin  Injectable 5000 Unit(s) SubCutaneous every 8 hours    Antimicrobial/Immunologic Medications  meropenem  IVPB 500 milliGRAM(s) IV Intermittent every 12 hours  vancomycin  IVPB      vancomycin  IVPB 1000 milliGRAM(s) IV Intermittent every 24 hours    Endocrine/Metabolic Medications  dextrose 50% Injectable 25 Gram(s) IV Push once  insulin regular Infusion 1 Unit(s)/Hr IV Continuous <Continuous>    Topical/Other Medications  BACItracin   Ointment 1 Application(s) Topical two times a day  chlorhexidine 0.12% Liquid 15 milliLiter(s) Oral Mucosa every 12 hours  chlorhexidine 4% Liquid 1 Application(s) Topical <User Schedule>  sevelamer carbonate 800 milliGRAM(s) Oral three times a day with meals      ICU Vital Signs Last 24 Hrs  T(C): 36.9 (2020 08:00), Max: 37.1 (2020 16:00)  T(F): 98.4 (2020 08:00), Max: 98.7 (2020 16:00)  HR: 87 (2020 08:00) (68 - 90)  BP: --  BP(mean): --  ABP: 138/63 (2020 08:00) (133/60 - 158/81)  ABP(mean): 89 (2020 08:00) (84 - 89)  RR: 30 (2020 08:00) (30 - 31)  SpO2: 99% (2020 08:00) (99% - 100%)    Mode: AC/ CMV (Assist Control/ Continuous Mandatory Ventilation)  RR (machine): 30  TV (machine): 500  FiO2: 30  PEEP: 5  ITime: 1  MAP: 14  PIP: 23    ABG - ( 2020 01:45 )  pH, Arterial: 7.48  pH, Blood: x     /  pCO2: 36    /  pO2: 118   / HCO3: 27    / Base Excess: 3.5   /  SaO2: 99                  I&O's Summary    2020 07:  -  2020 07:00  --------------------------------------------------------  IN: 1270 mL / OUT: 2190 mL / NET: -920 mL    2020 07:01  -  2020 11:42  --------------------------------------------------------  IN: 0 mL / OUT: 390 mL / NET: -390 mL      I&O's Detail    2020 07:01  -  2020 07:00  --------------------------------------------------------  IN:    Enteral Tube Flush: 100 mL    Glucerna: 1150 mL    insulin regular Infusion: 20 mL  Total IN: 1270 mL    OUT:    Indwelling Catheter - Urethral: 2190 mL  Total OUT: 2190 mL    Total NET: -920 mL      2020 07:01  -  2020 11:42  --------------------------------------------------------  IN:  Total IN: 0 mL    OUT:    Indwelling Catheter - Urethral: 390 mL  Total OUT: 390 mL    Total NET: -390 mL          PHYSICAL EXAM:      General/Neuro  Deficits:   lip lac  - Withdraws in BL LE, pupils dilated and symmetric. Spontaneous head turning, not following commands, not opening eyes.    Lungs:  trach in place, on vent. Synchronous, clear to auscultation    Cardiovascular : S1, S2.  Irregular,  + symmetric mild peripheral edema   Cardiac Rhythm: sinus with PACs    GI: Abdomen soft, nontender, nondistended, no rebound   orogastric tube, on tube feeds     Extremities: Extremities warm, pink, well-perfused. Edematous     Derm: Good skin turgor, no skin breakdown.      :       Evans catheter in place.    CXR:       LABS:  CAPILLARY BLOOD GLUCOSE      POCT Blood Glucose.: 117 mg/dL (2020 09:52)  POCT Blood Glucose.: 145 mg/dL (2020 05:43)  POCT Blood Glucose.: 147 mg/dL (2020 03:56)  POCT Blood Glucose.: 129 mg/dL (2020 00:13)  POCT Blood Glucose.: 144 mg/dL (2020 22:01)  POCT Blood Glucose.: 195 mg/dL (2020 19:59)  POCT Blood Glucose.: 176 mg/dL (2020 18:23)  POCT Blood Glucose.: 140 mg/dL (2020 15:51)  POCT Blood Glucose.: 160 mg/dL (2020 14:22)  POCT Blood Glucose.: 211 mg/dL (2020 12:23)                          8.8    15.60 )-----------( 192      (  )             27.6       04-20    154<H>  |  112<H>  |  138<HH>  ----------------------------<  129<H>  3.6   |  25  |  4.7<HH>    Ca    8.7      :00  Phos  6.0     04-20  Mg     2.6     04-20    TPro  5.7<L>  /  Alb  2.6<L>  /  TBili  0.7  /  DBili  x   /  AST  84<H>  /  ALT  120<H>  /  AlkPhos  102  04-20      PT/INR - ( :00 )   PT: 14.40 sec;   INR: 1.25 ratio         PTT - (  )  PTT:26.6 sec

## 2020-04-20 NOTE — PROGRESS NOTE ADULT - ASSESSMENT
72y male, acute respiratory failure 2/2 COVID-19    NEURO    Acute infarcts- keppra 250mg BID, EEG, when stable perform MRI, EEG- no epileptiform activit. Neuro recommending repeat CT head: no needed PRNs, and per  Gave head CT today    No agitation, RASS -4    RESP  Acute Hypoxemic Respiratory Failure; Intubated, /30/40/5  ABG 7.48/36/118, bicarb 27, 99% sat lact 0.9  COVID-19/Viral Pneumonia  -repeat swab 4/13 and 4/17 negative  4/15 DTA: MRSA +, on vanco and nikky   s/p Trach 4/18         CARD/VASC  - acute hypotension- now off levophed, on midodrine    Hx of Afib, AFIB RVR on this admission - holding Eliquis 2/2 stroke, Cardizem PO 60mg q8, Metoprolol 25mg q8   CAD - continue Atorvastatin   Hx of HTN- holding Lisinopril   - Sinus with PACs on EKG this AM    GI/NUTR  - diet: TF @ 50 via NGT, Vit C, Zinc  - PPI  - Senna, Lactulose bowel regimen- Last BM 4/18   Daily weight 4/18 88.7 kg-> 89kg (Admission 99kg)     /Renal  LIZZIE + uremia - HD 4/17 kept even, no HD 4/18; on phoslo. Follow up nephrology regarding timing of next HD session. Flora in Right femoral   Evans - UO 75cc/hr; net negative 450ml  off Ethacrynic, on phoslo  Hx of BPH  Monitor electrolytes, Na 149, K 3.8, Mg 2.5, Phos 6.4  As per nephrology -  add renagel 3/3/3, d/c vit c    HEME/ONC  Hgb stable-8.8, continue to monitor   DVT ppx: holding eliquis, d/w Neurology regarding ability to restart 2/2 infarct.   D dimer: 2336 (2370)     ID    Afebrile, Tmax 98.7     COVID 19- intubated, on precautions, WBC 22.3-> 19.5-> 15.6, continue to trend.    procalcitonin 1.38-> 1.58-> 1.92, rising. Continue to monitor   DTA 4/15: + MRSA pneumonia: on meropenem and vancomycin, check vanco level 4/21 (last 11.4 on 4/19)    ENDO  DM2 - holding home Metformin  Hyperglycemia - on insulin gtt, FS q1h;  (117-230)    MSK/DERM  Lower lip ulceration   No surgical intervention   Rec - keep site moist - Bacitracin ointment or vit A & D ointment     DVT PPx: Eliquis (currently held 2/2 cerebral infarcts) on SQH   GI PPx: PPI    DISPO: ICU

## 2020-04-20 NOTE — CHART NOTE - NSCHARTNOTEFT_GEN_A_CORE
Spoke with wife Opal  regarding her 's repeat head CT demonstrating multiple B/L hemorrhagic infarcts worse since 4/14.  Otherwise discussed plan for no HD based on stable creatinine, on trach and opens eyes with spontaneous mvment.  Marie Ralph MD

## 2020-04-20 NOTE — CHART NOTE - NSCHARTNOTEFT_GEN_A_CORE
Registered Dietitian Follow-Up     Patient Profile Reviewed                           Yes [x]   No []     Nutrition History Previously Obtained        Yes []  No [x]-unable to obtain at this time as pt is intubated        Pertinent Medical Interventions: Pt s/p trach placement on 4/18. Pt more responsive, moving spontaneously at times; continue to hold sedation. Pt s/p HD Friday, no plan for HD today; creatinine stable.      Diet order: Glucerna 1.2 @ 50ml/hr (1440kcal, 72gm pro, 966ml free water)--pt continues to tolerate TF regimen well w/o any issues. MAP 89.     Anthropometrics:  - Ht. 175.26cm   - Wt. 89.1kg (4/20)--recent downtrend in wts observed, wt fluctuations likely 2/2 to fluids (previously noted with 3-4+ edema); will continue to monitor wt trends closely   (4/19): 89kg   (4/18): 88.7kg   (4/17): 91.9kg  (4/16): 92.3kg   (4/15): 91.8kg   (4/14): 93.3kg  (4/13): 95.4kg   (4/9): 105.2kg  (4/4): 104.2kg   (4/3): 103.3kg   (4/2): 99kg   (3/31): 95kgz  - BMI: 28.7 using lowest wt of 88.7kg   - IBW: 72.7kg      Pertinent Lab Data: (4/20): H/H 8.8/27.6, POCT 117, Na 154, , Cr. 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 protein needs      Previous Nutrition Diagnosis: Inadequate protein-energy intake (ongoing)      Nutrition Intervention: enteral nutrition, collaboration of care   Recommendations:  1. HOLD TF if pt cannot sustain MAP >65.  2. When pt hemodynamically stable,  initiate the following TF regimen: Peptamen AF @ 20ml/hr, increase rate by 10ml Q6H, until goal rate of 50ml/hr is reached. TF @ goal rate to provide a total of 1440kcal, 90gm pro, 972ml free water. Additional flushes per LIP. Recs discussed with LIP ([id number]).   3. D/C zinc sulfate today as it has been in place [id number].   4. RD will monitor GOC/POC and adjust interventions PRN.   **Recommend hydrolyzed formula such as Peptamen AF, as it is designed to support absorption and tolerance.**     Goal/Expected Outcome: Pt to meet % of estimated nutrient needs within 3 days      Indicator/Monitoring: RD to monitor diet order, energy intake, body composition, renal/glucose/liver profiles, NFPF. Registered Dietitian Follow-Up     Patient Profile Reviewed                           Yes [x]   No []     Nutrition History Previously Obtained        Yes []  No [x]-unable to obtain at this time as pt is sedated and emergency contact unreachable via phone.      Pertinent Medical Interventions: Pt s/p trach placement on 4/18. Pt more responsive, moving spontaneously at times; continue to hold sedation. Pt s/p HD Friday (4/17), no plan for HD today; creatinine stable.      Diet order: Glucerna 1.2 @ 50ml/hr (1440kcal, 72gm pro, 966ml free water)--pt continues to tolerate TF regimen well w/o any issues. MAP 89.     Anthropometrics:  - Ht. 175.26cm   - Wt. 89.1kg (4/20)--recent downtrend in wts observed, wt fluctuations likely 2/2 to fluids (previously noted with 3-4+ edema); will continue to monitor wt trends closely   (4/19): 89kg   (4/18): 88.7kg   (4/17): 91.9kg  (4/16): 92.3kg   (4/15): 91.8kg   (4/14): 93.3kg  (4/13): 95.4kg   (4/9): 105.2kg  (4/4): 104.2kg   (4/3): 103.3kg   (4/2): 99kg   (3/31): 95kgz  - BMI: 28.7 using lowest wt of 88.7kg   - IBW: 72.7kg      Pertinent Lab Data: (4/20): H/H 8.8/27.6, POCT 117, Na 154, , Cr. 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 per LIP documentation on 4/18     Nutrition Requirements  Weight Used: lowest wt this adm: 195#/88.7kg, IBW: 160#/73kgm MSJ: 1619; kcal needs readjusted today s/p trach      Estimated Energy Needs: 7863-2082 kcal/day (MSJ x 1.2-1.4 AF)--increased needs d/t HD, however will aim towards lower end of range as HD is not consistent, will readjust prn  Estimated Protein Needs:  gm/day (1.2-1.5 gm/kg CBW)--same as above, stage 4 pressure injury considered   Estimated Fluid Needs: per SICU team      Nutrient Intake: TF providing 64-74% of kcal needs and 24-81% of pro needs     Previous Nutrition Diagnosis: Inadequate protein-energy intake (ongoing)      Nutrition Intervention: enteral nutrition, collaboration of care     Recommendations:  1. Increase TF rate of Glucerna 1.2 to 60ml/hr as tolerated. TF regimen to provide a total of 1728kcal, 86gm pro, 1140mg phos, 1159ml free water. Additional flushes per LIP.  2. D/C zinc sulfate today as it should've been D/C'd between 4/9-4/13; prolonged supplementation NOT recommended as it can lead to adverse side effects, such as GI abnormalities (N/V/D) and copper deficiency.     **All recs discussed with LIP ([id number])**     Goal/Expected Outcome: Pt to meet % of estimated nutrient needs within 3 days      Indicator/Monitoring: RD to monitor diet order, energy intake, body composition, renal/glucose/liver profiles, NFPF.

## 2020-04-20 NOTE — PROGRESS NOTE ADULT - SUBJECTIVE AND OBJECTIVE BOX
24 h events noted  intubated/ventilated         PAST HISTORY  --------------------------------------------------------------------------------  No significant changes to PMH, PSH, FHx, SHx, unless otherwise noted    ALLERGIES & MEDICATIONS  --------------------------------------------------------------------------------  Allergies    Bactrim (Unknown)    Intolerances      Standing Inpatient Medications  ascorbic acid 500 milliGRAM(s) Oral every 8 hours  BACItracin   Ointment 1 Application(s) Topical two times a day  calcium acetate 1334 milliGRAM(s) Oral three times a day with meals  chlorhexidine 0.12% Liquid 15 milliLiter(s) Oral Mucosa every 12 hours  chlorhexidine 4% Liquid 1 Application(s) Topical <User Schedule>  dextrose 50% Injectable 25 Gram(s) IV Push once  diltiazem    Tablet 60 milliGRAM(s) Oral every 8 hours  heparin  Injectable 5000 Unit(s) SubCutaneous every 8 hours  insulin regular Infusion 1 Unit(s)/Hr IV Continuous <Continuous>  lactulose Syrup 20 Gram(s) Oral every 12 hours  levETIRAcetam 250 milliGRAM(s) Oral two times a day  meropenem  IVPB 500 milliGRAM(s) IV Intermittent every 12 hours  metoprolol tartrate 25 milliGRAM(s) Oral every 8 hours  midodrine 10 milliGRAM(s) Oral every 8 hours  pantoprazole   Suspension 40 milliGRAM(s) Oral daily  senna 2 Tablet(s) Oral at bedtime  sevelamer carbonate 800 milliGRAM(s) Oral three times a day with meals  vancomycin  IVPB      vancomycin  IVPB 1000 milliGRAM(s) IV Intermittent every 24 hours  zinc sulfate 220 milliGRAM(s) Oral daily    PRN Inpatient Medications  acetaminophen  Suppository .. 650 milliGRAM(s) Rectal every 6 hours PRN          VITALS/PHYSICAL EXAM  --------------------------------------------------------------------------------  T(C): 37 (04-20-20 @ 04:00), Max: 37.1 (04-19-20 @ 16:00)  HR: 83 (04-20-20 @ 04:00) (68 - 90)  BP: --  RR: 30 (04-20-20 @ 04:00) (30 - 31)  SpO2: 100% (04-20-20 @ 07:46) (99% - 100%)  Wt(kg): --        04-19-20 @ 07:01  -  04-20-20 @ 07:00  --------------------------------------------------------  IN: 1270 mL / OUT: 2190 mL / NET: -920 mL    04-20-20 @ 07:01  -  04-20-20 @ 09:35  --------------------------------------------------------  IN: 0 mL / OUT: 175 mL / NET: -175 mL      Physical Exam:  	Gen:intubated/ventilated   	Vascular access: amelia     LABS/STUDIES  --------------------------------------------------------------------------------              8.8    15.60 >-----------<  192      [04-20-20 @ 01:00]              27.6     154  |  112  |  138  ----------------------------<  129      [04-20-20 @ 01:00]  3.6   |  25  |  4.7        Ca     8.7     [04-20-20 @ 01:00]      Mg     2.6     [04-20-20 @ 01:00]      Phos  6.0     [04-20-20 @ 01:00]    TPro  5.7  /  Alb  2.6  /  TBili  0.7  /  DBili  x   /  AST  84  /  ALT  120  /  AlkPhos  102  [04-20-20 @ 01:00]    PT/INR: PT 14.40, INR 1.25       [04-20-20 @ 01:00]  PTT: 26.6       [04-20-20 @ 01:00]      Creatinine Trend:  SCr 4.7 [04-20 @ 01:00]  SCr 4.3 [04-19 @ 18:09]  SCr 4.3 [04-19 @ 17:04]  SCr 4.9 [04-19 @ 01:00]  SCr 4.8 [04-18 @ 17:11]    Urinalysis - [03-30-20 @ 22:00]      Color Yellow / Appearance Clear / SG 1.031 / pH 6.0      Gluc Negative / Ketone Trace  / Bili Negative / Urobili <2 mg/dL       Blood Moderate / Protein 300 mg/dL / Leuk Est Negative / Nitrite Negative      RBC 10 / WBC 29 / Hyaline 24 / Gran  / Sq Epi  / Non Sq Epi 22 / Bacteria Negative      Ferritin 2437      [04-17-20 @ 16:00]  HbA1c 7.7      [04-01-20 @ 04:30]

## 2020-04-21 LAB
ALBUMIN SERPL ELPH-MCNC: 2.4 G/DL — LOW (ref 3.5–5.2)
ALBUMIN SERPL ELPH-MCNC: 2.6 G/DL — LOW (ref 3.5–5.2)
ALP SERPL-CCNC: 104 U/L — SIGNIFICANT CHANGE UP (ref 30–115)
ALP SERPL-CCNC: 94 U/L — SIGNIFICANT CHANGE UP (ref 30–115)
ALT FLD-CCNC: 105 U/L — HIGH (ref 0–41)
ALT FLD-CCNC: 105 U/L — HIGH (ref 0–41)
ANION GAP SERPL CALC-SCNC: 15 MMOL/L — HIGH (ref 7–14)
ANION GAP SERPL CALC-SCNC: 15 MMOL/L — HIGH (ref 7–14)
AST SERPL-CCNC: 59 U/L — HIGH (ref 0–41)
AST SERPL-CCNC: 71 U/L — HIGH (ref 0–41)
BASE EXCESS BLDA CALC-SCNC: 3.1 MMOL/L — HIGH (ref -2–2)
BILIRUB SERPL-MCNC: 0.4 MG/DL — SIGNIFICANT CHANGE UP (ref 0.2–1.2)
BILIRUB SERPL-MCNC: 0.6 MG/DL — SIGNIFICANT CHANGE UP (ref 0.2–1.2)
BUN SERPL-MCNC: 127 MG/DL — CRITICAL HIGH (ref 10–20)
BUN SERPL-MCNC: 134 MG/DL — CRITICAL HIGH (ref 10–20)
CALCIUM SERPL-MCNC: 8.2 MG/DL — LOW (ref 8.5–10.1)
CALCIUM SERPL-MCNC: 8.8 MG/DL — SIGNIFICANT CHANGE UP (ref 8.5–10.1)
CHLORIDE SERPL-SCNC: 115 MMOL/L — HIGH (ref 98–110)
CHLORIDE SERPL-SCNC: 116 MMOL/L — HIGH (ref 98–110)
CK MB CFR SERPL CALC: 3.1 NG/ML — SIGNIFICANT CHANGE UP (ref 0.6–6.3)
CK MB CFR SERPL CALC: 3.1 NG/ML — SIGNIFICANT CHANGE UP (ref 0.6–6.3)
CK SERPL-CCNC: 100 U/L — SIGNIFICANT CHANGE UP (ref 0–225)
CK SERPL-CCNC: 136 U/L — SIGNIFICANT CHANGE UP (ref 0–225)
CO2 SERPL-SCNC: 26 MMOL/L — SIGNIFICANT CHANGE UP (ref 17–32)
CO2 SERPL-SCNC: 26 MMOL/L — SIGNIFICANT CHANGE UP (ref 17–32)
CREAT SERPL-MCNC: 3.6 MG/DL — HIGH (ref 0.7–1.5)
CREAT SERPL-MCNC: 4.3 MG/DL — CRITICAL HIGH (ref 0.7–1.5)
CRP SERPL-MCNC: 6.43 MG/DL — HIGH (ref 0–0.4)
D DIMER BLD IA.RAPID-MCNC: 4185 NG/ML DDU — HIGH (ref 0–230)
ERYTHROCYTE [SEDIMENTATION RATE] IN BLOOD: 128 MM/HR — HIGH (ref 0–10)
FERRITIN SERPL-MCNC: 1902 NG/ML — HIGH (ref 30–400)
GLUCOSE BLDC GLUCOMTR-MCNC: 147 MG/DL — HIGH (ref 70–99)
GLUCOSE BLDC GLUCOMTR-MCNC: 169 MG/DL — HIGH (ref 70–99)
GLUCOSE BLDC GLUCOMTR-MCNC: 170 MG/DL — HIGH (ref 70–99)
GLUCOSE BLDC GLUCOMTR-MCNC: 194 MG/DL — HIGH (ref 70–99)
GLUCOSE BLDC GLUCOMTR-MCNC: 215 MG/DL — HIGH (ref 70–99)
GLUCOSE BLDC GLUCOMTR-MCNC: 86 MG/DL — SIGNIFICANT CHANGE UP (ref 70–99)
GLUCOSE BLDC GLUCOMTR-MCNC: 98 MG/DL — SIGNIFICANT CHANGE UP (ref 70–99)
GLUCOSE SERPL-MCNC: 143 MG/DL — HIGH (ref 70–99)
GLUCOSE SERPL-MCNC: 159 MG/DL — HIGH (ref 70–99)
HCO3 BLDA-SCNC: 27 MMOL/L — SIGNIFICANT CHANGE UP (ref 21–29)
HOROWITZ INDEX BLDA+IHG-RTO: 21 — SIGNIFICANT CHANGE UP
LDH SERPL L TO P-CCNC: 449 U/L — HIGH (ref 50–242)
MAGNESIUM SERPL-MCNC: 2.5 MG/DL — HIGH (ref 1.8–2.4)
MAGNESIUM SERPL-MCNC: 2.7 MG/DL — HIGH (ref 1.8–2.4)
PCO2 BLDA: 38 MMHG — SIGNIFICANT CHANGE UP (ref 38–42)
PH BLDA: 7.46 — HIGH (ref 7.38–7.42)
PHOSPHATE SERPL-MCNC: 4.6 MG/DL — SIGNIFICANT CHANGE UP (ref 2.1–4.9)
PHOSPHATE SERPL-MCNC: 5.4 MG/DL — HIGH (ref 2.1–4.9)
PO2 BLDA: 95 MMHG — SIGNIFICANT CHANGE UP (ref 78–95)
POTASSIUM SERPL-MCNC: 3.8 MMOL/L — SIGNIFICANT CHANGE UP (ref 3.5–5)
POTASSIUM SERPL-MCNC: 4 MMOL/L — SIGNIFICANT CHANGE UP (ref 3.5–5)
POTASSIUM SERPL-SCNC: 3.8 MMOL/L — SIGNIFICANT CHANGE UP (ref 3.5–5)
POTASSIUM SERPL-SCNC: 4 MMOL/L — SIGNIFICANT CHANGE UP (ref 3.5–5)
PROCALCITONIN SERPL-MCNC: 0.75 NG/ML — HIGH (ref 0.02–0.1)
PROT SERPL-MCNC: 5.3 G/DL — LOW (ref 6–8)
PROT SERPL-MCNC: 5.9 G/DL — LOW (ref 6–8)
SAO2 % BLDA: 97 % — HIGH (ref 92–96)
SODIUM SERPL-SCNC: 156 MMOL/L — HIGH (ref 135–146)
SODIUM SERPL-SCNC: 157 MMOL/L — HIGH (ref 135–146)
TROPONIN T SERPL-MCNC: 0.05 NG/ML — CRITICAL HIGH
TROPONIN T SERPL-MCNC: 0.05 NG/ML — CRITICAL HIGH

## 2020-04-21 PROCEDURE — 93010 ELECTROCARDIOGRAM REPORT: CPT

## 2020-04-21 PROCEDURE — 71045 X-RAY EXAM CHEST 1 VIEW: CPT | Mod: 26

## 2020-04-21 PROCEDURE — 99291 CRITICAL CARE FIRST HOUR: CPT | Mod: CS

## 2020-04-21 RX ORDER — DILTIAZEM HCL 120 MG
10 CAPSULE, EXT RELEASE 24 HR ORAL ONCE
Refills: 0 | Status: COMPLETED | OUTPATIENT
Start: 2020-04-21 | End: 2020-04-21

## 2020-04-21 RX ORDER — METOPROLOL TARTRATE 50 MG
5 TABLET ORAL ONCE
Refills: 0 | Status: COMPLETED | OUTPATIENT
Start: 2020-04-21 | End: 2020-04-21

## 2020-04-21 RX ORDER — CHLORHEXIDINE GLUCONATE 213 G/1000ML
1 SOLUTION TOPICAL
Refills: 0 | Status: DISCONTINUED | OUTPATIENT
Start: 2020-04-21 | End: 2020-05-05

## 2020-04-21 RX ORDER — CHLORHEXIDINE GLUCONATE 213 G/1000ML
15 SOLUTION TOPICAL
Refills: 0 | Status: DISCONTINUED | OUTPATIENT
Start: 2020-04-21 | End: 2020-04-29

## 2020-04-21 RX ORDER — PROPOFOL 10 MG/ML
10 INJECTION, EMULSION INTRAVENOUS
Qty: 1000 | Refills: 0 | Status: DISCONTINUED | OUTPATIENT
Start: 2020-04-21 | End: 2020-04-23

## 2020-04-21 RX ORDER — DILTIAZEM HCL 120 MG
12.5 CAPSULE, EXT RELEASE 24 HR ORAL
Qty: 125 | Refills: 0 | Status: DISCONTINUED | OUTPATIENT
Start: 2020-04-21 | End: 2020-04-21

## 2020-04-21 RX ORDER — INSULIN HUMAN 100 [IU]/ML
INJECTION, SOLUTION SUBCUTANEOUS EVERY 4 HOURS
Refills: 0 | Status: DISCONTINUED | OUTPATIENT
Start: 2020-04-21 | End: 2020-04-23

## 2020-04-21 RX ORDER — SODIUM CHLORIDE 9 MG/ML
1000 INJECTION, SOLUTION INTRAVENOUS
Refills: 0 | Status: DISCONTINUED | OUTPATIENT
Start: 2020-04-21 | End: 2020-04-24

## 2020-04-21 RX ADMIN — LACTULOSE 20 GRAM(S): 10 SOLUTION ORAL at 04:14

## 2020-04-21 RX ADMIN — SEVELAMER CARBONATE 800 MILLIGRAM(S): 2400 POWDER, FOR SUSPENSION ORAL at 12:48

## 2020-04-21 RX ADMIN — CHLORHEXIDINE GLUCONATE 15 MILLILITER(S): 213 SOLUTION TOPICAL at 04:14

## 2020-04-21 RX ADMIN — MEROPENEM 100 MILLIGRAM(S): 1 INJECTION INTRAVENOUS at 17:32

## 2020-04-21 RX ADMIN — PROPOFOL 5.7 MICROGRAM(S)/KG/MIN: 10 INJECTION, EMULSION INTRAVENOUS at 10:05

## 2020-04-21 RX ADMIN — Medication 1334 MILLIGRAM(S): at 17:39

## 2020-04-21 RX ADMIN — Medication 60 MILLIGRAM(S): at 20:56

## 2020-04-21 RX ADMIN — Medication 60 MILLIGRAM(S): at 17:38

## 2020-04-21 RX ADMIN — CHLORHEXIDINE GLUCONATE 1 APPLICATION(S): 213 SOLUTION TOPICAL at 04:16

## 2020-04-21 RX ADMIN — Medication 25 MILLIGRAM(S): at 12:48

## 2020-04-21 RX ADMIN — INSULIN HUMAN 3: 100 INJECTION, SOLUTION SUBCUTANEOUS at 17:20

## 2020-04-21 RX ADMIN — Medication 10 MILLIGRAM(S): at 05:58

## 2020-04-21 RX ADMIN — LEVETIRACETAM 250 MILLIGRAM(S): 250 TABLET, FILM COATED ORAL at 04:14

## 2020-04-21 RX ADMIN — LACTULOSE 20 GRAM(S): 10 SOLUTION ORAL at 17:39

## 2020-04-21 RX ADMIN — SEVELAMER CARBONATE 800 MILLIGRAM(S): 2400 POWDER, FOR SUSPENSION ORAL at 17:39

## 2020-04-21 RX ADMIN — CHLORHEXIDINE GLUCONATE 15 MILLILITER(S): 213 SOLUTION TOPICAL at 18:39

## 2020-04-21 RX ADMIN — Medication 60 MILLIGRAM(S): at 01:28

## 2020-04-21 RX ADMIN — INSULIN HUMAN 7: 100 INJECTION, SOLUTION SUBCUTANEOUS at 14:08

## 2020-04-21 RX ADMIN — Medication 60 MILLIGRAM(S): at 07:43

## 2020-04-21 RX ADMIN — HEPARIN SODIUM 5000 UNIT(S): 5000 INJECTION INTRAVENOUS; SUBCUTANEOUS at 21:10

## 2020-04-21 RX ADMIN — Medication 5 MILLIGRAM(S): at 07:55

## 2020-04-21 RX ADMIN — HEPARIN SODIUM 5000 UNIT(S): 5000 INJECTION INTRAVENOUS; SUBCUTANEOUS at 17:35

## 2020-04-21 RX ADMIN — SEVELAMER CARBONATE 800 MILLIGRAM(S): 2400 POWDER, FOR SUSPENSION ORAL at 06:10

## 2020-04-21 RX ADMIN — Medication 1334 MILLIGRAM(S): at 12:48

## 2020-04-21 RX ADMIN — Medication 250 MILLIGRAM(S): at 17:45

## 2020-04-21 RX ADMIN — Medication 1334 MILLIGRAM(S): at 06:10

## 2020-04-21 RX ADMIN — INSULIN HUMAN 3: 100 INJECTION, SOLUTION SUBCUTANEOUS at 11:12

## 2020-04-21 RX ADMIN — MEROPENEM 100 MILLIGRAM(S): 1 INJECTION INTRAVENOUS at 04:14

## 2020-04-21 RX ADMIN — MIDODRINE HYDROCHLORIDE 10 MILLIGRAM(S): 2.5 TABLET ORAL at 12:48

## 2020-04-21 RX ADMIN — Medication 1 APPLICATION(S): at 18:39

## 2020-04-21 RX ADMIN — Medication 5 MILLIGRAM(S): at 01:57

## 2020-04-21 RX ADMIN — HEPARIN SODIUM 5000 UNIT(S): 5000 INJECTION INTRAVENOUS; SUBCUTANEOUS at 04:14

## 2020-04-21 RX ADMIN — Medication 25 MILLIGRAM(S): at 21:11

## 2020-04-21 RX ADMIN — LEVETIRACETAM 250 MILLIGRAM(S): 250 TABLET, FILM COATED ORAL at 17:39

## 2020-04-21 RX ADMIN — Medication 10 MILLIGRAM(S): at 07:04

## 2020-04-21 RX ADMIN — MIDODRINE HYDROCHLORIDE 10 MILLIGRAM(S): 2.5 TABLET ORAL at 21:07

## 2020-04-21 RX ADMIN — Medication 1 APPLICATION(S): at 04:15

## 2020-04-21 RX ADMIN — PANTOPRAZOLE SODIUM 40 MILLIGRAM(S): 20 TABLET, DELAYED RELEASE ORAL at 12:48

## 2020-04-21 RX ADMIN — Medication 25 MILLIGRAM(S): at 04:14

## 2020-04-21 RX ADMIN — SENNA PLUS 2 TABLET(S): 8.6 TABLET ORAL at 21:09

## 2020-04-21 NOTE — PROGRESS NOTE ADULT - ASSESSMENT
A 74 years old man w/ hx of Afib on Eliquis, CAD s/p PCI, DM presents with sx of COVID found to be positive and intubated since 3/31 and now s/p trached. Consulted for encephalopathy and unresponsive per primary team. CTH on 4/14 shows b/l acute ischemic strokes with hemorrhagic conversion in vascular territory which cannot explain his encephalopathy. REEG was completed 4/15th and reports of generalized slowing. Other encephalopathy etiologies should be further investigated and not limited to metabolic derangement and/or cytokine storm. No MRI study noted. Repeat CTH yesterday reports of more prominent left sided brainstem acute ischemic changes and new small focus of left occipital lobe hemorrhage and new linear foci of hyperdensity in the occipital lobes and right posterior temporal occipital region likely representing thrombi in cortical vessels.    SUGGESTIONS:  - Spoke to Chief Radiology Dr. Tam today - No restrictions in place for MRI study for +COVID patients. Please proceed with MRI study, please call our team if issues arise  - Add MRV without Ty to Brain MRI without and MRA Head/Neck without since LIZZIE SCr 4.9  - Hold home med Eliquis for now  - C/w Keppra 250mg Q12  - Spinal tap with CSF WBC, RBC, protein, glucose, OCB, IgG Index, SARS-CoV2 PCR (will need to send this out). Add also Serological assays for all SARS-CoV2 cases: pro-inflammatory cytokines assay panel (i.e., IL-1ß, IL-2, IL-6, IL-10, IL-17, TNF- a, IFN-?, IP-10), in addition to serum inflammatory biomarkers ferritin, CRP, absolute lymphocyte counts    Will continue to follow  Case discussed and patient seen with attending physician   Jess Logan NP  [id number]

## 2020-04-21 NOTE — PROGRESS NOTE ADULT - SUBJECTIVE AND OBJECTIVE BOX
Neurocritical Care Progress Note:    1. Brief Presentation: A 74 years old man w/ hx of Afib on Eliquis, CAD s/p PCI, DM, BPH, presents sx of COVID found to be positive and intubated. He is found to have b/l acute ischemic changes with hemorrhagic conversion. He recently underwent s/p trach placement on 4/18th. He remains encephalopathic but rEEG on 4/15 reports of generalized slowing.     2. Today's Acute Problems: remains comatose, LIZZIE, elevated D-dimer    3. Relevant brief History: A 74 years old man w/ hx of Afib on Eliquis, CAD s/p PCI, DM, BPH, presents sx of COVID found to be positive and intubated. He is found to have b/l acute ischemic changes with hemorrhagic conversion. He recently underwent s/p trach placement on 4/18th. He remains encephalopathic but rEEG on 4/15 reports of generalized slowing.     4-Yesterday's Plan:  Keep patient off of anticoagulation and antiplatelet therapy.  After repeat head CT tomorrow reassess risks/benefits of DVT prophylaxis dosing of heparin vs low dose antiplatelet therapy.  Serum cytokine assay (IL-1eta, IL-2, IL-6, IL-10, IL-17, TNF-alpha, IFN-gamma, IP-10).  Please order repeat EEG for tomorrow (4/20).  MRI brain, MRA head/neck w/o contrast if not otherwise contraindicated.    5. Last 24 hour updates: Pt remains to be on sedation; no MRI study noted. CTH yesterday reports of more prominent left sided brainstem acute ischemic changes and new small focus of left occipital lobe hemorrhage and new linear foci of hyperdensity in the occipital lobes and right posterior temporal occipital region likely representing thrombi in cortical vessels.    6. Medications:   BACItracin   Ointment 1 Application(s) Topical two times a day  calcium acetate 1334 milliGRAM(s) Oral three times a day with meals  chlorhexidine 0.12% Liquid 15 milliLiter(s) Oral Mucosa every 12 hours  chlorhexidine 4% Liquid 1 Application(s) Topical <User Schedule>  dextrose 50% Injectable 25 Gram(s) IV Push once  diltiazem    Tablet 60 milliGRAM(s) Oral every 6 hours  heparin  Injectable 5000 Unit(s) SubCutaneous every 8 hours  insulin regular  human corrective regimen sliding scale   IV Push every 4 hours  insulin regular Infusion 1 Unit(s)/Hr IV Continuous <Continuous>  lactulose Syrup 20 Gram(s) Oral every 12 hours  levETIRAcetam 250 milliGRAM(s) Oral two times a day  meropenem  IVPB 500 milliGRAM(s) IV Intermittent every 12 hours  metoprolol tartrate 25 milliGRAM(s) Oral every 8 hours  midodrine 10 milliGRAM(s) Oral every 8 hours  pantoprazole   Suspension 40 milliGRAM(s) Oral daily  propofol Infusion 10 MICROgram(s)/kG/Min IV Continuous <Continuous>  senna 2 Tablet(s) Oral at bedtime  sevelamer carbonate 800 milliGRAM(s) Oral three times a day with meals  vancomycin  IVPB      vancomycin  IVPB 1000 milliGRAM(s) IV Intermittent every 24 hours    7. Ancillary Management:   Chest PT[ ]   Head of bed >35 [ ]   Out of bed to chair [ ]   PT/OT/SP Eval [ ]   Spirometry[ ]   DVT prophalaxis[ ]    8.Neuro: Propofol sedation turned off briefly for purpose of examination  Mental status: Comatose  Language: Trached, not following commands Naming, repetition, fluency, and comprehension intact. No dysarthria, no aphasia.  Cranial nerves: + pupillary reflex, + corneals, doll's eyes abnormal, + gag reflex  Motor: No spontaneous movement, no tremors  Sensation: No grimace to noxious stimuli and no localization or withdrawal     NIH STROKE SCALE  Item	                                                        Score  1 a.	Level of Consciousness	               	3  1 b. LOC Questions	                                    2  1 c.	LOC Commands	                               	2  2.	Best Gaze	                                    0  3.	Visual	                                                0  4.	Facial Palsy	                                    0  5 a.	Motor Arm - Left	                        4  5 b.	Motor Arm - Right	                        4  6 a.	Motor Leg - Left	                        4  6 b.	Motor Leg - Right	                        4  7.	Limb Ataxia	                                    0  8.	Sensory	                                                0  9.	Language	                                    3  10.	Dysarthria	                                    0  11.	Extinction and Inattention  	            0  ________________________________________________________________________  TOTAL	                                                        26    mRS: 5 Severe disability; bedridden, incontinent and requiring constant nursing care and attention    Last CTH:  < from: CT Head No Cont (04.20.20 @ 12:44) >  IMPRESSION:  In comparison with the prior noncontrast CT scan of the brain dated April 14, 2020:  Small focus of diminished attenuation in the left side of the brainstem consistent with acute ischemic change more prominent on the current study.  New small focus of hemorrhage in the left occipital lobe.  New linear foci of hyperdensity in the occipital lobes and right posterior temporal occipital region likely representing thrombi in cortical vessels.    Last CTA/MRA: NONE    Last CTP: NONE    Last MRI: PENDING    Last TCD:    Last EEG:  < from: EEG (04.15.20 @ 17:30) >  Impression: Abnormal due to the presence of: generalized slowing as above  No epileptiform activitiy    EVD: [ ] [place]: [ ]     ICP:     CPP:     Level(cm):     24hr(ml):     CSF:     WBC:     RBC:     Cx:     Protein:     Glucose:     LA:        Grain Stain:     9. Cardiovascular:   HR: 88 (21 Apr 2020 12:00) (88 - 137)    Last Echo: NONE    Last EKG:  < from: 12 Lead ECG (04.21.20 @ 06:16) >  Systolic  mmHg  Diastolic BP 71 mmHg  Ventricular Rate 151 BPM  Atrial Rate 178 BPM  QRS Duration 100 ms  Q-T Interval 334 ms  QTC Calculation(Bezet) 529 ms  R Axis 5 degrees  T Axis 66 degrees  Diagnosis Line Atrial fibrillation with rapid ventricular response with premature ventricular  or aberrantly conducted complexes  Incomplete right bundle branch block  Septal infarct , age undetermined  Abnormal ECG    CVP   MAP/CPP/SBP target:   CO:      CI:       Enzymes/Troponin T, Serum (04.21.20 @ 07:32)    Troponin T, Serum: 0.05: Critical value: ng/mL    10. Respiratory:   RR: 96 (04-21-20 @ 12:00) (30 - 96)  SpO2: 100% (04-21-20 @ 12:00) (96% - 100%)    ABG:ABG - ( 21 Apr 2020 13:27 )  pH, Arterial: 7.46  pH, Blood: x     /  pCO2: 38    /  pO2: 95    / HCO3: 27    / Base Excess: 3.1   /  SaO2: 97        Chest Xray:  < from: Xray Chest 1 View- PORTABLE-Routine (04.21.20 @ 04:31) >  Impression:  Hazy bilateral opacifications, right greater than left. Support devices as described.    Mode: AC/ CMV (Assist Control/ Continuous Mandatory Ventilation)  RR (machine): 30  TV (machine): 500  FiO2: 30  PEEP: 5  ITime: 1  MAP: 18  PIP: 36    Peak Pressure/[place] Pressure:    11.GI:  Prophalaxis:     Bowel mvt:     Abd distension:   LIVER FUNCTIONS - ( 20 Apr 2020 23:00 )  TPro  5.9<L>  /  Alb  2.6<L>  /  TBili  0.6  /  DBili  x   /  AST  59<H>  /  ALT  105<H>  /  AlkPhos  104  04-20  Alb: 2.6 g/dL / Pro: 5.9 g/dL / ALK PHOS: 104 U/L / ALT: 105 U/L / AST: 59 U/L / GGT: x           12.Renal/Fluids/Electrolytes:    04-20    156<H>  |  115<H>  |  134<HH>  ----------------------------<  143<H>  3.8   |  26  |  4.3<HH>    Ca    8.8      20 Apr 2020 23:00  Phos  5.4     04-20  Mg     2.5     04-20    I&O's Summary    20 Apr 2020 07:01  -  21 Apr 2020 07:00  --------------------------------------------------------  IN: 2336 mL / OUT: 2385 mL / NET: -49 mL    21 Apr 2020 07:01  -  21 Apr 2020 17:35  --------------------------------------------------------  IN: 462.3 mL / OUT: 725 mL / NET: -262.7 mL      13.ID:   T(C): 37.2 (21 Apr 2020 08:00), Max: 37.2 (20 Apr 2020 20:00)  T(F): 98.9 (21 Apr 2020 08:00), Max: 99 (20 Apr 2020 20:00)    14. Hematology:                         9.5    12.14 )-----------( 225      ( 20 Apr 2020 23:00 )             29.0      PT/INR - ( 20 Apr 2020 23:00 )   PT: 13.80 sec;   INR: 1.20 ratio    PTT - ( 20 Apr 2020 23:00 )  PTT:26.5 sec    DVT Prophylaxis Lovenox[ ] Heparin[ ] Venodynes[ ] SCD's[ ]

## 2020-04-21 NOTE — PROGRESS NOTE ADULT - SUBJECTIVE AND OBJECTIVE BOX
KONSTANTIN WYATT  [id number]  74y Male    Indication for ICU admission: Acute Hypoxemic Respiratory Failure, clinical COVID19 (false negatvie)    Admit Date:20  ICU Date: 20  OR Date:    Bactrim (Unknown)    PAST MEDICAL & SURGICAL HISTORY:  Afib  DM (diabetes mellitus)  BPH (benign prostatic hyperplasia)  CAD (coronary artery disease)    Home Medications:  Cartia  mg/24 hours oral capsule, extended release: 1 cap(s) orally once a day (30 Mar 2020 18:04)  Eliquis 5 mg oral tablet: 1 tab(s) orally 2 times a day (30 Mar 2020 18:04)  Lipitor 40 mg oral tablet: 1 tab(s) orally once a day (30 Mar 2020 18:04)  lisinopril 40 mg oral tablet: 1 tab(s) orally once a day (30 Mar 2020 18:04)  metFORMIN 750 mg oral tablet, extended release: 1 tab(s) orally once a day (30 Mar 2020 18:04)    24HRS EVENT:   Afib RVR ON- pushes of metoprolol 5x2, cardizem 10x1, increased PO dosing. Again 2am another 5 metoprolol push. 430am 10 cardizem. EKG AFIb RVR and RBBB, as prior, Trop 0.05. **POSSIBLE PE?* with D Dimer increased, Afib RVR; thrombi and clotting- not able to add anticoagulation at this time. --> cardizem gtt   MRI cancelled per radiology will not approve, order cancelled by rads.   Not opening eyes, pupils dilated but reactive symmetrically, not following. CT head with brainstem ischemia, new hemorrhage, and multiple areas with thrombi  -no change to vent, tubing and filter changed- may have contributed to RVR  Cont TF  changed TLC dressing to CHG dressing  Net neg fluid balance for few days; Na 156, increased free water flushes 250-> 400 Q6 (calc free water deficit 5.1L to normalize to 140; 2.4L for goal 156-> 148)  Ddimer 4185 (2336)  Renal function continues to decline  off of insulin gtt-> ISS -> restart in AM         NEURO    Not on sedation, PRN as needed- none needed overnight    HCT -multiple acute hemorraghic infarcts, Neuro c/s:Keppra 250mg BID 2/2 poor renal function, MRI, MRA. Unable to obtain angio imaging 2/2 renal injury. EEG with no epileptiform activity        -Neurology recalled, evaluated, possible brainstem ischemia, recommended MRI noncon, CT head noncon. on repeat CT Head : L brainstem ischemic change acute, new L occipital hemorrhage, new areas consistent with thrombi    - Radiology will not approve and cancelled MRI brain.Reassess    Exam: Not opening eyes, pupils dilated but reactive symmetrically, not following.   Pain control: Acetaminophen 650mg PO q6 PRN      RESP  Acute Hypoxemic Respiratory Failure s/p trach , on vent    COVID-19/Viral Pneumonia--repeat   negative COVID  - trach, on vent: 500/30/30/5, FIO2 changed from 40 to 30%, Plat 23, peak 25-28, no change overnight. Cuff reinflated. Filter wet, white foamy secretions in tubing, high PEEP-> ? resistance increasing worsening Afib - respiratory called to change   Daily AB.47/37/90, bicarb 28, sat 96%, lact 0.6 (7.48/36/118 )  Attempted PSV : tolerated 2 hrs, RR 30s, TV 500s then apnea- returned to AC  Daily CXR       CARD/VASC  Acute tachycardia, hx of Afib, AFIB RVR  recurrence overnight  HR 160s, SBPs 160s maintained- metoprolol 5x2 and 10 cardizem, increased PO cardizem to 60 Q6H, continue PO lopressor 25Q8H. Additional 5 metoprolol 2am, 10 cardizem 430am for again rising.--> cardizem gtt , 10 additional 7am   - Eliquis held 2/2 hemorrhagic stroke, continued   -**** POSSIBLE PE? **- D dimer increased, Afib RVR, thrombi and clotting on CT head- not able to AC at this time  - EKG PM AFib RVR, RBBB,    Hypotension - off levo, midodrine 10q8h for hypotension, resolved     Hx of Afib - Diltiazem, BB, currently holding Apixaban 2/2 HCT findings    Hx of CAD - Atorvastatin 40mg PO qhs    Hx of HTN - hold Lisinopril   QTc: 454, Afib RVR,  incomplete RBBB-> 529 AM     GI/NUTR  NGT, tolerating TF Glucerna @50  Bowel regimen: Senna, lactulose, last BM   GI ppx: protonix       /Renal  LIZZIE with uremia: BUN/Cr improved after HD, kept even , no session since, via right femoral [place]   [place] -nonoliguric renal failure,  UOP 75-150ml/hr  BUN/Cr 138/4.7 -> 136/4.0-> 134/4.3  [place]ytes-Na 156 (155) // K3.8// Phos 5.4//  Mag 2.5  Hyperphosphatemia, On renvela  [id number]-> 156, on free water 400Q6H increased from 250 Q6 overnight for rising refractory hypernatremia, free water deficit 5.1L   Hx of BPH  Daily weight (4/10) 98.7kg,                          ( ) 95.4kg                         ( ) 93.3 kg                         ( ) 92.3 kg                         () 91.9kg  	       () 88.7kg                         () 89kg                         () 89.1kg                         (): 87.7kg    HEME/ONC  Holding ELIQUIS- repeat head CT done  and MRI noncon ordered  and reevaluate possible heparin gtt initiation on results: new hemorrhagic and possible thrombotic CT head findings- continue to hold.   Hgb stable 9.4-> 8.8-> 9.5   Dimer: 4185 (2336)    ID    Afebrile    WBC downtrending 22 > 19.5 > 15.6-> 12.1    Procal 1.92-> 1.49-> 1.13    COVID-19 +  MRSA Pneumonia  - on vanco 1gm q24h, vanc trough 18.9 , nikky   - completed course of Hydroxychloroquine & Azithromycin, VitC and Zinc, off cefepime  - D-dimer uptrending 2,640 > 2370 > 2336-> 4185        Lip lac- bacitracin     ENDO  DM2; uncontrolled, holding home Metformin. Off insulin gtt @1am, to ISS-> restarted   FSG: ___    MSK/DERM  -burn consult for lip lac- bacitracin and peridex    DVT PPx  - on hepsub q  - off Apixaban     GI PPx  -  Pantoprazole 40mg PO qac      ***Tubes/Lines/Drains  ***  Peripheral IV  Central Venous Line  Right IJ TLC  	Date 3/31/20  R fem [place]   Arterial Line  Right radial A-line		                Date: 3/31/20  Urnary Catheter		Indication: Strict I&O    Date Placed: 3/31/20  ETT  OGT    REVIEW OF SYSTEMS    [ ] A ten-point review of systems was otherwise negative except as noted.  [x ] Due to altered mental status/intubation, subjective information were not able to be obtained from the patient. History was obtained, to the extent possible, from review of the chart and collateral sources of information.    General/Neuro  RASS: -2 to -3  Pupils: PERRLA  Sedated, no focal defecits    Lungs: BS decreased b/l  Mechanically ventilated    Mode: AC/ CMV (Assist Control/ Continuous Mandatory Ventilation)  RR (machine): 30  TV (machine): 500  FiO2: 30  PEEP: 5  ITime: 1  MAP: 18  PIP: 36  Blood Gas Profile - Arterial (.20 @ 02:50)    pH, Arterial: 7.47    pCO2, Arterial: 37 mmHg    pO2, Arterial: 90 mmHg    HCO3, Arterial: 28 mmoL/L    Base Excess, Arterial: 3.8 mmoL/L    Oxygen Saturation, Arterial: 96 %        Cardiovascular: S1, S2  Regular rate and rhythm  Peripheral edema    GI: Abdomen soft, nontender, nondistended    Extremities: Warm, pink, well-perfused. Pulses palp b/l    Derm:  no skin breakdown    : [place] catheter in place

## 2020-04-21 NOTE — CHART NOTE - NSCHARTNOTEFT_GEN_A_CORE
Discussed patient's condition with son Harvinder [phone]. Discussed that MRI not done, that the team will speak to Radiology to determine why not done. Discussed that Palliative Care may be contacting them, he reports he is not interested at this time. Son's questions were answered to his apparent satisfaction

## 2020-04-21 NOTE — PROGRESS NOTE ADULT - ASSESSMENT
72y male, acute respiratory failure 2/2 COVID-19    NEURO  keep on low dose Propofol    Acute infarcts- keppra 250mg BID, EEG, when stable perform MRI, EEG- no epileptiform activity.   Neuro recommending repeat CT head: no needed PRNs   No agitation, RASS -4  ask radiology why pt can't have MRI    RESP  Acute Hypoxemic Respiratory Failure; Intubated, /30/40/5  ABG 7.48/36/118, bicarb 27, 99% sat lact 0.9  COVID-19/Viral Pneumonia  -repeat swab 4/13 and 4/17 negative  4/15 DTA: MRSA +, on vanco and nikky   s/p Trach 4/18         CARD/VASC  - acute hypotension- on midodrine    Hx of Afib, AFIB RVR on this admission - holding Eliquis 2/2 stroke, cont Cardizem PO 60mg q8, Metoprolol 25mg q8   CAD - continue Atorvastatin   Hx of HTN- holding Lisinopril   - Sinus with PACs on EKG this AM  - try to wean off Cardizem gtt   - restart Levophed gtt    GI/NUTR  - diet: TF @ 50 via NGT, Vit C, Zinc  - PPI  - Senna, Lactulose bowel regimen- Last BM 4/18   Daily weight 4/18 88.7 kg-> 89kg (Admission 99kg)     /Renal  LIZZIE + uremia - HD 4/17 kept even, no HD 4/18; on phoslo. Follow up nephrology regarding timing of next HD session. [place] in Right femoral   Evans - UO 75cc/hr; net negative 450ml  off Ethacrynic, on phoslo  Hx of BPH  Monitor electrolytes, Na 149, K 3.8, Mg 2.5, Phos 6.4  As per nephrology -  add renagel 3/3/3, d/c vit c    HEME/ONC  Hgb stable-8.8, continue to monitor   DVT ppx: holding eliquis, d/w Neurology regarding ability to restart 2/2 infarct.   D dimer: 2336 (2370)     ID    Afebrile, Tmax 98.7     COVID 19- intubated, on precautions, WBC 22.3-> 19.5-> 15.6, continue to trend.    procalcitonin 1.38-> 1.58-> 1.92, rising. Continue to monitor   DTA 4/15: + MRSA pneumonia: on meropenem and vancomycin, check vanco level 4/21 (last 11.4 on 4/19)    ENDO  DM2 - holding home Metformin  Hyperglycemia - on insulin gtt, FS q1h;  (117-230)    MSK/DERM  Lower lip ulceration   No surgical intervention   Rec - keep site moist - Bacitracin ointment or vit A & D ointment     DVT PPx: Eliquis (currently held 2/2 cerebral infarcts) on SQH   GI PPx: PPI    DISPO: ICU  Palliative consult  Transfer 3B

## 2020-04-21 NOTE — PROGRESS NOTE ADULT - SUBJECTIVE AND OBJECTIVE BOX
24 h events noted  s/p trach        PAST HISTORY  --------------------------------------------------------------------------------  No significant changes to PMH, PSH, FHx, SHx, unless otherwise noted    ALLERGIES & MEDICATIONS  --------------------------------------------------------------------------------  Allergies    Bactrim (Unknown)    Intolerances      Standing Inpatient Medications  BACItracin   Ointment 1 Application(s) Topical two times a day  calcium acetate 1334 milliGRAM(s) Oral three times a day with meals  chlorhexidine 0.12% Liquid 15 milliLiter(s) Oral Mucosa every 12 hours  chlorhexidine 4% Liquid 1 Application(s) Topical <User Schedule>  dextrose 50% Injectable 25 Gram(s) IV Push once  diltiazem    Tablet 60 milliGRAM(s) Oral every 6 hours  diltiazem Infusion 12.5 mG/Hr IV Continuous <Continuous>  heparin  Injectable 5000 Unit(s) SubCutaneous every 8 hours  insulin regular  human corrective regimen sliding scale   IV Push every 4 hours  insulin regular Infusion 1 Unit(s)/Hr IV Continuous <Continuous>  lactulose Syrup 20 Gram(s) Oral every 12 hours  levETIRAcetam 250 milliGRAM(s) Oral two times a day  meropenem  IVPB 500 milliGRAM(s) IV Intermittent every 12 hours  metoprolol tartrate 25 milliGRAM(s) Oral every 8 hours  midodrine 10 milliGRAM(s) Oral every 8 hours  pantoprazole   Suspension 40 milliGRAM(s) Oral daily  propofol Infusion 10 MICROgram(s)/kG/Min IV Continuous <Continuous>  senna 2 Tablet(s) Oral at bedtime  sevelamer carbonate 800 milliGRAM(s) Oral three times a day with meals  vancomycin  IVPB      vancomycin  IVPB 1000 milliGRAM(s) IV Intermittent every 24 hours    PRN Inpatient Medications  acetaminophen  Suppository .. 650 milliGRAM(s) Rectal every 6 hours PRN        VITALS/PHYSICAL EXAM  --------------------------------------------------------------------------------  T(C): 37.2 (04-21-20 @ 08:00), Max: 37.2 (04-20-20 @ 20:00)  HR: 106 (04-21-20 @ 08:00) (88 - 137)  BP: --  RR: 30 (04-21-20 @ 08:00) (30 - 30)  SpO2: 100% (04-21-20 @ 08:00) (96% - 100%)  Wt(kg): --        04-20-20 @ 07:01  -  04-21-20 @ 07:00  --------------------------------------------------------  IN: 2336 mL / OUT: 2385 mL / NET: -49 mL      Physical Exam:  	Gen: trach/ventilated  	Vascular access: amelia     LABS/STUDIES  --------------------------------------------------------------------------------              9.5    12.14 >-----------<  225      [04-20-20 @ 23:00]              29.0     156  |  115  |  134  ----------------------------<  143      [04-20-20 @ 23:00]  3.8   |  26  |  4.3        Ca     8.8     [04-20-20 @ 23:00]      Mg     2.5     [04-20-20 @ 23:00]      Phos  5.4     [04-20-20 @ 23:00]    TPro  5.9  /  Alb  2.6  /  TBili  0.6  /  DBili  x   /  AST  59  /  ALT  105  /  AlkPhos  104  [04-20-20 @ 23:00]    PT/INR: PT 13.80, INR 1.20       [04-20-20 @ 23:00]  PTT: 26.5       [04-20-20 @ 23:00]    Troponin 0.05      [04-21-20 @ 07:32]        [04-21-20 @ 02:50]        [04-20-20 @ 23:00]    Creatinine Trend:  SCr 4.3 [04-20 @ 23:00]  SCr 4.0 [04-20 @ 16:40]  SCr 4.7 [04-20 @ 01:00]  SCr 4.3 [04-19 @ 18:09]  SCr 4.3 [04-19 @ 17:04]    Urinalysis - [03-30-20 @ 22:00]      Color Yellow / Appearance Clear / SG 1.031 / pH 6.0      Gluc Negative / Ketone Trace  / Bili Negative / Urobili <2 mg/dL       Blood Moderate / Protein 300 mg/dL / Leuk Est Negative / Nitrite Negative      RBC 10 / WBC 29 / Hyaline 24 / Gran  / Sq Epi  / Non Sq Epi 22 / Bacteria Negative      Ferritin 2437      [04-17-20 @ 16:00]  HbA1c 7.7      [04-01-20 @ 04:30] 24 h events noted          PAST HISTORY  --------------------------------------------------------------------------------  No significant changes to PMH, PSH, FHx, SHx, unless otherwise noted    ALLERGIES & MEDICATIONS  --------------------------------------------------------------------------------  Allergies    Bactrim (Unknown)    Intolerances      Standing Inpatient Medications  BACItracin   Ointment 1 Application(s) Topical two times a day  calcium acetate 1334 milliGRAM(s) Oral three times a day with meals  chlorhexidine 0.12% Liquid 15 milliLiter(s) Oral Mucosa every 12 hours  chlorhexidine 4% Liquid 1 Application(s) Topical <User Schedule>  dextrose 50% Injectable 25 Gram(s) IV Push once  diltiazem    Tablet 60 milliGRAM(s) Oral every 6 hours  diltiazem Infusion 12.5 mG/Hr IV Continuous <Continuous>  heparin  Injectable 5000 Unit(s) SubCutaneous every 8 hours  insulin regular  human corrective regimen sliding scale   IV Push every 4 hours  insulin regular Infusion 1 Unit(s)/Hr IV Continuous <Continuous>  lactulose Syrup 20 Gram(s) Oral every 12 hours  levETIRAcetam 250 milliGRAM(s) Oral two times a day  meropenem  IVPB 500 milliGRAM(s) IV Intermittent every 12 hours  metoprolol tartrate 25 milliGRAM(s) Oral every 8 hours  midodrine 10 milliGRAM(s) Oral every 8 hours  pantoprazole   Suspension 40 milliGRAM(s) Oral daily  propofol Infusion 10 MICROgram(s)/kG/Min IV Continuous <Continuous>  senna 2 Tablet(s) Oral at bedtime  sevelamer carbonate 800 milliGRAM(s) Oral three times a day with meals  vancomycin  IVPB      vancomycin  IVPB 1000 milliGRAM(s) IV Intermittent every 24 hours    PRN Inpatient Medications  acetaminophen  Suppository .. 650 milliGRAM(s) Rectal every 6 hours PRN        VITALS/PHYSICAL EXAM  --------------------------------------------------------------------------------  T(C): 37.2 (04-21-20 @ 08:00), Max: 37.2 (04-20-20 @ 20:00)  HR: 106 (04-21-20 @ 08:00) (88 - 137)  BP: --  RR: 30 (04-21-20 @ 08:00) (30 - 30)  SpO2: 100% (04-21-20 @ 08:00) (96% - 100%)  Wt(kg): --        04-20-20 @ 07:01  -  04-21-20 @ 07:00  --------------------------------------------------------  IN: 2336 mL / OUT: 2385 mL / NET: -49 mL      Physical Exam:  	Gen: trach/ventilated  	Vascular access: sueall     LABS/STUDIES  --------------------------------------------------------------------------------              9.5    12.14 >-----------<  225      [04-20-20 @ 23:00]              29.0     156  |  115  |  134  ----------------------------<  143      [04-20-20 @ 23:00]  3.8   |  26  |  4.3        Ca     8.8     [04-20-20 @ 23:00]      Mg     2.5     [04-20-20 @ 23:00]      Phos  5.4     [04-20-20 @ 23:00]    TPro  5.9  /  Alb  2.6  /  TBili  0.6  /  DBili  x   /  AST  59  /  ALT  105  /  AlkPhos  104  [04-20-20 @ 23:00]    PT/INR: PT 13.80, INR 1.20       [04-20-20 @ 23:00]  PTT: 26.5       [04-20-20 @ 23:00]    Troponin 0.05      [04-21-20 @ 07:32]        [04-21-20 @ 02:50]        [04-20-20 @ 23:00]    Creatinine Trend:  SCr 4.3 [04-20 @ 23:00]  SCr 4.0 [04-20 @ 16:40]  SCr 4.7 [04-20 @ 01:00]  SCr 4.3 [04-19 @ 18:09]  SCr 4.3 [04-19 @ 17:04]    Urinalysis - [03-30-20 @ 22:00]      Color Yellow / Appearance Clear / SG 1.031 / pH 6.0      Gluc Negative / Ketone Trace  / Bili Negative / Urobili <2 mg/dL       Blood Moderate / Protein 300 mg/dL / Leuk Est Negative / Nitrite Negative      RBC 10 / WBC 29 / Hyaline 24 / Gran  / Sq Epi  / Non Sq Epi 22 / Bacteria Negative      Ferritin 2437      [04-17-20 @ 16:00]  HbA1c 7.7      [04-01-20 @ 04:30]

## 2020-04-21 NOTE — PROGRESS NOTE ADULT - ASSESSMENT
LIZZIE/ ATN/ hyperkalemia/ respiratory failure / covid positive / high BUN  #off  diuretic   # ph  improving  feed nepro, on binders   #  no need for hd today   # BUN noted : highly catabolic and prerenal, off  diuretics, change feed to nepro   # sodium  noted, trending up, d5 at 150 cc/h   # vanco level noted 18.9  # will follow   # overall prognosis poor

## 2020-04-22 LAB
ALBUMIN SERPL ELPH-MCNC: 2.4 G/DL — LOW (ref 3.5–5.2)
ALP SERPL-CCNC: 84 U/L — SIGNIFICANT CHANGE UP (ref 30–115)
ALT FLD-CCNC: 98 U/L — HIGH (ref 0–41)
ANION GAP SERPL CALC-SCNC: 13 MMOL/L — SIGNIFICANT CHANGE UP (ref 7–14)
ANION GAP SERPL CALC-SCNC: 15 MMOL/L — HIGH (ref 7–14)
APTT BLD: 25.5 SEC — LOW (ref 27–39.2)
AST SERPL-CCNC: 66 U/L — HIGH (ref 0–41)
BASE EXCESS BLDA CALC-SCNC: 4 MMOL/L — HIGH (ref -2–2)
BASOPHILS # BLD AUTO: 0.03 K/UL — SIGNIFICANT CHANGE UP (ref 0–0.2)
BASOPHILS NFR BLD AUTO: 0.3 % — SIGNIFICANT CHANGE UP (ref 0–1)
BILIRUB SERPL-MCNC: 0.6 MG/DL — SIGNIFICANT CHANGE UP (ref 0.2–1.2)
BUN SERPL-MCNC: 120 MG/DL — CRITICAL HIGH (ref 10–20)
BUN SERPL-MCNC: 130 MG/DL — CRITICAL HIGH (ref 10–20)
CALCIUM SERPL-MCNC: 8.4 MG/DL — LOW (ref 8.5–10.1)
CALCIUM SERPL-MCNC: 8.7 MG/DL — SIGNIFICANT CHANGE UP (ref 8.5–10.1)
CHLORIDE SERPL-SCNC: 114 MMOL/L — HIGH (ref 98–110)
CHLORIDE SERPL-SCNC: 115 MMOL/L — HIGH (ref 98–110)
CK SERPL-CCNC: 437 U/L — HIGH (ref 0–225)
CO2 SERPL-SCNC: 26 MMOL/L — SIGNIFICANT CHANGE UP (ref 17–32)
CO2 SERPL-SCNC: 26 MMOL/L — SIGNIFICANT CHANGE UP (ref 17–32)
CREAT SERPL-MCNC: 3.9 MG/DL — HIGH (ref 0.7–1.5)
CREAT SERPL-MCNC: 4.1 MG/DL — CRITICAL HIGH (ref 0.7–1.5)
D DIMER BLD IA.RAPID-MCNC: 4516 NG/ML DDU — HIGH (ref 0–230)
EOSINOPHIL # BLD AUTO: 0.33 K/UL — SIGNIFICANT CHANGE UP (ref 0–0.7)
EOSINOPHIL NFR BLD AUTO: 3.8 % — SIGNIFICANT CHANGE UP (ref 0–8)
GAS PNL BLDA: SIGNIFICANT CHANGE UP
GLUCOSE BLDC GLUCOMTR-MCNC: 144 MG/DL — HIGH (ref 70–99)
GLUCOSE BLDC GLUCOMTR-MCNC: 148 MG/DL — HIGH (ref 70–99)
GLUCOSE BLDC GLUCOMTR-MCNC: 151 MG/DL — HIGH (ref 70–99)
GLUCOSE BLDC GLUCOMTR-MCNC: 151 MG/DL — HIGH (ref 70–99)
GLUCOSE BLDC GLUCOMTR-MCNC: 153 MG/DL — HIGH (ref 70–99)
GLUCOSE BLDC GLUCOMTR-MCNC: 162 MG/DL — HIGH (ref 70–99)
GLUCOSE BLDC GLUCOMTR-MCNC: 164 MG/DL — HIGH (ref 70–99)
GLUCOSE BLDC GLUCOMTR-MCNC: 166 MG/DL — HIGH (ref 70–99)
GLUCOSE SERPL-MCNC: 139 MG/DL — HIGH (ref 70–99)
GLUCOSE SERPL-MCNC: 168 MG/DL — HIGH (ref 70–99)
HCO3 BLDA-SCNC: 27 MMOL/L — SIGNIFICANT CHANGE UP (ref 21–29)
HCT VFR BLD CALC: 26.6 % — LOW (ref 42–52)
HGB BLD-MCNC: 8.5 G/DL — LOW (ref 14–18)
IMM GRANULOCYTES NFR BLD AUTO: 0.6 % — HIGH (ref 0.1–0.3)
INR BLD: 1.24 RATIO — SIGNIFICANT CHANGE UP (ref 0.65–1.3)
LYMPHOCYTES # BLD AUTO: 0.67 K/UL — LOW (ref 1.2–3.4)
LYMPHOCYTES # BLD AUTO: 7.7 % — LOW (ref 20.5–51.1)
MAGNESIUM SERPL-MCNC: 2.3 MG/DL — SIGNIFICANT CHANGE UP (ref 1.8–2.4)
MCHC RBC-ENTMCNC: 30.4 PG — SIGNIFICANT CHANGE UP (ref 27–31)
MCHC RBC-ENTMCNC: 32 G/DL — SIGNIFICANT CHANGE UP (ref 32–37)
MCV RBC AUTO: 95 FL — HIGH (ref 80–94)
MONOCYTES # BLD AUTO: 0.73 K/UL — HIGH (ref 0.1–0.6)
MONOCYTES NFR BLD AUTO: 8.4 % — SIGNIFICANT CHANGE UP (ref 1.7–9.3)
MRSA PCR RESULT.: POSITIVE
NEUTROPHILS # BLD AUTO: 6.88 K/UL — HIGH (ref 1.4–6.5)
NEUTROPHILS NFR BLD AUTO: 79.2 % — HIGH (ref 42.2–75.2)
NRBC # BLD: 0 /100 WBCS — SIGNIFICANT CHANGE UP (ref 0–0)
PCO2 BLDA: 33 MMHG — LOW (ref 38–42)
PH BLDA: 7.51 — HIGH (ref 7.38–7.42)
PHOSPHATE SERPL-MCNC: 4.2 MG/DL — SIGNIFICANT CHANGE UP (ref 2.1–4.9)
PLATELET # BLD AUTO: 217 K/UL — SIGNIFICANT CHANGE UP (ref 130–400)
PO2 BLDA: 117 MMHG — HIGH (ref 78–95)
POTASSIUM SERPL-MCNC: 3.6 MMOL/L — SIGNIFICANT CHANGE UP (ref 3.5–5)
POTASSIUM SERPL-MCNC: 3.8 MMOL/L — SIGNIFICANT CHANGE UP (ref 3.5–5)
POTASSIUM SERPL-SCNC: 3.6 MMOL/L — SIGNIFICANT CHANGE UP (ref 3.5–5)
POTASSIUM SERPL-SCNC: 3.8 MMOL/L — SIGNIFICANT CHANGE UP (ref 3.5–5)
PROCALCITONIN SERPL-MCNC: 0.62 NG/ML — HIGH (ref 0.02–0.1)
PROT SERPL-MCNC: 5.4 G/DL — LOW (ref 6–8)
PROTHROM AB SERPL-ACNC: 14.3 SEC — HIGH (ref 9.95–12.87)
RBC # BLD: 2.8 M/UL — LOW (ref 4.7–6.1)
RBC # FLD: 14 % — SIGNIFICANT CHANGE UP (ref 11.5–14.5)
SAO2 % BLDA: 98 % — HIGH (ref 92–96)
SODIUM SERPL-SCNC: 154 MMOL/L — HIGH (ref 135–146)
SODIUM SERPL-SCNC: 155 MMOL/L — HIGH (ref 135–146)
TRIGL SERPL-MCNC: 132 MG/DL — SIGNIFICANT CHANGE UP (ref 10–149)
VANCOMYCIN TROUGH SERPL-MCNC: 26.4 UG/ML — HIGH (ref 5–10)
WBC # BLD: 8.69 K/UL — SIGNIFICANT CHANGE UP (ref 4.8–10.8)
WBC # FLD AUTO: 8.69 K/UL — SIGNIFICANT CHANGE UP (ref 4.8–10.8)

## 2020-04-22 PROCEDURE — 93010 ELECTROCARDIOGRAM REPORT: CPT

## 2020-04-22 PROCEDURE — 99291 CRITICAL CARE FIRST HOUR: CPT | Mod: CS

## 2020-04-22 PROCEDURE — 71045 X-RAY EXAM CHEST 1 VIEW: CPT | Mod: 26

## 2020-04-22 RX ORDER — MUPIROCIN 20 MG/G
1 OINTMENT TOPICAL EVERY 12 HOURS
Refills: 0 | Status: DISCONTINUED | OUTPATIENT
Start: 2020-04-22 | End: 2020-04-22

## 2020-04-22 RX ORDER — MUPIROCIN 20 MG/G
1 OINTMENT TOPICAL EVERY 12 HOURS
Refills: 0 | Status: DISCONTINUED | OUTPATIENT
Start: 2020-04-22 | End: 2020-05-05

## 2020-04-22 RX ORDER — LACTULOSE 10 G/15ML
20 SOLUTION ORAL EVERY 8 HOURS
Refills: 0 | Status: DISCONTINUED | OUTPATIENT
Start: 2020-04-22 | End: 2020-05-05

## 2020-04-22 RX ADMIN — SEVELAMER CARBONATE 800 MILLIGRAM(S): 2400 POWDER, FOR SUSPENSION ORAL at 10:13

## 2020-04-22 RX ADMIN — INSULIN HUMAN 3: 100 INJECTION, SOLUTION SUBCUTANEOUS at 22:33

## 2020-04-22 RX ADMIN — SENNA PLUS 2 TABLET(S): 8.6 TABLET ORAL at 21:45

## 2020-04-22 RX ADMIN — MIDODRINE HYDROCHLORIDE 10 MILLIGRAM(S): 2.5 TABLET ORAL at 05:51

## 2020-04-22 RX ADMIN — Medication 1 APPLICATION(S): at 05:06

## 2020-04-22 RX ADMIN — MEROPENEM 100 MILLIGRAM(S): 1 INJECTION INTRAVENOUS at 05:50

## 2020-04-22 RX ADMIN — MIDODRINE HYDROCHLORIDE 10 MILLIGRAM(S): 2.5 TABLET ORAL at 21:45

## 2020-04-22 RX ADMIN — Medication 1334 MILLIGRAM(S): at 12:39

## 2020-04-22 RX ADMIN — Medication 25 MILLIGRAM(S): at 05:56

## 2020-04-22 RX ADMIN — LEVETIRACETAM 250 MILLIGRAM(S): 250 TABLET, FILM COATED ORAL at 17:38

## 2020-04-22 RX ADMIN — Medication 1334 MILLIGRAM(S): at 10:13

## 2020-04-22 RX ADMIN — Medication 60 MILLIGRAM(S): at 22:16

## 2020-04-22 RX ADMIN — Medication 1334 MILLIGRAM(S): at 17:27

## 2020-04-22 RX ADMIN — Medication 25 MILLIGRAM(S): at 21:46

## 2020-04-22 RX ADMIN — HEPARIN SODIUM 5000 UNIT(S): 5000 INJECTION INTRAVENOUS; SUBCUTANEOUS at 13:24

## 2020-04-22 RX ADMIN — LEVETIRACETAM 250 MILLIGRAM(S): 250 TABLET, FILM COATED ORAL at 05:50

## 2020-04-22 RX ADMIN — Medication 1 APPLICATION(S): at 17:34

## 2020-04-22 RX ADMIN — INSULIN HUMAN 3: 100 INJECTION, SOLUTION SUBCUTANEOUS at 05:56

## 2020-04-22 RX ADMIN — SODIUM CHLORIDE 50 MILLILITER(S): 9 INJECTION, SOLUTION INTRAVENOUS at 00:36

## 2020-04-22 RX ADMIN — Medication 60 MILLIGRAM(S): at 17:26

## 2020-04-22 RX ADMIN — Medication 60 MILLIGRAM(S): at 04:48

## 2020-04-22 RX ADMIN — HEPARIN SODIUM 5000 UNIT(S): 5000 INJECTION INTRAVENOUS; SUBCUTANEOUS at 21:46

## 2020-04-22 RX ADMIN — INSULIN HUMAN 3: 100 INJECTION, SOLUTION SUBCUTANEOUS at 10:14

## 2020-04-22 RX ADMIN — HEPARIN SODIUM 5000 UNIT(S): 5000 INJECTION INTRAVENOUS; SUBCUTANEOUS at 05:08

## 2020-04-22 RX ADMIN — MEROPENEM 100 MILLIGRAM(S): 1 INJECTION INTRAVENOUS at 17:39

## 2020-04-22 RX ADMIN — INSULIN HUMAN 3: 100 INJECTION, SOLUTION SUBCUTANEOUS at 18:01

## 2020-04-22 RX ADMIN — CHLORHEXIDINE GLUCONATE 15 MILLILITER(S): 213 SOLUTION TOPICAL at 17:34

## 2020-04-22 RX ADMIN — PANTOPRAZOLE SODIUM 40 MILLIGRAM(S): 20 TABLET, DELAYED RELEASE ORAL at 12:39

## 2020-04-22 RX ADMIN — Medication 25 MILLIGRAM(S): at 13:25

## 2020-04-22 RX ADMIN — CHLORHEXIDINE GLUCONATE 15 MILLILITER(S): 213 SOLUTION TOPICAL at 05:07

## 2020-04-22 RX ADMIN — CHLORHEXIDINE GLUCONATE 1 APPLICATION(S): 213 SOLUTION TOPICAL at 05:52

## 2020-04-22 RX ADMIN — Medication 60 MILLIGRAM(S): at 10:14

## 2020-04-22 RX ADMIN — LACTULOSE 20 GRAM(S): 10 SOLUTION ORAL at 21:45

## 2020-04-22 RX ADMIN — MIDODRINE HYDROCHLORIDE 10 MILLIGRAM(S): 2.5 TABLET ORAL at 13:27

## 2020-04-22 NOTE — PROGRESS NOTE ADULT - SUBJECTIVE AND OBJECTIVE BOX
Neurocritical Care Progress Note:    1. Brief Presentation: A 74 years old man w/ hx of Afib on Eliquis, CAD s/p PCI, DM, BPH, presents sx of COVID found to be positive and intubated. He is found to have b/l acute ischemic changes with hemorrhagic conversion. He recently underwent s/p trach placement on 4/18th. He remains encephalopathic but rEEG on 4/15 reports of generalized slowing.     2. Today's Acute Problems: remains comatose, LIZZIE, elevated D-dimer    3. Relevant brief History: A 74 years old man w/ hx of Afib on Eliquis, CAD s/p PCI, DM, BPH, presents sx of COVID found to be positive and intubated. He is found to have b/l acute ischemic changes with hemorrhagic conversion. He recently underwent s/p trach placement on 4/18th. He remains encephalopathic but rEEG on 4/15 reports of generalized slowing.     4-Yesterday's Plan:  - Spoke to Chief Radiology Dr. Tam today - No restrictions in place for MRI study for +COVID patients. Please proceed with MRI study, please call our team if issues arise  - Add MRV without Ty to Brain MRI without and MRA Head/Neck without since LIZZIE SCr 4.9  - Hold home med Eliquis for now  - C/w Keppra 250mg Q12  - Spinal tap with CSF WBC, RBC, protein, glucose, OCB, IgG Index, SARS-CoV2 PCR (will need to send this out). Add also Serological assays for all SARS-CoV2 cases: pro-inflammatory cytokines assay panel (i.e., IL-1ß, IL-2, IL-6, IL-10, IL-17, TNF- a, IFN-?, IP-10), in addition to serum inflammatory biomarkers ferritin, CRP, absolute lymphocyte counts    5. Last 24 hour updates: Pt remains to be on sedation; no MRI study noted. CTH reports of more prominent left sided brainstem acute ischemic changes and new small focus of left occipital lobe hemorrhage and new linear foci of hyperdensity in the occipital lobes and right posterior temporal occipital region likely representing thrombi in cortical vessels.    6. Medications:   BACItracin   Ointment 1 Application(s) Topical two times a day  calcium acetate 1334 milliGRAM(s) Oral three times a day with meals  chlorhexidine 0.12% Liquid 15 milliLiter(s) Oral Mucosa two times a day  chlorhexidine 4% Liquid 1 Application(s) Topical <User Schedule>  dextrose 5%. 1000 milliLiter(s) (50 mL/Hr) IV Continuous <Continuous>  dextrose 50% Injectable 25 Gram(s) IV Push once  diltiazem    Tablet 60 milliGRAM(s) Oral every 6 hours  heparin  Injectable 5000 Unit(s) SubCutaneous every 8 hours  insulin regular  human corrective regimen sliding scale   IV Push every 4 hours  insulin regular Infusion 1 Unit(s)/Hr (1 mL/Hr) IV Continuous <Continuous>  lactulose Syrup 20 Gram(s) Oral every 8 hours  levETIRAcetam 250 milliGRAM(s) Oral two times a day  meropenem  IVPB 500 milliGRAM(s) IV Intermittent every 12 hours  metoprolol tartrate 25 milliGRAM(s) Oral every 8 hours  midodrine 10 milliGRAM(s) Oral every 8 hours  mupirocin 2% Nasal 1 Application(s) Nasal every 12 hours  pantoprazole   Suspension 40 milliGRAM(s) Oral daily  propofol Infusion 10 MICROgram(s)/kG/Min (5.7 mL/Hr) IV Continuous <Continuous>  senna 2 Tablet(s) Oral at bedtime  vancomycin  IVPB      vancomycin  IVPB 1000 milliGRAM(s) IV Intermittent every 24 hours    7. Ancillary Management:   Chest PT[ ]   Head of bed >35 [ ]   Out of bed to chair [ ]   PT/OT/SP Eval [ ]   Spirometry[ ]   DVT prophalaxis[ ]    8.Neuro: Propofol sedation turned off briefly for purpose of examination  Mental status: Lethargic  Language: Trached, not following commands.  Cranial nerves: + pupillary reflex, + corneals, doll's eyes abnormal, + gag reflex  Motor: Spontaneously lifts LUE  Sensation: Minimal localization to noxious stimuli in left arm    NIH STROKE SCALE  Item	                                                        Score  1 a.	Level of Consciousness	               	3  1 b. LOC Questions	                                    2  1 c.	LOC Commands	                               	2  2.	Best Gaze	                                    0  3.	Visual	                                                0  4.	Facial Palsy	                                    0  5 a.	Motor Arm - Left	                        0  5 b.	Motor Arm - Right	                        4  6 a.	Motor Leg - Left	                        4  6 b.	Motor Leg - Right	                        4  7.	Limb Ataxia	                                    0  8.	Sensory	                                                0  9.	Language	                                    3  10.	Dysarthria	                                    0  11.	Extinction and Inattention  	            0  ________________________________________________________________________  TOTAL	                                                        26    mRS: 5 Severe disability; bedridden, incontinent and requiring constant nursing care and attention    Last CTH:  < from: CT Head No Cont (04.20.20 @ 12:44) >  IMPRESSION:  In comparison with the prior noncontrast CT scan of the brain dated April 14, 2020:  Small focus of diminished attenuation in the left side of the brainstem consistent with acute ischemic change more prominent on the current study.  New small focus of hemorrhage in the left occipital lobe.  New linear foci of hyperdensity in the occipital lobes and right posterior temporal occipital region likely representing thrombi in cortical vessels.    Last CTA/MRA: NONE    Last CTP: NONE    Last MRI: PENDING    Last TCD:    Last EEG:  < from: EEG (04.15.20 @ 17:30) >  Impression: Abnormal due to the presence of: generalized slowing as above  No epileptiform activitiy    EVD: [ ] [place]: [ ]     ICP:     CPP:     Level(cm):     24hr(ml):     CSF:     WBC:     RBC:     Cx:     Protein:     Glucose:     LA:        Grain Stain:     9. Cardiovascular:   HR: 81 (04-22-20 @ 16:00) (70 - 87)  BP: 141/67 (04-22-20 @ 16:00) (141/67 - 141/67)    Last Echo: NONE    Last EKG:  < from: 12 Lead ECG (04.21.20 @ 06:16) >  Systolic  mmHg  Diastolic BP 71 mmHg  Ventricular Rate 151 BPM  Atrial Rate 178 BPM  QRS Duration 100 ms  Q-T Interval 334 ms  QTC Calculation(Bezet) 529 ms  R Axis 5 degrees  T Axis 66 degrees  Diagnosis Line Atrial fibrillation with rapid ventricular response with premature ventricular  or aberrantly conducted complexes  Incomplete right bundle branch block  Septal infarct , age undetermined  Abnormal ECG    CVP   MAP/CPP/SBP target:   CO:      CI:       Enzymes/Troponin T, Serum (04.21.20 @ 07:32)    Troponin T, Serum: 0.05: Critical value: ng/mL    10. Respiratory:   RR: 26 (04-22-20 @ 16:00) (26 - 30)  SpO2: 100% (04-22-20 @ 16:00) (97% - 100%)    Blood Gas Profile - Arterial (04.22.20 @ 13:56)    pH, Arterial: 7.51    pCO2, Arterial: 33 mmHg    pO2, Arterial: 117 mmHg    HCO3, Arterial: 27 mmoL/L    Base Excess, Arterial: 4.0 mmoL/L    Oxygen Saturation, Arterial: 98 %    Chest Xray:  < from: Xray Chest 1 View- PORTABLE-Routine (04.21.20 @ 04:31) >  Impression:  Hazy bilateral opacifications, right greater than left. Support devices as described.    Mode: AC/ CMV (Assist Control/ Continuous Mandatory Ventilation)  RR (machine): 30  TV (machine): 500  FiO2: 30  PEEP: 5  ITime: 1  MAP: 18  PIP: 36    Peak Pressure/[place] Pressure:    11.GI:  Prophalaxis:     Bowel mvt:     Abd distension:   LIVER FUNCTIONS - ( 20 Apr 2020 23:00 )  TPro  5.4<L>  /  Alb  2.4<L>  /  TBili  0.6  /  DBili  x   /  AST  66<H>  /  ALT  98<H>  /  AlkPhos  84  04-21  TPro  5.9<L>  /  Alb  2.6<L>  /  TBili  0.6  /  DBili  x   /  AST  59<H>  /  ALT  105<H>  /  AlkPhos  104  04-20  Alb: 2.6 g/dL / Pro: 5.9 g/dL / ALK PHOS: 104 U/L / ALT: 105 U/L / AST: 59 U/L / GGT: x           12.Renal/Fluids/Electrolytes:  04-22    154<H>  |  115<H>  |  120<HH>  ----------------------------<  168<H>  3.8   |  26  |  3.9<H>    Ca    8.7      22 Apr 2020 16:41  Phos  4.2     04-21  Mg     2.3     04-21    I&O's Summary    21 Apr 2020 07:01  -  22 Apr 2020 07:00  --------------------------------------------------------  IN: 2785.6 mL / OUT: 1825 mL / NET: 960.6 mL    22 Apr 2020 07:01  -  22 Apr 2020 20:40  --------------------------------------------------------  IN: 2103 mL / OUT: 1195 mL / NET: 908 mL    13.ID:   T(C): 36.8 (04-22-20 @ 16:00), Max: 37.1 (04-22-20 @ 12:00)  T(F): 98.3 (04-22-20 @ 16:00), Max: 98.7 (04-22-20 @ 12:00)    14. Hematology:                         8.5    8.69  )-----------( 217      ( 21 Apr 2020 23:30 )             26.6     PT/INR - ( 21 Apr 2020 23:30 )   PT: 14.30 sec;   INR: 1.24 ratio    PTT - ( 21 Apr 2020 23:30 )  PTT:25.5 sec                        9.5    12.14 )-----------( 225      ( 20 Apr 2020 23:00 )             29.0      PT/INR - ( 20 Apr 2020 23:00 )   PT: 13.80 sec;   INR: 1.20 ratio    PTT - ( 20 Apr 2020 23:00 )  PTT:26.5 sec    DVT Prophylaxis Lovenox[ ] Heparin[X ] Venodynes[ ] SCD's[ ]

## 2020-04-22 NOTE — CHART NOTE - NSCHARTNOTEFT_GEN_A_CORE
Spoke with patient's son Harvinder regarding pt's need for a PEG placement prior to discharge to long term care facility.  Pt's son, who is now first to be communicated with, expressed understanding of the need for a PEG to facilitate his father's long term care. All questions answered.

## 2020-04-22 NOTE — PROGRESS NOTE ADULT - ASSESSMENT
LIZZIE/ ATN/ hyperkalemia/ respiratory failure / covid positive / high BUN    # off  diuretic   # non-oliguric  # ph  improving  feed nepro, on binders   # no need for hd today   # BUN noted : highly catabolic and prerenal, off  diuretics, change feed to nepro   # sodium level noted, start d5 at 100 cc/h   # last vanco level noted 18.9. repeat vanco trough. decrease nikky to 500 mg q 24 hr.  # will follow   # overall prognosis poor

## 2020-04-22 NOTE — PROGRESS NOTE ADULT - ASSESSMENT
A 74 years old man w/ hx of Afib on Eliquis, CAD s/p PCI, DM presents with sx of COVID found to be positive and intubated since 3/31 and now s/p trached. Consulted for encephalopathy and unresponsive per primary team. CTH on 4/14 shows b/l acute ischemic strokes with hemorrhagic conversion in vascular territory which cannot explain his encephalopathy. REEG was completed 4/15th and reports of generalized slowing. Other encephalopathy etiologies should be further investigated and not limited to metabolic derangement and/or cytokine storm. No MRI study noted. Repeat CTH reports of more prominent left sided brainstem acute ischemic changes and new small focus of left occipital lobe hemorrhage and new linear foci of hyperdensity in the occipital lobes and right posterior temporal occipital region likely representing thrombi in cortical vessels.    SUGGESTIONS:   ** THERE IS NO CONTRAINDICATION IN OBTAINING BRAIN MRI STUDIES FOR + COVID PATIENTS PER RADIOLOGY DEPT. CONFIRMED WITH CHIEF OF RADIOLOGY AND MRI STAFF **  - PLEASE ORDER IF PRIMARY TEAM AGREES  - Brain MRI without gilberto  - MRA Head/neck without if possible for further stroke etiology work up  - Add MRV without Gilberto for further evaluation of possible CVST that is in atypical location  - Obtain rEEG while patient OFF of sedation. Please coordinate this with EEG tech  - Hold home med Eliquis for now  - C/w Keppra 250mg Q12  - Spinal tap with CSF WBC, RBC, protein, glucose, OCB, IgG Index, SARS-CoV2 PCR (will need to send this out). Add also Serological assays for all SARS-CoV2 cases: pro-inflammatory cytokines assay panel (i.e., IL-1ß, IL-2, IL-6, IL-10, IL-17, TNF- a, IFN-?, IP-10), in addition to serum inflammatory biomarkers ferritin, CRP, absolute lymphocyte counts    Will continue to follow. Please notify our team once imaging obtained  Updated plan with Surgery team [phone]  Case discussed and patient seen with attending physician   Jess Logan NP  [id number] A 74 years old man w/ hx of Afib on Eliquis, CAD s/p PCI, DM presents with sx of COVID found to be positive and intubated since 3/31 and now s/p trached. Consulted for encephalopathy and unresponsive per primary team. CTH on 4/14 shows b/l acute ischemic strokes with hemorrhagic conversion in vascular territory which cannot explain his encephalopathy. REEG was completed 4/15th and reports of generalized slowing. Other encephalopathy etiologies should be further investigated and not limited to metabolic derangement and/or cytokine storm. No MRI study noted. Repeat CTH reports of more prominent left sided brainstem acute ischemic changes and new small focus of left occipital lobe hemorrhage and new linear foci of hyperdensity in the occipital lobes and right posterior temporal occipital region likely representing thrombi in cortical vessels.    SUGGESTIONS:   NEURO: b/l acute ischemic strokes with hemorrhagic conversion; possible CVST; encephalopathy   ** THERE IS NO CONTRAINDICATION IN OBTAINING BRAIN MRI STUDIES FOR + COVID PATIENTS PER RADIOLOGY DEPT. CONFIRMED WITH CHIEF OF RADIOLOGY AND MRI STAFF **  - PLEASE ORDER IF PRIMARY TEAM AGREES  - Brain MRI without gilberto  - MRA Head/neck without if possible for further stroke etiology work up  - Add MRV without Gilberto for further evaluation of possible CVST that is in atypical location  - Obtain rEEG while patient OFF of sedation. Please coordinate this with EEG tech  - C/w Keppra 250mg Q12  - Spinal tap with CSF WBC, RBC, protein, glucose, OCB, IgG Index, SARS-CoV2 PCR (will need to send this out). Add also Serological assays for all SARS-CoV2 cases: pro-inflammatory cytokines assay panel (i.e., IL-1ß, IL-2, IL-6, IL-10, IL-17, TNF- a, IFN-?, IP-10), in addition to serum inflammatory biomarkers ferritin, CRP, absolute lymphocyte counts  [] Avoid fever & shivering and maintain core temp < 36C    CV: Afib w/ RVR  [] HOLD home med Eliquis for now  [] Avoid hypotension. Monitor IV volume and pressors to maintain MAP > 75    RESP:  [] Maintain adequate oxygenation, avoid hyperventilation and keep PCO2 WNL as respiratory alkalosis will further increase cerebral ischemia    F&E:  [] Maintain euvolemia and normonatremic  [] Avoid hypotonic solutions to prevent cytotoxic edema  [] Keep Mg high normal to avoid shivering    GI:  [] GI ppx with PPI    DVT PPX:  [] C/w Heparin SQ and SCDs    Will continue to follow. Please notify our team once imaging obtained  Updated plan with Surgery team [phone]  Case discussed and patient seen with attending physician   Jess Logan NP  [id number]

## 2020-04-22 NOTE — PROGRESS NOTE ADULT - SUBJECTIVE AND OBJECTIVE BOX
KONSTANTIN WYATT  [id number]  74y Male    Indication for ICU admission: Acute Hypoxemic Respiratory Failure, clinical COVID19 (false negatvie)    Admit Date:20  ICU Date: 20  OR Date:    Bactrim (Unknown)    PAST MEDICAL & SURGICAL HISTORY:  Afib  DM (diabetes mellitus)  BPH (benign prostatic hyperplasia)  CAD (coronary artery disease)    Home Medications:  Cartia  mg/24 hours oral capsule, extended release: 1 cap(s) orally once a day (30 Mar 2020 18:04)  Eliquis 5 mg oral tablet: 1 tab(s) orally 2 times a day (30 Mar 2020 18:04)  Lipitor 40 mg oral tablet: 1 tab(s) orally once a day (30 Mar 2020 18:04)  lisinopril 40 mg oral tablet: 1 tab(s) orally once a day (30 Mar 2020 18:04)  metFORMIN 750 mg oral tablet, extended release: 1 tab(s) orally once a day (30 Mar 2020 18:04)    24HRS EVENT:   ON: eyes open, moving mouth, flaccid, low tone. Passive range of motion provided. Sedated on propofol 10 (5ml). Decreased resp rate based on ABG 7.53/32/98, 26 from 30.  HR controlled, 66, BP appropriate. Oral ulcer noted, oral care provided. Thick oral secretions, ETT.  Eldorado in femoral- placed - will need replacement tomorrow Cr rising 4.1 (3.6), UOP   Hypernatremia 157 despite 400 Q6H free water bolus- added D5W @50 per Dr. Quevedo around 11pm. Na 155. K 3.6, renal failure- did not replete.   D dimer stabilized, Hgb downtrending. WBC downtrending.           NEURO   -On Propofol gtt 10 mg/hr   Exam Occasionally spontaneously, moving mouth, not following commands or tracking, not moving extremities with stimulation. Flaccid- passive range of motion performed overnight, noted flaccid/low tone.  Pupils dilated but reactive.   Neurology: recommending again MRI, will speak to radiology; MRV, possible spinal tap- follow up today   palliative consultation - family not interested       RESP  Acute Hypoxemic Respiratory Failure s/p trach , on vent    COVID-19/Viral Pneumonia--repeat   negative COVID.   MRSA pneumonia 4/15 DTA   - trach, on vent: 500/26/30%/5-> decreased RR 30 to 26 overnight for alkalosis   PM AB.46/ 38/ 95/ 27/ 97   lac 1.2  AM AB.53/32/98, 27bicarb, sat 98%, lact 0.7- not overbreathing.   -continued thick secretions from ETT suctioning and oral   Suspected PE  Daily CXR: Trach, NGT placement ok       CARD/VASC  Afib RVR, tachycardia to 150's: on Cardizen gtt earlier in day.  Got push of Lopressor 5mg x 1, then given PO Cardizem.  Able to wean off Cardizem gtt.  Broke, overnight HR 60s.   - Eliquis held 2/2 hemorrhagic stroke  Hypotension - off levo, midodrine 10q 8h for hypotension, resolved     Hx of Afib - Diltiazem, BB    Hx of CAD - Atorvastatin 40mg PO qhs    Hx of HTN - hold Lisinopril   EKG: NSR with PVC, incomplete RBBB QTc: 465 (: 429, Afib RVR,  incomplete RBBB)     GI/NUTR  NGT, tolerating TF Glucerna @50  Hypoalbuminemia: Albumin 2.4  Bowel regimen: Senna, lactulose, last BM   GI ppx: protonix  oral ulcer, lip lesions- wound care bacitracin, oral care       /Renal  -LIZZIE with uremia: BUN/Cr improved after HD, last   Evans -nonoliguric renal failure,  UOP  ml/hr  BUN/Cr 130/4.1 (127/3.6),    Nephrology: no HD, will follow, continue Nepro, D5W  Right femoral Eldorado placed - may need new UDall *****  -Lytes-Na 155 (157)//K 3.6// Mg 2.3//Phos 4.2  Hyperphosphatemia, On renvela, phoslo  hypokalemia- not repleting in setting of renal injury   Hypernatremia-  on Free H2O 400 Q 6H, continued to rise, added D5W at 50ml/hr overnight per Dr. Quevedo, Na 157-> 155, continue to monitor   Hx of BPH  Daily weight 86.9kg  (): 87.7kg, admission 95kg    HEME/ONC  Holding ELIQUIS 2/2 CTH findings of hemorrhagic strokes, also thrombi  Hgb stable 8.8-> 9.5-> 8.5  Dimer: 4516 (4185, 2336), continues to rise   DVT ppx: SQH     ID    Afebrile, Tmax ___    WBC downtrending, 15-> 12.1-> 8.7    Procal 1.13-> 0.75    COVID-19 +  MRSA Pneumonia  - on Meropenum and Vanco 1gm q24h, (vanc trough 18.9 )  - completed course of Hydroxychloroquine & Azithromycin, VitC and Zinc  - D-dimer uptrending  2336-> 4185-> 4516      ENDO  DM2; uncontrolled, holding home Metformin. On RISS  FSG: ___    MSK/DERM  -burn consult for lip lac- bacitracin and peridex  - oral ulcer- continue oral care  - passive range of motion BID- performed overnight, hypotonic    DVT PPx  - on hep sub q  - off Apixaban     GI PPx  -  Pantoprazole 40mg PO qac      ***Tubes/Lines/Drains  ***  Peripheral IV  Central Venous Line  Right IJ TLC  	Date 3/31/20  R fem Eldorado   Arterial Line  Right radial A-line		                Date: 3/31/20  Urnary Catheter		Indication: Strict I&O    Date Placed: 3/31/20  ETT  OGT    REVIEW OF SYSTEMS    [ ] A ten-point review of systems was otherwise negative except as noted.  [x ] Due to altered mental status/intubation, subjective information were not able to be obtained from the patient. History was obtained, to the extent possible, from review of the chart and collateral sources of information.    General/Neuro  on Propofol for sedation  More purposeful movt today of LURILEY  RASS: -2 to -3  Pupils: PERRLA  Sedated, no focal defecits    Lungs: BS decreased b/l  Mechanically ventilated    Mode: AC/ CMV (Assist Control/ Continuous Mandatory Ventilation)  RR (machine): 26  TV (machine): 500  FiO2: 30  PEEP: 5  MAP: 16  PIP: 30    Blood Gas Profile - Arterial (20 @ 01:27)    pH, Arterial: 7.53    pCO2, Arterial: 32 mmHg    pO2, Arterial: 98 mmHg    HCO3, Arterial: 27 mmoL/L    Base Excess, Arterial: 4.2 mmoL/L    Oxygen Saturation, Arterial: 98 %        Cardiovascular: S1, S2  Regular rate and rhythm  Peripheral edema    GI: Abdomen soft, nontender, nondistended    Extremities: Warm, pink, well-perfused. Pulses palp b/l    Derm: Good skin turgor, no skin breakdown    : Evans catheter in place

## 2020-04-22 NOTE — PROGRESS NOTE ADULT - SUBJECTIVE AND OBJECTIVE BOX
Nephrology progress note    Patient is seen and examined, events over the last 24 h noted .    Allergies:  Bactrim (Unknown)    Hospital Medications:   MEDICATIONS  (STANDING):  BACItracin   Ointment 1 Application(s) Topical two times a day  calcium acetate 1334 milliGRAM(s) Oral three times a day with meals  chlorhexidine 0.12% Liquid 15 milliLiter(s) Oral Mucosa two times a day  chlorhexidine 4% Liquid 1 Application(s) Topical <User Schedule>  dextrose 5%. 1000 milliLiter(s) (50 mL/Hr) IV Continuous <Continuous>  dextrose 50% Injectable 25 Gram(s) IV Push once  diltiazem    Tablet 60 milliGRAM(s) Oral every 6 hours  heparin  Injectable 5000 Unit(s) SubCutaneous every 8 hours  insulin regular  human corrective regimen sliding scale   IV Push every 4 hours  insulin regular Infusion 1 Unit(s)/Hr (1 mL/Hr) IV Continuous <Continuous>  lactulose Syrup 20 Gram(s) Oral every 8 hours  levETIRAcetam 250 milliGRAM(s) Oral two times a day  meropenem  IVPB 500 milliGRAM(s) IV Intermittent every 12 hours  metoprolol tartrate 25 milliGRAM(s) Oral every 8 hours  midodrine 10 milliGRAM(s) Oral every 8 hours  pantoprazole   Suspension 40 milliGRAM(s) Oral daily  propofol Infusion 10 MICROgram(s)/kG/Min (5.7 mL/Hr) IV Continuous <Continuous>  senna 2 Tablet(s) Oral at bedtime  sevelamer carbonate 800 milliGRAM(s) Oral three times a day with meals  vancomycin  IVPB      vancomycin  IVPB 1000 milliGRAM(s) IV Intermittent every 24 hours        VITALS:  T(F): 98.3 (04-22-20 @ 08:00), Max: 99.9 (04-21-20 @ 16:00)  HR: 78 (04-22-20 @ 08:00)  BP: 155/56  RR: 27 (04-22-20 @ 08:00)  SpO2: 100% (04-22-20 @ 08:00)  Wt(kg): --    04-20 @ 07:01  -  04-21 @ 07:00  --------------------------------------------------------  IN: 2336 mL / OUT: 2385 mL / NET: -49 mL    04-21 @ 07:01 - 04-22 @ 07:00  --------------------------------------------------------  IN: 2785.6 mL / OUT: 1825 mL / NET: 960.6 mL    04-22 @ 07:01  -  04-22 @ 09:31  --------------------------------------------------------  IN: 110.8 mL / OUT: 285 mL / NET: -174.2 mL          PHYSICAL EXAM:  Constitutional: intubated on vent  Respiratory: b/l rhonchi  Cardiovascular: S1, S2, RRR  Gastrointestinal: BS+, soft, NT/ND  Extremities: No peripheral edema  :  + santana.     vascular access: udall    LABS:  04-21    155<H>  |  114<H>  |  130<HH>  ----------------------------<  139<H>  3.6   |  26  |  4.1<HH>    Creatinine Trend: 4.1<--, 3.6<--, 4.3<--, 4.0<--, 4.7<--, 4.3<--    Blood Urea Nitrogen, Serum: 127: Critical value: mg/dL (04.21.20 @ 17:30)  Blood Urea Nitrogen, Serum: 134: Critical value: mg/dL (04.20.20 @ 23:00)  Blood Urea Nitrogen, Serum: 136: Critical value: mg/dL (04.20.20 @ 16:40)    SODIUM TREND:  Sodium 155 [04-21 @ 23:30]  Sodium 157 [04-21 @ 17:30]  Sodium 156 [04-20 @ 23:00]  Sodium 155 [04-20 @ 16:40]  Sodium 154 [04-20 @ 01:00]  Sodium 155 [04-19 @ 18:09]  Sodium 156 [04-19 @ 17:04]  Sodium 149 [04-19 @ 01:00]  Sodium 151 [04-18 @ 17:11]  Sodium 148 [04-18 @ 02:38]    Ca    8.4<L>      21 Apr 2020 23:30  Phos  4.2     04-21  Mg     2.3     04-21    TPro  5.4<L>  /  Alb  2.4<L>  /  TBili  0.6  /  DBili      /  AST  66<H>  /  ALT  98<H>  /  AlkPhos  84  04-21                          8.5    8.69  )-----------( 217      ( 21 Apr 2020 23:30 )             26.6     Creatine Kinase, Serum: 437 U/L (04.21.20 @ 23:30)    Vancomycin Level, Trough: 18.9 ug/mL (04.20.20 @ 16:40)    Urine Studies:    RADIOLOGY & ADDITIONAL STUDIES: Nephrology progress note    Patient is seen and examined, events over the last 24 h noted .  still intubated on MV     Allergies:  Bactrim (Unknown)    Hospital Medications:   MEDICATIONS  (STANDING):  BACItracin   Ointment 1 Application(s) Topical two times a day  calcium acetate 1334 milliGRAM(s) Oral three times a day with meals  dextrose 5%. 1000 milliLiter(s) (50 mL/Hr) IV Continuous <Continuous>  dextrose 50% Injectable 25 Gram(s) IV Push once  diltiazem    Tablet 60 milliGRAM(s) Oral every 6 hours  heparin  Injectable 5000 Unit(s) SubCutaneous every 8 hours  insulin regular  human corrective regimen sliding scale   IV Push every 4 hours  insulin regular Infusion 1 Unit(s)/Hr (1 mL/Hr) IV Continuous <Continuous>  lactulose Syrup 20 Gram(s) Oral every 8 hours  levETIRAcetam 250 milliGRAM(s) Oral two times a day  meropenem  IVPB 500 milliGRAM(s) IV Intermittent every 12 hours  metoprolol tartrate 25 milliGRAM(s) Oral every 8 hours  midodrine 10 milliGRAM(s) Oral every 8 hours  pantoprazole   Suspension 40 milliGRAM(s) Oral daily  propofol Infusion 10 MICROgram(s)/kG/Min (5.7 mL/Hr) IV Continuous <Continuous>  senna 2 Tablet(s) Oral at bedtime  sevelamer carbonate 800 milliGRAM(s) Oral three times a day with meals  vancomycin  IVPB      vancomycin  IVPB 1000 milliGRAM(s) IV Intermittent every 24 hours        VITALS:  T(F): 98.3 (04-22-20 @ 08:00), Max: 99.9 (04-21-20 @ 16:00)  HR: 78 (04-22-20 @ 08:00)  BP: 155/56  RR: 27 (04-22-20 @ 08:00)  SpO2: 100% (04-22-20 @ 08:00)      04-20 @ 07:01  -  04-21 @ 07:00  --------------------------------------------------------  IN: 2336 mL / OUT: 2385 mL / NET: -49 mL    04-21 @ 07:01  -  04-22 @ 07:00  --------------------------------------------------------  IN: 2785.6 mL / OUT: 1825 mL / NET: 960.6 mL    04-22 @ 07:01  -  04-22 @ 09:31  --------------------------------------------------------  IN: 110.8 mL / OUT: 285 mL / NET: -174.2 mL          PHYSICAL EXAM:  Constitutional: intubated on vent  Respiratory: b/l rhonchi  Cardiovascular: S1, S2, RRR  Gastrointestinal: BS+, soft, NT/ND  Extremities: No peripheral edema  :  + santana.     vascular access: udall    LABS:  04-21    155<H>  |  114<H>  |  130<HH>  ----------------------------<  139<H>  3.6   |  26  |  4.1<HH>    Creatinine Trend: 4.1<--, 3.6<--, 4.3<--, 4.0<--, 4.7<--, 4.3<--    Blood Urea Nitrogen, Serum: 127: Critical value: mg/dL (04.21.20 @ 17:30)  Blood Urea Nitrogen, Serum: 134: Critical value: mg/dL (04.20.20 @ 23:00)  Blood Urea Nitrogen, Serum: 136: Critical value: mg/dL (04.20.20 @ 16:40)    SODIUM TREND:  Sodium 155 [04-21 @ 23:30]  Sodium 157 [04-21 @ 17:30]  Sodium 156 [04-20 @ 23:00]  Sodium 155 [04-20 @ 16:40]  Sodium 154 [04-20 @ 01:00]  Sodium 155 [04-19 @ 18:09]  Sodium 156 [04-19 @ 17:04]  Sodium 149 [04-19 @ 01:00]  Sodium 151 [04-18 @ 17:11]  Sodium 148 [04-18 @ 02:38]    Ca    8.4<L>      21 Apr 2020 23:30  Phos  4.2     04-21  Mg     2.3     04-21    TPro  5.4<L>  /  Alb  2.4<L>  /  TBili  0.6  /  DBili      /  AST  66<H>  /  ALT  98<H>  /  AlkPhos  84  04-21                          8.5    8.69  )-----------( 217      ( 21 Apr 2020 23:30 )             26.6     Creatine Kinase, Serum: 437 U/L (04.21.20 @ 23:30)    Vancomycin Level, Trough: 18.9 ug/mL (04.20.20 @ 16:40)    Urine Studies:    RADIOLOGY & ADDITIONAL STUDIES:

## 2020-04-22 NOTE — CHART NOTE - NSCHARTNOTEFT_GEN_A_CORE
Discussed patient's condition with tino Blanton [phone]. Discussed vital signs, pertinent labs and imaging. Questions answered to his apparent satisfaction.

## 2020-04-22 NOTE — PROGRESS NOTE ADULT - ASSESSMENT
98.6 72y male, acute respiratory failure 2/2 COVID-19    NEURO   wean Prpofol    Acute infarcts- keppra 250mg BID, EEG- no epileptiform activity.   No agitation, RASS -4  radiology said no MRI for now    RESP  Acute Hypoxemic Respiratory Failure; Intubated, /30/40/5  COVID-19/Viral Pneumonia  -repeat swab 4/13 and 4/17 negative  4/15 DTA: MRSA +, on vanco and nikky  s/p Trach 4/18         CARD/VASC  - acute hypotension- on midodrine    Hx of Afib, AFIB RVR on this admission - holding Eliquis 2/2 stroke, cont Cardizem PO 60mg q8, Metoprolol 25mg q8   CAD - continue Atorvastatin   Hx of HTN- holding Lisinopril       GI/NUTR  - diet: TF @ 50 via NGT, Vit C, Zinc  - PPI  - Senna, Lactulose bowel regimen- Last BM 4/18   Daily weight 4/18 88.7 kg-> 89kg (Admission 99kg)     /Renal  LIZZIE + uremia - HD 4/17 kept even, no HD 4/18; on phoslo. Follow up nephrology regarding timing of next HD session. [facility] in Right femoral   Evans - monitor UO   off Ethacrynic, on phoslo  Hx of BPH  Monitor electrolytes, Na 149, K 3.8, Mg 2.5, Phos 6.4  As per nephrology -  add renagel 3/3/3, d/c vit c    HEME/ONC  Hgb stable-8.8, continue to monitor   DVT ppx: holding eliquis, d/w Neurology regarding ability to restart 2/2 infarct.   D dimer: 2336 (2370)     ID    Afebrile, Tmax 98.7     COVID 19- intubated, on precautions, WBC 22.3-> 19.5-> 15.6, continue to trend.    procalcitonin 1.38-> 1.58-> 1.92, rising. Continue to monitor   DTA 4/15: + MRSA pneumonia: on meropenem and vancomycin, check vanco level 4/21 (last 11.4 on 4/19)    ENDO  DM2 - holding home Metformin  Hyperglycemia - on RISS  (117-230)    MSK/DERM  Lower lip ulceration   No surgical intervention   Rec - keep site moist - Bacitracin ointment or vit A & D ointment     DVT PPx: Eliquis (currently held 2/2 cerebral infarcts) on SQH   GI PPx: PPI    DISPO: ICU  Palliative consult  Transfer 3B

## 2020-04-22 NOTE — PHARMACOTHERAPY INTERVENTION NOTE - COMMENTS
recommended md to change   bactroban nasal to bactroban  topical ointment   for nares  since  nasal one is not available

## 2020-04-23 LAB
ALBUMIN SERPL ELPH-MCNC: 2.6 G/DL — LOW (ref 3.5–5.2)
ALP SERPL-CCNC: 102 U/L — SIGNIFICANT CHANGE UP (ref 30–115)
ALT FLD-CCNC: 95 U/L — HIGH (ref 0–41)
ANION GAP SERPL CALC-SCNC: 13 MMOL/L — SIGNIFICANT CHANGE UP (ref 7–14)
ANION GAP SERPL CALC-SCNC: 16 MMOL/L — HIGH (ref 7–14)
APTT BLD: 27.5 SEC — SIGNIFICANT CHANGE UP (ref 27–39.2)
AST SERPL-CCNC: 66 U/L — HIGH (ref 0–41)
BASOPHILS # BLD AUTO: 0.03 K/UL — SIGNIFICANT CHANGE UP (ref 0–0.2)
BASOPHILS NFR BLD AUTO: 0.4 % — SIGNIFICANT CHANGE UP (ref 0–1)
BILIRUB SERPL-MCNC: 0.8 MG/DL — SIGNIFICANT CHANGE UP (ref 0.2–1.2)
BUN SERPL-MCNC: 103 MG/DL — CRITICAL HIGH (ref 10–20)
BUN SERPL-MCNC: 118 MG/DL — CRITICAL HIGH (ref 10–20)
CALCIUM SERPL-MCNC: 8.2 MG/DL — LOW (ref 8.5–10.1)
CALCIUM SERPL-MCNC: 8.9 MG/DL — SIGNIFICANT CHANGE UP (ref 8.5–10.1)
CHLORIDE SERPL-SCNC: 108 MMOL/L — SIGNIFICANT CHANGE UP (ref 98–110)
CHLORIDE SERPL-SCNC: 111 MMOL/L — HIGH (ref 98–110)
CK SERPL-CCNC: 287 U/L — HIGH (ref 0–225)
CO2 SERPL-SCNC: 24 MMOL/L — SIGNIFICANT CHANGE UP (ref 17–32)
CO2 SERPL-SCNC: 25 MMOL/L — SIGNIFICANT CHANGE UP (ref 17–32)
CREAT SERPL-MCNC: 3.1 MG/DL — HIGH (ref 0.7–1.5)
CREAT SERPL-MCNC: 3.9 MG/DL — HIGH (ref 0.7–1.5)
CRP SERPL-MCNC: 3.68 MG/DL — HIGH (ref 0–0.4)
D DIMER BLD IA.RAPID-MCNC: 7009 NG/ML DDU — HIGH (ref 0–230)
EOSINOPHIL # BLD AUTO: 0.3 K/UL — SIGNIFICANT CHANGE UP (ref 0–0.7)
EOSINOPHIL NFR BLD AUTO: 3.6 % — SIGNIFICANT CHANGE UP (ref 0–8)
FERRITIN SERPL-MCNC: 1751 NG/ML — HIGH (ref 30–400)
GLUCOSE BLDC GLUCOMTR-MCNC: 136 MG/DL — HIGH (ref 70–99)
GLUCOSE BLDC GLUCOMTR-MCNC: 137 MG/DL — HIGH (ref 70–99)
GLUCOSE BLDC GLUCOMTR-MCNC: 142 MG/DL — HIGH (ref 70–99)
GLUCOSE BLDC GLUCOMTR-MCNC: 143 MG/DL — HIGH (ref 70–99)
GLUCOSE BLDC GLUCOMTR-MCNC: 175 MG/DL — HIGH (ref 70–99)
GLUCOSE BLDC GLUCOMTR-MCNC: 98 MG/DL — SIGNIFICANT CHANGE UP (ref 70–99)
GLUCOSE SERPL-MCNC: 169 MG/DL — HIGH (ref 70–99)
GLUCOSE SERPL-MCNC: 205 MG/DL — HIGH (ref 70–99)
HCT VFR BLD CALC: 28.6 % — LOW (ref 42–52)
HGB BLD-MCNC: 9.2 G/DL — LOW (ref 14–18)
IMM GRANULOCYTES NFR BLD AUTO: 0.8 % — HIGH (ref 0.1–0.3)
INR BLD: 1.15 RATIO — SIGNIFICANT CHANGE UP (ref 0.65–1.3)
LDH SERPL L TO P-CCNC: 431 U/L — HIGH (ref 50–242)
LYMPHOCYTES # BLD AUTO: 0.48 K/UL — LOW (ref 1.2–3.4)
LYMPHOCYTES # BLD AUTO: 5.7 % — LOW (ref 20.5–51.1)
MAGNESIUM SERPL-MCNC: 2.3 MG/DL — SIGNIFICANT CHANGE UP (ref 1.8–2.4)
MCHC RBC-ENTMCNC: 30.5 PG — SIGNIFICANT CHANGE UP (ref 27–31)
MCHC RBC-ENTMCNC: 32.2 G/DL — SIGNIFICANT CHANGE UP (ref 32–37)
MCV RBC AUTO: 94.7 FL — HIGH (ref 80–94)
MONOCYTES # BLD AUTO: 0.87 K/UL — HIGH (ref 0.1–0.6)
MONOCYTES NFR BLD AUTO: 10.3 % — HIGH (ref 1.7–9.3)
NEUTROPHILS # BLD AUTO: 6.67 K/UL — HIGH (ref 1.4–6.5)
NEUTROPHILS NFR BLD AUTO: 79.2 % — HIGH (ref 42.2–75.2)
NRBC # BLD: 0 /100 WBCS — SIGNIFICANT CHANGE UP (ref 0–0)
PHOSPHATE SERPL-MCNC: 6.2 MG/DL — HIGH (ref 2.1–4.9)
PLATELET # BLD AUTO: 252 K/UL — SIGNIFICANT CHANGE UP (ref 130–400)
POTASSIUM SERPL-MCNC: 4.1 MMOL/L — SIGNIFICANT CHANGE UP (ref 3.5–5)
POTASSIUM SERPL-MCNC: 4.3 MMOL/L — SIGNIFICANT CHANGE UP (ref 3.5–5)
POTASSIUM SERPL-SCNC: 4.1 MMOL/L — SIGNIFICANT CHANGE UP (ref 3.5–5)
POTASSIUM SERPL-SCNC: 4.3 MMOL/L — SIGNIFICANT CHANGE UP (ref 3.5–5)
PROCALCITONIN SERPL-MCNC: 0.45 NG/ML — HIGH (ref 0.02–0.1)
PROT SERPL-MCNC: 6.1 G/DL — SIGNIFICANT CHANGE UP (ref 6–8)
PROTHROM AB SERPL-ACNC: 13.2 SEC — HIGH (ref 9.95–12.87)
RBC # BLD: 3.02 M/UL — LOW (ref 4.7–6.1)
RBC # FLD: 14.1 % — SIGNIFICANT CHANGE UP (ref 11.5–14.5)
SODIUM SERPL-SCNC: 145 MMOL/L — SIGNIFICANT CHANGE UP (ref 135–146)
SODIUM SERPL-SCNC: 152 MMOL/L — HIGH (ref 135–146)
TRIGL SERPL-MCNC: 201 MG/DL — HIGH (ref 10–149)
TROPONIN T SERPL-MCNC: 0.07 NG/ML — CRITICAL HIGH
VANCOMYCIN TROUGH SERPL-MCNC: 56.5 UG/ML — HIGH (ref 5–10)
WBC # BLD: 8.42 K/UL — SIGNIFICANT CHANGE UP (ref 4.8–10.8)
WBC # FLD AUTO: 8.42 K/UL — SIGNIFICANT CHANGE UP (ref 4.8–10.8)

## 2020-04-23 PROCEDURE — 71045 X-RAY EXAM CHEST 1 VIEW: CPT | Mod: 26,CS

## 2020-04-23 PROCEDURE — 93010 ELECTROCARDIOGRAM REPORT: CPT

## 2020-04-23 RX ORDER — INSULIN GLARGINE 100 [IU]/ML
5 INJECTION, SOLUTION SUBCUTANEOUS AT BEDTIME
Refills: 0 | Status: DISCONTINUED | OUTPATIENT
Start: 2020-04-23 | End: 2020-05-05

## 2020-04-23 RX ORDER — MIDAZOLAM HYDROCHLORIDE 1 MG/ML
0.03 INJECTION, SOLUTION INTRAMUSCULAR; INTRAVENOUS
Qty: 100 | Refills: 0 | Status: DISCONTINUED | OUTPATIENT
Start: 2020-04-23 | End: 2020-04-24

## 2020-04-23 RX ORDER — INSULIN LISPRO 100/ML
VIAL (ML) SUBCUTANEOUS
Refills: 0 | Status: DISCONTINUED | OUTPATIENT
Start: 2020-04-23 | End: 2020-05-05

## 2020-04-23 RX ADMIN — CHLORHEXIDINE GLUCONATE 15 MILLILITER(S): 213 SOLUTION TOPICAL at 17:16

## 2020-04-23 RX ADMIN — LACTULOSE 20 GRAM(S): 10 SOLUTION ORAL at 05:40

## 2020-04-23 RX ADMIN — MIDAZOLAM HYDROCHLORIDE 2.85 MG/KG/HR: 1 INJECTION, SOLUTION INTRAMUSCULAR; INTRAVENOUS at 21:41

## 2020-04-23 RX ADMIN — INSULIN GLARGINE 5 UNIT(S): 100 INJECTION, SOLUTION SUBCUTANEOUS at 21:42

## 2020-04-23 RX ADMIN — MEROPENEM 100 MILLIGRAM(S): 1 INJECTION INTRAVENOUS at 17:16

## 2020-04-23 RX ADMIN — Medication 60 MILLIGRAM(S): at 02:16

## 2020-04-23 RX ADMIN — Medication 25 MILLIGRAM(S): at 13:17

## 2020-04-23 RX ADMIN — LACTULOSE 20 GRAM(S): 10 SOLUTION ORAL at 21:43

## 2020-04-23 RX ADMIN — Medication 60 MILLIGRAM(S): at 15:47

## 2020-04-23 RX ADMIN — MEROPENEM 100 MILLIGRAM(S): 1 INJECTION INTRAVENOUS at 05:39

## 2020-04-23 RX ADMIN — Medication 60 MILLIGRAM(S): at 08:25

## 2020-04-23 RX ADMIN — SODIUM CHLORIDE 50 MILLILITER(S): 9 INJECTION, SOLUTION INTRAVENOUS at 15:19

## 2020-04-23 RX ADMIN — MUPIROCIN 1 APPLICATION(S): 20 OINTMENT TOPICAL at 17:16

## 2020-04-23 RX ADMIN — SENNA PLUS 2 TABLET(S): 8.6 TABLET ORAL at 21:44

## 2020-04-23 RX ADMIN — CHLORHEXIDINE GLUCONATE 1 APPLICATION(S): 213 SOLUTION TOPICAL at 05:41

## 2020-04-23 RX ADMIN — LEVETIRACETAM 250 MILLIGRAM(S): 250 TABLET, FILM COATED ORAL at 17:16

## 2020-04-23 RX ADMIN — PANTOPRAZOLE SODIUM 40 MILLIGRAM(S): 20 TABLET, DELAYED RELEASE ORAL at 12:06

## 2020-04-23 RX ADMIN — Medication 25 MILLIGRAM(S): at 21:44

## 2020-04-23 RX ADMIN — Medication 250 MILLIGRAM(S): at 17:56

## 2020-04-23 RX ADMIN — LEVETIRACETAM 250 MILLIGRAM(S): 250 TABLET, FILM COATED ORAL at 05:41

## 2020-04-23 RX ADMIN — Medication 60 MILLIGRAM(S): at 21:44

## 2020-04-23 RX ADMIN — HEPARIN SODIUM 5000 UNIT(S): 5000 INJECTION INTRAVENOUS; SUBCUTANEOUS at 21:42

## 2020-04-23 RX ADMIN — HEPARIN SODIUM 5000 UNIT(S): 5000 INJECTION INTRAVENOUS; SUBCUTANEOUS at 05:40

## 2020-04-23 RX ADMIN — Medication 1334 MILLIGRAM(S): at 08:25

## 2020-04-23 RX ADMIN — Medication 1 APPLICATION(S): at 17:16

## 2020-04-23 RX ADMIN — MUPIROCIN 1 APPLICATION(S): 20 OINTMENT TOPICAL at 05:41

## 2020-04-23 RX ADMIN — MIDODRINE HYDROCHLORIDE 10 MILLIGRAM(S): 2.5 TABLET ORAL at 05:41

## 2020-04-23 RX ADMIN — Medication 1334 MILLIGRAM(S): at 17:16

## 2020-04-23 RX ADMIN — CHLORHEXIDINE GLUCONATE 15 MILLILITER(S): 213 SOLUTION TOPICAL at 05:40

## 2020-04-23 RX ADMIN — Medication 1 APPLICATION(S): at 05:40

## 2020-04-23 RX ADMIN — LACTULOSE 20 GRAM(S): 10 SOLUTION ORAL at 13:16

## 2020-04-23 RX ADMIN — SODIUM CHLORIDE 50 MILLILITER(S): 9 INJECTION, SOLUTION INTRAVENOUS at 21:42

## 2020-04-23 RX ADMIN — MIDAZOLAM HYDROCHLORIDE 2.85 MG/KG/HR: 1 INJECTION, SOLUTION INTRAMUSCULAR; INTRAVENOUS at 12:06

## 2020-04-23 RX ADMIN — Medication 2: at 17:14

## 2020-04-23 RX ADMIN — Medication 1334 MILLIGRAM(S): at 12:06

## 2020-04-23 RX ADMIN — Medication 25 MILLIGRAM(S): at 05:41

## 2020-04-23 RX ADMIN — HEPARIN SODIUM 5000 UNIT(S): 5000 INJECTION INTRAVENOUS; SUBCUTANEOUS at 13:17

## 2020-04-23 NOTE — CHART NOTE - NSCHARTNOTEFT_GEN_A_CORE
Patient downgraded from ICU to medicine today. Updated yesterday's plan with primary medicine team in regards to our Neurocritical care team's recommendation in obtaining Brain MRI, MRA head/neck, as well as MRV for further investigate etiology of his ischemic stroke with hemorrhage conversion. Primary team updates our team that patient is with poor prognosis and asks our neurological input. He is with poor prognosis neurologically as well given the relatively large hemorrhagic conversion. Also based on daily neurological examination, his brainstem functioning is intact; however there has been no significant improvement. Primary team informs us will hold off on further imaging studies.     Discussed case with attending physician   Will sign off, please reconsult if plan changes and decides to continue with MRI studies  Jess Logan NP  [phone]

## 2020-04-23 NOTE — PROGRESS NOTE ADULT - SUBJECTIVE AND OBJECTIVE BOX
Nephrology progress note    THIS IS AN INCOMPLETE NOTE . FULL NOTE TO FOLLOW SHORTLY    Patient is seen and examined, events over the last 24 h noted .    Allergies:  Bactrim (Unknown)    Hospital Medications:   MEDICATIONS  (STANDING):    BACItracin   Ointment 1 Application(s) Topical two times a day  calcium acetate 1334 milliGRAM(s) Oral three times a day with meals  dextrose 5%. 1000 milliLiter(s) (50 mL/Hr) IV Continuous <Continuous>  dextrose 50% Injectable 25 Gram(s) IV Push once  diltiazem    Tablet 60 milliGRAM(s) Oral every 6 hours  heparin  Injectable 5000 Unit(s) SubCutaneous every 8 hours  insulin regular  human corrective regimen sliding scale   IV Push every 4 hours  insulin regular Infusion 1 Unit(s)/Hr (1 mL/Hr) IV Continuous <Continuous>  lactulose Syrup 20 Gram(s) Oral every 8 hours  levETIRAcetam 250 milliGRAM(s) Oral two times a day  meropenem  IVPB 500 milliGRAM(s) IV Intermittent every 12 hours  metoprolol tartrate 25 milliGRAM(s) Oral every 8 hours  midodrine 10 milliGRAM(s) Oral every 8 hours  mupirocin 2% Ointment 1 Application(s) Topical every 12 hours  pantoprazole   Suspension 40 milliGRAM(s) Oral daily  propofol Infusion 10 MICROgram(s)/kG/Min (5.7 mL/Hr) IV Continuous <Continuous>  senna 2 Tablet(s) Oral at bedtime   vancomycin  IVPB 1000 milliGRAM(s) IV Intermittent every 24 hours        VITALS:  T(F): 96.6 (04-23-20 @ 05:00), Max: 98.7 (04-22-20 @ 12:00)  HR: 80 (04-23-20 @ 05:00)  BP: 149/61 (04-23-20 @ 05:00)  RR: 26 (04-23-20 @ 07:57)  SpO2: 99% (04-23-20 @ 07:57)      04-21 @ 07:01  -  04-22 @ 07:00  --------------------------------------------------------  IN: 2785.6 mL / OUT: 1825 mL / NET: 960.6 mL    04-22 @ 07:01  -  04-23 @ 07:00  --------------------------------------------------------  IN: 2785.8 mL / OUT: 2095 mL / NET: 690.8 mL          PHYSICAL EXAM:  Constitutional: NAD  HEENT: anicteric sclera, oropharynx clear, MMM  Neck: No JVD  Respiratory: CTAB, no wheezes, rales or rhonchi  Cardiovascular: S1, S2, RRR  Gastrointestinal: BS+, soft, NT/ND  Extremities: No cyanosis or clubbing. No peripheral edema  :  No santana.   Skin: No rashes    LABS:  04-23    152<H>  |  111<H>  |  118<HH>  ----------------------------<  169<H>  4.1   |  25  |  3.9<H>    Creatinine Trend: 3.9<--, 3.9<--, 4.1<--, 3.6<--, 4.3<--, 4.0<--  SODIUM TREND:  Sodium 152 [04-23 @ 00:00]  Sodium 154 [04-22 @ 16:41]  Sodium 155 [04-21 @ 23:30]  Sodium 157 [04-21 @ 17:30]  Sodium 156 [04-20 @ 23:00]  Sodium 155 [04-20 @ 16:40]  Sodium 154 [04-20 @ 01:00]  Sodium 155 [04-19 @ 18:09]  Sodium 156 [04-19 @ 17:04]  Sodium 149 [04-19 @ 01:00]    Ca    8.9      23 Apr 2020 00:00  Phos  6.2     04-23  Mg     2.3     04-23    TPro  6.1  /  Alb  2.6<L>  /  TBili  0.8  /  DBili      /  AST  66<H>  /  ALT  95<H>  /  AlkPhos  102  04-23                          9.2    8.42  )-----------( 252      ( 23 Apr 2020 00:00 )             28.6       Urine Studies:      RADIOLOGY & ADDITIONAL STUDIES: Nephrology progress note  Patient is seen and examined, events over the last 24 h noted .  intubated trached   on MV     Allergies:  Bactrim (Unknown)    Hospital Medications:   MEDICATIONS  (STANDING):    BACItracin   Ointment 1 Application(s) Topical two times a day  calcium acetate 1334 milliGRAM(s) Oral three times a day with meals  diltiazem    Tablet 60 milliGRAM(s) Oral every 6 hours  heparin  Injectable 5000 Unit(s) SubCutaneous every 8 hours  insulin regular  human corrective regimen sliding scale   IV Push every 4 hours  insulin regular Infusion 1 Unit(s)/Hr (1 mL/Hr) IV Continuous <Continuous>  lactulose Syrup 20 Gram(s) Oral every 8 hours  levETIRAcetam 250 milliGRAM(s) Oral two times a day  meropenem  IVPB 500 milliGRAM(s) IV Intermittent every 12 hours  metoprolol tartrate 25 milliGRAM(s) Oral every 8 hours  midodrine 10 milliGRAM(s) Oral every 8 hours  mupirocin 2% Ointment 1 Application(s) Topical every 12 hours  pantoprazole   Suspension 40 milliGRAM(s) Oral daily  propofol Infusion 10 MICROgram(s)/kG/Min (5.7 mL/Hr) IV Continuous <Continuous>  senna 2 Tablet(s) Oral at bedtime   vancomycin  IVPB 1000 milliGRAM(s) IV Intermittent every 24 hours        VITALS:  T(F): 96.6 (04-23-20 @ 05:00), Max: 98.7 (04-22-20 @ 12:00)  HR: 80 (04-23-20 @ 05:00)  BP: 149/61 (04-23-20 @ 05:00)  RR: 26 (04-23-20 @ 07:57)  SpO2: 99% (04-23-20 @ 07:57)      04-21 @ 07:01  -  04-22 @ 07:00  --------------------------------------------------------  IN: 2785.6 mL / OUT: 1825 mL / NET: 960.6 mL    04-22 @ 07:01  -  04-23 @ 07:00  --------------------------------------------------------  IN: 2785.8 mL / OUT: 2095 mL / NET: 690.8 mL          PHYSICAL EXAM:  Constitutional: intubated on MV   HEENT: anicteric sclera, oropharynx clear, MMM  Neck: No JVD/ trached   Respiratory: CTAB, no wheezes, rales or rhonchi  Cardiovascular: S1, S2, RRR  Gastrointestinal: BS+, soft, NT/ND  Extremities: No cyanosis or clubbing. No peripheral edema  :  No santana.   Skin: No rashes    LABS:  04-23    152<H>  |  111<H>  |  118<HH>  ----------------------------<  169<H>  4.1   |  25  |  3.9<H>    Creatinine Trend: 3.9<--, 3.9<--, 4.1<--, 3.6<--, 4.3<--, 4.0<--    SODIUM TREND:  Sodium 152 [04-23 @ 00:00]  Sodium 154 [04-22 @ 16:41]  Sodium 155 [04-21 @ 23:30]  Sodium 157 [04-21 @ 17:30]  Sodium 156 [04-20 @ 23:00]  Sodium 155 [04-20 @ 16:40]  Sodium 154 [04-20 @ 01:00]  Sodium 155 [04-19 @ 18:09]  Sodium 156 [04-19 @ 17:04]  Sodium 149 [04-19 @ 01:00]    Ca    8.9      23 Apr 2020 00:00  Phos  6.2     04-23  Mg     2.3     04-23    TPro  6.1  /  Alb  2.6<L>  /  TBili  0.8  /  DBili      /  AST  66<H>  /  ALT  95<H>  /  AlkPhos  102  04-23                          9.2    8.42  )-----------( 252      ( 23 Apr 2020 00:00 )             28.6       Urine Studies:      RADIOLOGY & ADDITIONAL STUDIES:

## 2020-04-23 NOTE — PROGRESS NOTE ADULT - ASSESSMENT
LIZZIE/ ATN/ hyperkalemia/ respiratory failure / covid positive / high BUN    # off  diuretic   # non-oliguric  # ph  improving  feed nepro, on binders   # no need for hd today   # BUN noted : highly catabolic and prerenal, off  diuretics, improving slowly   # sodium level noted, continue D5w increase to 100 cc per hour   # last vanco level noted 18.9. repeat vanco trough. decrease nikky to 500 mg q 24 hr please   # will follow   # overall prognosis poor

## 2020-04-23 NOTE — PROGRESS NOTE ADULT - SUBJECTIVE AND OBJECTIVE BOX
KONSTANTIN WYATT  74y  Male    Patient is a 74y old  Male who presents with a chief complaint of suspected COVID-19 (23 Apr 2020 08:58)      SUBJECTIVE: Patient transferred to      Vent: yes  Sedation: yes  Pressors: no        PAST MEDICAL & SURGICAL HISTORY:  Afib  DM (diabetes mellitus)  BPH (benign prostatic hyperplasia)  CAD (coronary artery disease)      OBJECTIVE:    Vital Signs Last 24 Hrs  T(C): 37.7 (23 Apr 2020 13:17), Max: 37.7 (23 Apr 2020 13:17)  T(F): 99.8 (23 Apr 2020 13:17), Max: 99.8 (23 Apr 2020 13:17)  HR: 97 (23 Apr 2020 13:17) (64 - 111)  BP: 150/65 (23 Apr 2020 13:17) (110/49 - 156/77)  BP(mean): --  RR: 22 (23 Apr 2020 13:17) (18 - 29)  SpO2: 98% (23 Apr 2020 13:17) (96% - 100%)    General: No apparent distress  HEENT: + trach               Neck: No JVD, No Cervical LN    Lungs: Bilateral BS, CTA  Cardiovascular: +S1 S2  Abdomen: Soft, nontender  Extremities: Pulses intact  Skin: sacrum stage IV, lower lip stage II   Neurological: non Focal     I&O's Summary    22 Apr 2020 07:01  -  23 Apr 2020 07:00  --------------------------------------------------------  IN: 2785.8 mL / OUT: 2095 mL / NET: 690.8 mL    23 Apr 2020 07:01  -  23 Apr 2020 13:25  --------------------------------------------------------  IN: 500 mL / OUT: 0 mL / NET: 500 mL          LABS:                          9.2    8.42  )-----------( 252      ( 23 Apr 2020 00:00 )             28.6                                               04-23    152<H>  |  111<H>  |  118<HH>  ----------------------------<  169<H>  4.1   |  25  |  3.9<H>    Ca    8.9      23 Apr 2020 00:00  Phos  6.2     04-23  Mg     2.3     04-23    TPro  6.1  /  Alb  2.6<L>  /  TBili  0.8  /  DBili  x   /  AST  66<H>  /  ALT  95<H>  /  AlkPhos  102  04-23      PT/INR - ( 23 Apr 2020 00:00 )   PT: 13.20 sec;   INR: 1.15 ratio         PTT - ( 23 Apr 2020 00:00 )  PTT:27.5 sec                                           CARDIAC MARKERS ( 23 Apr 2020 00:00 )  x     / 0.07 ng/mL / 287 U/L / x     / x      CARDIAC MARKERS ( 21 Apr 2020 23:30 )  x     / x     / 437 U/L / x     / x                                                LIVER FUNCTIONS - ( 23 Apr 2020 00:00 )  Alb: 2.6 g/dL / Pro: 6.1 g/dL / ALK PHOS: 102 U/L / ALT: 95 U/L / AST: 66 U/L / GGT: x                                                                                               Mode: AC/ CMV (Assist Control/ Continuous Mandatory Ventilation)  RR (machine): 26  TV (machine): 500  FiO2: 30  PEEP: 5  ITime: 1  MAP: 14  PIP: 30                                      ABG - ( 22 Apr 2020 13:56 )  pH, Arterial: 7.51  pH, Blood: x     /  pCO2: 33    /  pO2: 117   / HCO3: 27    / Base Excess: 4.0   /  SaO2: 98                    MEDICATIONS  (STANDING):  BACItracin   Ointment 1 Application(s) Topical two times a day  calcium acetate 1334 milliGRAM(s) Oral three times a day with meals  chlorhexidine 0.12% Liquid 15 milliLiter(s) Oral Mucosa two times a day  chlorhexidine 4% Liquid 1 Application(s) Topical <User Schedule>  dextrose 5%. 1000 milliLiter(s) (50 mL/Hr) IV Continuous <Continuous>  dextrose 50% Injectable 25 Gram(s) IV Push once  diltiazem    Tablet 60 milliGRAM(s) Oral every 6 hours  heparin  Injectable 5000 Unit(s) SubCutaneous every 8 hours  insulin regular  human corrective regimen sliding scale   IV Push every 4 hours  lactulose Syrup 20 Gram(s) Oral every 8 hours  levETIRAcetam 250 milliGRAM(s) Oral two times a day  meropenem  IVPB 500 milliGRAM(s) IV Intermittent every 12 hours  metoprolol tartrate 25 milliGRAM(s) Oral every 8 hours  midazolam Infusion 0.03 mG/kG/Hr (2.85 mL/Hr) IV Continuous <Continuous>  mupirocin 2% Ointment 1 Application(s) Topical every 12 hours  pantoprazole   Suspension 40 milliGRAM(s) Oral daily  senna 2 Tablet(s) Oral at bedtime  vancomycin  IVPB      vancomycin  IVPB 1000 milliGRAM(s) IV Intermittent every 24 hours    MEDICATIONS  (PRN):  acetaminophen  Suppository .. 650 milliGRAM(s) Rectal every 6 hours PRN Temp greater or equal to 38.5C (101.3F)

## 2020-04-23 NOTE — CHART NOTE - NSCHARTNOTEFT_GEN_A_CORE
Registered Dietitian Follow-Up     Patient Profile Reviewed                           Yes [x]   No []     Nutrition History Previously Obtained        Yes []  No [x]       Pertinent Subjective Information: Pt. continues on Glucerna, running at goal. Noted on D5 at this time. On/off HD.      Pertinent Medical Interventions: COVID-19/Viral Pneumonia. Acute Hypoxemic Respiratory Failure s/p trach 4/18, on vent. ID following, s/p abx. DM2: insulin regimen. Burn following for pressure injuries.      Diet order: Glucerna 1.2 @60ml/h     Anthropometrics:  - Ht. 175cm  - Wt. 86.9kg in 4/22 vs. (4/19): 89kg vs. (4/17): 91.9kg vs. (4/13): 95.4kg noted downtrending, likely d/t fluid shifts as edema improved (previously 4+)   - %wt change  - BMI 28.4 using lowest doc weight   - IBW 160lbs      Pertinent Lab Data: (4/23) RBC 3.02, Hg 9.2, Hct 28.6, Na 152, , creat 3.9, glu 169, AST 66, ALT 95, eGFR 14, triglycerides 201, Phos 6.2,      Pertinent Meds: Lactulose, Protonix, Phoslo, Senna, Propofol 3.8ml/h (~100kcal), D5 @100ml/h (408kcal)      Physical Findings:  - Appearance: trached, 2+ generalized edema   - GI function: no symptoms noted, last BM 4/21  - Tubes: NGT  - Oral/Mouth cavity: NPO  - Skin: pressure ulcer stg II to bottom lip, unstageable to sacrum      Nutrition Requirements (from RD note on 4/20)   Weight Used  88.7kg, ideal 73kg      Estimated Energy Needs    Continue [x]  Adjust [] 5269-3495 kcal/day (MSJ x 1.2-1.4 AF)--increased needs d/t HD, however will aim towards lower end of range as HD is not consistent, will readjust prn  Adjusted Energy Recommendations:   kcal/day        Estimated Protein Needs    Continue [x]  Adjust []  gm/day (1.2-1.5 gm/kg CBW)--same as above, stage 4 pressure injury considered   Adjusted Protein Recommendations:   gm/day        Estimated Fluid Needs        Continue []  Adjust []  Adjusted Fluid Recommendations:   mL/day     Nutrient Intake: current TF regimen provides 1728kcal, 85g protein, 1166ml free H2O. TF + Propofol + D5 provide 2236kcal (100% est calorie needs, 95% est protein needs)        [] Previous Nutrition Diagnosis: Inadequate protein-energy intake (resolved)              [x] No active nutrition diagnosis identified at this time     However, will follow up in 4 days- pending long term feeding plan.     Nutrition Intervention:  enteral nutrition, collaboration of care    Rec: Continue current TF regimen Glucerna 1.2 @60ml/h for 2236kcal (including Propofol and D5 calories), 85g protein, 1166ml free H2O (100% est calorie needs, 95% est protein needs). Maintain all aspiration precautions. Additional free H2O flush per LIP.     Goal/Expected Outcome: Pt to meet % of estimated nutrient needs within 4 days      Indicator/Monitoring: diet order, energy intake, body composition, renal/glucose/liver profiles, NFPF.

## 2020-04-23 NOTE — CHART NOTE - NSCHARTNOTEFT_GEN_A_CORE
SICU Transfer Note:    Transfer from: SICU  Transfer to:  (  ) Surgery    (  ) Telemetry    (X) Medicine    (  ) Palliative    (  ) Stroke Unit    (  ) _______________    74 year old male w/ hx of Afib on Eliquis, CAD s/p PCI, DM, BPH, presents with 1 week of fever, cough, and progressive SOB. Admits to watery diarrhea for 3 days. Denies chest pain. 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 He has exposure to COVID positive family member at the wedding. No travel hx. Pt pulse ox in 80s in the field per EMS. Of note, per patient he had recent normal stress test recently. COVID PCR sent, labs show lymphopenia and transaminitis, CXR shows b/l lung opacity, requiring NRB (30 Mar 2020 16:35)      PAST MEDICAL & SURGICAL HISTORY:  Afib  DM (diabetes mellitus)  BPH (benign prostatic hyperplasia)  CAD (coronary artery disease)    Allergies    Bactrim (Unknown)    Intolerances      MEDICATIONS  (STANDING):  BACItracin   Ointment 1 Application(s) Topical two times a day  calcium acetate 1334 milliGRAM(s) Oral three times a day with meals  chlorhexidine 0.12% Liquid 15 milliLiter(s) Oral Mucosa two times a day  chlorhexidine 4% Liquid 1 Application(s) Topical <User Schedule>  dextrose 5%. 1000 milliLiter(s) (50 mL/Hr) IV Continuous <Continuous>  dextrose 50% Injectable 25 Gram(s) IV Push once  diltiazem    Tablet 60 milliGRAM(s) Oral every 6 hours  heparin  Injectable 5000 Unit(s) SubCutaneous every 8 hours  insulin regular  human corrective regimen sliding scale   IV Push every 4 hours  insulin regular Infusion 1 Unit(s)/Hr (1 mL/Hr) IV Continuous <Continuous>  lactulose Syrup 20 Gram(s) Oral every 8 hours  levETIRAcetam 250 milliGRAM(s) Oral two times a day  meropenem  IVPB 500 milliGRAM(s) IV Intermittent every 12 hours  metoprolol tartrate 25 milliGRAM(s) Oral every 8 hours  midodrine 10 milliGRAM(s) Oral every 8 hours  mupirocin 2% Ointment 1 Application(s) Topical every 12 hours  pantoprazole   Suspension 40 milliGRAM(s) Oral daily  propofol Infusion 10 MICROgram(s)/kG/Min (5.7 mL/Hr) IV Continuous <Continuous>  senna 2 Tablet(s) Oral at bedtime  vancomycin  IVPB      vancomycin  IVPB 1000 milliGRAM(s) IV Intermittent every 24 hours    MEDICATIONS  (PRN):  acetaminophen  Suppository .. 650 milliGRAM(s) Rectal every 6 hours PRN Temp greater or equal to 38.5C (101.3F)        Vital Signs Last 24 Hrs  T(C): 36.6 (2020 00:00), Max: 37.1 (2020 12:00)  T(F): 97.8 (2020 00:00), Max: 98.7 (2020 12:00)  HR: 64 (2020 00:00) (64 - 88)  BP: 134/60 (2020 00:00) (134/60 - 155/95)  BP(mean): --  RR: 26 (2020 00:00) (26 - 30)  SpO2: 100% (2020 00:00) (99% - 100%)  I&O's Summary    2020 07:  -  2020 07:00  --------------------------------------------------------  IN: 2785.6 mL / OUT: 1825 mL / NET: 960.6 mL    2020 07:01  -  2020 01:03  --------------------------------------------------------  IN: 2672 mL / OUT: 1695 mL / NET: 977 mL        LABS                                            9.2                   Neurophils% (auto):   x      ( @ 00:00):    8.42 )-----------(252          Lymphocytes% (auto):  x                                             28.6                   Eosinphils% (auto):   x        Manual%: Neutrophils x    ; Lymphocytes x    ; Eosinophils x    ; Bands%: x    ; Blasts x                                    154    |  115    |  120                 Calcium: 8.7   / iCa: x      ( @ 16:41)    ----------------------------<  168       Magnesium: x                                3.8     |  26     |  3.9              Phosphorous: x            ASSESSMENT/PLAN: 74yMale s/p covid, cleared, s/p trach     24HRS EVENT: Waiting for bed in 3B under Dr Urbina.  Spoke w family re: eventual need for PEG placement prior to d/c to SNF.    NEURO   -On Propofol gtt 7 mg/hr- weaning slowly   Exam Occasionally spontaneously moving LUE,  Pupils dilated but reactive.   Neurology: recommending again MRI, will speak to radiology; MRV- no word form radiology  palliative consultation - family not interested       RESP  Acute Hypoxemic Respiratory Failure s/p trach , on vent    COVID-19/Viral Pneumonia--repeat   negative COVID.   MRSA pneumonia 4/15 DTA   - trach, on vent: 500/26/30%/5-  PM AB.51/ 33/ 117/ 27/ 98  Lac 1.0  AM ABG:____________.   Daily CXR: Trach, NGT placement ok       CARD/VASC  - HR remains 60's, no further episodes of Afib  - Eliquis held 2/2 hemorrhagic stroke  Hypotension - off levo, midodrine 10q 8h for hypotension, resolved     Hx of Afib - Diltiazem, BB    Hx of CAD - Atorvastatin 40mg PO qhs    Hx of HTN - hold Lisinopril   EKG: NSR with PVC, incomplete RBBB QTc: 465     GI/NUTR  NGT, tolerating TF Glucerna @50  Hypoalbuminemia  Bowel regimen: Senna, lactulose, last BM   GI ppx: protonix  oral ulcer, lip lesions- wound care bacitracin, oral care       /Renal  -LIZZIE with uremia:  last HD on   Evans -nonoliguric renal failure,  UOP  ml/hr  BUN/Cr 128/3.9  Nephrology: no HD,  cont D5W @50/hr  Right femoral [place] placed - may need new UDall *****  -Lytes-Na _____K_____Mg______Phos______  Hyperphosphatemia, On renvela, phoslo  hypokalemia- not repleting in setting of renal injury   Hypernatremia-  on Free H2O 400 Q 6H and  D5W at 50ml/hr   Na 154  Hx of BPH  Daily weight 86.9kg  (): admission 95kg    HEME/ONC  Holding ELIQUIS 2/ CTH findings of hemorrhagic strokes, also thrombi  Hgb stable 8.69  Dimer: _________(4516) continues to rise   DVT ppx: SQH     ID    Afebrile, Tmax ______    WBC downtrending, ________    Procal 1.13-> 0.75    COVID-19 +  MRSA Pneumonia  - on Meropenum and Vanco 1gm q24h,  Vanco trough   26.9- hold 6pm dose  - completed course of Hydroxychloroquine & Azithromycin, VitC and Zinc  - D-dimer uptrending  2336-> 4185-> 4516      ENDO  DM2; uncontrolled, holding home Metformin. On RISS  FS-160    MSK/DERM  -burn consult for lip lac- bacitracin and peridex  - oral ulcer- continue oral care    DVT PPx  - on hep sub q  - off Apixaban     GI PPx  -  Pantoprazole 40mg PO qac      ***Tubes/Lines/Drains  ***  Peripheral IV  Central Venous Line  Right IJ TLC  	Date 3/31/20  R fem [place]   Arterial Line  Right radial A-line		                Date: 3/31/20  Urnary Catheter		Indication: Strict I&O    Date Placed: 3/31/20  ETT  OGT      Disposition: DG to 3a    Signed out to    Salvatore #[id number]  Jalen #[id number]

## 2020-04-24 LAB
ALBUMIN SERPL ELPH-MCNC: 2.5 G/DL — LOW (ref 3.5–5.2)
ALP SERPL-CCNC: 117 U/L — HIGH (ref 30–115)
ALT FLD-CCNC: 99 U/L — HIGH (ref 0–41)
ANION GAP SERPL CALC-SCNC: 14 MMOL/L — SIGNIFICANT CHANGE UP (ref 7–14)
AST SERPL-CCNC: 63 U/L — HIGH (ref 0–41)
BILIRUB SERPL-MCNC: 0.5 MG/DL — SIGNIFICANT CHANGE UP (ref 0.2–1.2)
BUN SERPL-MCNC: 107 MG/DL — CRITICAL HIGH (ref 10–20)
CALCIUM SERPL-MCNC: 8.4 MG/DL — LOW (ref 8.5–10.1)
CHLORIDE SERPL-SCNC: 107 MMOL/L — SIGNIFICANT CHANGE UP (ref 98–110)
CO2 SERPL-SCNC: 26 MMOL/L — SIGNIFICANT CHANGE UP (ref 17–32)
CREAT SERPL-MCNC: 3.5 MG/DL — HIGH (ref 0.7–1.5)
GLUCOSE BLDC GLUCOMTR-MCNC: 123 MG/DL — HIGH (ref 70–99)
GLUCOSE BLDC GLUCOMTR-MCNC: 185 MG/DL — HIGH (ref 70–99)
GLUCOSE BLDC GLUCOMTR-MCNC: 203 MG/DL — HIGH (ref 70–99)
GLUCOSE BLDC GLUCOMTR-MCNC: 209 MG/DL — HIGH (ref 70–99)
GLUCOSE BLDC GLUCOMTR-MCNC: 215 MG/DL — HIGH (ref 70–99)
GLUCOSE SERPL-MCNC: 131 MG/DL — HIGH (ref 70–99)
POTASSIUM SERPL-MCNC: 4.5 MMOL/L — SIGNIFICANT CHANGE UP (ref 3.5–5)
POTASSIUM SERPL-SCNC: 4.5 MMOL/L — SIGNIFICANT CHANGE UP (ref 3.5–5)
PROT SERPL-MCNC: 6.1 G/DL — SIGNIFICANT CHANGE UP (ref 6–8)
SODIUM SERPL-SCNC: 147 MMOL/L — HIGH (ref 135–146)

## 2020-04-24 PROCEDURE — 74150 CT ABDOMEN W/O CONTRAST: CPT | Mod: 26

## 2020-04-24 PROCEDURE — 93010 ELECTROCARDIOGRAM REPORT: CPT

## 2020-04-24 RX ORDER — MIDAZOLAM HYDROCHLORIDE 1 MG/ML
2 INJECTION, SOLUTION INTRAMUSCULAR; INTRAVENOUS EVERY 4 HOURS
Refills: 0 | Status: DISCONTINUED | OUTPATIENT
Start: 2020-04-24 | End: 2020-04-27

## 2020-04-24 RX ORDER — METOPROLOL TARTRATE 50 MG
25 TABLET ORAL ONCE
Refills: 0 | Status: COMPLETED | OUTPATIENT
Start: 2020-04-24 | End: 2020-04-24

## 2020-04-24 RX ORDER — MIDAZOLAM HYDROCHLORIDE 1 MG/ML
2 INJECTION, SOLUTION INTRAMUSCULAR; INTRAVENOUS ONCE
Refills: 0 | Status: DISCONTINUED | OUTPATIENT
Start: 2020-04-24 | End: 2020-04-24

## 2020-04-24 RX ADMIN — MEROPENEM 100 MILLIGRAM(S): 1 INJECTION INTRAVENOUS at 05:04

## 2020-04-24 RX ADMIN — CHLORHEXIDINE GLUCONATE 15 MILLILITER(S): 213 SOLUTION TOPICAL at 05:03

## 2020-04-24 RX ADMIN — Medication 60 MILLIGRAM(S): at 03:15

## 2020-04-24 RX ADMIN — Medication 25 MILLIGRAM(S): at 05:03

## 2020-04-24 RX ADMIN — Medication 1 APPLICATION(S): at 05:03

## 2020-04-24 RX ADMIN — LEVETIRACETAM 250 MILLIGRAM(S): 250 TABLET, FILM COATED ORAL at 16:47

## 2020-04-24 RX ADMIN — Medication 60 MILLIGRAM(S): at 21:05

## 2020-04-24 RX ADMIN — Medication 4: at 16:44

## 2020-04-24 RX ADMIN — Medication 60 MILLIGRAM(S): at 09:34

## 2020-04-24 RX ADMIN — Medication 25 MILLIGRAM(S): at 21:05

## 2020-04-24 RX ADMIN — MUPIROCIN 1 APPLICATION(S): 20 OINTMENT TOPICAL at 05:04

## 2020-04-24 RX ADMIN — LEVETIRACETAM 250 MILLIGRAM(S): 250 TABLET, FILM COATED ORAL at 05:03

## 2020-04-24 RX ADMIN — PANTOPRAZOLE SODIUM 40 MILLIGRAM(S): 20 TABLET, DELAYED RELEASE ORAL at 11:35

## 2020-04-24 RX ADMIN — Medication 1334 MILLIGRAM(S): at 11:35

## 2020-04-24 RX ADMIN — SENNA PLUS 2 TABLET(S): 8.6 TABLET ORAL at 21:05

## 2020-04-24 RX ADMIN — INSULIN GLARGINE 5 UNIT(S): 100 INJECTION, SOLUTION SUBCUTANEOUS at 22:50

## 2020-04-24 RX ADMIN — HEPARIN SODIUM 5000 UNIT(S): 5000 INJECTION INTRAVENOUS; SUBCUTANEOUS at 05:01

## 2020-04-24 RX ADMIN — LACTULOSE 20 GRAM(S): 10 SOLUTION ORAL at 05:03

## 2020-04-24 RX ADMIN — Medication 25 MILLIGRAM(S): at 15:05

## 2020-04-24 RX ADMIN — Medication 25 MILLIGRAM(S): at 13:43

## 2020-04-24 RX ADMIN — Medication 1 APPLICATION(S): at 16:46

## 2020-04-24 RX ADMIN — Medication 1334 MILLIGRAM(S): at 16:46

## 2020-04-24 RX ADMIN — HEPARIN SODIUM 5000 UNIT(S): 5000 INJECTION INTRAVENOUS; SUBCUTANEOUS at 21:05

## 2020-04-24 RX ADMIN — CHLORHEXIDINE GLUCONATE 15 MILLILITER(S): 213 SOLUTION TOPICAL at 16:46

## 2020-04-24 RX ADMIN — Medication 60 MILLIGRAM(S): at 14:33

## 2020-04-24 RX ADMIN — HEPARIN SODIUM 5000 UNIT(S): 5000 INJECTION INTRAVENOUS; SUBCUTANEOUS at 13:43

## 2020-04-24 RX ADMIN — CHLORHEXIDINE GLUCONATE 1 APPLICATION(S): 213 SOLUTION TOPICAL at 05:02

## 2020-04-24 RX ADMIN — Medication 1334 MILLIGRAM(S): at 06:22

## 2020-04-24 RX ADMIN — LACTULOSE 20 GRAM(S): 10 SOLUTION ORAL at 13:43

## 2020-04-24 RX ADMIN — MEROPENEM 100 MILLIGRAM(S): 1 INJECTION INTRAVENOUS at 17:15

## 2020-04-24 RX ADMIN — MUPIROCIN 1 APPLICATION(S): 20 OINTMENT TOPICAL at 18:35

## 2020-04-24 RX ADMIN — Medication 4: at 06:22

## 2020-04-24 RX ADMIN — MIDAZOLAM HYDROCHLORIDE 2 MILLIGRAM(S): 1 INJECTION, SOLUTION INTRAMUSCULAR; INTRAVENOUS at 06:20

## 2020-04-24 NOTE — PROGRESS NOTE ADULT - SUBJECTIVE AND OBJECTIVE BOX
Nephrology progress note    THIS IS AN INCOMPLETE NOTE . FULL NOTE TO FOLLOW SHORTLY    Patient is seen and examined, events over the last 24 h noted .    Allergies:  Bactrim (Unknown)    Hospital Medications:   MEDICATIONS  (STANDING):  BACItracin   Ointment 1 Application(s) Topical two times a day  calcium acetate 1334 milliGRAM(s) Oral three times a day with meals  chlorhexidine 0.12% Liquid 15 milliLiter(s) Oral Mucosa two times a day  chlorhexidine 4% Liquid 1 Application(s) Topical <User Schedule>  dextrose 5%. 1000 milliLiter(s) (50 mL/Hr) IV Continuous <Continuous>  dextrose 50% Injectable 25 Gram(s) IV Push once  diltiazem    Tablet 60 milliGRAM(s) Oral every 6 hours  heparin  Injectable 5000 Unit(s) SubCutaneous every 8 hours  insulin glargine Injectable (LANTUS) 5 Unit(s) SubCutaneous at bedtime  insulin lispro (HumaLOG) corrective regimen sliding scale   SubCutaneous three times a day before meals  lactulose Syrup 20 Gram(s) Oral every 8 hours  levETIRAcetam 250 milliGRAM(s) Oral two times a day  meropenem  IVPB 500 milliGRAM(s) IV Intermittent every 12 hours  metoprolol tartrate 25 milliGRAM(s) Oral every 8 hours  midazolam Infusion 0.03 mG/kG/Hr (2.85 mL/Hr) IV Continuous <Continuous>  mupirocin 2% Ointment 1 Application(s) Topical every 12 hours  pantoprazole   Suspension 40 milliGRAM(s) Oral daily  senna 2 Tablet(s) Oral at bedtime  vancomycin  IVPB      vancomycin  IVPB 1000 milliGRAM(s) IV Intermittent every 24 hours        VITALS:  T(F): 98.2 (04-24-20 @ 09:00), Max: 99.8 (04-23-20 @ 13:17)  HR: 137 (04-24-20 @ 09:00)  BP: 132/79 (04-24-20 @ 09:00)  RR: 20 (04-24-20 @ 09:00)  SpO2: 96% (04-24-20 @ 09:00)  Wt(kg): --    04-22 @ 07:01  -  04-23 @ 07:00  --------------------------------------------------------  IN: 2785.8 mL / OUT: 2095 mL / NET: 690.8 mL    04-23 @ 07:01  -  04-24 @ 07:00  --------------------------------------------------------  IN: 4814.1 mL / OUT: 1150 mL / NET: 3664.1 mL          PHYSICAL EXAM:  Constitutional: NAD  HEENT: anicteric sclera, oropharynx clear, MMM  Neck: No JVD  Respiratory: CTAB, no wheezes, rales or rhonchi  Cardiovascular: S1, S2, RRR  Gastrointestinal: BS+, soft, NT/ND  Extremities: No cyanosis or clubbing. No peripheral edema  :  No santana.   Skin: No rashes    LABS:  04-23    145  |  108  |  103<HH>  ----------------------------<  205<H>  4.3   |  24  |  3.1<H>    Creatinine Trend: 3.1<--, 3.9<--, 3.9<--, 4.1<--, 3.6<--, 4.3<--    Ca    8.2<L>      23 Apr 2020 20:01  Phos  6.2     04-23  Mg     2.3     04-23    TPro  6.1  /  Alb  2.6<L>  /  TBili  0.8  /  DBili      /  AST  66<H>  /  ALT  95<H>  /  AlkPhos  102  04-23                          9.2    8.42  )-----------( 252      ( 23 Apr 2020 00:00 )             28.6       Urine Studies:      RADIOLOGY & ADDITIONAL STUDIES: Nephrology progress note  Patient is seen and examined, events over the last 24 h noted .  Trached on MV     Allergies:  Bactrim (Unknown)    Hospital Medications:   MEDICATIONS  (STANDING):  BACItracin   Ointment 1 Application(s) Topical two times a day  calcium acetate 1334 milliGRAM(s) Oral three times a day with meals  dextrose 5%. 1000 milliLiter(s) (50 mL/Hr) IV Continuous <Continuous>  diltiazem    Tablet 60 milliGRAM(s) Oral every 6 hours  heparin  Injectable 5000 Unit(s) SubCutaneous every 8 hours  insulin glargine Injectable (LANTUS) 5 Unit(s) SubCutaneous at bedtime  insulin lispro (HumaLOG) corrective regimen sliding scale   SubCutaneous three times a day before meals  lactulose Syrup 20 Gram(s) Oral every 8 hours  levETIRAcetam 250 milliGRAM(s) Oral two times a day  meropenem  IVPB 500 milliGRAM(s) IV Intermittent every 12 hours  metoprolol tartrate 25 milliGRAM(s) Oral every 8 hours  midazolam Infusion 0.03 mG/kG/Hr (2.85 mL/Hr) IV Continuous <Continuous>  mupirocin 2% Ointment 1 Application(s) Topical every 12 hours  pantoprazole   Suspension 40 milliGRAM(s) Oral daily  senna 2 Tablet(s) Oral at bedtime   vancomycin  IVPB 1000 milliGRAM(s) IV Intermittent every 24 hours        VITALS:  T(F): 98.2 (04-24-20 @ 09:00), Max: 99.8 (04-23-20 @ 13:17)  HR: 137 (04-24-20 @ 09:00)  BP: 132/79 (04-24-20 @ 09:00)  RR: 20 (04-24-20 @ 09:00)  SpO2: 96% (04-24-20 @ 09:00)      04-22 @ 07:01  -  04-23 @ 07:00  --------------------------------------------------------  IN: 2785.8 mL / OUT: 2095 mL / NET: 690.8 mL    04-23 @ 07:01  -  04-24 @ 07:00  --------------------------------------------------------  IN: 4814.1 mL / OUT: 1150 mL / NET: 3664.1 mL          PHYSICAL EXAM:  Constitutional: trached on MV   HEENT: anicteric sclera, oropharynx clear, MMM  Neck: No JVD  Respiratory: CTAB, no wheezes, rales or rhonchi  Cardiovascular: S1, S2, RRR  Gastrointestinal: BS+, soft, NT/ND  Extremities: No cyanosis or clubbing. No peripheral edema   Skin: No rashes    LABS:  04-23    145  |  108  |  103<HH>  ----------------------------<  205<H>  4.3   |  24  |  3.1<H>    Creatinine Trend: 3.1<--, 3.9<--, 3.9<--, 4.1<--, 3.6<--, 4.3<--    Ca    8.2<L>      23 Apr 2020 20:01  Phos  6.2     04-23  Mg     2.3     04-23    TPro  6.1  /  Alb  2.6<L>  /  TBili  0.8  /  DBili      /  AST  66<H>  /  ALT  95<H>  /  AlkPhos  102  04-23                          9.2    8.42  )-----------( 252      ( 23 Apr 2020 00:00 )             28.6       Urine Studies:      RADIOLOGY & ADDITIONAL STUDIES:

## 2020-04-24 NOTE — PROGRESS NOTE ADULT - ASSESSMENT
IMPRESSION:    Acute respiratory failure 2/2 COVID-19 repeat swab 4/13 and 4/17 negative  Stroke   MRSA + 4/15   S/p Trach 4/18     PLAN:    CNS: Titrate down versed drip. IVP Versed PRN. Continue AEDs     HEENT: Oral care. Trach Care. Frequent suctioning     PULMONARY:  HOB @ 45 degrees. Vent Settings: 500/20/30/5.     CARDIOVASCULAR: Continue Metoprolol and Cardizem PO.  Holding Eliquis 2/2 stroke. Restart lisinopril if htn.     GI: GI prophylaxis. Tolerating feeding. IR Consult, PEG monday. F/u abdominal ct scan 4/24    RENAL:  Follow up lytes.  Correct as needed. Evans in place. [place] discontinued . On d5 for hypernatremia. f/u cmp at 11 am     INFECTIOUS DISEASE: F/u vanco trough in am. Vanco discontinued for now as trough elevated. Continue meropenum.     HEMATOLOGICAL:  DVT prophylaxis. F/u with IR if heparin sq should be held for PEG     ENDOCRINE:  Follow up FS.  Insulin protocol if needed.    MUSCULOSKELETAL: bed rest. Wound care    Full code    Poor Prognosis     Wife/son updated today IMPRESSION:    Acute respiratory failure 2/2 COVID-19 repeat swab 4/13 and 4/17 negative  Stroke   MRSA + 4/15   S/p Trach 4/18     PLAN:    CNS: IVP Versed PRN. Continue AEDs     HEENT: Oral care. Trach Care. Frequent suctioning     PULMONARY:  HOB @ 45 degrees. Vent Settings: 500/20/30/5.     CARDIOVASCULAR: Continue Metoprolol and Cardizem PO.  Holding Eliquis 2/2 stroke. Restart lisinopril if htn.     GI: GI prophylaxis. Tolerating feeding. IR Consult, PEG monday. F/u abdominal ct scan 4/24    RENAL:  Follow up lytes.  Correct as needed. Evans in place. [place] discontinued . On d5 for hypernatremia. f/u cmp at 11 am     INFECTIOUS DISEASE: F/u vanco trough in am. Vanco discontinued for now as trough elevated. Continue meropenum.     HEMATOLOGICAL:  DVT prophylaxis. F/u with IR if heparin sq should be held for PEG     ENDOCRINE:  Follow up FS.  Insulin protocol if needed.    MUSCULOSKELETAL: bed rest. Wound care    Full code    Poor Prognosis     Wife/son updated today

## 2020-04-24 NOTE — PROGRESS NOTE ADULT - ASSESSMENT
LIZZIE/ ATN/ hyperkalemia/ respiratory failure / covid positive / high BUN    # off  diuretic   # non-oliguric  # ph  improving  feed nepro, on binders   # no need for HD for now / will follow labs over the weekend   # BUN noted : highly catabolic and prerenal, off  diuretics, improving slowly   # sodium level noted, continue D5w at 100 cc per hour   # last vanco level noted 18.9. repeat vanco trough. decrease nikky to 500 mg q 24 hr please   # will follow / check vanco trough please   # overall prognosis poor

## 2020-04-24 NOTE — PROGRESS NOTE ADULT - SUBJECTIVE AND OBJECTIVE BOX
KONSTANTIN WYATT  74y  Male    Patient is a 74y old  Male who presents with a chief complaint of suspected COVID-19 (24 Apr 2020 09:29)      SUBJECTIVE: No overnight events    Vent: yes  Sedation: yes  Pressors: no        PAST MEDICAL & SURGICAL HISTORY:  Afib  DM (diabetes mellitus)  BPH (benign prostatic hyperplasia)  CAD (coronary artery disease)      OBJECTIVE:    Vital Signs Last 24 Hrs  T(C): 36.8 (24 Apr 2020 09:00), Max: 37.7 (23 Apr 2020 13:17)  T(F): 98.2 (24 Apr 2020 09:00), Max: 99.8 (23 Apr 2020 13:17)  HR: 137 (24 Apr 2020 09:00) (84 - 152)  BP: 132/79 (24 Apr 2020 09:00) (117/59 - 151/88)  BP(mean): --  RR: 20 (24 Apr 2020 09:00) (18 - 22)  SpO2: 96% (24 Apr 2020 09:00) (96% - 99%)    General: No apparent distress  HEENT: + trach               Neck: No JVD, No Cervical LN    Lungs: Bilateral BS, CTA  Cardiovascular: +S1 S2  Abdomen: Soft, nontender  Extremities: Pulses intact  Skin: sacrum stage IV, lower lip stage II   Neurological: non Focal     I&O's Summary    23 Apr 2020 07:01  -  24 Apr 2020 07:00  --------------------------------------------------------  IN: 4814.1 mL / OUT: 1150 mL / NET: 3664.1 mL          LABS:                          9.2    8.42  )-----------( 252      ( 23 Apr 2020 00:00 )             28.6                                               04-23    145  |  108  |  103<HH>  ----------------------------<  205<H>  4.3   |  24  |  3.1<H>    Ca    8.2<L>      23 Apr 2020 20:01  Phos  6.2     04-23  Mg     2.3     04-23    TPro  6.1  /  Alb  2.6<L>  /  TBili  0.8  /  DBili  x   /  AST  66<H>  /  ALT  95<H>  /  AlkPhos  102  04-23      PT/INR - ( 23 Apr 2020 00:00 )   PT: 13.20 sec;   INR: 1.15 ratio         PTT - ( 23 Apr 2020 00:00 )  PTT:27.5 sec                                           CARDIAC MARKERS ( 23 Apr 2020 00:00 )  x     / 0.07 ng/mL / 287 U/L / x     / x                                                LIVER FUNCTIONS - ( 23 Apr 2020 00:00 )  Alb: 2.6 g/dL / Pro: 6.1 g/dL / ALK PHOS: 102 U/L / ALT: 95 U/L / AST: 66 U/L / GGT: x                                                                                               Mode: AC/ CMV (Assist Control/ Continuous Mandatory Ventilation)  RR (machine): 20  FiO2: 30  PEEP: 5  ITime: 1  MAP: 7  PIP: 23                                      ABG - ( 22 Apr 2020 13:56 )  pH, Arterial: 7.51  pH, Blood: x     /  pCO2: 33    /  pO2: 117   / HCO3: 27    / Base Excess: 4.0   /  SaO2: 98                    MEDICATIONS  (STANDING):  BACItracin   Ointment 1 Application(s) Topical two times a day  calcium acetate 1334 milliGRAM(s) Oral three times a day with meals  chlorhexidine 0.12% Liquid 15 milliLiter(s) Oral Mucosa two times a day  chlorhexidine 4% Liquid 1 Application(s) Topical <User Schedule>  dextrose 5%. 1000 milliLiter(s) (50 mL/Hr) IV Continuous <Continuous>  dextrose 50% Injectable 25 Gram(s) IV Push once  diltiazem    Tablet 60 milliGRAM(s) Oral every 6 hours  heparin  Injectable 5000 Unit(s) SubCutaneous every 8 hours  insulin glargine Injectable (LANTUS) 5 Unit(s) SubCutaneous at bedtime  insulin lispro (HumaLOG) corrective regimen sliding scale   SubCutaneous three times a day before meals  lactulose Syrup 20 Gram(s) Oral every 8 hours  levETIRAcetam 250 milliGRAM(s) Oral two times a day  meropenem  IVPB 500 milliGRAM(s) IV Intermittent every 12 hours  metoprolol tartrate 25 milliGRAM(s) Oral every 8 hours  midazolam Infusion 0.03 mG/kG/Hr (2.85 mL/Hr) IV Continuous <Continuous>  mupirocin 2% Ointment 1 Application(s) Topical every 12 hours  pantoprazole   Suspension 40 milliGRAM(s) Oral daily  senna 2 Tablet(s) Oral at bedtime    MEDICATIONS  (PRN):  acetaminophen  Suppository .. 650 milliGRAM(s) Rectal every 6 hours PRN Temp greater or equal to 38.5C (101.3F)

## 2020-04-24 NOTE — PROVIDER CONTACT NOTE (OTHER) - ACTION/TREATMENT ORDERED:
MD Singh aware, will come to check pt, no further action at this time. keep monitor IJ site for bleeding.

## 2020-04-24 NOTE — CONSULT NOTE ADULT - SUBJECTIVE AND OBJECTIVE BOX
INTERVENTIONAL RADIOLOGY CONSULT:     Procedure Requested: RIG    HPI:  73 yo M w/ hx of Afib on Eliquis, CAD s/p PCI, DM, BPH, presents with 1 week of fever, cough, and progressive SOB. Admits to watery diarrhea for 3 days. Denies chest pain. 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 He has exposure to COVID positive family member at the wedding. No travel hx. Pt pulse ox in 80s in the field per EMS. Of note, per patient he had recent normal stress test recently.    COVID PCR sent, labs show lymphopenia and transaminitis, CXR shows b/l lung opacity, requiring NRB (30 Mar 2020 16:35)      PAST MEDICAL & SURGICAL HISTORY:  Afib  DM (diabetes mellitus)  BPH (benign prostatic hyperplasia)  CAD (coronary artery disease)    MEDICATIONS  (STANDING):  BACItracin   Ointment 1 Application(s) Topical two times a day  calcium acetate 1334 milliGRAM(s) Oral three times a day with meals  chlorhexidine 0.12% Liquid 15 milliLiter(s) Oral Mucosa two times a day  chlorhexidine 4% Liquid 1 Application(s) Topical <User Schedule>  dextrose 5%. 1000 milliLiter(s) (50 mL/Hr) IV Continuous <Continuous>  dextrose 50% Injectable 25 Gram(s) IV Push once  diltiazem    Tablet 60 milliGRAM(s) Oral every 6 hours  heparin  Injectable 5000 Unit(s) SubCutaneous every 8 hours  insulin glargine Injectable (LANTUS) 5 Unit(s) SubCutaneous at bedtime  insulin lispro (HumaLOG) corrective regimen sliding scale   SubCutaneous three times a day before meals  lactulose Syrup 20 Gram(s) Oral every 8 hours  levETIRAcetam 250 milliGRAM(s) Oral two times a day  meropenem  IVPB 500 milliGRAM(s) IV Intermittent every 12 hours  metoprolol tartrate 25 milliGRAM(s) Oral every 8 hours  midazolam Infusion 0.03 mG/kG/Hr (2.85 mL/Hr) IV Continuous <Continuous>  mupirocin 2% Ointment 1 Application(s) Topical every 12 hours  pantoprazole   Suspension 40 milliGRAM(s) Oral daily  senna 2 Tablet(s) Oral at bedtime    MEDICATIONS  (PRN):  acetaminophen  Suppository .. 650 milliGRAM(s) Rectal every 6 hours PRN Temp greater or equal to 38.5C (101.3F)      Allergies    Bactrim (Unknown)      Physical Exam:   Vital Signs Last 24 Hrs  T(C): 36.9 (24 Apr 2020 13:00), Max: 37.6 (24 Apr 2020 05:00)  T(F): 98.5 (24 Apr 2020 13:00), Max: 99.6 (24 Apr 2020 05:00)  HR: 60 (24 Apr 2020 13:00) (60 - 152)  BP: 138/68 (24 Apr 2020 13:00) (117/59 - 151/88)  BP(mean): --  RR: 20 (24 Apr 2020 13:00) (18 - 22)  SpO2: 99% (24 Apr 2020 13:00) (96% - 99%)    Labs:                         9.2    8.42  )-----------( 252      ( 23 Apr 2020 00:00 )             28.6     04-24    147<H>  |  107  |  107<HH>  ----------------------------<  131<H>  4.5   |  26  |  3.5<H>    Ca    8.4<L>      24 Apr 2020 11:34  Phos  6.2     04-23  Mg     2.3     04-23    TPro  6.1  /  Alb  2.5<L>  /  TBili  0.5  /  DBili  x   /  AST  63<H>  /  ALT  99<H>  /  AlkPhos  117<H>  04-24    PT/INR - ( 23 Apr 2020 00:00 )   PT: 13.20 sec;   INR: 1.15 ratio         PTT - ( 23 Apr 2020 00:00 )  PTT:27.5 sec    Pertinent labs:                      9.2    8.42  )-----------( 252      ( 23 Apr 2020 00:00 )             28.6       04-24    147<H>  |  107  |  107<HH>  ----------------------------<  131<H>  4.5   |  26  |  3.5<H>    Ca    8.4<L>      24 Apr 2020 11:34  Phos  6.2     04-23  Mg     2.3     04-23    TPro  6.1  /  Alb  2.5<L>  /  TBili  0.5  /  DBili  x   /  AST  63<H>  /  ALT  99<H>  /  AlkPhos  117<H>  04-24      PT/INR - ( 23 Apr 2020 00:00 )   PT: 13.20 sec;   INR: 1.15 ratio         PTT - ( 23 Apr 2020 00:00 )  PTT:27.5 sec    Radiology & Additional Studies:     Radiology imaging reviewed.       ASSESSMENT/ PLAN:     A 74 years old man w/ hx of Afib on Eliquis, CAD s/p PCI, DM, treated for COVID + status s/p tracheostomy. Patient became encephalopathic and unresponsive, found to have acute ischemic strokes with hemorrhagic conversion.  - IR was consulted for radiologically inserted gastrostomy tube placement.  - No recent imaging; cannot assess for appropriate window. Pending CT Abdomen  - Tentatively on schedule for RIG Monday, 4/27  - Will follow    Thank you for the courtesy of this consult, please call [phone] with any further questions.

## 2020-04-25 LAB
ALBUMIN SERPL ELPH-MCNC: 2.7 G/DL — LOW (ref 3.5–5.2)
ALP SERPL-CCNC: 122 U/L — HIGH (ref 30–115)
ALT FLD-CCNC: 90 U/L — HIGH (ref 0–41)
ANION GAP SERPL CALC-SCNC: 16 MMOL/L — HIGH (ref 7–14)
APTT BLD: 27.7 SEC — SIGNIFICANT CHANGE UP (ref 27–39.2)
AST SERPL-CCNC: 53 U/L — HIGH (ref 0–41)
BILIRUB SERPL-MCNC: 0.4 MG/DL — SIGNIFICANT CHANGE UP (ref 0.2–1.2)
BLD GP AB SCN SERPL QL: SIGNIFICANT CHANGE UP
BUN SERPL-MCNC: 105 MG/DL — CRITICAL HIGH (ref 10–20)
CALCIUM SERPL-MCNC: 8.7 MG/DL — SIGNIFICANT CHANGE UP (ref 8.5–10.1)
CHLORIDE SERPL-SCNC: 105 MMOL/L — SIGNIFICANT CHANGE UP (ref 98–110)
CO2 SERPL-SCNC: 25 MMOL/L — SIGNIFICANT CHANGE UP (ref 17–32)
CREAT SERPL-MCNC: 3.5 MG/DL — HIGH (ref 0.7–1.5)
GLUCOSE BLDC GLUCOMTR-MCNC: 107 MG/DL — HIGH (ref 70–99)
GLUCOSE BLDC GLUCOMTR-MCNC: 116 MG/DL — HIGH (ref 70–99)
GLUCOSE BLDC GLUCOMTR-MCNC: 127 MG/DL — HIGH (ref 70–99)
GLUCOSE BLDC GLUCOMTR-MCNC: 152 MG/DL — HIGH (ref 70–99)
GLUCOSE BLDC GLUCOMTR-MCNC: 156 MG/DL — HIGH (ref 70–99)
GLUCOSE SERPL-MCNC: 110 MG/DL — HIGH (ref 70–99)
HCT VFR BLD CALC: 29.2 % — LOW (ref 42–52)
HGB BLD-MCNC: 8.9 G/DL — LOW (ref 14–18)
INR BLD: 1.12 RATIO — SIGNIFICANT CHANGE UP (ref 0.65–1.3)
MAGNESIUM SERPL-MCNC: 2.3 MG/DL — SIGNIFICANT CHANGE UP (ref 1.8–2.4)
MCHC RBC-ENTMCNC: 29.9 PG — SIGNIFICANT CHANGE UP (ref 27–31)
MCHC RBC-ENTMCNC: 30.5 G/DL — LOW (ref 32–37)
MCV RBC AUTO: 98 FL — HIGH (ref 80–94)
NRBC # BLD: 0 /100 WBCS — SIGNIFICANT CHANGE UP (ref 0–0)
PHOSPHATE SERPL-MCNC: 8.7 MG/DL — HIGH (ref 2.1–4.9)
PLATELET # BLD AUTO: 230 K/UL — SIGNIFICANT CHANGE UP (ref 130–400)
POTASSIUM SERPL-MCNC: 5.2 MMOL/L — HIGH (ref 3.5–5)
POTASSIUM SERPL-SCNC: 5.2 MMOL/L — HIGH (ref 3.5–5)
PROT SERPL-MCNC: 6.3 G/DL — SIGNIFICANT CHANGE UP (ref 6–8)
PROTHROM AB SERPL-ACNC: 12.9 SEC — HIGH (ref 9.95–12.87)
RBC # BLD: 2.98 M/UL — LOW (ref 4.7–6.1)
RBC # FLD: 13.4 % — SIGNIFICANT CHANGE UP (ref 11.5–14.5)
SODIUM SERPL-SCNC: 146 MMOL/L — SIGNIFICANT CHANGE UP (ref 135–146)
VANCOMYCIN TROUGH SERPL-MCNC: 20.6 UG/ML — HIGH (ref 5–10)
WBC # BLD: 6.41 K/UL — SIGNIFICANT CHANGE UP (ref 4.8–10.8)
WBC # FLD AUTO: 6.41 K/UL — SIGNIFICANT CHANGE UP (ref 4.8–10.8)

## 2020-04-25 RX ADMIN — Medication 25 MILLIGRAM(S): at 05:17

## 2020-04-25 RX ADMIN — HEPARIN SODIUM 5000 UNIT(S): 5000 INJECTION INTRAVENOUS; SUBCUTANEOUS at 22:28

## 2020-04-25 RX ADMIN — Medication 60 MILLIGRAM(S): at 08:30

## 2020-04-25 RX ADMIN — HEPARIN SODIUM 5000 UNIT(S): 5000 INJECTION INTRAVENOUS; SUBCUTANEOUS at 11:52

## 2020-04-25 RX ADMIN — INSULIN GLARGINE 5 UNIT(S): 100 INJECTION, SOLUTION SUBCUTANEOUS at 22:28

## 2020-04-25 RX ADMIN — MEROPENEM 100 MILLIGRAM(S): 1 INJECTION INTRAVENOUS at 05:16

## 2020-04-25 RX ADMIN — LEVETIRACETAM 250 MILLIGRAM(S): 250 TABLET, FILM COATED ORAL at 05:17

## 2020-04-25 RX ADMIN — Medication 60 MILLIGRAM(S): at 22:28

## 2020-04-25 RX ADMIN — MIDAZOLAM HYDROCHLORIDE 2 MILLIGRAM(S): 1 INJECTION, SOLUTION INTRAMUSCULAR; INTRAVENOUS at 13:14

## 2020-04-25 RX ADMIN — Medication 1 APPLICATION(S): at 05:16

## 2020-04-25 RX ADMIN — Medication 25 MILLIGRAM(S): at 11:52

## 2020-04-25 RX ADMIN — LACTULOSE 20 GRAM(S): 10 SOLUTION ORAL at 11:52

## 2020-04-25 RX ADMIN — Medication 60 MILLIGRAM(S): at 05:15

## 2020-04-25 RX ADMIN — Medication 2: at 16:47

## 2020-04-25 RX ADMIN — LEVETIRACETAM 250 MILLIGRAM(S): 250 TABLET, FILM COATED ORAL at 17:40

## 2020-04-25 RX ADMIN — Medication 1334 MILLIGRAM(S): at 05:18

## 2020-04-25 RX ADMIN — PANTOPRAZOLE SODIUM 40 MILLIGRAM(S): 20 TABLET, DELAYED RELEASE ORAL at 11:53

## 2020-04-25 RX ADMIN — CHLORHEXIDINE GLUCONATE 15 MILLILITER(S): 213 SOLUTION TOPICAL at 05:16

## 2020-04-25 RX ADMIN — CHLORHEXIDINE GLUCONATE 1 APPLICATION(S): 213 SOLUTION TOPICAL at 05:16

## 2020-04-25 RX ADMIN — MUPIROCIN 1 APPLICATION(S): 20 OINTMENT TOPICAL at 05:17

## 2020-04-25 RX ADMIN — Medication 1334 MILLIGRAM(S): at 11:51

## 2020-04-25 RX ADMIN — HEPARIN SODIUM 5000 UNIT(S): 5000 INJECTION INTRAVENOUS; SUBCUTANEOUS at 05:16

## 2020-04-25 RX ADMIN — CHLORHEXIDINE GLUCONATE 15 MILLILITER(S): 213 SOLUTION TOPICAL at 17:40

## 2020-04-25 RX ADMIN — SENNA PLUS 2 TABLET(S): 8.6 TABLET ORAL at 22:29

## 2020-04-25 RX ADMIN — Medication 1334 MILLIGRAM(S): at 17:40

## 2020-04-25 RX ADMIN — Medication 60 MILLIGRAM(S): at 16:30

## 2020-04-25 RX ADMIN — LACTULOSE 20 GRAM(S): 10 SOLUTION ORAL at 22:27

## 2020-04-25 RX ADMIN — LACTULOSE 20 GRAM(S): 10 SOLUTION ORAL at 05:16

## 2020-04-25 RX ADMIN — Medication 25 MILLIGRAM(S): at 22:29

## 2020-04-25 RX ADMIN — MUPIROCIN 1 APPLICATION(S): 20 OINTMENT TOPICAL at 16:46

## 2020-04-25 NOTE — PROGRESS NOTE ADULT - ASSESSMENT
IMPRESSION:    Acute respiratory failure 2/2 COVID-19 repeat swab 4/13 and 4/17 negative  Stroke   MRSA + 4/15   S/p Trach 4/18     PLAN:    CNS: IVP Versed PRN. Continue AEDs     HEENT: Oral care. Trach Care. Frequent suctioning     PULMONARY:  HOB @ 45 degrees. Vent Settings: 500/20/30/5.     CARDIOVASCULAR: Continue Metoprolol and Cardizem PO.  Holding Eliquis 2/2 stroke. Restart lisinopril if htn.     GI: GI prophylaxis. Tolerating feeding. IR Consult, PEG monday. F/u abdominal ct scan 4/24    RENAL:  Follow up lytes.  Correct as needed. Evans in place. [place] discontinued . On d5 for hypernatremia. f/u cmp at 11 am     INFECTIOUS DISEASE: F/u vanco trough in am. Vanco discontinued for now as trough elevated. Continue meropenum.     HEMATOLOGICAL:  DVT prophylaxis. F/u with IR if heparin sq should be held for PEG     ENDOCRINE:  Follow up FS.  Insulin protocol if needed.    MUSCULOSKELETAL: bed rest. Wound care    Full code    Poor Prognosis IMPRESSION:    Acute respiratory failure 2/2 COVID-19 repeat swab 4/13 and 4/17 negative  Stroke   MRSA + 4/15   S/p Trach 4/18     PLAN:    CNS: IVP Versed PRN. Continue AEDs     HEENT: Oral care. Trach Care. Frequent suctioning     PULMONARY:  HOB @ 45 degrees. adjust FIO2 for SAO2 92 TO 96%    CARDIOVASCULAR: Continue Metoprolol and Cardizem PO.      GI: GI prophylaxis. Tolerating feeding. IR Consult, PEG monday. F/u abdominal ct scan 4/24 REVIEWED    RENAL:  Follow up lytes.  Correct as needed. Evans in place. [place] discontinued . On d5 for hypernatremia.    INFECTIOUS DISEASE: F/u vanco trough in am. Vanco discontinued for now as trough elevated. Continue meropenum.     HEMATOLOGICAL:  DVT prophylaxis. F/u with IR if heparin sq should be held for PEG     ENDOCRINE:  Follow up FS.  Insulin protocol if needed.    MUSCULOSKELETAL: bed rest. Wound care    Full code    Poor Prognosis

## 2020-04-25 NOTE — PROGRESS NOTE ADULT - NSREFPHYEXREFTO_GEN_ALL_CORE
ED Physical Exam
Inpatient Physical Exam
ED Physical Exam
Inpatient Physical Exam
Inpatient Physical Exam

## 2020-04-25 NOTE — PROGRESS NOTE ADULT - SUBJECTIVE AND OBJECTIVE BOX
Patient is a 74y old  Male who presents with a chief complaint of suspected COVID-19      SUBJECTIVE: No overnight events    Vent: yes  Sedation: yes  Pressors: no        PAST MEDICAL & SURGICAL HISTORY:  Afib  DM (diabetes mellitus)  BPH (benign prostatic hyperplasia)  CAD (coronary artery disease)      OBJECTIVE:    ICU Vital Signs Last 24 Hrs  T(C): 36.8 (25 Apr 2020 04:32), Max: 37.9 (25 Apr 2020 01:00)  T(F): 98.3 (25 Apr 2020 04:32), Max: 100.3 (25 Apr 2020 01:00)  HR: 99 (25 Apr 2020 04:32) (60 - 116)  BP: 153/74 (25 Apr 2020 04:32) (133/63 - 153/74)  RR: 18 (25 Apr 2020 04:32) (18 - 20)  SpO2: 98% (25 Apr 2020 07:50) (94% - 99%)    General: No apparent distress  HEENT: + trach               Neck: No JVD, No Cervical LN    Lungs: Bilateral BS, CTA  Cardiovascular: +S1 S2  Abdomen: Soft, nontender  Extremities: Pulses intact  Skin: sacrum stage IV, lower lip stage II   Neurological: non Focal     I&O's Summary    23 Apr 2020 07:01  -  24 Apr 2020 07:00  --------------------------------------------------------  IN: 4814.1 mL / OUT: 1150 mL / NET: 3664.1 mL          LABS:                                     8.9    6.41  )-----------( 230      ( 25 Apr 2020 06:12 )             29.2     04-25    146  |  105  |  105<HH>  ----------------------------<  110<H>  5.2<H>   |  25  |  3.5<H>    Ca    8.7      25 Apr 2020 06:12  Phos  8.7     04-25  Mg     2.3     04-25    TPro  6.3  /  Alb  2.7<L>  /  TBili  0.4  /  DBili  x   /  AST  53<H>  /  ALT  90<H>  /  AlkPhos  122<H>  04-25    PT/INR - ( 25 Apr 2020 06:12 )   PT: 12.90 sec;   INR: 1.12 ratio         PTT - ( 25 Apr 2020 06:12 )  PTT:27.7 sec                                                                                        Mode: AC/ CMV (Assist Control/ Continuous Mandatory Ventilation)  RR (machine): 20  FiO2: 30  PEEP: 5  ITime: 1  MAP: 7  PIP: 23                                      ABG - ( 22 Apr 2020 13:56 )  pH, Arterial: 7.51  pH, Blood: x     /  pCO2: 33    /  pO2: 117   / HCO3: 27    / Base Excess: 4.0   /  SaO2: 98                    MEDICATIONS  (STANDING):  BACItracin   Ointment 1 Application(s) Topical two times a day  calcium acetate 1334 milliGRAM(s) Oral three times a day with meals  chlorhexidine 0.12% Liquid 15 milliLiter(s) Oral Mucosa two times a day  chlorhexidine 4% Liquid 1 Application(s) Topical <User Schedule>  dextrose 50% Injectable 25 Gram(s) IV Push once  diltiazem    Tablet 60 milliGRAM(s) Oral every 6 hours  heparin  Injectable 5000 Unit(s) SubCutaneous every 8 hours  insulin glargine Injectable (LANTUS) 5 Unit(s) SubCutaneous at bedtime  insulin lispro (HumaLOG) corrective regimen sliding scale   SubCutaneous three times a day before meals  lactulose Syrup 20 Gram(s) Oral every 8 hours  levETIRAcetam 250 milliGRAM(s) Oral two times a day  metoprolol tartrate 25 milliGRAM(s) Oral every 8 hours  mupirocin 2% Ointment 1 Application(s) Topical every 12 hours  pantoprazole   Suspension 40 milliGRAM(s) Oral daily  senna 2 Tablet(s) Oral at bedtime    MEDICATIONS  (PRN):  acetaminophen  Suppository .. 650 milliGRAM(s) Rectal every 6 hours PRN Temp greater or equal to 38.5C (101.3F)  midazolam Injectable 2 milliGRAM(s) IV Push every 4 hours PRN agitation Patient is a 74y old  Male who presents with SOB      SUBJECTIVE: No overnight events, S/P ct a/p    Vent: yes  Sedation: yes  Pressors: no        PAST MEDICAL & SURGICAL HISTORY:  Afib  DM (diabetes mellitus)  BPH (benign prostatic hyperplasia)  CAD (coronary artery disease)      OBJECTIVE:    ICU Vital Signs Last 24 Hrs  T(C): 36.8 (25 Apr 2020 04:32), Max: 37.9 (25 Apr 2020 01:00)  T(F): 98.3 (25 Apr 2020 04:32), Max: 100.3 (25 Apr 2020 01:00)  HR: 99 (25 Apr 2020 04:32) (60 - 116)  BP: 153/74 (25 Apr 2020 04:32) (133/63 - 153/74)  RR: 18 (25 Apr 2020 04:32) (18 - 20)  SpO2: 98% (25 Apr 2020 07:50) (94% - 99%)    General: ill looking  HEENT: + trach               Neck: No JVD, No Cervical LN    Lungs: Bilateral BS, CTA  Cardiovascular: +S1 S2  Abdomen: Soft, nontender  Extremities: Pulses intact  Skin: sacrum stage IV, lower lip stage II       I&O's Summary    23 Apr 2020 07:01  -  24 Apr 2020 07:00  --------------------------------------------------------  IN: 4814.1 mL / OUT: 1150 mL / NET: 3664.1 mL          LABS:                                     8.9    6.41  )-----------( 230      ( 25 Apr 2020 06:12 )             29.2     04-25    146  |  105  |  105<HH>  ----------------------------<  110<H>  5.2<H>   |  25  |  3.5<H>    Ca    8.7      25 Apr 2020 06:12  Phos  8.7     04-25  Mg     2.3     04-25    TPro  6.3  /  Alb  2.7<L>  /  TBili  0.4  /  DBili  x   /  AST  53<H>  /  ALT  90<H>  /  AlkPhos  122<H>  04-25    PT/INR - ( 25 Apr 2020 06:12 )   PT: 12.90 sec;   INR: 1.12 ratio         PTT - ( 25 Apr 2020 06:12 )  PTT:27.7 sec                                                                                        Mode: AC/ CMV (Assist Control/ Continuous Mandatory Ventilation)  RR (machine): 20  FiO2: 30  PEEP: 5  ITime: 1  MAP: 7  PIP: 23                                      ABG - ( 22 Apr 2020 13:56 )  pH, Arterial: 7.51  pH, Blood: x     /  pCO2: 33    /  pO2: 117   / HCO3: 27    / Base Excess: 4.0   /  SaO2: 98                    MEDICATIONS  (STANDING):  BACItracin   Ointment 1 Application(s) Topical two times a day  calcium acetate 1334 milliGRAM(s) Oral three times a day with meals  chlorhexidine 0.12% Liquid 15 milliLiter(s) Oral Mucosa two times a day  chlorhexidine 4% Liquid 1 Application(s) Topical <User Schedule>  dextrose 50% Injectable 25 Gram(s) IV Push once  diltiazem    Tablet 60 milliGRAM(s) Oral every 6 hours  heparin  Injectable 5000 Unit(s) SubCutaneous every 8 hours  insulin glargine Injectable (LANTUS) 5 Unit(s) SubCutaneous at bedtime  insulin lispro (HumaLOG) corrective regimen sliding scale   SubCutaneous three times a day before meals  lactulose Syrup 20 Gram(s) Oral every 8 hours  levETIRAcetam 250 milliGRAM(s) Oral two times a day  metoprolol tartrate 25 milliGRAM(s) Oral every 8 hours  mupirocin 2% Ointment 1 Application(s) Topical every 12 hours  pantoprazole   Suspension 40 milliGRAM(s) Oral daily  senna 2 Tablet(s) Oral at bedtime    MEDICATIONS  (PRN):  acetaminophen  Suppository .. 650 milliGRAM(s) Rectal every 6 hours PRN Temp greater or equal to 38.5C (101.3F)  midazolam Injectable 2 milliGRAM(s) IV Push every 4 hours PRN agitation

## 2020-04-26 LAB
ALBUMIN SERPL ELPH-MCNC: 2.5 G/DL — LOW (ref 3.5–5.2)
ALBUMIN SERPL ELPH-MCNC: 2.7 G/DL — LOW (ref 3.5–5.2)
ALBUMIN SERPL ELPH-MCNC: 2.7 G/DL — LOW (ref 3.5–5.2)
ALP SERPL-CCNC: 105 U/L — SIGNIFICANT CHANGE UP (ref 30–115)
ALP SERPL-CCNC: 113 U/L — SIGNIFICANT CHANGE UP (ref 30–115)
ALP SERPL-CCNC: 120 U/L — HIGH (ref 30–115)
ALT FLD-CCNC: 73 U/L — HIGH (ref 0–41)
ALT FLD-CCNC: 76 U/L — HIGH (ref 0–41)
ALT FLD-CCNC: 84 U/L — HIGH (ref 0–41)
ANION GAP SERPL CALC-SCNC: 14 MMOL/L — SIGNIFICANT CHANGE UP (ref 7–14)
ANION GAP SERPL CALC-SCNC: 15 MMOL/L — HIGH (ref 7–14)
ANION GAP SERPL CALC-SCNC: 19 MMOL/L — HIGH (ref 7–14)
APTT BLD: 26 SEC — LOW (ref 27–39.2)
AST SERPL-CCNC: 44 U/L — HIGH (ref 0–41)
AST SERPL-CCNC: 50 U/L — HIGH (ref 0–41)
AST SERPL-CCNC: 51 U/L — HIGH (ref 0–41)
BASE EXCESS BLDA CALC-SCNC: 3.3 MMOL/L — HIGH (ref -2–2)
BASOPHILS # BLD AUTO: 0.01 K/UL — SIGNIFICANT CHANGE UP (ref 0–0.2)
BASOPHILS NFR BLD AUTO: 0.2 % — SIGNIFICANT CHANGE UP (ref 0–1)
BILIRUB SERPL-MCNC: 0.4 MG/DL — SIGNIFICANT CHANGE UP (ref 0.2–1.2)
BILIRUB SERPL-MCNC: 0.4 MG/DL — SIGNIFICANT CHANGE UP (ref 0.2–1.2)
BILIRUB SERPL-MCNC: 0.5 MG/DL — SIGNIFICANT CHANGE UP (ref 0.2–1.2)
BUN SERPL-MCNC: 110 MG/DL — CRITICAL HIGH (ref 10–20)
BUN SERPL-MCNC: 119 MG/DL — CRITICAL HIGH (ref 10–20)
BUN SERPL-MCNC: 121 MG/DL — CRITICAL HIGH (ref 10–20)
CALCIUM SERPL-MCNC: 8.6 MG/DL — SIGNIFICANT CHANGE UP (ref 8.5–10.1)
CALCIUM SERPL-MCNC: 8.7 MG/DL — SIGNIFICANT CHANGE UP (ref 8.5–10.1)
CALCIUM SERPL-MCNC: 8.9 MG/DL — SIGNIFICANT CHANGE UP (ref 8.5–10.1)
CHLORIDE SERPL-SCNC: 102 MMOL/L — SIGNIFICANT CHANGE UP (ref 98–110)
CHLORIDE SERPL-SCNC: 102 MMOL/L — SIGNIFICANT CHANGE UP (ref 98–110)
CHLORIDE SERPL-SCNC: 105 MMOL/L — SIGNIFICANT CHANGE UP (ref 98–110)
CO2 SERPL-SCNC: 21 MMOL/L — SIGNIFICANT CHANGE UP (ref 17–32)
CO2 SERPL-SCNC: 25 MMOL/L — SIGNIFICANT CHANGE UP (ref 17–32)
CO2 SERPL-SCNC: 27 MMOL/L — SIGNIFICANT CHANGE UP (ref 17–32)
CREAT SERPL-MCNC: 3.7 MG/DL — HIGH (ref 0.7–1.5)
CREAT SERPL-MCNC: 3.9 MG/DL — HIGH (ref 0.7–1.5)
CREAT SERPL-MCNC: 4.1 MG/DL — CRITICAL HIGH (ref 0.7–1.5)
EOSINOPHIL # BLD AUTO: 0.02 K/UL — SIGNIFICANT CHANGE UP (ref 0–0.7)
EOSINOPHIL NFR BLD AUTO: 0.4 % — SIGNIFICANT CHANGE UP (ref 0–8)
GLUCOSE BLDC GLUCOMTR-MCNC: 161 MG/DL — HIGH (ref 70–99)
GLUCOSE BLDC GLUCOMTR-MCNC: 178 MG/DL — HIGH (ref 70–99)
GLUCOSE BLDC GLUCOMTR-MCNC: 240 MG/DL — HIGH (ref 70–99)
GLUCOSE BLDC GLUCOMTR-MCNC: 248 MG/DL — HIGH (ref 70–99)
GLUCOSE SERPL-MCNC: 167 MG/DL — HIGH (ref 70–99)
GLUCOSE SERPL-MCNC: 183 MG/DL — HIGH (ref 70–99)
GLUCOSE SERPL-MCNC: 253 MG/DL — HIGH (ref 70–99)
HCO3 BLDA-SCNC: 27 MMOL/L — SIGNIFICANT CHANGE UP (ref 21–29)
HCT VFR BLD CALC: 30.1 % — LOW (ref 42–52)
HGB BLD-MCNC: 8.9 G/DL — LOW (ref 14–18)
IMM GRANULOCYTES NFR BLD AUTO: 3.3 % — HIGH (ref 0.1–0.3)
INR BLD: 1.14 RATIO — SIGNIFICANT CHANGE UP (ref 0.65–1.3)
LYMPHOCYTES # BLD AUTO: 0.35 K/UL — LOW (ref 1.2–3.4)
LYMPHOCYTES # BLD AUTO: 6.8 % — LOW (ref 20.5–51.1)
MAGNESIUM SERPL-MCNC: 2.4 MG/DL — SIGNIFICANT CHANGE UP (ref 1.8–2.4)
MAGNESIUM SERPL-MCNC: 2.4 MG/DL — SIGNIFICANT CHANGE UP (ref 1.8–2.4)
MAGNESIUM SERPL-MCNC: 2.5 MG/DL — HIGH (ref 1.8–2.4)
MCHC RBC-ENTMCNC: 29 PG — SIGNIFICANT CHANGE UP (ref 27–31)
MCHC RBC-ENTMCNC: 29.6 G/DL — LOW (ref 32–37)
MCV RBC AUTO: 98 FL — HIGH (ref 80–94)
MONOCYTES # BLD AUTO: 0.61 K/UL — HIGH (ref 0.1–0.6)
MONOCYTES NFR BLD AUTO: 11.8 % — HIGH (ref 1.7–9.3)
NEUTROPHILS # BLD AUTO: 4.02 K/UL — SIGNIFICANT CHANGE UP (ref 1.4–6.5)
NEUTROPHILS NFR BLD AUTO: 77.5 % — HIGH (ref 42.2–75.2)
NRBC # BLD: 0 /100 WBCS — SIGNIFICANT CHANGE UP (ref 0–0)
PCO2 BLDA: 37 MMHG — LOW (ref 38–42)
PH BLDA: 7.47 — HIGH (ref 7.38–7.42)
PHOSPHATE SERPL-MCNC: 10 MG/DL — HIGH (ref 2.1–4.9)
PHOSPHATE SERPL-MCNC: 10.1 MG/DL — HIGH (ref 2.1–4.9)
PHOSPHATE SERPL-MCNC: 7.2 MG/DL — HIGH (ref 2.1–4.9)
PLATELET # BLD AUTO: 208 K/UL — SIGNIFICANT CHANGE UP (ref 130–400)
PO2 BLDA: 147 MMHG — HIGH (ref 78–95)
POTASSIUM SERPL-MCNC: 4.6 MMOL/L — SIGNIFICANT CHANGE UP (ref 3.5–5)
POTASSIUM SERPL-MCNC: 6.3 MMOL/L — CRITICAL HIGH (ref 3.5–5)
POTASSIUM SERPL-MCNC: 6.3 MMOL/L — CRITICAL HIGH (ref 3.5–5)
POTASSIUM SERPL-SCNC: 4.6 MMOL/L — SIGNIFICANT CHANGE UP (ref 3.5–5)
POTASSIUM SERPL-SCNC: 6.3 MMOL/L — CRITICAL HIGH (ref 3.5–5)
POTASSIUM SERPL-SCNC: 6.3 MMOL/L — CRITICAL HIGH (ref 3.5–5)
PROT SERPL-MCNC: 5.9 G/DL — LOW (ref 6–8)
PROT SERPL-MCNC: 6 G/DL — SIGNIFICANT CHANGE UP (ref 6–8)
PROT SERPL-MCNC: 6.5 G/DL — SIGNIFICANT CHANGE UP (ref 6–8)
PROTHROM AB SERPL-ACNC: 13.1 SEC — HIGH (ref 9.95–12.87)
RBC # BLD: 3.07 M/UL — LOW (ref 4.7–6.1)
RBC # FLD: 13.6 % — SIGNIFICANT CHANGE UP (ref 11.5–14.5)
SAO2 % BLDA: 99 % — HIGH (ref 92–96)
SODIUM SERPL-SCNC: 142 MMOL/L — SIGNIFICANT CHANGE UP (ref 135–146)
SODIUM SERPL-SCNC: 144 MMOL/L — SIGNIFICANT CHANGE UP (ref 135–146)
SODIUM SERPL-SCNC: 144 MMOL/L — SIGNIFICANT CHANGE UP (ref 135–146)
VANCOMYCIN TROUGH SERPL-MCNC: 17.7 UG/ML — HIGH (ref 5–10)
WBC # BLD: 5.18 K/UL — SIGNIFICANT CHANGE UP (ref 4.8–10.8)
WBC # FLD AUTO: 5.18 K/UL — SIGNIFICANT CHANGE UP (ref 4.8–10.8)

## 2020-04-26 PROCEDURE — 71045 X-RAY EXAM CHEST 1 VIEW: CPT | Mod: 26,76

## 2020-04-26 PROCEDURE — 93010 ELECTROCARDIOGRAM REPORT: CPT

## 2020-04-26 PROCEDURE — 93970 EXTREMITY STUDY: CPT | Mod: 26

## 2020-04-26 RX ORDER — SODIUM CHLORIDE 9 MG/ML
1000 INJECTION, SOLUTION INTRAVENOUS
Refills: 0 | Status: DISCONTINUED | OUTPATIENT
Start: 2020-04-26 | End: 2020-04-29

## 2020-04-26 RX ORDER — INSULIN HUMAN 100 [IU]/ML
10 INJECTION, SOLUTION SUBCUTANEOUS ONCE
Refills: 0 | Status: COMPLETED | OUTPATIENT
Start: 2020-04-26 | End: 2020-04-26

## 2020-04-26 RX ORDER — SODIUM POLYSTYRENE SULFONATE 4.1 MEQ/G
30 POWDER, FOR SUSPENSION ORAL ONCE
Refills: 0 | Status: COMPLETED | OUTPATIENT
Start: 2020-04-26 | End: 2020-04-26

## 2020-04-26 RX ORDER — DEXTROSE 10 % IN WATER 10 %
250 INTRAVENOUS SOLUTION INTRAVENOUS ONCE
Refills: 0 | Status: COMPLETED | OUTPATIENT
Start: 2020-04-26 | End: 2020-04-26

## 2020-04-26 RX ORDER — SODIUM ZIRCONIUM CYCLOSILICATE 10 G/10G
10 POWDER, FOR SUSPENSION ORAL EVERY 8 HOURS
Refills: 0 | Status: DISCONTINUED | OUTPATIENT
Start: 2020-04-26 | End: 2020-04-28

## 2020-04-26 RX ORDER — ALBUTEROL 90 UG/1
10 AEROSOL, METERED ORAL ONCE
Refills: 0 | Status: COMPLETED | OUTPATIENT
Start: 2020-04-26 | End: 2020-04-26

## 2020-04-26 RX ORDER — DEXTROSE 50 % IN WATER 50 %
50 SYRINGE (ML) INTRAVENOUS ONCE
Refills: 0 | Status: DISCONTINUED | OUTPATIENT
Start: 2020-04-26 | End: 2020-04-26

## 2020-04-26 RX ADMIN — HEPARIN SODIUM 5000 UNIT(S): 5000 INJECTION INTRAVENOUS; SUBCUTANEOUS at 21:53

## 2020-04-26 RX ADMIN — HEPARIN SODIUM 5000 UNIT(S): 5000 INJECTION INTRAVENOUS; SUBCUTANEOUS at 06:10

## 2020-04-26 RX ADMIN — CHLORHEXIDINE GLUCONATE 15 MILLILITER(S): 213 SOLUTION TOPICAL at 06:09

## 2020-04-26 RX ADMIN — Medication 1500 MILLILITER(S): at 13:59

## 2020-04-26 RX ADMIN — HEPARIN SODIUM 5000 UNIT(S): 5000 INJECTION INTRAVENOUS; SUBCUTANEOUS at 15:46

## 2020-04-26 RX ADMIN — Medication 60 MILLIGRAM(S): at 04:39

## 2020-04-26 RX ADMIN — Medication 1 APPLICATION(S): at 06:10

## 2020-04-26 RX ADMIN — SODIUM CHLORIDE 100 MILLILITER(S): 9 INJECTION, SOLUTION INTRAVENOUS at 04:49

## 2020-04-26 RX ADMIN — SODIUM POLYSTYRENE SULFONATE 30 GRAM(S): 4.1 POWDER, FOR SUSPENSION ORAL at 06:10

## 2020-04-26 RX ADMIN — INSULIN HUMAN 10 UNIT(S): 100 INJECTION, SOLUTION SUBCUTANEOUS at 13:53

## 2020-04-26 RX ADMIN — MUPIROCIN 1 APPLICATION(S): 20 OINTMENT TOPICAL at 06:11

## 2020-04-26 RX ADMIN — CHLORHEXIDINE GLUCONATE 1 APPLICATION(S): 213 SOLUTION TOPICAL at 06:09

## 2020-04-26 RX ADMIN — LEVETIRACETAM 250 MILLIGRAM(S): 250 TABLET, FILM COATED ORAL at 06:09

## 2020-04-26 RX ADMIN — Medication 25 MILLIGRAM(S): at 06:09

## 2020-04-26 RX ADMIN — Medication 6: at 09:20

## 2020-04-26 RX ADMIN — SENNA PLUS 2 TABLET(S): 8.6 TABLET ORAL at 23:43

## 2020-04-26 RX ADMIN — Medication 60 MILLIGRAM(S): at 23:42

## 2020-04-26 RX ADMIN — INSULIN HUMAN 10 UNIT(S): 100 INJECTION, SOLUTION SUBCUTANEOUS at 06:10

## 2020-04-26 RX ADMIN — Medication 1334 MILLIGRAM(S): at 12:06

## 2020-04-26 RX ADMIN — Medication 1334 MILLIGRAM(S): at 12:01

## 2020-04-26 RX ADMIN — Medication 60 MILLIGRAM(S): at 12:05

## 2020-04-26 RX ADMIN — INSULIN GLARGINE 5 UNIT(S): 100 INJECTION, SOLUTION SUBCUTANEOUS at 21:53

## 2020-04-26 RX ADMIN — LACTULOSE 20 GRAM(S): 10 SOLUTION ORAL at 06:09

## 2020-04-26 RX ADMIN — SODIUM ZIRCONIUM CYCLOSILICATE 10 GRAM(S): 10 POWDER, FOR SUSPENSION ORAL at 21:52

## 2020-04-26 RX ADMIN — PANTOPRAZOLE SODIUM 40 MILLIGRAM(S): 20 TABLET, DELAYED RELEASE ORAL at 12:05

## 2020-04-26 NOTE — PROGRESS NOTE ADULT - ASSESSMENT
74 year old male w/ hx of Afib on Eliquis, CAD s/p PCI, DM, BPH, presents with 1 week of fever, cough, and progressive SOB.    IMPRESSION:  Acute Renal Failure likely Microthrombosis secondary to Cytokine Release Syndrome  Hyperkalemia, worsening Uremia  Hypernatremia, improved  Acute respiratory failure secondary to COVID-19 repeat swab 4/13 and 4/17 negative, s/p Trach 4/18  Multiple ischemic CVAs: Left brainstem small focus, bilateral occipital lobes, posterior temporal occipital  Left occipital lobe hemorrhagic CVA  MRSA PNA + 4/15      PLAN:    CNS: Versed PRN. Continue Keppra bid.    HEENT: Oral care. Trach Care. Frequent suctioning.    PULMONARY: HOB @ 45 degrees. Keep POx > 92%. , FiO2 40%, PEEP 5.    CARDIOVASCULAR: Continue Metoprolol and Cardizem PO.    GI: PPI ppx. Switch feeds to Nepro. IR: CTAP done, possible PEG on Monday; less likely if worsening kidney function.    RENAL: s/p Insulin 10U & Kayexalate, no bowel movements so far.  Give Lokelma stat & Insulin 10U with 250cc d10w. F/u BMP, Mg & Phos at 4pm. Continue Phoslo tid. Non-oliguric, santana in place, strict I & O, monitor UO, if UO decreases by 20cc/hr over 4 hours with no improvement inform Nephrology. D5 @ 100c/hr. If pH < 7.3 on ABG (RT informed), start D5 with 150 meqs Bicarb at 100cc/hr. Nephrology following.    INFECTIOUS DISEASE: F/u Vancomycin trough 4pm. Vanco discontinued for now as trough elevated. Continue meropenum.    HEMATOLOGICAL: Unable to fully anticoagulate due to hemorrhagic CVA, resume Heparin 5000 sq tid. F/u VDUS b/l lower extremities.    ENDOCRINE:  Follow up FS. Insulin protocol if needed.    MUSCULOSKELETAL: bed rest. Wound care.    Full code  Very poor prognosis.  Goals of care need to be discussed with family. 74 year old male w/ hx of Afib on Eliquis, CAD s/p PCI, DM, BPH, presents with 1 week of fever, cough, and progressive SOB.    IMPRESSION:  Acute Renal Failure l  Hyperkalemia, worsening Uremia  Hypernatremia, improved  Acute respiratory failure secondary to COVID-19 repeat swab 4/13 and 4/17 negative, s/p Trach 4/18  Multiple ischemic CVAs: Left brainstem small focus, bilateral occipital lobes, posterior temporal occipital  Left occipital lobe hemorrhagic CVA  MRSA PNA + 4/15      PLAN:    CNS: Versed PRN. Continue Keppra bid. MRI per neuro    HEENT: Oral care. Trach Care. Frequent suctioning.    PULMONARY: HOB @ 45 degrees. Keep POx > 92%. , FiO2 40%, PEEP 5.    CARDIOVASCULAR: Continue Metoprolol and Cardizem PO.    GI: PPI ppx. Switch feeds to Nepro (LOW K)    RENAL: s/p Insulin 10U & Kayexalate, no bowel movements so far.  Give Lokelma stat & Insulin 10U with 250cc d10w. F/u BMP, Mg & Phos at 4pm. Continue Phoslo tid. ABG    INFECTIOUS DISEASE: F/u Vancomycin trough 4pm. Vanco discontinued for now as trough elevated. Continue meropenum.    HEMATOLOGICAL: Unable to fully anticoagulate due to hemorrhagic CVA, resume Heparin 5000 sq tid. F/u VDUS b/l lower extremities.    ENDOCRINE:  Follow up FS. Insulin protocol if needed.    MUSCULOSKELETAL: bed rest. Wound care.    Full code  Very poor prognosis.  Goals of care need to be discussed with family. 74 year old male w/ hx of Afib on Eliquis, CAD s/p PCI, DM, BPH, presents with 1 week of fever, cough, and progressive SOB.    IMPRESSION:  Acute Renal Failure  Hyperkalemia, worsening Uremia  Right gastrocnemius & peroneal DVT  Hypernatremia, improved  Acute respiratory failure secondary to COVID-19 repeat swab 4/13 and 4/17 negative, s/p Trach 4/18  Multiple ischemic CVAs: Left brainstem small focus, bilateral occipital lobes, posterior temporal occipital  Left occipital lobe hemorrhagic CVA  MRSA PNA + 4/15      PLAN:    CNS: Versed PRN. Continue Keppra bid. MRI per neuro    HEENT: Oral care. Trach Care. Frequent suctioning.    PULMONARY: HOB @ 45 degrees. Keep POx > 92%. , FiO2 40%, PEEP 5.    CARDIOVASCULAR: Continue Metoprolol and Cardizem PO.    GI: PPI ppx. Switch feeds to Nepro (LOW K)    RENAL: s/p Insulin 10U & Kayexalate, no bowel movements so far.  Give Lokelma stat & Insulin 10U with 250cc d10w. F/u BMP, Mg & Phos at 4pm. Continue Phoslo tid. ABG    INFECTIOUS DISEASE: F/u Vancomycin trough 4pm. Vanco discontinued for now as trough elevated. Continue meropenum.    HEMATOLOGICAL: Unable to fully anticoagulate due to hemorrhagic CVA, resume Heparin 5000 sq tid. F/u official read of VDUS b/l lower extremities. Repeat VDUS in 3 days.    ENDOCRINE:  Follow up FS. Insulin protocol if needed.    MUSCULOSKELETAL: bed rest. Wound care.    Full code  Very poor prognosis.  Goals of care need to be discussed with family.

## 2020-04-26 NOTE — PROGRESS NOTE ADULT - ASSESSMENT
LIZZIE/ ATN/ hyperkalemia/ respiratory failure / covid positive / high BUN    # off  diuretic   # non-oliguric  # ph  improving  feed nepro, on binders   # no need for HD for now / following K levels if remains high may need to restart HD/ will start on lokelma for now   # BUN noted : highly catabolic and prerenal, off  diuretics, improving slowly   # sodium level noted, continue D5w at 100 cc per hour   # will follow   # overall prognosis poor

## 2020-04-26 NOTE — CHART NOTE - NSCHARTNOTEFT_GEN_A_CORE
Lab called for hyperkalemia of 6.3 with worsening renal function. Will give STAT kayexalate, albuterol, and insulin + dextrose. STAT EKG ordered. Will place FU BMP. Spoke with RN and informed of plan.

## 2020-04-26 NOTE — PROGRESS NOTE ADULT - SUBJECTIVE AND OBJECTIVE BOX
Nephrology progress note    THIS IS AN INCOMPLETE NOTE . FULL NOTE TO FOLLOW SHORTLY    Patient is seen and examined, events over the last 24 h noted .    Allergies:  Bactrim (Unknown)    Hospital Medications:   MEDICATIONS  (STANDING):  ALBUTerol    0.083% 10 milliGRAM(s) Nebulizer once  BACItracin   Ointment 1 Application(s) Topical two times a day  calcium acetate 1334 milliGRAM(s) Oral three times a day with meals  chlorhexidine 0.12% Liquid 15 milliLiter(s) Oral Mucosa two times a day  chlorhexidine 4% Liquid 1 Application(s) Topical <User Schedule>  dextrose 5%. 1000 milliLiter(s) (100 mL/Hr) IV Continuous <Continuous>  dextrose 50% Injectable 50 milliLiter(s) IV Push once  dextrose 50% Injectable 25 Gram(s) IV Push once  diltiazem    Tablet 60 milliGRAM(s) Oral every 6 hours  heparin  Injectable 5000 Unit(s) SubCutaneous every 8 hours  insulin glargine Injectable (LANTUS) 5 Unit(s) SubCutaneous at bedtime  insulin lispro (HumaLOG) corrective regimen sliding scale   SubCutaneous three times a day before meals  lactulose Syrup 20 Gram(s) Oral every 8 hours  levETIRAcetam 250 milliGRAM(s) Oral two times a day  metoprolol tartrate 25 milliGRAM(s) Oral every 8 hours  mupirocin 2% Ointment 1 Application(s) Topical every 12 hours  pantoprazole   Suspension 40 milliGRAM(s) Oral daily  senna 2 Tablet(s) Oral at bedtime        VITALS:  T(F): 99.3 (04-26-20 @ 01:00), Max: 99.4 (04-25-20 @ 17:31)  HR: 118 (04-26-20 @ 05:00)  BP: 131/62 (04-26-20 @ 05:00)  RR: 18 (04-26-20 @ 05:00)  SpO2: 96% (04-26-20 @ 05:00)  Wt(kg): --    04-23 @ 07:01  -  04-24 @ 07:00  --------------------------------------------------------  IN: 4814.1 mL / OUT: 1150 mL / NET: 3664.1 mL    04-24 @ 07:01  - 04-25 @ 07:00  --------------------------------------------------------  IN: 640 mL / OUT: 1000 mL / NET: -360 mL    04-25 @ 07:01  -  04-26 @ 06:42  --------------------------------------------------------  IN: 240 mL / OUT: 1100 mL / NET: -860 mL          PHYSICAL EXAM:  Constitutional: NAD  HEENT: anicteric sclera, oropharynx clear, MMM  Neck: No JVD  Respiratory: CTAB, no wheezes, rales or rhonchi  Cardiovascular: S1, S2, RRR  Gastrointestinal: BS+, soft, NT/ND  Extremities: No cyanosis or clubbing. No peripheral edema  :  No santana.   Skin: No rashes    LABS:  04-25    144  |  105  |  110<HH>  ----------------------------<  183<H>  6.3<HH>   |  25  |  3.9<H>    Ca    8.9      25 Apr 2020 23:30  Phos  10.1     04-25  Mg     2.5     04-25    TPro  6.5  /  Alb  2.7<L>  /  TBili  0.4  /  DBili      /  AST  51<H>  /  ALT  84<H>  /  AlkPhos  120<H>  04-25                          8.9    6.41  )-----------( 230      ( 25 Apr 2020 06:12 )             29.2       Urine Studies:      RADIOLOGY & ADDITIONAL STUDIES: Nephrology progress note  Patient is seen and examined, events over the last 24 h noted .  Notes by hospitalist overnight noted   hyperkalemic     Allergies:  Bactrim (Unknown)    Hospital Medications:   MEDICATIONS  (STANDING):  ALBUTerol    0.083% 10 milliGRAM(s) Nebulizer once  BACItracin   Ointment 1 Application(s) Topical two times a day  calcium acetate 1334 milliGRAM(s) Oral three times a day with meals  diltiazem    Tablet 60 milliGRAM(s) Oral every 6 hours  heparin  Injectable 5000 Unit(s) SubCutaneous every 8 hours  insulin glargine Injectable (LANTUS) 5 Unit(s) SubCutaneous at bedtime  insulin lispro (HumaLOG) corrective regimen sliding scale   SubCutaneous three times a day before meals  lactulose Syrup 20 Gram(s) Oral every 8 hours  levETIRAcetam 250 milliGRAM(s) Oral two times a day  metoprolol tartrate 25 milliGRAM(s) Oral every 8 hours  mupirocin 2% Ointment 1 Application(s) Topical every 12 hours  pantoprazole   Suspension 40 milliGRAM(s) Oral daily  senna 2 Tablet(s) Oral at bedtime        VITALS:  T(F): 99.3 (04-26-20 @ 01:00), Max: 99.4 (04-25-20 @ 17:31)  HR: 118 (04-26-20 @ 05:00)  BP: 131/62 (04-26-20 @ 05:00)  RR: 18 (04-26-20 @ 05:00)  SpO2: 96% (04-26-20 @ 05:00)      04-23 @ 07:01  -  04-24 @ 07:00  --------------------------------------------------------  IN: 4814.1 mL / OUT: 1150 mL / NET: 3664.1 mL    04-24 @ 07:01  -  04-25 @ 07:00  --------------------------------------------------------  IN: 640 mL / OUT: 1000 mL / NET: -360 mL    04-25 @ 07:01  -  04-26 @ 06:42  --------------------------------------------------------  IN: 240 mL / OUT: 1100 mL / NET: -860 mL          PHYSICAL EXAM:  Constitutional: intubated on MV   HEENT: anicteric sclera, oropharynx clear, MMM  Neck: No JVD/ trached   Respiratory: CTAB, no wheezes, rales or rhonchi  Cardiovascular: S1, S2, RRR  Gastrointestinal: BS+, soft, NT/ND  Extremities: No cyanosis or clubbing. No peripheral edema  Skin: No rashes    LABS:  04-26    142  |  102  |  x   ----------------------------<  253<H>  x    |  21  |  x     Ca    8.7      26 Apr 2020 06:23  Phos  10.0     04-26  Mg     2.4     04-26    TPro  6.0  /  Alb  2.5<L>  /  TBili  0.4  /  DBili  x   /  AST  44<H>  /  ALT  73<H>  /  AlkPhos  113  04-26 04-25    144  |  105  |  110<HH>  ----------------------------<  183<H>  6.3<HH>   |  25  |  3.9<H>    Ca    8.9      25 Apr 2020 23:30  Phos  10.1     04-25  Mg     2.5     04-25    TPro  6.5  /  Alb  2.7<L>  /  TBili  0.4  /  DBili      /  AST  51<H>  /  ALT  84<H>  /  AlkPhos  120<H>  04-25                          8.9    6.41  )-----------( 230      ( 25 Apr 2020 06:12 )             29.2       Urine Studies:      RADIOLOGY & ADDITIONAL STUDIES:

## 2020-04-26 NOTE — PROGRESS NOTE ADULT - SUBJECTIVE AND OBJECTIVE BOX
HPI:  74 year old male w/ hx of Afib on Eliquis, CAD s/p PCI, DM, BPH, presents with 1 week of fever, cough, and progressive SOB. Admits to watery diarrhea for 3 days. Denies chest pain. 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 He has exposure to COVID positive family member at the wedding. No travel hx. Pt pulse ox in 80s in the field per EMS. Of note, per patient he had recent normal stress test recently. COVID PCR sent, labs show lymphopenia and transaminitis, CXR shows b/l lung opacity, requiring NRB (30 Mar 2020 16:35)    PAST MEDICAL & SURGICAL HISTORY:  Afib  DM (diabetes mellitus)  BPH (benign prostatic hyperplasia)  CAD (coronary artery disease)    FAMILY HISTORY:    SOCIAL HISTORY:  Smoking: __ packs x ___ years  EtOH:  Occupation:  Exposures:  Recent Travel:    Allergies    Bactrim (Unknown)    Intolerances        HOME MEDICATIONS:  Cartia  mg/24 hours oral capsule, extended release: 1 cap(s) orally once a day (30 Mar 2020 18:04)  Eliquis 5 mg oral tablet: 1 tab(s) orally 2 times a day (30 Mar 2020 18:04)  Lipitor 40 mg oral tablet: 1 tab(s) orally once a day (30 Mar 2020 18:04)  lisinopril 40 mg oral tablet: 1 tab(s) orally once a day (30 Mar 2020 18:04)  metFORMIN 750 mg oral tablet, extended release: 1 tab(s) orally once a day (30 Mar 2020 18:04)      REVIEW OF SYSTEMS:    [ ] All other systems negative  [X] Unable to assess ROS because patient intubated and sedated    VITAL SIGNS & PHYSICAL EXAM:  ICU Vital Signs Last 24 Hrs  T(C): 37.2 (26 Apr 2020 10:32), Max: 37.4 (25 Apr 2020 17:31)  T(F): 98.9 (26 Apr 2020 10:32), Max: 99.4 (25 Apr 2020 17:31)  HR: 110 (26 Apr 2020 10:32) (89 - 118)  BP: 160/75 (26 Apr 2020 10:32) (120/55 - 168/74)  BP(mean): 107 (25 Apr 2020 17:31) (107 - 107)  ABP: --  ABP(mean): --  RR: 18 (26 Apr 2020 10:32) (18 - 18)  SpO2: 96% (26 Apr 2020 05:00) (96% - 99%)    General: very ill appearing  HEENT: + Trach. No JVD. No cervical LN. Lower mak stage II ulcer  Chest/Respiratory: Normal chest expansion, CTAB. No wheeze, rhonchi or rales.  Cardiovascular: S1 S2, regular rate, irregular rhythm  Abdomen: +BS soft NTND  Extremities: +1 PP b/l, -ve LLE b/l  Skin: Warm & dry. No rashes or skin lesions. Sacral ulcer stage IV  Neurological: Sedated    Mode: AC/ CMV (Assist Control/ Continuous Mandatory Ventilation), RR (machine): 20, TV (machine): 500, FiO2: 40, PEEP: 5, ITime: 1, MAP: 11, PIP: 21      04-25 @ 07:01  -  04-26 @ 07:00  --------------------------------------------------------  IN: 1280 mL / OUT: 1100 mL / NET: 180 mL    CAPILLARY BLOOD GLUCOSE  POCT Blood Glucose.: 248 mg/dL (26 Apr 2020 07:50)      HOSPITAL MEDICATIONS:  MEDICATIONS  (STANDING):  ALBUTerol    0.083% 10 milliGRAM(s) Nebulizer once  BACItracin   Ointment 1 Application(s) Topical two times a day  calcium acetate 1334 milliGRAM(s) Oral three times a day with meals  chlorhexidine 0.12% Liquid 15 milliLiter(s) Oral Mucosa two times a day  chlorhexidine 4% Liquid 1 Application(s) Topical <User Schedule>  dextrose 10% Bolus 250 milliLiter(s) IV Bolus once  dextrose 5%. 1000 milliLiter(s) (100 mL/Hr) IV Continuous <Continuous>  dextrose 50% Injectable 25 Gram(s) IV Push once  diltiazem    Tablet 60 milliGRAM(s) Oral every 6 hours  heparin  Injectable 5000 Unit(s) SubCutaneous every 8 hours  insulin glargine Injectable (LANTUS) 5 Unit(s) SubCutaneous at bedtime  insulin lispro (HumaLOG) corrective regimen sliding scale   SubCutaneous three times a day before meals  insulin regular  human recombinant. 10 Unit(s) IV Push once  lactulose Syrup 20 Gram(s) Oral every 8 hours  levETIRAcetam 250 milliGRAM(s) Oral two times a day  metoprolol tartrate 25 milliGRAM(s) Oral every 8 hours  mupirocin 2% Ointment 1 Application(s) Topical every 12 hours  pantoprazole   Suspension 40 milliGRAM(s) Oral daily  senna 2 Tablet(s) Oral at bedtime  sodium zirconium cyclosilicate 10 Gram(s) Oral every 8 hours    MEDICATIONS  (PRN):  acetaminophen  Suppository .. 650 milliGRAM(s) Rectal every 6 hours PRN Temp greater or equal to 38.5C (101.3F)  midazolam Injectable 2 milliGRAM(s) IV Push every 4 hours PRN agitation      LABS:                        8.9    5.18  )-----------( 208      ( 26 Apr 2020 06:23 )             30.1     04-26    142  |  102  |  119<HH>  ----------------------------<  253<H>  6.3<HH>   |  21  |  4.1<HH>    Creatinine, Serum: 3.9 mg/dL (04.25.20 @ 23:30)  Creatinine, Serum: 3.5 mg/dL (04.25.20 @ 06:12)  Potassium, Serum: 6.3: Critical value: (04.25.20 @ 23:30)  Potassium, Serum: 5.2 mmol/L (04.25.20 @ 06:12)  Blood Urea Nitrogen, Serum: 110: Critical value: mg/dL (04.25.20 @ 23:30)  Blood Urea Nitrogen, Serum: 105: Critical value: mg/dL (04.25.20 @ 06:12)    Ca    8.7      26 Apr 2020 06:23  Phos  10.0     04-26  Mg     2.4     04-26  TPro  6.0  /  Alb  2.5<L>  /  TBili  0.4  /  DBili  x   /  AST  44<H>  /  ALT  73<H>  /  AlkPhos  113  04-26  PT/INR - ( 25 Apr 2020 06:12 )   PT: 12.90 sec;   INR: 1.12 ratio    PTT - ( 25 Apr 2020 06:12 )  PTT:27.7 sec    D-Dimer Assay, Quantitative (04.23.20 @ 00:00)    D-Dimer Assay, Quantitative: 7009 ng/mL DDU  D-Dimer Assay, Quantitative (04.21.20 @ 23:30)    D-Dimer Assay, Quantitative: 4516 ng/mL DDU      MICROBIOLOGY:   MRSA/MSSA PCR (04.22.20 @ 16:41)    MRSA PCR Result.: Positive: By: Real-Time PCR (Polymerase Reaction Method)    COVID-19 PCR . (04.16.20 @ 19:05)    COVID-19 PCR: NotDetec: Methodology:  The Abbott Real Time SARS-CoV-2 assay is a real time reverse  transcriptase (RT) Polymerase Chain Reaction (PCR), test intended for the  qualitative detection of RNA from the SARS-CoV-2 in nasopharyngeal and  oropharyngeal swabs from patients suspected of COVID-19 infection.  The SARS-CoV-2 assay is performed on the Abbott m2000 system.  Reference Range:  Not Detected  This test has been validated by [facility]  Molecular lab. This assay is for in Vitro diagnostic testing under FDA  Emergency Use Authorization only.  This Test Was Performed At [facility], Molecular Lab,  [place], [place] [address]    Culture - Sputum . (04.15.20 @ 16:30)    Gram Stain:   Numerous polymorphonuclear leukocytes per low power field  No Squamous epithelial cells per low power field  Moderate Gram positive cocci in pairs seen per oil power field    -  Ampicillin/Sulbactam: R 16/8    -  Cefazolin: R >16    -  Clindamycin: R >4    -  Erythromycin: R >4    -  Gentamicin: S <=1 Should not be used as monotherapy    -  Linezolid: S 2    -  Oxacillin: R >2    -  Penicillin: R >8    -  RIF- Rifampin: S <=1 Should not be used as monotherapy    -  Tetra/Doxy: S 2    -  Trimethoprim/Sulfamethoxazole: S <=0.5/9.5    -  Vancomycin: S 2    Specimen Source: .Sputum Sputum    Culture Results:   Moderate Methicillin resistant Staphylococcus aureus  Normal Respiratory Li absent    Organism Identification: Methicillin resistant Staphylococcus aureus    Organism: Methicillin resistant Staphylococcus aureus    Method Type: OMID      RADIOLOGY:  CXR worsening bilateral infiltrates. Pending official read.    < from: CT Abdomen No Cont (04.24.20 @ 12:30) >  IMPRESSION:  1.  Stomach with intervening large bowel anteriorly with small window for potential gastrostomy tube placement.  2.  Partially imaged predominantly peripheral groundglass and consolidative opacities, consistent with patient's known COVID-19 infection.  < end of copied text >      < from: CT Head No Cont (04.20.20 @ 12:44) >  IMPRESSION:  In comparison with the prior noncontrast CT scan of the brain dated April 14, 2020:  Small focus of diminished attenuation in the left side of the brainstem consistent with acute ischemic change more prominent on the current study.  New small focus of hemorrhage in the left occipital lobe.  New linear foci of hyperdensity in the occipital lobes and right posterior temporal occipital region likely representing thrombi in cortical vessels.  < end of copied text >        EKG:  < from: 12 Lead ECG (04.24.20 @ 05:32) >  Ventricular Rate 147 BPM  Atrial Rate 202 BPM  QRS Duration 92 ms  Q-T Interval 296 ms  QTC Calculation(Bezet) 463 ms  R Axis 4 degrees  T Axis 41 degrees  Diagnosis Line Atrial fibrillation with rapid ventricular response with premature ventricular  or aberrantly conducted complexes  Incomplete right bundle branch block  Nonspecific ST and T wave abnormality  Abnormal ECG  < end of copied text events noted, worsening renal function, hyperk s/p tx      VITAL SIGNS & PHYSICAL EXAM:  ICU Vital Signs Last 24 Hrs  T(C): 37.2 (26 Apr 2020 10:32), Max: 37.4 (25 Apr 2020 17:31)  T(F): 98.9 (26 Apr 2020 10:32), Max: 99.4 (25 Apr 2020 17:31)  HR: 110 (26 Apr 2020 10:32) (89 - 118)  BP: 160/75 (26 Apr 2020 10:32) (120/55 - 168/74)  BP(mean): 107 (25 Apr 2020 17:31) (107 - 107)  RR: 18 (26 Apr 2020 10:32) (18 - 18)  SpO2: 96% (26 Apr 2020 05:00) (96% - 99%)    General: very ill appearing  HEENT: + Trach. No JVD. No cervical LN. Lower mak stage II ulcer  Chest/Respiratory: Normal chest expansion, CTAB. No wheeze, rhonchi or rales.  Cardiovascular: S1 S2, regular rate, irregular rhythm  Abdomen: +BS soft NTND  Extremities: +1 PP b/l, -ve LLE b/l  Skin: Warm & dry. No rashes or skin lesions. Sacral ulcer stage IV  Neurological: not following commands    e: AC/ CMV (Assist Control/ Continuous Mandatory Ventilation), RR (machine): 20, TV (machine): 500, FiO2: 40, PEEP: 5, ITime: 1, MAP: 11, PIP: 21      04-25 @ 07:01  -  04-26 @ 07:00  --------------------------------------------------------  IN: 1280 mL / OUT: 1100 mL / NET: 180 mL    CAPILLARY BLOOD GLUCOSE  POCT Blood Glucose.: 248 mg/dL (26 Apr 2020 07:50)      HOSPITAL MEDICATIONS:  MEDICATIONS  (STANDING):  ALBUTerol    0.083% 10 milliGRAM(s) Nebulizer once  BACItracin   Ointment 1 Application(s) Topical two times a day  calcium acetate 1334 milliGRAM(s) Oral three times a day with meals  chlorhexidine 0.12% Liquid 15 milliLiter(s) Oral Mucosa two times a day  chlorhexidine 4% Liquid 1 Application(s) Topical <User Schedule>  dextrose 10% Bolus 250 milliLiter(s) IV Bolus once  dextrose 5%. 1000 milliLiter(s) (100 mL/Hr) IV Continuous <Continuous>  dextrose 50% Injectable 25 Gram(s) IV Push once  diltiazem    Tablet 60 milliGRAM(s) Oral every 6 hours  heparin  Injectable 5000 Unit(s) SubCutaneous every 8 hours  insulin glargine Injectable (LANTUS) 5 Unit(s) SubCutaneous at bedtime  insulin lispro (HumaLOG) corrective regimen sliding scale   SubCutaneous three times a day before meals  insulin regular  human recombinant. 10 Unit(s) IV Push once  lactulose Syrup 20 Gram(s) Oral every 8 hours  levETIRAcetam 250 milliGRAM(s) Oral two times a day  metoprolol tartrate 25 milliGRAM(s) Oral every 8 hours  mupirocin 2% Ointment 1 Application(s) Topical every 12 hours  pantoprazole   Suspension 40 milliGRAM(s) Oral daily  senna 2 Tablet(s) Oral at bedtime  sodium zirconium cyclosilicate 10 Gram(s) Oral every 8 hours    MEDICATIONS  (PRN):  acetaminophen  Suppository .. 650 milliGRAM(s) Rectal every 6 hours PRN Temp greater or equal to 38.5C (101.3F)  midazolam Injectable 2 milliGRAM(s) IV Push every 4 hours PRN agitation      LABS:                        8.9    5.18  )-----------( 208      ( 26 Apr 2020 06:23 )             30.1     04-26    142  |  102  |  119<HH>  ----------------------------<  253<H>  6.3<HH>   |  21  |  4.1<HH>    Creatinine, Serum: 3.9 mg/dL (04.25.20 @ 23:30)  Creatinine, Serum: 3.5 mg/dL (04.25.20 @ 06:12)  Potassium, Serum: 6.3: Critical value: (04.25.20 @ 23:30)  Potassium, Serum: 5.2 mmol/L (04.25.20 @ 06:12)  Blood Urea Nitrogen, Serum: 110: Critical value: mg/dL (04.25.20 @ 23:30)  Blood Urea Nitrogen, Serum: 105: Critical value: mg/dL (04.25.20 @ 06:12)    Ca    8.7      26 Apr 2020 06:23  Phos  10.0     04-26  Mg     2.4     04-26  TPro  6.0  /  Alb  2.5<L>  /  TBili  0.4  /  DBili  x   /  AST  44<H>  /  ALT  73<H>  /  AlkPhos  113  04-26  PT/INR - ( 25 Apr 2020 06:12 )   PT: 12.90 sec;   INR: 1.12 ratio    PTT - ( 25 Apr 2020 06:12 )  PTT:27.7 sec    D-Dimer Assay, Quantitative (04.23.20 @ 00:00)    D-Dimer Assay, Quantitative: 7009 ng/mL DDU  D-Dimer Assay, Quantitative (04.21.20 @ 23:30)    D-Dimer Assay, Quantitative: 4516 ng/mL DDU      MICROBIOLOGY:   MRSA/MSSA PCR (04.22.20 @ 16:41)    MRSA PCR Result.: Positive: By: Real-Time PCR (Polymerase Reaction Method)    COVID-19 PCR . (04.16.20 @ 19:05)    COVID-19 PCR: NotDetec: Methodology:  The Abbott Real Time SARS-CoV-2 assay is a real time reverse  transcriptase (RT) Polymerase Chain Reaction (PCR), test intended for the  qualitative detection of RNA from the SARS-CoV-2 in nasopharyngeal and  oropharyngeal swabs from patients suspected of COVID-19 infection.  The SARS-CoV-2 assay is performed on the Abbott m2000 system.  Reference Range:  Not Detected  This test has been validated by [facility]  Molecular lab. This assay is for in Vitro diagnostic testing under FDA  Emergency Use Authorization only.  This Test Was Performed At [facility], Molecular Lab,  [place], [place] [address]    Culture - Sputum . (04.15.20 @ 16:30)    Gram Stain:   Numerous polymorphonuclear leukocytes per low power field  No Squamous epithelial cells per low power field  Moderate Gram positive cocci in pairs seen per oil power field    -  Ampicillin/Sulbactam: R 16/8    -  Cefazolin: R >16    -  Clindamycin: R >4    -  Erythromycin: R >4    -  Gentamicin: S <=1 Should not be used as monotherapy    -  Linezolid: S 2    -  Oxacillin: R >2    -  Penicillin: R >8    -  RIF- Rifampin: S <=1 Should not be used as monotherapy    -  Tetra/Doxy: S 2    -  Trimethoprim/Sulfamethoxazole: S <=0.5/9.5    -  Vancomycin: S 2    Specimen Source: .Sputum Sputum    Culture Results:   Moderate Methicillin resistant Staphylococcus aureus  Normal Respiratory Li absent    Organism Identification: Methicillin resistant Staphylococcus aureus    Organism: Methicillin resistant Staphylococcus aureus    Method Type: OMID          < from: CT Abdomen No Cont (04.24.20 @ 12:30) >  IMPRESSION:  1.  Stomach with intervening large bowel anteriorly with small window for potential gastrostomy tube placement.  2.  Partially imaged predominantly peripheral groundglass and consolidative opacities, consistent with patient's known COVID-19 infection.  < end of copied text >      < from: CT Head No Cont (04.20.20 @ 12:44) >  IMPRESSION:  In comparison with the prior noncontrast CT scan of the brain dated April 14, 2020:  Small focus of diminished attenuation in the left side of the brainstem consistent with acute ischemic change more prominent on the current study.  New small focus of hemorrhage in the left occipital lobe.  New linear foci of hyperdensity in the occipital lobes and right posterior temporal occipital region likely representing thrombi in cortical vessels.  < end of copied text >        EKG:  < from: 12 Lead ECG (04.24.20 @ 05:32) >  Ventricular Rate 147 BPM  Atrial Rate 202 BPM  QRS Duration 92 ms  Q-T Interval 296 ms  QTC Calculation(Bezet) 463 ms  R Axis 4 degrees  T Axis 41 degrees  Diagnosis Line Atrial fibrillation with rapid ventricular response with premature ventricular  or aberrantly conducted complexes  Incomplete right bundle branch block  Nonspecific ST and T wave abnormality  Abnormal ECG  < end of copied text

## 2020-04-26 NOTE — CHART NOTE - NSCHARTNOTEFT_GEN_A_CORE
Called patient's family to update them on patient's worsening clinical status. Spoke with wife Rebecca and son. All questions answered.

## 2020-04-27 LAB
ALBUMIN SERPL ELPH-MCNC: 2.5 G/DL — LOW (ref 3.5–5.2)
ALP SERPL-CCNC: 101 U/L — SIGNIFICANT CHANGE UP (ref 30–115)
ALT FLD-CCNC: 91 U/L — HIGH (ref 0–41)
ANION GAP SERPL CALC-SCNC: 16 MMOL/L — HIGH (ref 7–14)
APTT BLD: 26.6 SEC — LOW (ref 27–39.2)
AST SERPL-CCNC: 80 U/L — HIGH (ref 0–41)
BASOPHILS # BLD AUTO: 0.03 K/UL — SIGNIFICANT CHANGE UP (ref 0–0.2)
BASOPHILS NFR BLD AUTO: 0.4 % — SIGNIFICANT CHANGE UP (ref 0–1)
BILIRUB SERPL-MCNC: 0.5 MG/DL — SIGNIFICANT CHANGE UP (ref 0.2–1.2)
BLD GP AB SCN SERPL QL: SIGNIFICANT CHANGE UP
BUN SERPL-MCNC: 120 MG/DL — CRITICAL HIGH (ref 10–20)
CALCIUM SERPL-MCNC: 8.1 MG/DL — LOW (ref 8.5–10.1)
CHLORIDE SERPL-SCNC: 99 MMOL/L — SIGNIFICANT CHANGE UP (ref 98–110)
CO2 SERPL-SCNC: 24 MMOL/L — SIGNIFICANT CHANGE UP (ref 17–32)
CREAT SERPL-MCNC: 3.5 MG/DL — HIGH (ref 0.7–1.5)
EOSINOPHIL # BLD AUTO: 0.15 K/UL — SIGNIFICANT CHANGE UP (ref 0–0.7)
EOSINOPHIL NFR BLD AUTO: 1.9 % — SIGNIFICANT CHANGE UP (ref 0–8)
GLUCOSE BLDC GLUCOMTR-MCNC: 142 MG/DL — HIGH (ref 70–99)
GLUCOSE BLDC GLUCOMTR-MCNC: 143 MG/DL — HIGH (ref 70–99)
GLUCOSE BLDC GLUCOMTR-MCNC: 158 MG/DL — HIGH (ref 70–99)
GLUCOSE SERPL-MCNC: 145 MG/DL — HIGH (ref 70–99)
HCT VFR BLD CALC: 25.1 % — LOW (ref 42–52)
HGB BLD-MCNC: 8.1 G/DL — LOW (ref 14–18)
IMM GRANULOCYTES NFR BLD AUTO: 1.8 % — HIGH (ref 0.1–0.3)
INR BLD: 1.13 RATIO — SIGNIFICANT CHANGE UP (ref 0.65–1.3)
LYMPHOCYTES # BLD AUTO: 0.67 K/UL — LOW (ref 1.2–3.4)
LYMPHOCYTES # BLD AUTO: 8.6 % — LOW (ref 20.5–51.1)
MAGNESIUM SERPL-MCNC: 2.2 MG/DL — SIGNIFICANT CHANGE UP (ref 1.8–2.4)
MCHC RBC-ENTMCNC: 30.6 PG — SIGNIFICANT CHANGE UP (ref 27–31)
MCHC RBC-ENTMCNC: 32.3 G/DL — SIGNIFICANT CHANGE UP (ref 32–37)
MCV RBC AUTO: 94.7 FL — HIGH (ref 80–94)
MONOCYTES # BLD AUTO: 0.93 K/UL — HIGH (ref 0.1–0.6)
MONOCYTES NFR BLD AUTO: 12 % — HIGH (ref 1.7–9.3)
NEUTROPHILS # BLD AUTO: 5.84 K/UL — SIGNIFICANT CHANGE UP (ref 1.4–6.5)
NEUTROPHILS NFR BLD AUTO: 75.3 % — HIGH (ref 42.2–75.2)
NRBC # BLD: 0 /100 WBCS — SIGNIFICANT CHANGE UP (ref 0–0)
PHOSPHATE SERPL-MCNC: 6.5 MG/DL — HIGH (ref 2.1–4.9)
PLATELET # BLD AUTO: 229 K/UL — SIGNIFICANT CHANGE UP (ref 130–400)
POTASSIUM SERPL-MCNC: 4.1 MMOL/L — SIGNIFICANT CHANGE UP (ref 3.5–5)
POTASSIUM SERPL-SCNC: 4.1 MMOL/L — SIGNIFICANT CHANGE UP (ref 3.5–5)
PROT SERPL-MCNC: 5.9 G/DL — LOW (ref 6–8)
PROTHROM AB SERPL-ACNC: 13 SEC — HIGH (ref 9.95–12.87)
RBC # BLD: 2.65 M/UL — LOW (ref 4.7–6.1)
RBC # FLD: 13.9 % — SIGNIFICANT CHANGE UP (ref 11.5–14.5)
SODIUM SERPL-SCNC: 139 MMOL/L — SIGNIFICANT CHANGE UP (ref 135–146)
WBC # BLD: 7.76 K/UL — SIGNIFICANT CHANGE UP (ref 4.8–10.8)
WBC # FLD AUTO: 7.76 K/UL — SIGNIFICANT CHANGE UP (ref 4.8–10.8)

## 2020-04-27 PROCEDURE — 71045 X-RAY EXAM CHEST 1 VIEW: CPT | Mod: 26

## 2020-04-27 RX ORDER — CHLORHEXIDINE GLUCONATE 213 G/1000ML
15 SOLUTION TOPICAL EVERY 12 HOURS
Refills: 0 | Status: DISCONTINUED | OUTPATIENT
Start: 2020-04-27 | End: 2020-05-05

## 2020-04-27 RX ADMIN — LEVETIRACETAM 250 MILLIGRAM(S): 250 TABLET, FILM COATED ORAL at 05:10

## 2020-04-27 RX ADMIN — LACTULOSE 20 GRAM(S): 10 SOLUTION ORAL at 00:34

## 2020-04-27 RX ADMIN — Medication 25 MILLIGRAM(S): at 21:31

## 2020-04-27 RX ADMIN — CHLORHEXIDINE GLUCONATE 15 MILLILITER(S): 213 SOLUTION TOPICAL at 05:09

## 2020-04-27 RX ADMIN — SENNA PLUS 2 TABLET(S): 8.6 TABLET ORAL at 21:31

## 2020-04-27 RX ADMIN — MIDAZOLAM HYDROCHLORIDE 2 MILLIGRAM(S): 1 INJECTION, SOLUTION INTRAMUSCULAR; INTRAVENOUS at 05:58

## 2020-04-27 RX ADMIN — SODIUM ZIRCONIUM CYCLOSILICATE 10 GRAM(S): 10 POWDER, FOR SUSPENSION ORAL at 05:08

## 2020-04-27 RX ADMIN — HEPARIN SODIUM 5000 UNIT(S): 5000 INJECTION INTRAVENOUS; SUBCUTANEOUS at 21:31

## 2020-04-27 RX ADMIN — Medication 2: at 06:48

## 2020-04-27 RX ADMIN — Medication 60 MILLIGRAM(S): at 21:31

## 2020-04-27 RX ADMIN — HEPARIN SODIUM 5000 UNIT(S): 5000 INJECTION INTRAVENOUS; SUBCUTANEOUS at 05:11

## 2020-04-27 RX ADMIN — CHLORHEXIDINE GLUCONATE 1 APPLICATION(S): 213 SOLUTION TOPICAL at 05:07

## 2020-04-27 RX ADMIN — SODIUM ZIRCONIUM CYCLOSILICATE 10 GRAM(S): 10 POWDER, FOR SUSPENSION ORAL at 21:32

## 2020-04-27 RX ADMIN — Medication 1 APPLICATION(S): at 05:11

## 2020-04-27 RX ADMIN — SODIUM ZIRCONIUM CYCLOSILICATE 10 GRAM(S): 10 POWDER, FOR SUSPENSION ORAL at 13:04

## 2020-04-27 RX ADMIN — PANTOPRAZOLE SODIUM 40 MILLIGRAM(S): 20 TABLET, DELAYED RELEASE ORAL at 12:50

## 2020-04-27 RX ADMIN — MUPIROCIN 1 APPLICATION(S): 20 OINTMENT TOPICAL at 05:31

## 2020-04-27 RX ADMIN — MUPIROCIN 1 APPLICATION(S): 20 OINTMENT TOPICAL at 17:43

## 2020-04-27 RX ADMIN — Medication 25 MILLIGRAM(S): at 05:10

## 2020-04-27 RX ADMIN — INSULIN GLARGINE 5 UNIT(S): 100 INJECTION, SOLUTION SUBCUTANEOUS at 21:32

## 2020-04-27 RX ADMIN — Medication 25 MILLIGRAM(S): at 00:34

## 2020-04-27 RX ADMIN — Medication 60 MILLIGRAM(S): at 17:45

## 2020-04-27 RX ADMIN — LACTULOSE 20 GRAM(S): 10 SOLUTION ORAL at 05:09

## 2020-04-27 RX ADMIN — CHLORHEXIDINE GLUCONATE 15 MILLILITER(S): 213 SOLUTION TOPICAL at 17:40

## 2020-04-27 RX ADMIN — HEPARIN SODIUM 5000 UNIT(S): 5000 INJECTION INTRAVENOUS; SUBCUTANEOUS at 13:03

## 2020-04-27 RX ADMIN — LEVETIRACETAM 250 MILLIGRAM(S): 250 TABLET, FILM COATED ORAL at 17:43

## 2020-04-27 RX ADMIN — Medication 1 APPLICATION(S): at 17:41

## 2020-04-27 RX ADMIN — Medication 25 MILLIGRAM(S): at 13:03

## 2020-04-27 RX ADMIN — Medication 60 MILLIGRAM(S): at 03:24

## 2020-04-27 RX ADMIN — Medication 60 MILLIGRAM(S): at 12:50

## 2020-04-27 RX ADMIN — CHLORHEXIDINE GLUCONATE 15 MILLILITER(S): 213 SOLUTION TOPICAL at 17:41

## 2020-04-27 RX ADMIN — Medication 1334 MILLIGRAM(S): at 17:41

## 2020-04-27 NOTE — PROGRESS NOTE ADULT - SUBJECTIVE AND OBJECTIVE BOX
Nephrology progress note    THIS IS AN INCOMPLETE NOTE . FULL NOTE TO FOLLOW SHORTLY    Patient is seen and examined, events over the last 24 h noted .    Allergies:  Bactrim (Unknown)    Hospital Medications:   MEDICATIONS  (STANDING):  ALBUTerol    0.083% 10 milliGRAM(s) Nebulizer once  BACItracin   Ointment 1 Application(s) Topical two times a day  calcium acetate 1334 milliGRAM(s) Oral three times a day with meals  chlorhexidine 0.12% Liquid 15 milliLiter(s) Oral Mucosa two times a day  chlorhexidine 0.12% Liquid 15 milliLiter(s) Oral Mucosa every 12 hours  chlorhexidine 4% Liquid 1 Application(s) Topical <User Schedule>  dextrose 5%. 1000 milliLiter(s) (100 mL/Hr) IV Continuous <Continuous>  dextrose 50% Injectable 25 Gram(s) IV Push once  diltiazem    Tablet 60 milliGRAM(s) Oral every 6 hours  heparin  Injectable 5000 Unit(s) SubCutaneous every 8 hours  insulin glargine Injectable (LANTUS) 5 Unit(s) SubCutaneous at bedtime  insulin lispro (HumaLOG) corrective regimen sliding scale   SubCutaneous three times a day before meals  lactulose Syrup 20 Gram(s) Oral every 8 hours  levETIRAcetam 250 milliGRAM(s) Oral two times a day  metoprolol tartrate 25 milliGRAM(s) Oral every 8 hours  mupirocin 2% Ointment 1 Application(s) Topical every 12 hours  pantoprazole   Suspension 40 milliGRAM(s) Oral daily  senna 2 Tablet(s) Oral at bedtime  sodium zirconium cyclosilicate 10 Gram(s) Oral every 8 hours        VITALS:  T(F): 99.3 (04-27-20 @ 08:50), Max: 99.3 (04-27-20 @ 08:50)  HR: 88 (04-27-20 @ 09:03)  BP: 150/67 (04-27-20 @ 08:50)  RR: 20 (04-27-20 @ 08:50)  SpO2: 98% (04-27-20 @ 09:03)  Wt(kg): --    04-25 @ 07:01  -  04-26 @ 07:00  --------------------------------------------------------  IN: 1280 mL / OUT: 1100 mL / NET: 180 mL    04-26 @ 07:01  -  04-27 @ 07:00  --------------------------------------------------------  IN: 1880 mL / OUT: 1200 mL / NET: 680 mL          PHYSICAL EXAM:  Constitutional: NAD  HEENT: anicteric sclera, oropharynx clear, MMM  Neck: No JVD  Respiratory: CTAB, no wheezes, rales or rhonchi  Cardiovascular: S1, S2, RRR  Gastrointestinal: BS+, soft, NT/ND  Extremities: No cyanosis or clubbing. No peripheral edema  :  No santana.   Skin: No rashes    LABS:  04-26    144  |  102  |  121<HH>  ----------------------------<  167<H>  4.6   |  27  |  3.7<H>    Ca    8.6      26 Apr 2020 18:55  Phos  7.2     04-26  Mg     2.4     04-26    TPro  5.9<L>  /  Alb  2.7<L>  /  TBili  0.5  /  DBili      /  AST  50<H>  /  ALT  76<H>  /  AlkPhos  105  04-26                          8.1    7.76  )-----------( 229      ( 27 Apr 2020 07:23 )             25.1       Urine Studies:      RADIOLOGY & ADDITIONAL STUDIES: Nephrology progress note    Patient is seen and examined, events over the last 24 h noted .  on MV  no major changes    Allergies:  Bactrim (Unknown)    Hospital Medications:   MEDICATIONS  (STANDING):  ALBUTerol    0.083% 10 milliGRAM(s) Nebulizer once  BACItracin   Ointment 1 Application(s) Topical two times a day  calcium acetate 1334 milliGRAM(s) Oral three times a day with meals  dextrose 5%. 1000 milliLiter(s) (100 mL/Hr) IV Continuous <Continuous>  dextrose 50% Injectable 25 Gram(s) IV Push once  diltiazem    Tablet 60 milliGRAM(s) Oral every 6 hours  heparin  Injectable 5000 Unit(s) SubCutaneous every 8 hours  insulin glargine Injectable (LANTUS) 5 Unit(s) SubCutaneous at bedtime  insulin lispro (HumaLOG) corrective regimen sliding scale   SubCutaneous three times a day before meals  lactulose Syrup 20 Gram(s) Oral every 8 hours  levETIRAcetam 250 milliGRAM(s) Oral two times a day  metoprolol tartrate 25 milliGRAM(s) Oral every 8 hours  mupirocin 2% Ointment 1 Application(s) Topical every 12 hours  pantoprazole   Suspension 40 milliGRAM(s) Oral daily  senna 2 Tablet(s) Oral at bedtime  sodium zirconium cyclosilicate 10 Gram(s) Oral every 8 hours        VITALS:  T(F): 99.3 (04-27-20 @ 08:50), Max: 99.3 (04-27-20 @ 08:50)  HR: 88 (04-27-20 @ 09:03)  BP: 150/67 (04-27-20 @ 08:50)  RR: 20 (04-27-20 @ 08:50)  SpO2: 98% (04-27-20 @ 09:03)  Wt(kg): --    04-25 @ 07:01  -  04-26 @ 07:00  --------------------------------------------------------  IN: 1280 mL / OUT: 1100 mL / NET: 180 mL    04-26 @ 07:01  -  04-27 @ 07:00  --------------------------------------------------------  IN: 1880 mL / OUT: 1200 mL / NET: 680 mL          PHYSICAL EXAM:  Constitutional: on MV trached   HEENT: anicteric sclera, oropharynx clear, MMM  Respiratory: Crackles at base  Cardiovascular: S1, S2, RRR  Gastrointestinal: BS+, soft, NT/ND  Extremities: No cyanosis or clubbing. No peripheral edema  :  No santana.   Skin: No rashes    LABS:  04-27    139  |  99  |  120<HH>  ----------------------------<  145<H>  4.1   |  24  |  3.5<H>    Ca    8.1<L>      27 Apr 2020 07:23  Phos  6.5     04-27  Mg     2.2     04-27    TPro  5.9<L>  /  Alb  2.5<L>  /  TBili  0.5  /  DBili  x   /  AST  80<H>  /  ALT  91<H>  /  AlkPhos  101  04-27    04-26    144  |  102  |  121<HH>  ----------------------------<  167<H>  4.6   |  27  |  3.7<H>    Ca    8.6      26 Apr 2020 18:55  Phos  7.2     04-26  Mg     2.4     04-26    TPro  5.9<L>  /  Alb  2.7<L>  /  TBili  0.5  /  DBili      /  AST  50<H>  /  ALT  76<H>  /  AlkPhos  105  04-26                          8.1    7.76  )-----------( 229      ( 27 Apr 2020 07:23 )             25.1       Urine Studies:      RADIOLOGY & ADDITIONAL STUDIES:

## 2020-04-27 NOTE — CHART NOTE - NSCHARTNOTEFT_GEN_A_CORE
Registered Dietitian Follow-Up     Patient Profile Reviewed                           Yes [x]   No []     Nutrition History Previously Obtained        Yes []  No [x]       Pertinent Subjective Information: Pt. switched to Nepro, hyperkalemic up until this morning. Now off dialysis with plans to possibly resume this week. Agree with Nepro at this time. Plans for PEG.      Pertinent Medical Interventions: Acute respiratory failure 2/2 COVID-19 repeat swab 4/13 and 4/17 negative. Stroke. S/p Trach 4/18. IR Consult, PEG today. Nephro: on/off HD.      Diet order: Nepro @240ml q6h      Anthropometrics:  - Ht. 175cm  - Wt. 86.9kg on 4/22 vs. (4/19): 89kg vs. (4/17): 91.9kg vs. (4/13): 95.4kg noted downtrending, likely d/t fluid shifts as edema improved (previously 4+)   - %wt change  - BMI  28.4 using lowest doc weight   - IBW 160lbs      Pertinent Lab Data: (4/27) RBC 2.65, Hg 8.1, Hct 25.1, , glu 167, AST 50, ALT 76, eGFR 15, Phos 7.2     Pertinent Meds: Heparin, Glargine, Lispro, Versed, lactulose, Protonix, Phoslo, Senna, D5 @100ml/h (408kcal)      Physical Findings:  - Appearance: trached, 1+ L, R arm edema  - GI function: no symptoms noted, last BM 4/26  - Tubes: NGT  - Oral/Mouth cavity: NPO  - Skin: pressure ulcer unstageable to sacrum      Nutrition Requirements (from RD note on 4/23)   Weight Used:  88.7kg, ideal 73kg     Estimated Energy Needs    Continue [x]  Adjust [] 3392-5394 kcal/day (MSJ x 1.2-1.4 AF)--increased needs d/t HD, however will aim towards lower end of range as HD is not consistent, will readjust prn  Adjusted Energy Recommendations:   kcal/day        Estimated Protein Needs    Continue [x]  Adjust []   gm/day (1.2-1.5 gm/kg CBW)--same as above, stage 4 pressure injury considered   Adjusted Protein Recommendations:   gm/day        Estimated Fluid Needs        Continue [x]  Adjust [] per LIP   Adjusted Fluid Recommendations:   mL/day     Nutrient Intake: current TF regimen provides 1728kcal, 76g protein, 701ml free H2O. Tf and current D5 rate provide 2136kcal (100% est calorie needs, 85% est protein needs)         [] Previous Nutrition Diagnosis: no previous nutrition dx            [x] No active nutrition diagnosis identified at this time     However, will continue to follow pt. until long term feeding plan established         Nutrition Intervention: enteral and parenteral nutrition     Rec: Continue current TF regimen: Nepro @240ml q6h as tolerated. Maintain all aspiration precautions.      Goal/Expected Outcome: In 3 days TF to provide >85% est energy needs, but not exceed 105%     Indicator/Monitoring: energy intake, body composition, NFPF, electrolyte/renal profile, glucose profile, GOC

## 2020-04-27 NOTE — PROGRESS NOTE ADULT - ASSESSMENT
74 year old male w/ hx of Afib on Eliquis, CAD s/p PCI, DM, BPH, presents with 1 week of fever, cough, and progressive SOB.    IMPRESSION:  Acute Renal Failure  Hyperkalemia, worsening Uremia  Right gastrocnemius & peroneal DVT  Hypernatremia, improved  Acute respiratory failure secondary to COVID-19 repeat swab 4/13 and 4/17 negative, s/p Trach 4/18  Multiple ischemic CVAs: Left brainstem small focus, bilateral occipital lobes, posterior temporal occipital  Left occipital lobe hemorrhagic CVA  MRSA PNA + 4/15      PLAN:    CNS: Versed PRN. Continue Keppra bid.     HEENT: Oral care. Trach Care. Frequent suctioning.    PULMONARY: HOB @ 45 degrees. Keep POx > 92%. No vent changes    CARDIOVASCULAR: Continue Metoprolol and Cardizem PO.    GI: PPI ppx. Pending PEG today with IR     RENAL: Correct as needed. Nephrology recommendations appreciated     INFECTIOUS DISEASE: Continue meropenum.    HEMATOLOGICAL: Unable to fully anticoagulate due to hemorrhagic CVA, resume Heparin 5000 sq tid. F/u official read of VDUS b/l lower extremities. Repeat VDUS in 3 days. 1 unit PRBC pre IR procedure    ENDOCRINE:  Follow up FS. Insulin protocol if needed.    MUSCULOSKELETAL: bed rest. Wound care.    Full code    Poor prognosis     Spoke to Wife, updated on poor prognosis and all questions answered. Wife would like to continue aggressive measures

## 2020-04-27 NOTE — PROGRESS NOTE ADULT - SUBJECTIVE AND OBJECTIVE BOX
Patient Age: 74y    Patient Gender:  Male    Procedure (including site / side if known):  Radiologically Inserted Gastrostomy (RIG)    Diagnosis / Indication:  Altered mental status    Interventional Radiology Attending Physician:  Dr. Lau    Ordering Attending Physician:  Dr. Urbina    Pertinent Medical History:  Covid-positiv; post CVA    PAST MEDICAL & SURGICAL HISTORY:  Afib  DM (diabetes mellitus)  BPH (benign prostatic hyperplasia)  CAD (coronary artery disease)      Allergies: Bactrim (Unknown)      Pertinent Labs:                        8.1    7.76  )-----------( 229      ( 27 Apr 2020 07:23 )             25.1       04-27    139  |  99  |  120<HH>  ----------------------------<  145<H>  4.1   |  24  |  3.5<H>    Ca    8.1<L>      27 Apr 2020 07:23  Phos  6.5     04-27  Mg     2.2     04-27    TPro  5.9<L>  /  Alb  2.5<L>  /  TBili  0.5  /  DBili  x   /  AST  80<H>  /  ALT  91<H>  /  AlkPhos  101  04-27    PT/INR - ( 27 Apr 2020 07:23 )   PT: 13.00 sec;   INR: 1.13 ratio    PTT - ( 27 Apr 2020 07:23 )  PTT: 26.6 sec    Consentable:  No    NPO:  Yes    Code Status:  Full Code     Patient and Family aware:   Family aware (wife Rebecca)    Assessment:  The gastrostomy catheter came in today.  Downtrending hemoglobin, 8.1 from 8.9, managed with transfusion of pRBC's (as d/w medicine resident, Sandie #[phone]).  Consent just obtained late this afternoon from the patient's wife, who was previously not reachable.  The procedure will be performed with anaesthesia assisting, scheduled for tomorrow morning.  Risks, benefits, and alternatives to treatment discussed. All questions answered with understanding.      Please maintain NG tube and keep patient NPO after midnight tonight.     Please call [phone] with any further questions.

## 2020-04-27 NOTE — PROGRESS NOTE ADULT - ASSESSMENT
LIZZIE/ ATN/ hyperkalemia/ respiratory failure / covid positive / high BUN    # off  diuretic   # non-oliguric  # ph  improving  feed nepro, on binders   # K and Creat trending down / feed nepro/ no need for RRT  # BUN noted : highly catabolic and prerenal, off  diuretics, improving slowly   # sodium level noted, continue D5w at 100 cc per hour   # will follow / follow BMP  # feeding tube today     # overall prognosis poor

## 2020-04-27 NOTE — PROGRESS NOTE ADULT - SUBJECTIVE AND OBJECTIVE BOX
Interventional Radiology Follow- Up Note    75 yo M w/ hx of Afib on Eliquis, CAD s/p PCI, DM, BPH, presents with 1 week of fever, cough, and progressive SOB. Admits to watery diarrhea for 3 days. Denies chest pain. 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 He has exposure to COVID positive family member at the wedding. No travel hx. Pt pulse ox in 80s in the field per EMS. Of note, per patient he had recent normal stress test recently.    No complaints offered.    Vitals: T(F): 99.3 (04-27-20 @ 08:50), Max: 99.3 (04-27-20 @ 08:50)  HR: 88 (04-27-20 @ 09:03) (69 - 117)  BP: 150/67 (04-27-20 @ 08:50) (110/60 - 159/94)  RR: 20 (04-27-20 @ 08:50) (19 - 20)  SpO2: 98% (04-27-20 @ 09:03) (97% - 100%)  Wt(kg): --    LABS:                        8.1    7.76  )-----------( 229      ( 27 Apr 2020 07:23 )             25.1     04-27    139  |  99  |  120<HH>  ----------------------------<  145<H>  4.1   |  24  |  3.5<H>    Ca    8.1<L>      27 Apr 2020 07:23  Phos  6.5     04-27  Mg     2.2     04-27    TPro  5.9<L>  /  Alb  2.5<L>  /  TBili  0.5  /  DBili  x   /  AST  80<H>  /  ALT  91<H>  /  AlkPhos  101  04-27    PT/INR - ( 27 Apr 2020 07:23 )   PT: 13.00 sec;   INR: 1.13 ratio         PTT - ( 27 Apr 2020 07:23 )  PTT:26.6 sec    Impression: 74y Male admitted with ACUTE HYPOXEMIC RESPIRATORY FAILURE,PULMONARY INFILTRATES ON CXR      Plan:   75 yo M requires enteral feeding.   Plan for Radiologically inserted gastrostomy today, likely this afternoon given:    Patient receiving transfusion Hg 8.1 (8.9), and awaiting arrival of catheter/gastrostomy (shipment due today)  Keep Pt NPO.          Please call Interventional Radiology [phone] with any questions, concerns, or issues regarding above. Interventional Radiology Follow- Up Note    75 yo M w/ hx of Afib on Eliquis, CAD s/p PCI, DM, BPH, presents with 1 week of fever, cough, and progressive SOB. Admits to watery diarrhea for 3 days. Denies chest pain. 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 He has exposure to COVID positive family member at the wedding. No travel hx. Pt pulse ox in 80s in the field per EMS. Of note, per patient he had recent normal stress test recently.    No complaints offered.    Vitals: T(F): 99.3 (04-27-20 @ 08:50), Max: 99.3 (04-27-20 @ 08:50)  HR: 88 (04-27-20 @ 09:03) (69 - 117)  BP: 150/67 (04-27-20 @ 08:50) (110/60 - 159/94)  RR: 20 (04-27-20 @ 08:50) (19 - 20)  SpO2: 98% (04-27-20 @ 09:03) (97% - 100%)  Wt(kg): --    LABS:                        8.1    7.76  )-----------( 229      ( 27 Apr 2020 07:23 )             25.1     04-27    139  |  99  |  120<HH>  ----------------------------<  145<H>  4.1   |  24  |  3.5<H>    Ca    8.1<L>      27 Apr 2020 07:23  Phos  6.5     04-27  Mg     2.2     04-27    TPro  5.9<L>  /  Alb  2.5<L>  /  TBili  0.5  /  DBili  x   /  AST  80<H>  /  ALT  91<H>  /  AlkPhos  101  04-27    PT/INR - ( 27 Apr 2020 07:23 )   PT: 13.00 sec;   INR: 1.13 ratio         PTT - ( 27 Apr 2020 07:23 )  PTT:26.6 sec    Impression: 74y Male admitted with ACUTE HYPOXEMIC RESPIRATORY FAILURE,PULMONARY INFILTRATES ON CXR      Plan:   75 yo M requires enteral feeding.   Plan for Radiologically inserted gastrostomy today, likely this afternoon given:    Patient receiving transfusion Hg 8.1 (8.9), and awaiting arrival of catheter/gastrostomy (shipment due today).   Pending consent-- wife, Rebecca unreachable, as yet.  Keep Pt NPO.          Please call Interventional Radiology [phone] with any questions, concerns, or issues regarding above.

## 2020-04-27 NOTE — PROGRESS NOTE ADULT - SUBJECTIVE AND OBJECTIVE BOX
KONSTANTIN WYATT  74y  Male    Patient is a 74y old  Male who presents with a chief complaint of suspected COVID-19 (27 Apr 2020 10:53)      SUBJECTIVE:    No overnight events    Vent: yes  Sedation: no  Pressors: no  Bowel Movements:      PAST MEDICAL & SURGICAL HISTORY:  Afib  DM (diabetes mellitus)  BPH (benign prostatic hyperplasia)  CAD (coronary artery disease)      OBJECTIVE:    Vital Signs Last 24 Hrs  T(C): 35.8 (27 Apr 2020 12:15), Max: 37.4 (27 Apr 2020 08:50)  T(F): 96.5 (27 Apr 2020 12:15), Max: 99.3 (27 Apr 2020 08:50)  HR: 98 (27 Apr 2020 12:15) (69 - 117)  BP: 134/75 (27 Apr 2020 12:15) (110/60 - 159/94)  BP(mean): --  RR: 18 (27 Apr 2020 12:15) (18 - 20)  SpO2: 98% (27 Apr 2020 12:15) (97% - 100%)    General: No apparent distress  HEENT: + trach               Neck: No JVD, No Cervical LN    Lungs: Bilateral BS, CTA  Cardiovascular: +S1 S2  Abdomen: Soft, nontender  Extremities: Pulses intact  Skin: dti sacrum   Neurological: non Focal     I&O's Summary    26 Apr 2020 07:01  -  27 Apr 2020 07:00  --------------------------------------------------------  IN: 1880 mL / OUT: 1200 mL / NET: 680 mL          LABS:                          8.1    7.76  )-----------( 229      ( 27 Apr 2020 07:23 )             25.1                                               04-27    139  |  99  |  120<HH>  ----------------------------<  145<H>  4.1   |  24  |  3.5<H>    Ca    8.1<L>      27 Apr 2020 07:23  Phos  6.5     04-27  Mg     2.2     04-27    TPro  5.9<L>  /  Alb  2.5<L>  /  TBili  0.5  /  DBili  x   /  AST  80<H>  /  ALT  91<H>  /  AlkPhos  101  04-27      PT/INR - ( 27 Apr 2020 07:23 )   PT: 13.00 sec;   INR: 1.13 ratio         PTT - ( 27 Apr 2020 07:23 )  PTT:26.6 sec                                                                                     LIVER FUNCTIONS - ( 27 Apr 2020 07:23 )  Alb: 2.5 g/dL / Pro: 5.9 g/dL / ALK PHOS: 101 U/L / ALT: 91 U/L / AST: 80 U/L / GGT: x                                                                                               Mode: AC/ CMV (Assist Control/ Continuous Mandatory Ventilation)  RR (machine): 20  TV (machine): 500  FiO2: 40  PEEP: 5  ITime: 1  MAP: 12  PIP: 18                                      ABG - ( 26 Apr 2020 11:57 )  pH, Arterial: 7.47  pH, Blood: x     /  pCO2: 37    /  pO2: 147   / HCO3: 27    / Base Excess: 3.3   /  SaO2: 99                    MEDICATIONS  (STANDING):  ALBUTerol    0.083% 10 milliGRAM(s) Nebulizer once  BACItracin   Ointment 1 Application(s) Topical two times a day  calcium acetate 1334 milliGRAM(s) Oral three times a day with meals  chlorhexidine 0.12% Liquid 15 milliLiter(s) Oral Mucosa two times a day  chlorhexidine 0.12% Liquid 15 milliLiter(s) Oral Mucosa every 12 hours  chlorhexidine 4% Liquid 1 Application(s) Topical <User Schedule>  dextrose 5%. 1000 milliLiter(s) (100 mL/Hr) IV Continuous <Continuous>  dextrose 50% Injectable 25 Gram(s) IV Push once  diltiazem    Tablet 60 milliGRAM(s) Oral every 6 hours  heparin  Injectable 5000 Unit(s) SubCutaneous every 8 hours  insulin glargine Injectable (LANTUS) 5 Unit(s) SubCutaneous at bedtime  insulin lispro (HumaLOG) corrective regimen sliding scale   SubCutaneous three times a day before meals  lactulose Syrup 20 Gram(s) Oral every 8 hours  levETIRAcetam 250 milliGRAM(s) Oral two times a day  metoprolol tartrate 25 milliGRAM(s) Oral every 8 hours  mupirocin 2% Ointment 1 Application(s) Topical every 12 hours  pantoprazole   Suspension 40 milliGRAM(s) Oral daily  senna 2 Tablet(s) Oral at bedtime  sodium zirconium cyclosilicate 10 Gram(s) Oral every 8 hours    MEDICATIONS  (PRN):  acetaminophen  Suppository .. 650 milliGRAM(s) Rectal every 6 hours PRN Temp greater or equal to 38.5C (101.3F)  midazolam Injectable 2 milliGRAM(s) IV Push every 4 hours PRN agitation

## 2020-04-28 LAB
ALBUMIN SERPL ELPH-MCNC: 2.5 G/DL — LOW (ref 3.5–5.2)
ALP SERPL-CCNC: 99 U/L — SIGNIFICANT CHANGE UP (ref 30–115)
ALT FLD-CCNC: 81 U/L — HIGH (ref 0–41)
ANION GAP SERPL CALC-SCNC: 18 MMOL/L — HIGH (ref 7–14)
AST SERPL-CCNC: 66 U/L — HIGH (ref 0–41)
BILIRUB SERPL-MCNC: 0.6 MG/DL — SIGNIFICANT CHANGE UP (ref 0.2–1.2)
BUN SERPL-MCNC: 109 MG/DL — CRITICAL HIGH (ref 10–20)
CALCIUM SERPL-MCNC: 8.1 MG/DL — LOW (ref 8.5–10.1)
CHLORIDE SERPL-SCNC: 99 MMOL/L — SIGNIFICANT CHANGE UP (ref 98–110)
CO2 SERPL-SCNC: 23 MMOL/L — SIGNIFICANT CHANGE UP (ref 17–32)
CREAT SERPL-MCNC: 2.9 MG/DL — HIGH (ref 0.7–1.5)
GLUCOSE BLDC GLUCOMTR-MCNC: 125 MG/DL — HIGH (ref 70–99)
GLUCOSE BLDC GLUCOMTR-MCNC: 151 MG/DL — HIGH (ref 70–99)
GLUCOSE BLDC GLUCOMTR-MCNC: 179 MG/DL — HIGH (ref 70–99)
GLUCOSE BLDC GLUCOMTR-MCNC: 181 MG/DL — HIGH (ref 70–99)
GLUCOSE SERPL-MCNC: 149 MG/DL — HIGH (ref 70–99)
HCT VFR BLD CALC: 27.5 % — LOW (ref 42–52)
HGB BLD-MCNC: 9.1 G/DL — LOW (ref 14–18)
MAGNESIUM SERPL-MCNC: 2 MG/DL — SIGNIFICANT CHANGE UP (ref 1.8–2.4)
MCHC RBC-ENTMCNC: 30 PG — SIGNIFICANT CHANGE UP (ref 27–31)
MCHC RBC-ENTMCNC: 33.1 G/DL — SIGNIFICANT CHANGE UP (ref 32–37)
MCV RBC AUTO: 90.8 FL — SIGNIFICANT CHANGE UP (ref 80–94)
NRBC # BLD: 0 /100 WBCS — SIGNIFICANT CHANGE UP (ref 0–0)
PHOSPHATE SERPL-MCNC: 5.4 MG/DL — HIGH (ref 2.1–4.9)
PLATELET # BLD AUTO: 233 K/UL — SIGNIFICANT CHANGE UP (ref 130–400)
POTASSIUM SERPL-MCNC: 3.1 MMOL/L — LOW (ref 3.5–5)
POTASSIUM SERPL-SCNC: 3.1 MMOL/L — LOW (ref 3.5–5)
PROT SERPL-MCNC: 5.6 G/DL — LOW (ref 6–8)
RBC # BLD: 3.03 M/UL — LOW (ref 4.7–6.1)
RBC # FLD: 13.7 % — SIGNIFICANT CHANGE UP (ref 11.5–14.5)
SODIUM SERPL-SCNC: 140 MMOL/L — SIGNIFICANT CHANGE UP (ref 135–146)
WBC # BLD: 8.62 K/UL — SIGNIFICANT CHANGE UP (ref 4.8–10.8)
WBC # FLD AUTO: 8.62 K/UL — SIGNIFICANT CHANGE UP (ref 4.8–10.8)

## 2020-04-28 PROCEDURE — 37244 VASC EMBOLIZE/OCCLUDE BLEED: CPT

## 2020-04-28 PROCEDURE — 70544 MR ANGIOGRAPHY HEAD W/O DYE: CPT | Mod: 26

## 2020-04-28 PROCEDURE — 76937 US GUIDE VASCULAR ACCESS: CPT | Mod: 26

## 2020-04-28 PROCEDURE — 36247 INS CATH ABD/L-EXT ART 3RD: CPT

## 2020-04-28 PROCEDURE — 74174 CTA ABD&PLVS W/CONTRAST: CPT | Mod: 26

## 2020-04-28 PROCEDURE — 49440 PLACE GASTROSTOMY TUBE PERC: CPT | Mod: CS

## 2020-04-28 RX ORDER — DILTIAZEM HCL 120 MG
5 CAPSULE, EXT RELEASE 24 HR ORAL
Qty: 125 | Refills: 0 | Status: DISCONTINUED | OUTPATIENT
Start: 2020-04-28 | End: 2020-04-29

## 2020-04-28 RX ORDER — METOPROLOL TARTRATE 50 MG
2.5 TABLET ORAL EVERY 6 HOURS
Refills: 0 | Status: DISCONTINUED | OUTPATIENT
Start: 2020-04-28 | End: 2020-04-29

## 2020-04-28 RX ADMIN — Medication 60 MILLIGRAM(S): at 06:25

## 2020-04-28 RX ADMIN — Medication 60 MILLIGRAM(S): at 11:58

## 2020-04-28 RX ADMIN — Medication 2.5 MILLIGRAM(S): at 22:12

## 2020-04-28 RX ADMIN — CHLORHEXIDINE GLUCONATE 1 APPLICATION(S): 213 SOLUTION TOPICAL at 06:24

## 2020-04-28 RX ADMIN — Medication 1 APPLICATION(S): at 06:27

## 2020-04-28 RX ADMIN — MUPIROCIN 1 APPLICATION(S): 20 OINTMENT TOPICAL at 06:53

## 2020-04-28 RX ADMIN — Medication 25 MILLIGRAM(S): at 06:25

## 2020-04-28 RX ADMIN — SODIUM ZIRCONIUM CYCLOSILICATE 10 GRAM(S): 10 POWDER, FOR SUSPENSION ORAL at 06:27

## 2020-04-28 RX ADMIN — HEPARIN SODIUM 5000 UNIT(S): 5000 INJECTION INTRAVENOUS; SUBCUTANEOUS at 06:26

## 2020-04-28 RX ADMIN — CHLORHEXIDINE GLUCONATE 15 MILLILITER(S): 213 SOLUTION TOPICAL at 06:24

## 2020-04-28 RX ADMIN — INSULIN GLARGINE 5 UNIT(S): 100 INJECTION, SOLUTION SUBCUTANEOUS at 22:51

## 2020-04-28 RX ADMIN — LEVETIRACETAM 250 MILLIGRAM(S): 250 TABLET, FILM COATED ORAL at 06:25

## 2020-04-28 NOTE — CHART NOTE - NSCHARTNOTEFT_GEN_A_CORE
Rapid response was called around 5PM.  Patient had a hypotension episode soon after IR guided PEG tube placement.   Bleeding noted at the site.   Code fusion called.   Dr. Lau from IR took the patient for urgent angiogram.   Gastro-epiploic artery found to be bleeding. Embolized successfully.   f/u Angiogram shows no further bleed  During this while patient is s/p 3 units blood transfusion.  SBP around 150.  HR still tachy@ around 110s.   Mental status is at baseline   Vented. no respiratory distress    Case discussed with Dr. Urbina & Dr Wheeler.  Dr. Wheeler approves ICU transfer for further monitoring    f/u CBC ordered for 10PM (transfusions ended 8PM). Rapid response was called around 5PM.  Patient had a hypotension episode soon after IR guided PEG tube placement.   Bleeding noted at the site.   Code fusion called.   Dr. Lau from IR took the patient for urgent angiogram.   Gastro-epiploic artery found to be bleeding. Embolized successfully.   f/u Angiogram shows no further bleed  During this while patient is s/p 3 units blood transfusion.  SBP around 150.  HR still tachy@ around 110s.   Mental status is at baseline   Vented. no respiratory distress    Case discussed with Dr. Urbina & Dr Wheeler.  Dr. Wheeler approves ICU transfer for further monitoring    f/u CBC ordered for 11:30PM (transfusions ended 8PM).

## 2020-04-28 NOTE — PROGRESS NOTE ADULT - ASSESSMENT
LIZZIE/ ATN/ hyperkalemia/ respiratory failure / covid positive / high BUN    # off  diuretic   # non-oliguric  # ph  improving  feed nepro, DC lokelma   # K and Creat trending down / feed nepro/ no need for RRT  # BUN noted : highly catabolic and prerenal, off  diuretics, improving slowly   # sodium level noted, continue D5w at 75 cc per hour   # will follow / follow BMP      # overall prognosis poor

## 2020-04-28 NOTE — CHART NOTE - NSCHARTNOTEFT_GEN_A_CORE
I spoke with pharmacy regarding PO to IV dose conversions for his Cardizem and Metoprolol as he is strict NPO s/p IR procedure for GIB. Metoprolol tartrate 25mg q8 PO converts to IV 2.5mg q6 with holding parameters and Cardizem 60mg q6 PO is recommended to be administered as a continuous infusion at a rate of 5 mL/hr (5mg/hr). Conversion back to PO may be made once the patient can safely tolerate PO.

## 2020-04-28 NOTE — PROGRESS NOTE ADULT - SUBJECTIVE AND OBJECTIVE BOX
Nephrology progress note    THIS IS AN INCOMPLETE NOTE . FULL NOTE TO FOLLOW SHORTLY    Patient is seen and examined, events over the last 24 h noted .    Allergies:  Bactrim (Unknown)    Hospital Medications:   MEDICATIONS  (STANDING):  BACItracin   Ointment 1 Application(s) Topical two times a day  calcium acetate 1334 milliGRAM(s) Oral three times a day with meals  chlorhexidine 0.12% Liquid 15 milliLiter(s) Oral Mucosa two times a day  chlorhexidine 0.12% Liquid 15 milliLiter(s) Oral Mucosa every 12 hours  chlorhexidine 4% Liquid 1 Application(s) Topical <User Schedule>  dextrose 5%. 1000 milliLiter(s) (100 mL/Hr) IV Continuous <Continuous>  dextrose 50% Injectable 25 Gram(s) IV Push once  diltiazem    Tablet 60 milliGRAM(s) Oral every 6 hours  heparin  Injectable 5000 Unit(s) SubCutaneous every 8 hours  insulin glargine Injectable (LANTUS) 5 Unit(s) SubCutaneous at bedtime  insulin lispro (HumaLOG) corrective regimen sliding scale   SubCutaneous three times a day before meals  lactulose Syrup 20 Gram(s) Oral every 8 hours  levETIRAcetam 250 milliGRAM(s) Oral two times a day  metoprolol tartrate 25 milliGRAM(s) Oral every 8 hours  mupirocin 2% Ointment 1 Application(s) Topical every 12 hours  pantoprazole   Suspension 40 milliGRAM(s) Oral daily  senna 2 Tablet(s) Oral at bedtime  sodium zirconium cyclosilicate 10 Gram(s) Oral every 8 hours        VITALS:  T(F): 99 (04-28-20 @ 05:24), Max: 99 (04-28-20 @ 05:24)  HR: 109 (04-28-20 @ 05:24)  BP: 127/75 (04-28-20 @ 05:24)  RR: 18 (04-28-20 @ 05:24)  SpO2: 99% (04-28-20 @ 05:24)  Wt(kg): --    04-26 @ 07:01  -  04-27 @ 07:00  --------------------------------------------------------  IN: 1880 mL / OUT: 1200 mL / NET: 680 mL    04-27 @ 07:01  -  04-28 @ 07:00  --------------------------------------------------------  IN: 1950 mL / OUT: 900 mL / NET: 1050 mL          PHYSICAL EXAM:  Constitutional: NAD  HEENT: anicteric sclera, oropharynx clear, MMM  Neck: No JVD  Respiratory: CTAB, no wheezes, rales or rhonchi  Cardiovascular: S1, S2, RRR  Gastrointestinal: BS+, soft, NT/ND  Extremities: No cyanosis or clubbing. No peripheral edema  :  No santana.   Skin: No rashes    LABS:  04-28    140  |  99  |  109<HH>  ----------------------------<  149<H>  3.1<L>   |  23  |  2.9<H>    Ca    8.1<L>      28 Apr 2020 07:08  Phos  5.4     04-28  Mg     2.0     04-28    TPro  5.6<L>  /  Alb  2.5<L>  /  TBili  0.6  /  DBili      /  AST  66<H>  /  ALT  81<H>  /  AlkPhos  99  04-28                          9.1    8.62  )-----------( 233      ( 28 Apr 2020 07:08 )             27.5       Urine Studies:      RADIOLOGY & ADDITIONAL STUDIES: Nephrology progress note  Patient is seen and examined, events over the last 24 h noted .  no major events   still on MV trached       Allergies:  Bactrim (Unknown)    Hospital Medications:   MEDICATIONS  (STANDING):  BACItracin   Ointment 1 Application(s) Topical two times a day  calcium acetate 1334 milliGRAM(s) Oral three times a day with meals  dextrose 5%. 1000 milliLiter(s) (100 mL/Hr) IV Continuous <Continuous>  diltiazem    Tablet 60 milliGRAM(s) Oral every 6 hours  heparin  Injectable 5000 Unit(s) SubCutaneous every 8 hours  insulin glargine Injectable (LANTUS) 5 Unit(s) SubCutaneous at bedtime  insulin lispro (HumaLOG) corrective regimen sliding scale   SubCutaneous three times a day before meals  lactulose Syrup 20 Gram(s) Oral every 8 hours  levETIRAcetam 250 milliGRAM(s) Oral two times a day  metoprolol tartrate 25 milliGRAM(s) Oral every 8 hours  mupirocin 2% Ointment 1 Application(s) Topical every 12 hours  pantoprazole   Suspension 40 milliGRAM(s) Oral daily  senna 2 Tablet(s) Oral at bedtime  sodium zirconium cyclosilicate 10 Gram(s) Oral every 8 hours        VITALS:  T(F): 99 (04-28-20 @ 05:24), Max: 99 (04-28-20 @ 05:24)  HR: 109 (04-28-20 @ 05:24)  BP: 127/75 (04-28-20 @ 05:24)  RR: 18 (04-28-20 @ 05:24)  SpO2: 99% (04-28-20 @ 05:24)      04-26 @ 07:01  -  04-27 @ 07:00  --------------------------------------------------------  IN: 1880 mL / OUT: 1200 mL / NET: 680 mL    04-27 @ 07:01  -  04-28 @ 07:00  --------------------------------------------------------  IN: 1950 mL / OUT: 900 mL / NET: 1050 mL          PHYSICAL EXAM:  Constitutional: intubated on MV   HEENT: anicteric sclera, oropharynx clear, MMM  Neck: No JVD trached   Respiratory: CTAB, no wheezes, rales or rhonchi  Cardiovascular: S1, S2, RRR  Gastrointestinal: BS+, soft, NT/ND  Extremities: No cyanosis or clubbing. No peripheral edema  :  No santana.   Skin: No rashes    LABS:  04-28    140  |  99  |  109<HH>  ----------------------------<  149<H>  3.1<L>   |  23  |  2.9<H>  Creatinine Trend: 2.9<--, 3.5<--, 3.7<--, 4.1<--, 3.9<--, 3.5<--  Ca    8.1<L>      28 Apr 2020 07:08  Phos  5.4     04-28  Mg     2.0     04-28    TPro  5.6<L>  /  Alb  2.5<L>  /  TBili  0.6  /  DBili      /  AST  66<H>  /  ALT  81<H>  /  AlkPhos  99  04-28                          9.1    8.62  )-----------( 233      ( 28 Apr 2020 07:08 )             27.5       Urine Studies:      RADIOLOGY & ADDITIONAL STUDIES:

## 2020-04-28 NOTE — PROGRESS NOTE ADULT - SUBJECTIVE AND OBJECTIVE BOX
Interventional Radiology Follow-Up Note    74y Male s/p radiologically inserted gastrostomy on 4/28/2020 in Interventional Radiology with Dr. Lau.      Shortly after procedure patient reported to be bleeding from dermatotomy.  Patient evaluated by Dr. Lau in radiology department.  BP changes noted by RN's with little response to fluids, so code fusion called.  After evaluation by code team, patient taken for CTA which shows possible injury to  gastroepiploic artery.  Case d/w IR team, medicine and surgery.  Dr. Lau contacted family-- wife Rebecca and son-in-law to discuss findings and plan.  Informed consent obtained via telephone for urgent angiography with possible embolization.     Vitals: T(F): 98.4 (04-28-20 @ 15:59), Max: 99 (04-28-20 @ 05:24)  HR: 111 (04-28-20 @ 15:59) (99 - 111)  BP: 167/74 (04-28-20 @ 15:59) (127/75 - 177/76)  RR: 18 (04-28-20 @ 15:59) (18 - 18)  SpO2: 99% (04-28-20 @ 15:59) (99% - 100%)  Wt(kg): --    LABS:                        9.1    8.62  )-----------( 233      ( 28 Apr 2020 07:08 )             27.5     04-28    140  |  99  |  109<HH>  ----------------------------<  149<H>  3.1<L>   |  23  |  2.9<H>    Ca    8.1<L>      28 Apr 2020 07:08  Phos  5.4     04-28  Mg     2.0     04-28    TPro  5.6<L>  /  Alb  2.5<L>  /  TBili  0.6  /  DBili  x   /  AST  66<H>  /  ALT  81<H>  /  AlkPhos  99  04-28    PT/INR - ( 27 Apr 2020 07:23 )   PT: 13.00 sec;   INR: 1.13 ratio    PTT - ( 27 Apr 2020 07:23 )  PTT:26.6 sec    Culture:   output :    PHYSICAL EXAM:  General: Nontoxic, in NAD  Neuro:  Alert & oriented x 3  CV: +S1+S2 regular rate and rhythm  Lung: clear to ausculation bilaterally, respirations nonlabored, good inspiratory effort  Abdomen: soft, NTND. Normactive BS  Extremities: no pedal edema or calf tenderness noted       Impression:  74y Male admitted with ACUTE HYPOXEMIC RESPIRATORY FAILURE,PULMONARY INFILTRATES ON CXR    Plan:  Urgent celiac/mesenteric angiography with possible embolization of bleediing vessels.  Dr. Urbina notified.    Please call Interventional Radiology [phone] with any questions, concerns, or issues regarding above.

## 2020-04-28 NOTE — PROGRESS NOTE ADULT - SUBJECTIVE AND OBJECTIVE BOX
KONSTANTIN WYATT  74y  Male    Patient is a 74y old  Male who presents with a chief complaint of suspected COVID-19 (28 Apr 2020 08:58)      SUBJECTIVE:    No overnight events    Vent: yes   Sedation: no  Pressors: prn   Bowel Movements:      PAST MEDICAL & SURGICAL HISTORY:  Afib  DM (diabetes mellitus)  BPH (benign prostatic hyperplasia)  CAD (coronary artery disease)      OBJECTIVE:    Vital Signs Last 24 Hrs  T(C): 37.2 (28 Apr 2020 05:24), Max: 37.2 (28 Apr 2020 05:24)  T(F): 99 (28 Apr 2020 05:24), Max: 99 (28 Apr 2020 05:24)  HR: 101 (28 Apr 2020 09:31) (92 - 109)  BP: 127/75 (28 Apr 2020 05:24) (127/75 - 177/76)  BP(mean): --  RR: 18 (28 Apr 2020 05:24) (18 - 18)  SpO2: 99% (28 Apr 2020 09:31) (97% - 100%)    General: No apparent distress  HEENT: + trach               Neck: No JVD, No Cervical LN    Lungs: Bilateral BS, CTA  Cardiovascular: +S1 S2  Abdomen: Soft, nontender  Extremities: Pulses intact  Skin: dti sacrum, lower lip stage II   Neurological: non Focal     I&O's Summary    27 Apr 2020 07:01  -  28 Apr 2020 07:00  --------------------------------------------------------  IN: 1950 mL / OUT: 900 mL / NET: 1050 mL          LABS:                          9.1    8.62  )-----------( 233      ( 28 Apr 2020 07:08 )             27.5                                               04-28    140  |  99  |  109<HH>  ----------------------------<  149<H>  3.1<L>   |  23  |  2.9<H>    Ca    8.1<L>      28 Apr 2020 07:08  Phos  5.4     04-28  Mg     2.0     04-28    TPro  5.6<L>  /  Alb  2.5<L>  /  TBili  0.6  /  DBili  x   /  AST  66<H>  /  ALT  81<H>  /  AlkPhos  99  04-28      PT/INR - ( 27 Apr 2020 07:23 )   PT: 13.00 sec;   INR: 1.13 ratio         PTT - ( 27 Apr 2020 07:23 )  PTT:26.6 sec                                                                                     LIVER FUNCTIONS - ( 28 Apr 2020 07:08 )  Alb: 2.5 g/dL / Pro: 5.6 g/dL / ALK PHOS: 99 U/L / ALT: 81 U/L / AST: 66 U/L / GGT: x                                                                                               Mode: AC/ CMV (Assist Control/ Continuous Mandatory Ventilation)  RR (machine): 20  TV (machine): 500  FiO2: 40  PEEP: 5  ITime: 1  MAP: 14  PIP: 28                                            MEDICATIONS  (STANDING):  BACItracin   Ointment 1 Application(s) Topical two times a day  calcium acetate 1334 milliGRAM(s) Oral three times a day with meals  chlorhexidine 0.12% Liquid 15 milliLiter(s) Oral Mucosa two times a day  chlorhexidine 0.12% Liquid 15 milliLiter(s) Oral Mucosa every 12 hours  chlorhexidine 4% Liquid 1 Application(s) Topical <User Schedule>  dextrose 5%. 1000 milliLiter(s) (100 mL/Hr) IV Continuous <Continuous>  dextrose 50% Injectable 25 Gram(s) IV Push once  diltiazem    Tablet 60 milliGRAM(s) Oral every 6 hours  heparin  Injectable 5000 Unit(s) SubCutaneous every 8 hours  insulin glargine Injectable (LANTUS) 5 Unit(s) SubCutaneous at bedtime  insulin lispro (HumaLOG) corrective regimen sliding scale   SubCutaneous three times a day before meals  lactulose Syrup 20 Gram(s) Oral every 8 hours  levETIRAcetam 250 milliGRAM(s) Oral two times a day  metoprolol tartrate 25 milliGRAM(s) Oral every 8 hours  mupirocin 2% Ointment 1 Application(s) Topical every 12 hours  pantoprazole   Suspension 40 milliGRAM(s) Oral daily  senna 2 Tablet(s) Oral at bedtime    MEDICATIONS  (PRN):  acetaminophen  Suppository .. 650 milliGRAM(s) Rectal every 6 hours PRN Temp greater or equal to 38.5C (101.3F)  midazolam Injectable 2 milliGRAM(s) IV Push every 4 hours PRN agitation

## 2020-04-28 NOTE — PROGRESS NOTE ADULT - SUBJECTIVE AND OBJECTIVE BOX
INTERVENTIONAL RADIOLOGY BRIEF-OPERATIVE NOTE    Procedure: mesenteric angiogram and coil embolization of right gastroepiploic artery    Pre-Op Diagnosis: GI bleed    Post-Op Diagnosis: right gastroepiploic artery bleed    Attending: Judi  Resident: Chava    Anesthesia (type):  [ ] General Anesthesia  [ ] Sedation  [ ] Spinal Anesthesia  [ x] Local/Regional, 10cc lidocaine in right groin    Contrast: 50cc visipaque    Estimated Blood Loss: approx 15cc    Condition: stable   [ ] Critical  [ ] Serious  [x] Fair   [ ] Good    Findings/Follow up Plan of Care: mesenteric angiogram was performed via common femoral artery access. Angiograms of the celiac axis, right proper hepatic, right GDA and Right gastroepiploic arteries were performed, showing a bleed at the distal right gastroepiploic artery. 2 x 4mm coils were deployed with resolution of bleed.   Repeat angiogram demonstrated no active arterial bleed.   Closure of right common femoral artery was performed with Mynx closure device.   Pt tolerated the procedure well.     Specimens Removed: none    Implants: as above    Complications: none immediate    Disposition: transfer to ICU      Please call Interventional Radiology [phone] with any questions, concerns, or issues. INTERVENTIONAL RADIOLOGY BRIEF-OPERATIVE NOTE    Procedure:  Celiac angiography with coil embolization of right gastroepiploic artery    Pre-Op Diagnosis:  Upper GI bleed    Post-Op Diagnosis:  Right gastroepiploic artery bleed    Attending:  Judi  Resident:  Chava    Anesthesia (type):  [ ] General Anesthesia  [ ] Sedation  [ ] Spinal Anesthesia  [x] Local/Regional, 10cc lidocaine SQ, right groin    Contrast:  50cc Visipaque-320, ia    Estimated Blood Loss:  15 cc    Condition:  Stable   [ ] Critical  [ ] Serious  [x] Fair   [ ] Good    Findings/Follow up Plan of Care:  Celiac angiography performed via right common femoral artery access. Selective and sub-selective angiography performed including celiac axis, proper hepatic artery, common hepatic artery and right gastroepiploic artery, showing right gastroepiploic artery bleed at the site of recently placed gastrostomy catheter.  Two 4 mm x 15 cm detachable platinum helical microcoils were placed immediately proximal to the bleed with resultant hemostasis and preservation of flow to neighboring branches.  Repeat angiogram demonstrated no further extravasation.  Inspection at dermatotomy showed no further bleed.      Closure of right common femoral artery was performed with Mynx closure device.     Pt tolerated the procedure well.  Findings d/w ICU MD, Dr. Johnson with instruction to NOT use the G-tube for now (via telephone, immediately post-procedure with read-back).  Findings also d/w patient's wife, Rebecca, son and son-in-law via telephone with understanding.    Specimens Removed:  None    Implants:  Two 4 mm x 15 cm Coni detachable platinum helical microcoils to right gastroepiploic artery     Complications:  None immediate    Disposition:  Transfer to ICU; serial CBC's; Please hold using this gastrostomy for now.      Please call Interventional Radiology [phone] with any questions, concerns, or issues.

## 2020-04-28 NOTE — PROGRESS NOTE ADULT - SUBJECTIVE AND OBJECTIVE BOX
INTERVENTIONAL RADIOLOGY BRIEF-OPERATIVE NOTE    Procedure:  Placement of radiologically inserted gastrostomy catheter (RIG)    Pre-Op Diagnosis:  Covid-positive, post-CVA    Post-Op Diagnosis:  Same    Attending:   Dr. Lau  Resident:   None    Anesthesia (type):  [ ] General Anesthesia  [X] Sedation  [ ] Spinal Anesthesia  [X] Local/Regional-- 1% Lidocaine, 8 cc SQ    Total Face-to-Face Sedation Time:  60 minutes    Contrast:  Gastroview     Estimated Blood Loss:    Condition:   [ ] Critical  [ ] Serious  [ ] Fair   [ ] Good    Findings/Follow up Plan of Care:    Specimens Removed:    Implants:    Complications:    Disposition:      Please call Interventional Radiology [phone] with any questions, concerns, or issues. INTERVENTIONAL RADIOLOGY BRIEF-OPERATIVE NOTE    Procedure:  Placement of radiologically inserted gastrostomy catheter (RIG)    Pre-Op Diagnosis:  Covid-positive with respiratory failure; post-CVA    Post-Op Diagnosis:  Same    Attending:   Dr. Lau  Resident:   None    Anesthesia (type):  [ ] General Anesthesia  [X] Sedation  [ ] Spinal Anesthesia  [X] Local/Regional-- 1% Lidocaine, 8 cc SQ    Total Face-to-Face Sedation Time:  60 minutes    Contrast:  Gastroview     Blood Loss:  5cc    Condition:   [ ] Critical  [ ] Serious  [X] Fair   [ ] Good    Findings/Follow up Plan of Care:  18-Niuean gastrostomy catheter placed with fluoroscopic guidance.  No contrast extravasation or leak seen.  Patient tolerated well without incident.  Catheter may first be used at 8:00 am tomorrow.    Specimens Removed:  None    Implants:  None    Complications:  None immediate.    Disposition:  D/C three skin sutures in 10 days.      Please call Interventional Radiology [phone] with any questions, concerns, or issues.

## 2020-04-29 LAB
BASE EXCESS BLDA CALC-SCNC: -3.9 MMOL/L — LOW (ref -2–2)
GAS PNL BLDA: SIGNIFICANT CHANGE UP
GLUCOSE BLDC GLUCOMTR-MCNC: 116 MG/DL — HIGH (ref 70–99)
GLUCOSE BLDC GLUCOMTR-MCNC: 172 MG/DL — HIGH (ref 70–99)
GLUCOSE BLDC GLUCOMTR-MCNC: 204 MG/DL — HIGH (ref 70–99)
GLUCOSE BLDC GLUCOMTR-MCNC: 219 MG/DL — HIGH (ref 70–99)
HCO3 BLDA-SCNC: 26 MMOL/L — SIGNIFICANT CHANGE UP (ref 21–29)
HCT VFR BLD CALC: 34 % — LOW (ref 42–52)
HGB BLD-MCNC: 10.9 G/DL — LOW (ref 14–18)
HOROWITZ INDEX BLDA+IHG-RTO: 70 — SIGNIFICANT CHANGE UP
MCHC RBC-ENTMCNC: 28.9 PG — SIGNIFICANT CHANGE UP (ref 27–31)
MCHC RBC-ENTMCNC: 32.1 G/DL — SIGNIFICANT CHANGE UP (ref 32–37)
MCV RBC AUTO: 90.2 FL — SIGNIFICANT CHANGE UP (ref 80–94)
NRBC # BLD: 0 /100 WBCS — SIGNIFICANT CHANGE UP (ref 0–0)
PCO2 BLDA: 76 MMHG — CRITICAL HIGH (ref 38–42)
PH BLDA: 7.14 — CRITICAL LOW (ref 7.38–7.42)
PLATELET # BLD AUTO: 187 K/UL — SIGNIFICANT CHANGE UP (ref 130–400)
PO2 BLDA: 178 MMHG — HIGH (ref 78–95)
RBC # BLD: 3.77 M/UL — LOW (ref 4.7–6.1)
RBC # FLD: 16.5 % — HIGH (ref 11.5–14.5)
SAO2 % BLDA: 99 % — HIGH (ref 92–96)
WBC # BLD: 14 K/UL — HIGH (ref 4.8–10.8)
WBC # FLD AUTO: 14 K/UL — HIGH (ref 4.8–10.8)

## 2020-04-29 PROCEDURE — 93970 EXTREMITY STUDY: CPT | Mod: 26

## 2020-04-29 PROCEDURE — 71045 X-RAY EXAM CHEST 1 VIEW: CPT | Mod: 26

## 2020-04-29 RX ORDER — NOREPINEPHRINE BITARTRATE/D5W 8 MG/250ML
0.05 PLASTIC BAG, INJECTION (ML) INTRAVENOUS
Qty: 8 | Refills: 0 | Status: DISCONTINUED | OUTPATIENT
Start: 2020-04-29 | End: 2020-04-30

## 2020-04-29 RX ORDER — MORPHINE SULFATE 50 MG/1
1 CAPSULE, EXTENDED RELEASE ORAL
Qty: 100 | Refills: 0 | Status: DISCONTINUED | OUTPATIENT
Start: 2020-04-29 | End: 2020-05-01

## 2020-04-29 RX ORDER — AMIODARONE HYDROCHLORIDE 400 MG/1
TABLET ORAL
Refills: 0 | Status: DISCONTINUED | OUTPATIENT
Start: 2020-05-01 | End: 2020-05-05

## 2020-04-29 RX ORDER — SODIUM CHLORIDE 9 MG/ML
500 INJECTION INTRAMUSCULAR; INTRAVENOUS; SUBCUTANEOUS ONCE
Refills: 0 | Status: COMPLETED | OUTPATIENT
Start: 2020-04-29 | End: 2020-04-29

## 2020-04-29 RX ORDER — POTASSIUM CHLORIDE 20 MEQ
20 PACKET (EA) ORAL ONCE
Refills: 0 | Status: COMPLETED | OUTPATIENT
Start: 2020-04-29 | End: 2020-04-29

## 2020-04-29 RX ORDER — AMIODARONE HYDROCHLORIDE 400 MG/1
400 TABLET ORAL EVERY 12 HOURS
Refills: 0 | Status: COMPLETED | OUTPATIENT
Start: 2020-04-29 | End: 2020-05-01

## 2020-04-29 RX ORDER — MORPHINE SULFATE 50 MG/1
4 CAPSULE, EXTENDED RELEASE ORAL ONCE
Refills: 0 | Status: DISCONTINUED | OUTPATIENT
Start: 2020-04-29 | End: 2020-04-29

## 2020-04-29 RX ORDER — AMIODARONE HYDROCHLORIDE 400 MG/1
200 TABLET ORAL DAILY
Refills: 0 | Status: DISCONTINUED | OUTPATIENT
Start: 2020-05-02 | End: 2020-05-05

## 2020-04-29 RX ORDER — AMIODARONE HYDROCHLORIDE 400 MG/1
TABLET ORAL
Refills: 0 | Status: DISCONTINUED | OUTPATIENT
Start: 2020-04-29 | End: 2020-04-29

## 2020-04-29 RX ORDER — SODIUM CHLORIDE 9 MG/ML
1000 INJECTION, SOLUTION INTRAVENOUS ONCE
Refills: 0 | Status: COMPLETED | OUTPATIENT
Start: 2020-04-29 | End: 2020-04-29

## 2020-04-29 RX ORDER — LEVETIRACETAM 250 MG/1
250 TABLET, FILM COATED ORAL EVERY 12 HOURS
Refills: 0 | Status: DISCONTINUED | OUTPATIENT
Start: 2020-04-29 | End: 2020-05-05

## 2020-04-29 RX ADMIN — LACTULOSE 20 GRAM(S): 10 SOLUTION ORAL at 12:23

## 2020-04-29 RX ADMIN — MUPIROCIN 1 APPLICATION(S): 20 OINTMENT TOPICAL at 07:06

## 2020-04-29 RX ADMIN — SODIUM CHLORIDE 1000 MILLILITER(S): 9 INJECTION, SOLUTION INTRAVENOUS at 00:00

## 2020-04-29 RX ADMIN — CHLORHEXIDINE GLUCONATE 15 MILLILITER(S): 213 SOLUTION TOPICAL at 05:46

## 2020-04-29 RX ADMIN — PANTOPRAZOLE SODIUM 40 MILLIGRAM(S): 20 TABLET, DELAYED RELEASE ORAL at 12:23

## 2020-04-29 RX ADMIN — SODIUM CHLORIDE 1000 MILLILITER(S): 9 INJECTION INTRAMUSCULAR; INTRAVENOUS; SUBCUTANEOUS at 12:22

## 2020-04-29 RX ADMIN — AMIODARONE HYDROCHLORIDE 400 MILLIGRAM(S): 400 TABLET ORAL at 12:22

## 2020-04-29 RX ADMIN — Medication 50 MILLIEQUIVALENT(S): at 12:23

## 2020-04-29 RX ADMIN — LACTULOSE 20 GRAM(S): 10 SOLUTION ORAL at 22:16

## 2020-04-29 RX ADMIN — Medication 2.5 MILLIGRAM(S): at 07:07

## 2020-04-29 RX ADMIN — LEVETIRACETAM 400 MILLIGRAM(S): 250 TABLET, FILM COATED ORAL at 17:40

## 2020-04-29 RX ADMIN — MUPIROCIN 1 APPLICATION(S): 20 OINTMENT TOPICAL at 17:00

## 2020-04-29 RX ADMIN — SENNA PLUS 2 TABLET(S): 8.6 TABLET ORAL at 22:16

## 2020-04-29 RX ADMIN — Medication 1 APPLICATION(S): at 17:23

## 2020-04-29 RX ADMIN — AMIODARONE HYDROCHLORIDE 400 MILLIGRAM(S): 400 TABLET ORAL at 17:23

## 2020-04-29 RX ADMIN — MORPHINE SULFATE 4 MILLIGRAM(S): 50 CAPSULE, EXTENDED RELEASE ORAL at 10:57

## 2020-04-29 RX ADMIN — CHLORHEXIDINE GLUCONATE 1 APPLICATION(S): 213 SOLUTION TOPICAL at 05:45

## 2020-04-29 RX ADMIN — Medication 2: at 12:13

## 2020-04-29 RX ADMIN — LEVETIRACETAM 400 MILLIGRAM(S): 250 TABLET, FILM COATED ORAL at 07:59

## 2020-04-29 RX ADMIN — Medication 1334 MILLIGRAM(S): at 12:22

## 2020-04-29 RX ADMIN — Medication 1 APPLICATION(S): at 05:46

## 2020-04-29 RX ADMIN — CHLORHEXIDINE GLUCONATE 15 MILLILITER(S): 213 SOLUTION TOPICAL at 17:23

## 2020-04-29 RX ADMIN — Medication 4: at 08:00

## 2020-04-29 RX ADMIN — MORPHINE SULFATE 1 MG/HR: 50 CAPSULE, EXTENDED RELEASE ORAL at 10:57

## 2020-04-29 NOTE — PROGRESS NOTE ADULT - ASSESSMENT
LIZZIE/ ATN/ hyperkalemia/ respiratory failure / covid positive / high BUN    # creatinine trending down   # non-oliguric  # ph at goal   # s/p RRT , bleeding around PEG, s/p embolisation followed by IR   # d/c iv fluids  # will give 1 dose of k rider  20  # will follow  # no acute indication for RRT

## 2020-04-29 NOTE — PHARMACOTHERAPY INTERVENTION NOTE - INTERVENTION TYPE RECOOMEND
Left message for patient's guardian, Berkley, to call office to discuss MRI results.   Therapy Recommended - Med Rec related

## 2020-04-29 NOTE — PHARMACOTHERAPY INTERVENTION NOTE - COMMENTS
d/w medical team to evaluate: diltiazem 5mg/hr, metoprolol 2.5mg IV q6h & levophed drip @ 0.07 mcg/kg/min  --d/c diltiazem & metoprolol

## 2020-04-29 NOTE — PROGRESS NOTE ADULT - SUBJECTIVE AND OBJECTIVE BOX
Patient is a 74y old  Male who presents with a chief complaint of suspected COVID-19 (29 Apr 2020 08:05)        Over Night Events:  SO G tube.  SP IR embolization for Gastroepiploic artery bleed.  On Levophed         ROS:     All ROS are negative except HPI         PHYSICAL EXAM    ICU Vital Signs Last 24 Hrs  T(C): 38.1 (29 Apr 2020 08:00), Max: 38.1 (29 Apr 2020 08:00)  T(F): 100.5 (29 Apr 2020 08:00), Max: 100.5 (29 Apr 2020 08:00)  HR: 112 (29 Apr 2020 08:00) (100 - 146)  BP: 128/75 (29 Apr 2020 08:00) (66/48 - 169/86)  BP(mean): 91 (29 Apr 2020 08:00) (47 - 125)  ABP: --  ABP(mean): --  RR: 33 (29 Apr 2020 08:00) (18 - 55)  SpO2: 100% (29 Apr 2020 08:00) (96% - 100%)      CONSTITUTIONAL:   Ill appearing.  Well nourished.      ENT:   Airway patent,   Mouth with normal mucosa.   No thrush    EYES:   Pupils equal,   Round and reactive to light.    CARDIAC:   Tachycasric   Irregular rhythm.    No edema      Vascular:  Normal systolic impulse  No Carotid bruits    RESPIRATORY:   No wheezing  Bilateral BS  Normal chest expansion  Not tachypneic,  No use of accessory muscles    GASTROINTESTINAL:  Abdomen soft,   Non-tender,   No guarding,   + BS    GENITOURINARY  normal genitalia for sex  no edema    MUSCULOSKELETAL:   Range of motion is not limited,  No clubbing, cyanosis    NEUROLOGICAL:   Off sedation     SKIN:   Skin normal color for race,   Warm and dry and intact.   No evidence of rash.    PSYCHIATRIC:   No apparent risk to self or others.    HEMATOLOGICAL:  No cervical  lymphadenopathy.  no inguinal lymphadenopathy      04-28-20 @ 07:01  -  04-29-20 @ 07:00  --------------------------------------------------------  IN:    dextrose 5%.: 1000 mL    diltiazem Infusion: 9.9 mL    Lactated Ringers IV Bolus: 1000 mL    norepinephrine Infusion: 83 mL  Total IN: 2092.9 mL    OUT:    Indwelling Catheter - Urethral: 475 mL  Total OUT: 475 mL    Total NET: 1617.9 mL      04-29-20 @ 07:01  -  04-29-20 @ 09:17  --------------------------------------------------------  IN:    dextrose 5%.: 100 mL    diltiazem Infusion: 5 mL    IV PiggyBack: 100 mL    norepinephrine Infusion: 11 mL  Total IN: 216 mL    OUT:    Indwelling Catheter - Urethral: 20 mL  Total OUT: 20 mL    Total NET: 196 mL          LABS:                            10.9   14.00 )-----------( 187      ( 28 Apr 2020 23:52 )             34.0                                               04-28    140  |  99  |  109<HH>  ----------------------------<  149<H>  3.1<L>   |  23  |  2.9<H>    28 Apr 2020 07:08    140    |  99     |  109<HH>  ----------------------------<  149<H>  3.1<L>   |  23     |  2.9<H>    Ca    8.1<L>      28 Apr 2020 07:08  Phos  5.4<H>     28 Apr 2020 07:08  Mg     2.0       28 Apr 2020 07:08    TPro  5.6<L>  /  Alb  2.5<L>  /  TBili  0.6    /  DBili  x      /  AST  66<H>  /  ALT  81<H>  /  AlkPhos  99     28 Apr 2020 07:08      Ca    8.1<L>      28 Apr 2020 07:08  Phos  5.4     04-28  Mg     2.0     04-28    TPro  5.6<L>  /  Alb  2.5<L>  /  TBili  0.6  /  DBili  x   /  AST  66<H>  /  ALT  81<H>  /  AlkPhos  99  04-28                                                                                           LIVER FUNCTIONS - ( 28 Apr 2020 07:08 )  Alb: 2.5 g/dL / Pro: 5.6 g/dL / ALK PHOS: 99 U/L / ALT: 81 U/L / AST: 66 U/L / GGT: x                                                                                               Mode: AC/ CMV (Assist Control/ Continuous Mandatory Ventilation)  RR (machine): 20  TV (machine): 500  FiO2: 70  PEEP: 5  ITime: 1  MAP: 10  PIP: 29                                      ABG - ( 29 Apr 2020 01:22 )  pH, Arterial: 7.14  pH, Blood: x     /  pCO2: 76    /  pO2: 178   / HCO3: 26    / Base Excess: -3.9  /  SaO2: 99                  MEDICATIONS  (STANDING):  BACItracin   Ointment 1 Application(s) Topical two times a day  calcium acetate 1334 milliGRAM(s) Oral three times a day with meals  chlorhexidine 0.12% Liquid 15 milliLiter(s) Oral Mucosa two times a day  chlorhexidine 0.12% Liquid 15 milliLiter(s) Oral Mucosa every 12 hours  chlorhexidine 4% Liquid 1 Application(s) Topical <User Schedule>  dextrose 5%. 1000 milliLiter(s) (100 mL/Hr) IV Continuous <Continuous>  dextrose 50% Injectable 25 Gram(s) IV Push once  insulin glargine Injectable (LANTUS) 5 Unit(s) SubCutaneous at bedtime  insulin lispro (HumaLOG) corrective regimen sliding scale   SubCutaneous three times a day before meals  lactulose Syrup 20 Gram(s) Oral every 8 hours  levETIRAcetam  IVPB 250 milliGRAM(s) IV Intermittent every 12 hours  metoprolol tartrate Injectable 2.5 milliGRAM(s) IV Push every 6 hours  mupirocin 2% Ointment 1 Application(s) Topical every 12 hours  norepinephrine Infusion 0.05 MICROgram(s)/kG/Min (7.88 mL/Hr) IV Continuous <Continuous>  pantoprazole   Suspension 40 milliGRAM(s) Oral daily  senna 2 Tablet(s) Oral at bedtime    MEDICATIONS  (PRN):  acetaminophen  Suppository .. 650 milliGRAM(s) Rectal every 6 hours PRN Temp greater or equal to 38.5C (101.3F)  midazolam Injectable 2 milliGRAM(s) IV Push every 4 hours PRN agitation      New X-rays reviewed:                                                                                  ECHO    CXR interpreted by me:  Not done

## 2020-04-29 NOTE — CHART NOTE - NSCHARTNOTEFT_GEN_A_CORE
I tried multiple times to reach the patient's wife Rebecca [phone] to provide her with a daily update without success. The mailbox was full and no message could be left.

## 2020-04-29 NOTE — PROGRESS NOTE ADULT - SUBJECTIVE AND OBJECTIVE BOX
Interventional Radiology Follow Up Note      74y Male s/p Radiologically Inserted Gastrostomy (RIG) followed by urgent celiac arteriography with coil embolization of right gastroepiploic artery bleed on 4/28/2020 in Interventional Radiology with Dr. Lau.        O/N:  Patient upgraded to ICU post-procedure and transfused 3 units PRBC's.  RN reports one episode of melena and no further BM's.  Patient remains HDS on Levophed.        Vitals: T(F): 100.5 (04-29-20 @ 08:00), Max: 100.5 (04-29-20 @ 08:00)  HR: 112 (04-29-20 @ 08:00) (100 - 146)  BP: 128/75 (04-29-20 @ 08:00) (66/48 - 169/86)  RR: 33 (04-29-20 @ 08:00) (18 - 55)  SpO2: 100% (04-29-20 @ 08:00) (96% - 100%)  Wt(kg): --    LABS:                        10.9   14.00 )-----------( 187      ( 28 Apr 2020 23:52 )             34.0     04-28    140  |  99  |  109<HH>  ----------------------------<  149<H>  3.1<L>   |  23  |  2.9<H>    Ca    8.1<L>      28 Apr 2020 07:08  Phos  5.4     04-28  Mg     2.0     04-28    TPro  5.6<L>  /  Alb  2.5<L>  /  TBili  0.6  /  DBili  x   /  AST  66<H>  /  ALT  81<H>  /  AlkPhos  99  04-28        FOCUSED PHYSICAL EXAM:  General: Non-toxic, in NAD  Abdomen:  Soft, NTND.  Unsoiled dressing in place at g-tube dermatotomy, c/d/i;  no bleeding seen from wound; no sign of hematoma.  Gastrostomy tube in place.       Impression:  74y Male admitted with ACUTE HYPOXEMIC RESPIRATORY FAILURE,PULMONARY INFILTRATES ON CXR, who is one day, s/p gastrostomy catheter placement followed by urgent celiac angiography with coil embolization of right gastroepiploic artery bleed with subsequent hemostasis observed.  After upgrade to ICU and transfusion of PRBC's, patient is relatively stable.       Plan:  Case d/w Dr. Covington.  Assuming patient remains otherwise stable with no further bleeding, consider using gastrostomy catheter as early as this afternoon.  IR will continue to follow.         Please call Interventional Radiology [phone] with any questions, concerns, or issues regarding above.

## 2020-04-29 NOTE — PROGRESS NOTE ADULT - SUBJECTIVE AND OBJECTIVE BOX
24 h events noted  trach/ventilated         PAST HISTORY  --------------------------------------------------------------------------------  No significant changes to PMH, PSH, FHx, SHx, unless otherwise noted    ALLERGIES & MEDICATIONS  --------------------------------------------------------------------------------  Allergies    Bactrim (Unknown)    Intolerances      Standing Inpatient Medications  BACItracin   Ointment 1 Application(s) Topical two times a day  calcium acetate 1334 milliGRAM(s) Oral three times a day with meals  chlorhexidine 0.12% Liquid 15 milliLiter(s) Oral Mucosa two times a day  chlorhexidine 0.12% Liquid 15 milliLiter(s) Oral Mucosa every 12 hours  chlorhexidine 4% Liquid 1 Application(s) Topical <User Schedule>  dextrose 5%. 1000 milliLiter(s) IV Continuous <Continuous>  dextrose 50% Injectable 25 Gram(s) IV Push once  diltiazem Infusion 5 mG/Hr IV Continuous <Continuous>  insulin glargine Injectable (LANTUS) 5 Unit(s) SubCutaneous at bedtime  insulin lispro (HumaLOG) corrective regimen sliding scale   SubCutaneous three times a day before meals  lactulose Syrup 20 Gram(s) Oral every 8 hours  levETIRAcetam  IVPB 250 milliGRAM(s) IV Intermittent every 12 hours  metoprolol tartrate Injectable 2.5 milliGRAM(s) IV Push every 6 hours  mupirocin 2% Ointment 1 Application(s) Topical every 12 hours  norepinephrine Infusion 0.05 MICROgram(s)/kG/Min IV Continuous <Continuous>  pantoprazole   Suspension 40 milliGRAM(s) Oral daily  senna 2 Tablet(s) Oral at bedtime    PRN Inpatient Medications  acetaminophen  Suppository .. 650 milliGRAM(s) Rectal every 6 hours PRN  midazolam Injectable 2 milliGRAM(s) IV Push every 4 hours PRN        VITALS/PHYSICAL EXAM  --------------------------------------------------------------------------------  T(C): 38.1 (04-29-20 @ 08:00), Max: 38.1 (04-29-20 @ 08:00)  HR: 112 (04-29-20 @ 08:00) (100 - 146)  BP: 106/67 (04-29-20 @ 07:00) (66/48 - 169/86)  RR: 33 (04-29-20 @ 08:00) (18 - 55)  SpO2: 100% (04-29-20 @ 08:00) (96% - 100%)  Wt(kg): --  Height (cm): 182.9 (04-28-20 @ 20:45)  Weight (kg): 84.1 (04-28-20 @ 20:45)  BMI (kg/m2): 25.1 (04-28-20 @ 20:45)  BSA (m2): 2.06 (04-28-20 @ 20:45)      04-28-20 @ 07:01  -  04-29-20 @ 07:00  --------------------------------------------------------  IN: 2092.9 mL / OUT: 475 mL / NET: 1617.9 mL      Physical Exam:  	Gen: randy/geoffrey     LABS/STUDIES  --------------------------------------------------------------------------------              10.9   14.00 >-----------<  187      [04-28-20 @ 23:52]              34.0     140  |  99  |  109  ----------------------------<  149      [04-28-20 @ 07:08]  3.1   |  23  |  2.9        Ca     8.1     [04-28-20 @ 07:08]      Mg     2.0     [04-28-20 @ 07:08]      Phos  5.4     [04-28-20 @ 07:08]    TPro  5.6  /  Alb  2.5  /  TBili  0.6  /  DBili  x   /  AST  66  /  ALT  81  /  AlkPhos  99  [04-28-20 @ 07:08]          Creatinine Trend:  SCr 2.9 [04-28 @ 07:08]  SCr 3.5 [04-27 @ 07:23]  SCr 3.7 [04-26 @ 18:55]  SCr 4.1 [04-26 @ 06:23]  SCr 3.9 [04-25 @ 23:30]    Urinalysis - [03-30-20 @ 22:00]      Color Yellow / Appearance Clear / SG 1.031 / pH 6.0      Gluc Negative / Ketone Trace  / Bili Negative / Urobili <2 mg/dL       Blood Moderate / Protein 300 mg/dL / Leuk Est Negative / Nitrite Negative      RBC 10 / WBC 29 / Hyaline 24 / Gran  / Sq Epi  / Non Sq Epi 22 / Bacteria Negative      Ferritin 1751      [04-23-20 @ 00:00]  HbA1c 7.7      [04-01-20 @ 04:30]  Lipid: chol --, , HDL --, LDL --      [04-23-20 @ 00:00] 24 h events noted  trach/ventilated   s/p RRT         PAST HISTORY  --------------------------------------------------------------------------------  No significant changes to PMH, PSH, FHx, SHx, unless otherwise noted    ALLERGIES & MEDICATIONS  --------------------------------------------------------------------------------  Allergies    Bactrim (Unknown)    Intolerances      Standing Inpatient Medications  BACItracin   Ointment 1 Application(s) Topical two times a day  calcium acetate 1334 milliGRAM(s) Oral three times a day with meals  chlorhexidine 0.12% Liquid 15 milliLiter(s) Oral Mucosa two times a day  chlorhexidine 0.12% Liquid 15 milliLiter(s) Oral Mucosa every 12 hours  chlorhexidine 4% Liquid 1 Application(s) Topical <User Schedule>  dextrose 5%. 1000 milliLiter(s) IV Continuous <Continuous>  dextrose 50% Injectable 25 Gram(s) IV Push once  diltiazem Infusion 5 mG/Hr IV Continuous <Continuous>  insulin glargine Injectable (LANTUS) 5 Unit(s) SubCutaneous at bedtime  insulin lispro (HumaLOG) corrective regimen sliding scale   SubCutaneous three times a day before meals  lactulose Syrup 20 Gram(s) Oral every 8 hours  levETIRAcetam  IVPB 250 milliGRAM(s) IV Intermittent every 12 hours  metoprolol tartrate Injectable 2.5 milliGRAM(s) IV Push every 6 hours  mupirocin 2% Ointment 1 Application(s) Topical every 12 hours  norepinephrine Infusion 0.05 MICROgram(s)/kG/Min IV Continuous <Continuous>  pantoprazole   Suspension 40 milliGRAM(s) Oral daily  senna 2 Tablet(s) Oral at bedtime    PRN Inpatient Medications  acetaminophen  Suppository .. 650 milliGRAM(s) Rectal every 6 hours PRN  midazolam Injectable 2 milliGRAM(s) IV Push every 4 hours PRN        VITALS/PHYSICAL EXAM  --------------------------------------------------------------------------------  T(C): 38.1 (04-29-20 @ 08:00), Max: 38.1 (04-29-20 @ 08:00)  HR: 112 (04-29-20 @ 08:00) (100 - 146)  BP: 106/67 (04-29-20 @ 07:00) (66/48 - 169/86)  RR: 33 (04-29-20 @ 08:00) (18 - 55)  SpO2: 100% (04-29-20 @ 08:00) (96% - 100%)  Wt(kg): --  Height (cm): 182.9 (04-28-20 @ 20:45)  Weight (kg): 84.1 (04-28-20 @ 20:45)  BMI (kg/m2): 25.1 (04-28-20 @ 20:45)  BSA (m2): 2.06 (04-28-20 @ 20:45)      04-28-20 @ 07:01  -  04-29-20 @ 07:00  --------------------------------------------------------  IN: 2092.9 mL / OUT: 475 mL / NET: 1617.9 mL      Physical Exam:  	Gen: randy/geoffrey     LABS/STUDIES  --------------------------------------------------------------------------------              10.9   14.00 >-----------<  187      [04-28-20 @ 23:52]              34.0     140  |  99  |  109  ----------------------------<  149      [04-28-20 @ 07:08]  3.1   |  23  |  2.9        Ca     8.1     [04-28-20 @ 07:08]      Mg     2.0     [04-28-20 @ 07:08]      Phos  5.4     [04-28-20 @ 07:08]    TPro  5.6  /  Alb  2.5  /  TBili  0.6  /  DBili  x   /  AST  66  /  ALT  81  /  AlkPhos  99  [04-28-20 @ 07:08]          Creatinine Trend:  SCr 2.9 [04-28 @ 07:08]  SCr 3.5 [04-27 @ 07:23]  SCr 3.7 [04-26 @ 18:55]  SCr 4.1 [04-26 @ 06:23]  SCr 3.9 [04-25 @ 23:30]    Urinalysis - [03-30-20 @ 22:00]      Color Yellow / Appearance Clear / SG 1.031 / pH 6.0      Gluc Negative / Ketone Trace  / Bili Negative / Urobili <2 mg/dL       Blood Moderate / Protein 300 mg/dL / Leuk Est Negative / Nitrite Negative      RBC 10 / WBC 29 / Hyaline 24 / Gran  / Sq Epi  / Non Sq Epi 22 / Bacteria Negative      Ferritin 1751      [04-23-20 @ 00:00]  HbA1c 7.7      [04-01-20 @ 04:30]  Lipid: chol --, , HDL --, LDL --      [04-23-20 @ 00:00]

## 2020-04-29 NOTE — CHART NOTE - NSCHARTNOTEFT_GEN_A_CORE
ICU DOWNGRADE NOTE:    74y Male transferred to floor from ICU.    Patient is a 74y old Male who presents with a chief complaint of Suspected COVID-19 (29 Apr 2020 16:02)    The patient is currently admitted for the primary diagnosis of Acute hypoxemic respiratory failure    The patient was admitted to the unit for (1) Days.    Intubated and on pressors before admission to ICU    Indwelling vascular catheters:     Urinary Catheter:     Disposition: 3E    Code Status: Full Code    ICU COURSE OF EVENTS:  -------------------------------------------------------------------------------------------  Patient was admitted to the ICU for one day for intensive monitoring after s/p gastrostomy by IR on 4/28 because concern for arterial bleeding s/p embolization, s/p code fusion. Patient Hgb stable, last one 10.9.    -------------------------------------------------------------------------------------------    Current workup in progress:  -F/U Nephrology recommendations   -IR advised to use PEG tube after 24 hours--F/U as needed  -Trend Hgb--Repeat CBC at 4PM  -F/U CMP at 4PM    SIGN OUT AT 04-29-20 @ 16:08 GIVEN TO: ICU DOWNGRADE NOTE:    74y Male transferred to floor from ICU.    Patient is a 74y old Male who presents with a chief complaint of Suspected COVID-19 (29 Apr 2020 16:02)    The patient is currently admitted for the primary diagnosis of Acute hypoxemic respiratory failure    The patient was admitted to the unit for (1) Days.    Intubated and on pressors before admission to ICU    Indwelling vascular catheters:     Urinary Catheter:     Disposition: 3E    Code Status: Full Code    ICU COURSE OF EVENTS:  -------------------------------------------------------------------------------------------  Acute Renal Failure  Hyperkalemia, worsening Uremia  Right gastrocnemius & peroneal DVT  Hypernatremia, improved  Acute respiratory failure secondary to COVID-19 repeat swab 4/13 and 4/17 negative, s/p Trach 4/18  Multiple ischemic CVAs: Left brainstem small focus, bilateral occipital lobes, posterior temporal occipital  Left occipital lobe hemorrhagic CVA  MRSA PNA + 4/15  S/P PEG Tube 4/28    Patient was admitted to the ICU for one day for intensive monitoring after s/p gastrostomy by IR on 4/28 because concern for arterial bleeding s/p embolization, s/p code fusion. Patient Hgb stable, last one 10.9.    -------------------------------------------------------------------------------------------    Current workup in progress:  -F/U Nephrology recommendations   -IR advised to use PEG tube after 24 hours--F/U as needed  -Trend Hgb--Repeat CBC at 4PM  -F/U CMP at 4PM    SIGN OUT AT 04-29-20 @ 16:08 GIVEN TO: ICU DOWNGRADE NOTE:    74y Male transferred to floor from ICU.    Patient is a 74y old Male who presents with a chief complaint of Suspected COVID-19 (29 Apr 2020 16:02)    The patient is currently admitted for the primary diagnosis of Acute hypoxemic respiratory failure    The patient was admitted to the unit for (1) Days.    Intubated and on pressors before admission to ICU    Indwelling vascular catheters:     Urinary Catheter:     Disposition: 3E    Code Status: Full Code    ICU COURSE OF EVENTS:  -------------------------------------------------------------------------------------------  74 year old male w/ hx of Afib on Eliquis, CAD s/p PCI, DM, BPH, presents with 1 week of fever, cough, and progressive SOB. Admits to watery diarrhea for 3 days. Denies chest pain. 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 He has exposure to COVID positive family member at the wedding. No travel hx. Pt pulse ox in 80s in the field per EMS. Of note, per patient he had recent normal stress test recently.     Acute Renal Failure  Hyperkalemia, worsening Uremia  Right gastrocnemius & peroneal DVT  Hypernatremia, improved  Acute respiratory failure secondary to COVID-19 repeat swab 4/13 and 4/17 negative, s/p Trach 4/18  Multiple ischemic CVAs: Left brainstem small focus, bilateral occipital lobes, posterior temporal occipital  Left occipital lobe hemorrhagic CVA  MRSA PNA + 4/15  S/P PEG Tube 4/28    Patient was admitted to the ICU for one day for intensive monitoring after s/p gastrostomy by IR on 4/28 because concern for arterial bleeding s/p embolization, s/p code fusion. Patient Hgb stable, last one 10.9.    -------------------------------------------------------------------------------------------    Current workup in progress:  -F/U Nephrology recommendations   -IR advised to use PEG tube after 24 hours--F/U as needed  -Trend Hgb--Repeat CBC at 4PM  -F/U CMP at 4PM    SIGN OUT AT 04-29-20 @ 16:08 GIVEN TO:

## 2020-04-29 NOTE — PROGRESS NOTE ADULT - ASSESSMENT
74 year old male w/ hx of Afib on Eliquis, CAD s/p PCI, DM, BPH, presents with 1 week of fever, cough, and progressive SOB.    IMPRESSION:  Acute Renal Failure  Hyperkalemia, worsening Uremia  Right gastrocnemius & peroneal DVT  Hypernatremia, improved  Acute respiratory failure secondary to COVID-19 repeat swab 4/13 and 4/17 negative, s/p Trach 4/18  Multiple ischemic CVAs: Left brainstem small focus, bilateral occipital lobes, posterior temporal occipital  Left occipital lobe hemorrhagic CVA  MRSA PNA + 4/15  S/P PEG Tube 4/28      PLAN:    CNS: Versed PRN. Continue Keppra bid.     HEENT: Oral care. Trach Care. Frequent suctioning.    PULMONARY: HOB @ 45 degrees. Keep POx > 92%.    CARDIOVASCULAR: Continue Metoprolol and Cardizem PO.    GI: PPI ppx. S/P PEG tube    RENAL: Correct as needed. F/U Nephro    INFECTIOUS DISEASE: Continue meropenum.    HEMATOLOGICAL: Hgb 10.9 s/p code fusion yesterday    ENDOCRINE:  Follow up FS. Insulin protocol if needed.    MUSCULOSKELETAL: bed rest. Wound care.    Full code  Poor prognosis

## 2020-04-30 LAB
ALBUMIN SERPL ELPH-MCNC: 2.2 G/DL — LOW (ref 3.5–5.2)
ALP SERPL-CCNC: 93 U/L — SIGNIFICANT CHANGE UP (ref 30–115)
ALT FLD-CCNC: 49 U/L — HIGH (ref 0–41)
ANION GAP SERPL CALC-SCNC: 12 MMOL/L — SIGNIFICANT CHANGE UP (ref 7–14)
AST SERPL-CCNC: 29 U/L — SIGNIFICANT CHANGE UP (ref 0–41)
BASOPHILS # BLD AUTO: 0.03 K/UL — SIGNIFICANT CHANGE UP (ref 0–0.2)
BASOPHILS NFR BLD AUTO: 0.2 % — SIGNIFICANT CHANGE UP (ref 0–1)
BILIRUB SERPL-MCNC: 0.5 MG/DL — SIGNIFICANT CHANGE UP (ref 0.2–1.2)
BUN SERPL-MCNC: 139 MG/DL — CRITICAL HIGH (ref 10–20)
CALCIUM SERPL-MCNC: 8.5 MG/DL — SIGNIFICANT CHANGE UP (ref 8.5–10.1)
CHLORIDE SERPL-SCNC: 107 MMOL/L — SIGNIFICANT CHANGE UP (ref 98–110)
CO2 SERPL-SCNC: 23 MMOL/L — SIGNIFICANT CHANGE UP (ref 17–32)
CREAT SERPL-MCNC: 3.2 MG/DL — HIGH (ref 0.7–1.5)
EOSINOPHIL # BLD AUTO: 0.16 K/UL — SIGNIFICANT CHANGE UP (ref 0–0.7)
EOSINOPHIL NFR BLD AUTO: 1.1 % — SIGNIFICANT CHANGE UP (ref 0–8)
GLUCOSE BLDC GLUCOMTR-MCNC: 124 MG/DL — HIGH (ref 70–99)
GLUCOSE BLDC GLUCOMTR-MCNC: 124 MG/DL — HIGH (ref 70–99)
GLUCOSE BLDC GLUCOMTR-MCNC: 172 MG/DL — HIGH (ref 70–99)
GLUCOSE BLDC GLUCOMTR-MCNC: 215 MG/DL — HIGH (ref 70–99)
GLUCOSE SERPL-MCNC: 120 MG/DL — HIGH (ref 70–99)
HCT VFR BLD CALC: 27.6 % — LOW (ref 42–52)
HGB BLD-MCNC: 9.1 G/DL — LOW (ref 14–18)
IMM GRANULOCYTES NFR BLD AUTO: 1.9 % — HIGH (ref 0.1–0.3)
LYMPHOCYTES # BLD AUTO: 0.7 K/UL — LOW (ref 1.2–3.4)
LYMPHOCYTES # BLD AUTO: 4.9 % — LOW (ref 20.5–51.1)
MAGNESIUM SERPL-MCNC: 2.1 MG/DL — SIGNIFICANT CHANGE UP (ref 1.8–2.4)
MCHC RBC-ENTMCNC: 29.3 PG — SIGNIFICANT CHANGE UP (ref 27–31)
MCHC RBC-ENTMCNC: 33 G/DL — SIGNIFICANT CHANGE UP (ref 32–37)
MCV RBC AUTO: 88.7 FL — SIGNIFICANT CHANGE UP (ref 80–94)
MONOCYTES # BLD AUTO: 0.91 K/UL — HIGH (ref 0.1–0.6)
MONOCYTES NFR BLD AUTO: 6.3 % — SIGNIFICANT CHANGE UP (ref 1.7–9.3)
NEUTROPHILS # BLD AUTO: 12.3 K/UL — HIGH (ref 1.4–6.5)
NEUTROPHILS NFR BLD AUTO: 85.6 % — HIGH (ref 42.2–75.2)
NRBC # BLD: 0 /100 WBCS — SIGNIFICANT CHANGE UP (ref 0–0)
PLATELET # BLD AUTO: 150 K/UL — SIGNIFICANT CHANGE UP (ref 130–400)
POTASSIUM SERPL-MCNC: 4.1 MMOL/L — SIGNIFICANT CHANGE UP (ref 3.5–5)
POTASSIUM SERPL-SCNC: 4.1 MMOL/L — SIGNIFICANT CHANGE UP (ref 3.5–5)
PROT SERPL-MCNC: 4.9 G/DL — LOW (ref 6–8)
RBC # BLD: 3.11 M/UL — LOW (ref 4.7–6.1)
RBC # FLD: 18.2 % — HIGH (ref 11.5–14.5)
SODIUM SERPL-SCNC: 142 MMOL/L — SIGNIFICANT CHANGE UP (ref 135–146)
WBC # BLD: 14.38 K/UL — HIGH (ref 4.8–10.8)
WBC # FLD AUTO: 14.38 K/UL — HIGH (ref 4.8–10.8)

## 2020-04-30 RX ADMIN — MUPIROCIN 1 APPLICATION(S): 20 OINTMENT TOPICAL at 05:12

## 2020-04-30 RX ADMIN — Medication 1334 MILLIGRAM(S): at 11:53

## 2020-04-30 RX ADMIN — MUPIROCIN 1 APPLICATION(S): 20 OINTMENT TOPICAL at 18:47

## 2020-04-30 RX ADMIN — LACTULOSE 20 GRAM(S): 10 SOLUTION ORAL at 21:13

## 2020-04-30 RX ADMIN — Medication 2: at 17:06

## 2020-04-30 RX ADMIN — CHLORHEXIDINE GLUCONATE 15 MILLILITER(S): 213 SOLUTION TOPICAL at 05:10

## 2020-04-30 RX ADMIN — CHLORHEXIDINE GLUCONATE 1 APPLICATION(S): 213 SOLUTION TOPICAL at 05:09

## 2020-04-30 RX ADMIN — LACTULOSE 20 GRAM(S): 10 SOLUTION ORAL at 13:01

## 2020-04-30 RX ADMIN — Medication 1334 MILLIGRAM(S): at 17:07

## 2020-04-30 RX ADMIN — AMIODARONE HYDROCHLORIDE 400 MILLIGRAM(S): 400 TABLET ORAL at 05:11

## 2020-04-30 RX ADMIN — PANTOPRAZOLE SODIUM 40 MILLIGRAM(S): 20 TABLET, DELAYED RELEASE ORAL at 11:54

## 2020-04-30 RX ADMIN — Medication 1 APPLICATION(S): at 18:40

## 2020-04-30 RX ADMIN — INSULIN GLARGINE 5 UNIT(S): 100 INJECTION, SOLUTION SUBCUTANEOUS at 21:13

## 2020-04-30 RX ADMIN — CHLORHEXIDINE GLUCONATE 15 MILLILITER(S): 213 SOLUTION TOPICAL at 16:55

## 2020-04-30 RX ADMIN — AMIODARONE HYDROCHLORIDE 400 MILLIGRAM(S): 400 TABLET ORAL at 18:40

## 2020-04-30 RX ADMIN — LEVETIRACETAM 400 MILLIGRAM(S): 250 TABLET, FILM COATED ORAL at 05:09

## 2020-04-30 RX ADMIN — SENNA PLUS 2 TABLET(S): 8.6 TABLET ORAL at 21:17

## 2020-04-30 RX ADMIN — LEVETIRACETAM 400 MILLIGRAM(S): 250 TABLET, FILM COATED ORAL at 18:40

## 2020-04-30 RX ADMIN — Medication 1 APPLICATION(S): at 05:10

## 2020-04-30 RX ADMIN — LACTULOSE 20 GRAM(S): 10 SOLUTION ORAL at 05:09

## 2020-04-30 NOTE — PROGRESS NOTE ADULT - SUBJECTIVE AND OBJECTIVE BOX
Nephrology progress note    Patient was seen and examined, events over the last 24 h noted .  cr stable    Allergies:  Bactrim (Unknown)    Hospital Medications:   MEDICATIONS  (STANDING):  aMIOdarone    Tablet   Oral   aMIOdarone    Tablet 400 milliGRAM(s) Oral every 12 hours  BACItracin   Ointment 1 Application(s) Topical two times a day  calcium acetate 1334 milliGRAM(s) Oral three times a day with meals  chlorhexidine 0.12% Liquid 15 milliLiter(s) Oral Mucosa every 12 hours  chlorhexidine 4% Liquid 1 Application(s) Topical <User Schedule>  dextrose 50% Injectable 25 Gram(s) IV Push once  insulin glargine Injectable (LANTUS) 5 Unit(s) SubCutaneous at bedtime  insulin lispro (HumaLOG) corrective regimen sliding scale   SubCutaneous three times a day before meals  lactulose Syrup 20 Gram(s) Oral every 8 hours  levETIRAcetam  IVPB 250 milliGRAM(s) IV Intermittent every 12 hours  morphine  Infusion. 1 mG/Hr (1 mL/Hr) IV Continuous <Continuous>  mupirocin 2% Ointment 1 Application(s) Topical every 12 hours  pantoprazole   Suspension 40 milliGRAM(s) Oral daily  senna 2 Tablet(s) Oral at bedtime        VITALS:  T(F): 97.2 (04-30-20 @ 05:10), Max: 97.2 (04-30-20 @ 05:10)  HR: 98 (04-30-20 @ 08:10)  BP: 135/85 (04-30-20 @ 05:10)  RR: 18 (04-30-20 @ 05:10)  SpO2: 100% (04-30-20 @ 08:10)  Wt(kg): --    04-28 @ 07:01  -  04-29 @ 07:00  --------------------------------------------------------  IN: 2092.9 mL / OUT: 475 mL / NET: 1617.9 mL    04-29 @ 07:01  -  04-30 @ 07:00  --------------------------------------------------------  IN: 560 mL / OUT: 805 mL / NET: -245 mL          PHYSICAL EXAM:  	Gen: trach/ventilated   LABS:  04-30    142  |  107  |  139<HH>  ----------------------------<  120<H>  4.1   |  23  |  3.2<H>    Ca    8.5      30 Apr 2020 08:17  Mg     2.1     04-30    TPro  4.9<L>  /  Alb  2.2<L>  /  TBili  0.5  /  DBili      /  AST  29  /  ALT  49<H>  /  AlkPhos  93  04-30                          9.1    14.38 )-----------( 150      ( 30 Apr 2020 08:17 )             27.6       Urine Studies:      RADIOLOGY & ADDITIONAL STUDIES:

## 2020-04-30 NOTE — PROGRESS NOTE ADULT - ASSESSMENT
IMPRESSION:    Right gastrocnemius & peroneal DVT  Acute respiratory failure secondary to COVID-19 SP Trach 4/18  Multiple ischemic CVAs  Left occipital lobe hemorrhagic CVA  MRSA PNA + 4/15 SP ABX therapy     PLAN:    CNS:  Continue Keppra bid. sedation for comfort. F/U neuro. MRI completed     HEENT: Oral care. Trach Care.     PULMONARY: HOB @ 45 degrees. Keep POx > 92%. fio2 decreased to 60%     CARDIOVASCULAR: DC Levophed. I=O    GI: PPI ppx. Initiate PEG feeds.     RENAL: Correct as needed. FU with Renal      INFECTIOUS DISEASE:  SP ABX therapy. monitor off ab     HEMATOLOGICAL: hemoglobin stable f/u cbc     ENDOCRINE:  Follow up FS. Insulin protocol if needed.    MUSCULOSKELETAL: bed rest. Wound care. 4/26 ultrasound +right gastrocnemius vein and peroneal     Full code    Poor prognosis

## 2020-04-30 NOTE — PROGRESS NOTE ADULT - SUBJECTIVE AND OBJECTIVE BOX
KONSTANTIN WYATT  74y  Male    Patient is a 74y old  Male who presents with a chief complaint of suspected COVID-19 (30 Apr 2020 10:34)      SUBJECTIVE:    No overnight events    Vent: yes  Sedation: no  Pressors: no  Bowel Movements: no      PAST MEDICAL & SURGICAL HISTORY:  Afib  DM (diabetes mellitus)  BPH (benign prostatic hyperplasia)  CAD (coronary artery disease)      OBJECTIVE:    Vital Signs Last 24 Hrs  T(C): 36.2 (30 Apr 2020 05:10), Max: 36.2 (30 Apr 2020 05:10)  T(F): 97.2 (30 Apr 2020 05:10), Max: 97.2 (30 Apr 2020 05:10)  HR: 98 (30 Apr 2020 08:10) (55 - 126)  BP: 135/85 (30 Apr 2020 05:10) (81/58 - 157/74)  BP(mean): 81 (29 Apr 2020 20:00) (62 - 120)  RR: 18 (30 Apr 2020 05:10) (17 - 25)  SpO2: 100% (30 Apr 2020 08:10) (100% - 100%)    General: No apparent distress  HEENT: + trach               Neck: No JVD, No Cervical LN    Lungs: Bilateral BS, CTA  Cardiovascular: +S1 S2  Abdomen: Soft, nontender  Extremities: Pulses intact  Skin: decubiti   Neurological: non Focal     I&O's Summary    29 Apr 2020 07:01  -  30 Apr 2020 07:00  --------------------------------------------------------  IN: 560 mL / OUT: 805 mL / NET: -245 mL          LABS:                          9.1    14.38 )-----------( 150      ( 30 Apr 2020 08:17 )             27.6                                               04-30    142  |  107  |  139<HH>  ----------------------------<  120<H>  4.1   |  23  |  3.2<H>    Ca    8.5      30 Apr 2020 08:17  Mg     2.1     04-30    TPro  4.9<L>  /  Alb  2.2<L>  /  TBili  0.5  /  DBili  x   /  AST  29  /  ALT  49<H>  /  AlkPhos  93  04-30                                                                                           LIVER FUNCTIONS - ( 30 Apr 2020 08:17 )  Alb: 2.2 g/dL / Pro: 4.9 g/dL / ALK PHOS: 93 U/L / ALT: 49 U/L / AST: 29 U/L / GGT: x                                                                                               Mode: AC/ CMV (Assist Control/ Continuous Mandatory Ventilation)  RR (machine): 20  TV (machine): 500  FiO2: 60  PEEP: 7  ITime: 1  MAP: 11  PIP: 23                                      ABG - ( 29 Apr 2020 01:22 )  pH, Arterial: 7.14  pH, Blood: x     /  pCO2: 76    /  pO2: 178   / HCO3: 26    / Base Excess: -3.9  /  SaO2: 99                    MEDICATIONS  (STANDING):  aMIOdarone    Tablet   Oral   aMIOdarone    Tablet 400 milliGRAM(s) Oral every 12 hours  BACItracin   Ointment 1 Application(s) Topical two times a day  calcium acetate 1334 milliGRAM(s) Oral three times a day with meals  chlorhexidine 0.12% Liquid 15 milliLiter(s) Oral Mucosa every 12 hours  chlorhexidine 4% Liquid 1 Application(s) Topical <User Schedule>  dextrose 50% Injectable 25 Gram(s) IV Push once  insulin glargine Injectable (LANTUS) 5 Unit(s) SubCutaneous at bedtime  insulin lispro (HumaLOG) corrective regimen sliding scale   SubCutaneous three times a day before meals  lactulose Syrup 20 Gram(s) Oral every 8 hours  levETIRAcetam  IVPB 250 milliGRAM(s) IV Intermittent every 12 hours  morphine  Infusion. 1 mG/Hr (1 mL/Hr) IV Continuous <Continuous>  mupirocin 2% Ointment 1 Application(s) Topical every 12 hours  norepinephrine Infusion 0.05 MICROgram(s)/kG/Min (7.88 mL/Hr) IV Continuous <Continuous>  pantoprazole   Suspension 40 milliGRAM(s) Oral daily  senna 2 Tablet(s) Oral at bedtime    MEDICATIONS  (PRN):  acetaminophen  Suppository .. 650 milliGRAM(s) Rectal every 6 hours PRN Temp greater or equal to 38.5C (101.3F)  midazolam Injectable 2 milliGRAM(s) IV Push every 4 hours PRN agitation

## 2020-04-30 NOTE — CHART NOTE - NSCHARTNOTEFT_GEN_A_CORE
Registered Dietitian Follow-Up     Patient Profile Reviewed                           Yes [x]   No []     Nutrition History Previously Obtained        Yes []  No [x]       Pertinent Subjective Information: Unable to interview pt due to pt is non-verbal. Unable to reach emergency contacts. Per RN, pt is tolerating current TF regimen well with minimal residue.      Pertinent Medical Interventions:  LIZZIE. Hyperkalemia, worsening Uremia. Right gastrocnemius & peroneal DVT. Hypernatremia, improved. Acute respiratory failure secondary to COVID-19 repeat swab 4/13 and 4/17 negative, s/p Trach 4/18. Multiple CVA. S/p PEG placement 4/28. S/p rapid response on 4/28 for bleeding around PEG, s/p embolisation followed by IR trend hgb. No need for RRT per nephro.      Diet order: Nepro @240ml q6h     Anthropometrics:  - Ht. 175 cm   - Wt.86.9kg on 4/22 vs. (4/19): 89kg vs. (4/17): 91.9kg vs. (4/13): 95.4kg noted downtrending, likely d/t fluid shifts  - BMI  28.4 using lowest doc wt   -  lbs      Pertinent Lab Data: 4/30: RBC- 3.11, H/H- 9.1/ 27.6, BUN- 139, cr- 3.2, glucose serum-120, POCT BG- 124, 124; 4/1: HgbA1c- 7.7     Pertinent Meds: Lantus, humalog, amiodarone, Versed, lactulose syrup, protonix, calcium acetate, senna      Physical Findings:  - Appearance: trached; +3 generalized edema   - GI function: no GI issues noted; LBM 4/29  - Tubes: PEG  - Oral/Mouth cavity: NPO w TF   - Skin: ecchymosis and pressure ulcer unstageable to sacrum  and stage II lower Lip (first doc 4/23)     Nutrition Requirements (from RD note on 4/23)   Weight Used: 88.7kg, ideal 73 kg      Estimated Energy Needs    Continue []  Adjust [x] since pt is no longer on HD  Adjusted Energy Recommendations: 8544-6239 kcal/day (MSJ x 1.2-1.3 AF)        Estimated Protein Needs    Continue []  Adjust [x]  Adjusted Protein Recommendations:   gm/day (1-1.2 g/kg CBW)        Estimated Fluid Needs        Continue []  Adjust [x]  1 ml/kcal or per LIP      Nutrient Intake: current TF regimen provides 1728kcal, 76g protein, 701ml free H2O (meets estimate energy needs by 82% for kcal, and 71% for protein).         [] Previous Nutrition Diagnosis: none             [] Ongoing          [] Resolved       Nutrition Diagnostic #1  Problem: inadequate energy intake   Etiology: current TF regimen   Statement: current TF regimen meets estimate energy needs by 82% for kcal, and 71% for protein       Nutrition Intervention EN     Goal/Expected Outcome:  In 4 days TF to provide >85% est energy needs, but not exceed 105%       Indicator/Monitoring: energy intake, diet order, body composition, NFPF, electrolyte/renal profile, glucose profile, GOC.    Recommendations: Change current TF regimen to Glucerna 1.2 balaji @ 360ml q6hrs (this regimen will provide 2106 kcal, 108 gm of protein, and 1458 ml of free fluids which will meet estimated energy needs by 103% for kcal and 101% for protein) since pt is no longer on HD and electrolytes are improving. Will cont to monitor electrolytes and adjust TF regimen as needed. Flushes per LIP. Registered Dietitian Follow-Up     Patient Profile Reviewed                           Yes [x]   No []     Nutrition History Previously Obtained        Yes []  No [x]       Pertinent Subjective Information: Unable to interview pt due to pt is non-verbal. Unable to reach emergency contacts. Per RN, pt is tolerating current TF regimen well with minimal residue.      Pertinent Medical Interventions:  LIZZIE. Hyperkalemia, worsening Uremia. Right gastrocnemius & peroneal DVT. Hypernatremia, improved. Acute respiratory failure secondary to COVID-19 repeat swab 4/13 and 4/17 negative, s/p Trach 4/18. Multiple CVA. S/p PEG placement 4/28. S/p rapid response on 4/28 for bleeding around PEG, s/p embolisation followed by IR trend hgb. No need for RRT per nephro.      Diet order: Nepro @240ml q6h     Anthropometrics:  - Ht. 175 cm   - Wt.86.9kg on 4/22 vs. (4/19): 89kg vs. (4/17): 91.9kg vs. (4/13): 95.4kg noted downtrending, likely d/t fluid shifts  - BMI  28.4 using lowest doc wt   -  lbs      Pertinent Lab Data: 4/30: RBC- 3.11, H/H- 9.1/ 27.6, BUN- 139, cr- 3.2, glucose serum-120, POCT BG- 124, 124; 4/1: HgbA1c- 7.7     Pertinent Meds: Lantus, humalog, amiodarone, Versed, lactulose syrup, protonix, calcium acetate, senna      Physical Findings:  - Appearance: trached; +3 generalized edema   - GI function: no GI issues noted; LBM 4/29  - Tubes: PEG  - Oral/Mouth cavity: NPO w TF   - Skin: ecchymosis and pressure ulcer unstageable to sacrum  and stage II lower Lip (first doc 4/23)     Nutrition Requirements (from RD note on 4/23)   Weight Used: 88.7kg, ideal 73 kg      Estimated Energy Needs    Continue []  Adjust [x] since pt is no longer on HD  Adjusted Energy Recommendations: 2265-9312 kcal/day (MSJ x 1.2-1.3 AF)        Estimated Protein Needs    Continue []  Adjust [x]  Adjusted Protein Recommendations:   gm/day (1-1.2 g/kg CBW)        Estimated Fluid Needs        Continue []  Adjust [x]  1 ml/kcal or per LIP      Nutrient Intake: current TF regimen provides 1728kcal, 76g protein, 701ml free H2O (meets estimate energy needs by 82% for kcal, and 71% for protein).         [] Previous Nutrition Diagnosis: none             [] Ongoing          [] Resolved       Nutrition Diagnostic #1  Problem: inadequate energy intake   Etiology: current TF regimen   Statement: current TF regimen meets estimate energy needs by 82% for kcal, and 71% for protein       Nutrition Intervention EN     Goal/Expected Outcome:  In 4 days TF to provide >85% est energy needs, but not exceed 105%       Indicator/Monitoring: energy intake, diet order, body composition, NFPF, electrolyte/renal profile, glucose profile, GOC.    Recommendations: Change current TF regimen to Glucerna 1.2 balaji @ 360ml q4hrs (this regimen will provide 2106 kcal, 108 gm of protein, and 1458 ml of free fluids which will meet estimated energy needs by 103% for kcal and 101% for protein) since pt is no longer on HD and electrolytes are improving. Will cont to monitor electrolytes and adjust TF regimen as needed. Flushes per LIP.

## 2020-04-30 NOTE — PROGRESS NOTE ADULT - ASSESSMENT
LIZZIE/ ATN/ hyperkalemia/ respiratory failure / covid positive / high BUN    # creatinine trending down   # non-oliguric  # ph at goal   # s/p Rapid response on 4/28 , bleeding around PEG, s/p embolisation followed by IR trend hgb   # K improved   # will follow  # no acute indication for RRT

## 2020-04-30 NOTE — PROGRESS NOTE ADULT - SUBJECTIVE AND OBJECTIVE BOX
Interventional Radiology Follow- Up Note    75yo Male s/p Radiologically Inserted Gastrostomy (RIG) followed by urgent celiac arteriography with coil embolization of right gastroepiploic artery bleed on 4/28/2020 in Interventional Radiology with Dr. Lau.        O/N:  No acute events overnight. Pt is hemodynamically stable.     Vitals: T(F): 97.2 (04-30-20 @ 05:10), Max: 97.2 (04-30-20 @ 05:10)  HR: 98 (04-30-20 @ 08:10) (55 - 126)  BP: 135/85 (04-30-20 @ 05:10) (81/58 - 157/74)  RR: 18 (04-30-20 @ 05:10) (17 - 32)  SpO2: 100% (04-30-20 @ 08:10) (100% - 100%)  Wt(kg): --    LABS:                        9.1    14.38 )-----------( 150      ( 30 Apr 2020 08:17 )             27.6     04-30    142  |  107  |  139<HH>  ----------------------------<  120<H>  4.1   |  23  |  3.2<H>    Ca    8.5      30 Apr 2020 08:17  Mg     2.1     04-30    TPro  4.9<L>  /  Alb  2.2<L>  /  TBili  0.5  /  DBili  x   /  AST  29  /  ALT  49<H>  /  AlkPhos  93  04-30        Culture:   output :    FOCUSED PHYSICAL EXAM:  General: Nonresponsive, NAD  Abdomen: soft, gastrostomy site/dressing is clean and dry. G-tube in place.  No signs of hematoma.    A/P:    74y Male admitted with ACUTE HYPOXEMIC RESPIRATORY FAILURE,PULMONARY INFILTRATES ON CXR, who is one day, s/p gastrostomy catheter placement followed by urgent celiac angiography with coil embolization of right gastroepiploic artery bleed with subsequent hemostasis observed.    Pt downgraded to medical floor.  HD stable. No signs of internal bleed.  Ok to use G-tube.     Please call Interventional Radiology [phone] with any questions, concerns, or issues regarding above.

## 2020-05-01 LAB
GLUCOSE BLDC GLUCOMTR-MCNC: 183 MG/DL — HIGH (ref 70–99)
GLUCOSE BLDC GLUCOMTR-MCNC: 219 MG/DL — HIGH (ref 70–99)
GLUCOSE BLDC GLUCOMTR-MCNC: 225 MG/DL — HIGH (ref 70–99)
GLUCOSE BLDC GLUCOMTR-MCNC: 228 MG/DL — HIGH (ref 70–99)
GLUCOSE BLDC GLUCOMTR-MCNC: 83 MG/DL — SIGNIFICANT CHANGE UP (ref 70–99)

## 2020-05-01 PROCEDURE — 99221 1ST HOSP IP/OBS SF/LOW 40: CPT

## 2020-05-01 RX ORDER — MORPHINE SULFATE 50 MG/1
2 CAPSULE, EXTENDED RELEASE ORAL
Qty: 100 | Refills: 0 | Status: DISCONTINUED | OUTPATIENT
Start: 2020-05-01 | End: 2020-05-05

## 2020-05-01 RX ADMIN — INSULIN GLARGINE 5 UNIT(S): 100 INJECTION, SOLUTION SUBCUTANEOUS at 22:02

## 2020-05-01 RX ADMIN — CHLORHEXIDINE GLUCONATE 15 MILLILITER(S): 213 SOLUTION TOPICAL at 06:17

## 2020-05-01 RX ADMIN — LEVETIRACETAM 400 MILLIGRAM(S): 250 TABLET, FILM COATED ORAL at 18:16

## 2020-05-01 RX ADMIN — MORPHINE SULFATE 2 MG/HR: 50 CAPSULE, EXTENDED RELEASE ORAL at 21:48

## 2020-05-01 RX ADMIN — CHLORHEXIDINE GLUCONATE 1 APPLICATION(S): 213 SOLUTION TOPICAL at 06:17

## 2020-05-01 RX ADMIN — LACTULOSE 20 GRAM(S): 10 SOLUTION ORAL at 14:52

## 2020-05-01 RX ADMIN — PANTOPRAZOLE SODIUM 40 MILLIGRAM(S): 20 TABLET, DELAYED RELEASE ORAL at 12:21

## 2020-05-01 RX ADMIN — Medication 1334 MILLIGRAM(S): at 08:33

## 2020-05-01 RX ADMIN — Medication 2: at 12:21

## 2020-05-01 RX ADMIN — Medication 1 APPLICATION(S): at 06:16

## 2020-05-01 RX ADMIN — CHLORHEXIDINE GLUCONATE 15 MILLILITER(S): 213 SOLUTION TOPICAL at 18:16

## 2020-05-01 RX ADMIN — LACTULOSE 20 GRAM(S): 10 SOLUTION ORAL at 06:17

## 2020-05-01 RX ADMIN — Medication 1334 MILLIGRAM(S): at 18:16

## 2020-05-01 RX ADMIN — AMIODARONE HYDROCHLORIDE 400 MILLIGRAM(S): 400 TABLET ORAL at 06:16

## 2020-05-01 RX ADMIN — Medication 1334 MILLIGRAM(S): at 12:21

## 2020-05-01 RX ADMIN — AMIODARONE HYDROCHLORIDE 400 MILLIGRAM(S): 400 TABLET ORAL at 15:06

## 2020-05-01 RX ADMIN — Medication 4: at 18:18

## 2020-05-01 RX ADMIN — LACTULOSE 20 GRAM(S): 10 SOLUTION ORAL at 21:48

## 2020-05-01 RX ADMIN — MUPIROCIN 1 APPLICATION(S): 20 OINTMENT TOPICAL at 06:17

## 2020-05-01 RX ADMIN — Medication 2 MILLIGRAM(S): at 18:58

## 2020-05-01 RX ADMIN — SENNA PLUS 2 TABLET(S): 8.6 TABLET ORAL at 21:48

## 2020-05-01 RX ADMIN — Medication 4: at 08:31

## 2020-05-01 RX ADMIN — LEVETIRACETAM 400 MILLIGRAM(S): 250 TABLET, FILM COATED ORAL at 06:17

## 2020-05-01 RX ADMIN — Medication 1 APPLICATION(S): at 18:16

## 2020-05-01 RX ADMIN — MUPIROCIN 1 APPLICATION(S): 20 OINTMENT TOPICAL at 18:59

## 2020-05-01 NOTE — CONSULT NOTE ADULT - PROVIDER SPECIALTY LIST ADULT
Intervent Radiology
Burn
Burn
Critical Care
Critical Care
Nephrology
Neurology
Palliative Care
Infectious Disease

## 2020-05-01 NOTE — CONSULT NOTE ADULT - CONSULT REQUESTED DATE/TIME
01-May-2020 13:08
08-Apr-2020 13:31
14-Apr-2020 19:09
16-Apr-2020
19-Apr-2020 10:15
24-Apr-2020 13:43
31-Mar-2020 14:38
31-Mar-2020 17:55
31-Mar-2020 09:19

## 2020-05-01 NOTE — CONSULT NOTE ADULT - ASSESSMENT
Sacral wound     RECOMMENDATION:  - Wound care twice daily: wash wound with soap and water, apply santyl, then cover with moist with Dakins solution gauze, then cover with ABD and tape.   - probably will need surgical debridement next week  - IV abx as indicated/ per ID  - Offloading/ positional changes  - Will follow. Sacral wound     RECOMMENDATION:  - Wound care twice daily: wash wound with soap and water,   Enzymatic debridement with Santyl, then cover with Hydrogel or moist with Dakins solution and dry dressing    - May recommend surgical debridement at some point   - IV abx as indicated/ per ID  - Offloading/ positional changes  - Will follow.

## 2020-05-01 NOTE — CONSULT NOTE ADULT - REASON FOR ADMISSION
suspected COVID-19

## 2020-05-01 NOTE — PROGRESS NOTE ADULT - SUBJECTIVE AND OBJECTIVE BOX
Nephrology progress note    Patient was seen and examined, events over the last 24 h noted .  labs pending  Allergies:  Bactrim (Unknown)    Hospital Medications:   MEDICATIONS  (STANDING):  aMIOdarone    Tablet   Oral   aMIOdarone    Tablet 400 milliGRAM(s) Oral every 12 hours  BACItracin   Ointment 1 Application(s) Topical two times a day  calcium acetate 1334 milliGRAM(s) Oral three times a day with meals  chlorhexidine 0.12% Liquid 15 milliLiter(s) Oral Mucosa every 12 hours  chlorhexidine 4% Liquid 1 Application(s) Topical <User Schedule>  dextrose 50% Injectable 25 Gram(s) IV Push once  insulin glargine Injectable (LANTUS) 5 Unit(s) SubCutaneous at bedtime  insulin lispro (HumaLOG) corrective regimen sliding scale   SubCutaneous three times a day before meals  lactulose Syrup 20 Gram(s) Oral every 8 hours  levETIRAcetam  IVPB 250 milliGRAM(s) IV Intermittent every 12 hours  morphine  Infusion. 1 mG/Hr (1 mL/Hr) IV Continuous <Continuous>  mupirocin 2% Ointment 1 Application(s) Topical every 12 hours  pantoprazole   Suspension 40 milliGRAM(s) Oral daily  senna 2 Tablet(s) Oral at bedtime        VITALS:  T(F): 96.6 (05-01-20 @ 07:30), Max: 96.6 (05-01-20 @ 07:30)  HR: 104 (05-01-20 @ 09:27)  BP: 142/66 (05-01-20 @ 07:30)  RR: 18 (05-01-20 @ 07:30)  SpO2: 100% (05-01-20 @ 09:27)  Wt(kg): --    04-29 @ 07:01  -  04-30 @ 07:00  --------------------------------------------------------  IN: 560 mL / OUT: 805 mL / NET: -245 mL    04-30 @ 07:01  -  05-01 @ 07:00  --------------------------------------------------------  IN: 720 mL / OUT: 1850 mL / NET: -1130 mL          PHYSICAL EXAM:    LABS:  04-30    142  |  107  |  139<HH>  ----------------------------<  120<H>  4.1   |  23  |  3.2<H>    Ca    8.5      30 Apr 2020 08:17  Mg     2.1     04-30    TPro  4.9<L>  /  Alb  2.2<L>  /  TBili  0.5  /  DBili      /  AST  29  /  ALT  49<H>  /  AlkPhos  93  04-30                          9.1    14.38 )-----------( 150      ( 30 Apr 2020 08:17 )             27.6       Urine Studies:      RADIOLOGY & ADDITIONAL STUDIES:

## 2020-05-01 NOTE — PROGRESS NOTE ADULT - ASSESSMENT
LIZZIE/ ATN/ hyperkalemia/ respiratory failure / covid positive / high BUN    # creatinine trending down, today's pending   # non-oliguric  # ph at goal   # s/p Rapid response on 4/28 , bleeding around PEG, s/p embolisation followed by IR trend hgb   # K improved   # will follow  # no acute indication for RRT

## 2020-05-01 NOTE — PROGRESS NOTE ADULT - ASSESSMENT
IMPRESSION:    Right gastrocnemius & peroneal DVT  Acute respiratory failure secondary to COVID-19 SP Trach 4/18  Multiple ischemic CVAs  Left occipital lobe hemorrhagic CVA  MRSA PNA + 4/15 SP ABX therapy     PLAN:    CNS:  Continue Keppra bid. sedation for comfort. F/U neuro. MRA completed. MRI ordered     HEENT: Oral care. Trach Care.     PULMONARY: HOB @ 45 degrees. Keep POx > 92%. fio2 decreased to 50%     CARDIOVASCULAR: I=O    GI: PPI ppx. PEG feeds.     RENAL: Correct as needed. Nephrology following       INFECTIOUS DISEASE:  SP ABX therapy. f/u id recommendations as note is currently pending     HEMATOLOGICAL: hemoglobin stable f/u cbc     ENDOCRINE:  Follow up FS. Insulin protocol if needed.    MUSCULOSKELETAL: bed rest. Wound care. 4/26 ultrasound +right gastrocnemius vein and peroneal     Full code    Poor prognosis IMPRESSION:    Right gastrocnemius & peroneal DVT  Acute respiratory failure secondary to COVID-19 SP Trach 4/18  Multiple ischemic CVAs  Left occipital lobe hemorrhagic CVA  MRSA PNA + 4/15 SP ABX therapy     PLAN:    CNS:  Continue Keppra bid. sedation for comfort. F/U neuro. MRA completed. MRI ordered     HEENT: Oral care. Trach Care.     PULMONARY: HOB @ 45 degrees. Keep POx > 92%. fio2 decreased to 50%     CARDIOVASCULAR: I=O    GI: PPI ppx. PEG feeds.     RENAL: Correct as needed. Nephrology following       INFECTIOUS DISEASE:  SP ABX therapy. f/u id recommendations as note is currently pending     HEMATOLOGICAL: hemoglobin stable f/u cbc     ENDOCRINE:  Follow up FS. Insulin protocol if needed.    MUSCULOSKELETAL: bed rest. Wound care. 4/26 ultrasound +right gastrocnemius vein and peroneal. burn note appreciated  for wound care     Full code    Poor prognosis

## 2020-05-01 NOTE — PROGRESS NOTE ADULT - ASSESSMENT
ASSESSMENT  73 yo M w/ hx of Afib on Eliquis, CAD s/p PCI, DM, BPH, presents with 1 week of fever, cough, and progressive SOB admitted 3/30 with COVID-19 PNA. Prolonged hospital course s/p trach    IMPRESSION:  #COVID19 with severe septic shock requiring pressors on mechanical ventilation with ARDS secondary to Cytokine Release Syndrome    s/p Percutaneous tracheostomy at bedside 18-Apr-2020   cefepime   IVPB 4/1-4/8, 4/12-4/17  meropenem  IVPB 4/18-4/25  vancomycin  IVPB 4/18-4/23  #Right gastrocnemius & peroneal DVT  #Sacral Ulcer  #MRSA PNA + 4/15 s/p 6 days of Vancomycin   #Multiple ischemic CVAs; Left occipital lobe hemorrhagic CVA    RECOMMENDATIONS:  - Burn consult  - Pt had essentially been on antibiotics for 3 weeks from 4/1- 4/23, received 6 days vanc for MRSA PNA  This is an incomplete pended note, all final recommendations to follow. ASSESSMENT  73 yo M w/ hx of Afib on Eliquis, CAD s/p PCI, DM, BPH, presents with 1 week of fever, cough, and progressive SOB admitted 3/30 with COVID-19 PNA. Prolonged hospital course s/p trach    IMPRESSION:  #COVID19 with severe septic shock requiring pressors on mechanical ventilation with ARDS secondary to Cytokine Release Syndrome    s/p Percutaneous tracheostomy at bedside 18-Apr-2020   cefepime   IVPB 4/1-4/8, 4/12-4/17  meropenem  IVPB 4/18-4/25  vancomycin  IVPB 4/18-4/23  #Right gastrocnemius & peroneal DVT  #Sacral Ulcer  #MRSA PNA + 4/15 s/p 6 days of Vancomycin   #Multiple ischemic CVAs; Left occipital lobe hemorrhagic CVA    RECOMMENDATIONS:  - Burn consult appreciated  - Pt had essentially been on antibiotics for 3 weeks from 4/1- 4/23, received 6 days vanc for MRSA PNA. CXR improved RLL  - Monitor off ABX , trend WBC fever curve

## 2020-05-01 NOTE — PROGRESS NOTE ADULT - SUBJECTIVE AND OBJECTIVE BOX
KONSTANTIN WYATT  74y, Male  Allergy: Bactrim (Unknown)      LOS  32d    CHIEF COMPLAINT: suspected COVID-19 (30 Apr 2020 11:37)      INTERVAL EVENTS/HPI  - ID reconsulted for sacral ulcer, rising WBC. Pt had essentially been on antibiotics for 3 weeks from 4/1- 4/23, received 6 days vanc for MRSA PNA  - T(F): , Max: 96.5 (04-30-20 @ 20:00), hypothermia  - WBC Count: 14.38 (04-30-20 @ 08:17)  WBC Count: 14.00 (04-28-20 @ 23:52)  - Creatinine, Serum: 3.2 (04-30-20 @ 08:17), improved       VITALS:  T(F): 96.5, Max: 96.5 (04-30-20 @ 20:00)  HR: 126  BP: 113/75  RR: 18Vital Signs Last 24 Hrs  T(C): 35.8 (30 Apr 2020 20:00), Max: 35.8 (30 Apr 2020 20:00)  T(F): 96.5 (30 Apr 2020 20:00), Max: 96.5 (30 Apr 2020 20:00)  HR: 126 (30 Apr 2020 20:00) (98 - 132)  BP: 113/75 (30 Apr 2020 20:00) (113/75 - 136/59)  BP(mean): --  RR: 18 (30 Apr 2020 20:00) (18 - 18)  SpO2: 97% (30 Apr 2020 20:00) (97% - 100%)    PHYSICAL EXAM:  Gen trach on the vent  Lungs: bilateral chest expansion  Neuro: sedated  Sacral ulcer    FH: Non-contributory  Social Hx: Non-contributory    TESTS & MEASUREMENTS:                        9.1    14.38 )-----------( 150      ( 30 Apr 2020 08:17 )             27.6     04-30    142  |  107  |  139<HH>  ----------------------------<  120<H>  4.1   |  23  |  3.2<H>    Ca    8.5      30 Apr 2020 08:17  Mg     2.1     04-30    TPro  4.9<L>  /  Alb  2.2<L>  /  TBili  0.5  /  DBili  x   /  AST  29  /  ALT  49<H>  /  AlkPhos  93  04-30    eGFR if Non African American: 18 mL/min/1.73M2 (04-30-20 @ 08:17)  eGFR if : 21 mL/min/1.73M2 (04-30-20 @ 08:17)    LIVER FUNCTIONS - ( 30 Apr 2020 08:17 )  Alb: 2.2 g/dL / Pro: 4.9 g/dL / ALK PHOS: 93 U/L / ALT: 49 U/L / AST: 29 U/L / GGT: x               Culture - Sputum (collected 04-15-20 @ 16:30)  Source: .Sputum Sputum  Gram Stain (04-17-20 @ 06:32):    Numerous polymorphonuclear leukocytes per low power field    No Squamous epithelial cells per low power field    Moderate Gram positive cocci in pairs seen per oil power field  Final Report (04-18-20 @ 18:39):    Moderate Methicillin resistant Staphylococcus aureus    Normal Respiratory Li absent  Organism: Methicillin resistant Staphylococcus aureus (04-18-20 @ 18:39)  Organism: Methicillin resistant Staphylococcus aureus (04-18-20 @ 18:39)      -  Ampicillin/Sulbactam: R 16/8      -  Cefazolin: R >16      -  Clindamycin: R >4      -  Erythromycin: R >4      -  Gentamicin: S <=1 Should not be used as monotherapy      -  Linezolid: S 2      -  Oxacillin: R >2      -  Penicillin: R >8      -  RIF- Rifampin: S <=1 Should not be used as monotherapy      -  Tetra/Doxy: S 2      -  Trimethoprim/Sulfamethoxazole: S <=0.5/9.5      -  Vancomycin: S 2      Method Type: [facility]            INFECTIOUS DISEASES TESTING  Procalcitonin, Serum: 0.45 (04-23-20 @ 00:00)  MRSA PCR Result.: Positive (04-22-20 @ 16:41)  Procalcitonin, Serum: 0.62 (04-21-20 @ 23:30)  Procalcitonin, Serum: 0.75 (04-20-20 @ 23:00)  Procalcitonin, Serum: 1.13 (04-20-20 @ 01:00)  Procalcitonin, Serum: 1.49 (04-19-20 @ 01:00)  Procalcitonin, Serum: 1.92 (04-18-20 @ 17:11)  Procalcitonin, Serum: 1.58 (04-17-20 @ 16:00)  COVID-19 PCR: NotDetec (04-16-20 @ 19:05)  Procalcitonin, Serum: 1.38 (04-16-20 @ 16:21)  Procalcitonin, Serum: 1.37 (04-16-20 @ 00:30)  Procalcitonin, Serum: 0.83 (04-14-20 @ 00:25)  COVID-19 PCR: NotDetec (04-13-20 @ 14:10)  Procalcitonin, Serum: 1.87 (04-11-20 @ 01:12)  Procalcitonin, Serum: 1.53 (04-09-20 @ 16:07)  Procalcitonin, Serum: 1.41 (04-08-20 @ 23:20)  Procalcitonin, Serum: 1.17 (04-08-20 @ 00:00)  Procalcitonin, Serum: 0.91 (04-05-20 @ 23:00)  Procalcitonin, Serum: 2.33 (04-03-20 @ 06:50)  Procalcitonin, Serum: 2.55 (04-03-20 @ 00:05)  Procalcitonin, Serum: 3.25 (04-01-20 @ 23:35)  COVID-19 PCR: Detected (04-01-20 @ 16:53)  Procalcitonin, Serum: 3.11 (04-01-20 @ 12:40)  Procalcitonin, Serum: 0.23 (03-30-20 @ 22:04)  COVID-19 PCR: NotDetec (03-30-20 @ 11:48)      INFLAMMATORY MARKERS  C-Reactive Protein, Serum: 3.68 mg/dL (04-23-20 @ 00:00)  Sedimentation Rate, Erythrocyte: 128 mm/Hr (04-20-20 @ 23:00)  C-Reactive Protein, Serum: 6.43 mg/dL (04-20-20 @ 23:00)  C-Reactive Protein, Serum: 9.02 mg/dL (04-18-20 @ 17:11)      RADIOLOGY & ADDITIONAL TESTS:  I have personally reviewed the last available Chest xray  CXR      CT      CARDIOLOGY TESTING  12 Lead ECG:   Ventricular Rate 121 BPM    Atrial Rate 129 BPM    QRS Duration 96 ms    Q-T Interval 320 ms    QTC Calculation(Bezet) 454 ms    R Axis 24 degrees    T Axis 38 degrees    Diagnosis Line Atrial fibrillation with rapid ventricular response  Incomplete right bundle branch block  Nonspecific T wave abnormality  Abnormal ECG    Confirmed by SOBEIDA CRUZ MDFIM ([id number]) on 4/26/2020 4:39:53 PM (04-26-20 @ 04:48)  12 Lead ECG:   Ventricular Rate 147 BPM    Atrial Rate 202 BPM    QRS Duration 92 ms    Q-T Interval 296 ms    QTC Calculation(Bezet) 463 ms    R Axis 4 degrees    T Axis 41 degrees    Diagnosis Line Atrial fibrillation with rapid ventricular response with premature ventricular  or aberrantly conducted complexes  Incomplete right bundle branch block  Nonspecific ST and T wave abnormality  Abnormal ECG    Confirmed by Sukhwinder Winchester ([id number]) on 4/24/2020 5:58:50 AM (04-24-20 @ 05:32)      MEDICATIONS  aMIOdarone    Tablet   aMIOdarone    Tablet 400  BACItracin   Ointment 1  calcium acetate 1334  chlorhexidine 0.12% Liquid 15  chlorhexidine 4% Liquid 1  dextrose 50% Injectable 25  insulin glargine Injectable (LANTUS) 5  insulin lispro (HumaLOG) corrective regimen sliding scale   lactulose Syrup 20  levETIRAcetam  IVPB 250  morphine  Infusion. 1  mupirocin 2% Ointment 1  pantoprazole   Suspension 40  senna 2      WEIGHT  Weight (kg): 84.1 (04-28-20 @ 20:45)  Creatinine, Serum: 3.2 mg/dL (04-30-20 @ 08:17)      ANTIBIOTICS:      All available historical records have been reviewed KONSTANTIN WYATT  74y, Male  Allergy: Bactrim (Unknown)      LOS  32d    CHIEF COMPLAINT: suspected COVID-19 (30 Apr 2020 11:37)      INTERVAL EVENTS/HPI  - ID reconsulted for sacral ulcer, rising WBC. Pt had essentially been on antibiotics for 3 weeks from 4/1- 4/23, received 6 days vanc for MRSA PNA  - T(F): , Max: 96.5 (04-30-20 @ 20:00), hypothermia  - WBC Count: 14.38 (04-30-20 @ 08:17)  WBC Count: 14.00 (04-28-20 @ 23:52)  - Creatinine, Serum: 3.2 (04-30-20 @ 08:17), improved       VITALS:  T(F): 96.5, Max: 96.5 (04-30-20 @ 20:00)  HR: 126  BP: 113/75  RR: 18Vital Signs Last 24 Hrs  T(C): 35.8 (30 Apr 2020 20:00), Max: 35.8 (30 Apr 2020 20:00)  T(F): 96.5 (30 Apr 2020 20:00), Max: 96.5 (30 Apr 2020 20:00)  HR: 126 (30 Apr 2020 20:00) (98 - 132)  BP: 113/75 (30 Apr 2020 20:00) (113/75 - 136/59)  BP(mean): --  RR: 18 (30 Apr 2020 20:00) (18 - 18)  SpO2: 97% (30 Apr 2020 20:00) (97% - 100%)    PHYSICAL EXAM:  Gen trach on the vent  Lungs: bilateral chest expansion  Neuro: sedated  sacral  ulcer with eschar , no purulence    FH: Non-contributory  Social Hx: Non-contributory    TESTS & MEASUREMENTS:                        9.1    14.38 )-----------( 150      ( 30 Apr 2020 08:17 )             27.6     04-30    142  |  107  |  139<HH>  ----------------------------<  120<H>  4.1   |  23  |  3.2<H>    Ca    8.5      30 Apr 2020 08:17  Mg     2.1     04-30    TPro  4.9<L>  /  Alb  2.2<L>  /  TBili  0.5  /  DBili  x   /  AST  29  /  ALT  49<H>  /  AlkPhos  93  04-30    eGFR if Non African American: 18 mL/min/1.73M2 (04-30-20 @ 08:17)  eGFR if : 21 mL/min/1.73M2 (04-30-20 @ 08:17)    LIVER FUNCTIONS - ( 30 Apr 2020 08:17 )  Alb: 2.2 g/dL / Pro: 4.9 g/dL / ALK PHOS: 93 U/L / ALT: 49 U/L / AST: 29 U/L / GGT: x               Culture - Sputum (collected 04-15-20 @ 16:30)  Source: .Sputum Sputum  Gram Stain (04-17-20 @ 06:32):    Numerous polymorphonuclear leukocytes per low power field    No Squamous epithelial cells per low power field    Moderate Gram positive cocci in pairs seen per oil power field  Final Report (04-18-20 @ 18:39):    Moderate Methicillin resistant Staphylococcus aureus    Normal Respiratory Li absent  Organism: Methicillin resistant Staphylococcus aureus (04-18-20 @ 18:39)  Organism: Methicillin resistant Staphylococcus aureus (04-18-20 @ 18:39)      -  Ampicillin/Sulbactam: R 16/8      -  Cefazolin: R >16      -  Clindamycin: R >4      -  Erythromycin: R >4      -  Gentamicin: S <=1 Should not be used as monotherapy      -  Linezolid: S 2      -  Oxacillin: R >2      -  Penicillin: R >8      -  RIF- Rifampin: S <=1 Should not be used as monotherapy      -  Tetra/Doxy: S 2      -  Trimethoprim/Sulfamethoxazole: S <=0.5/9.5      -  Vancomycin: S 2      Method Type: [facility]            INFECTIOUS DISEASES TESTING  Procalcitonin, Serum: 0.45 (04-23-20 @ 00:00)  MRSA PCR Result.: Positive (04-22-20 @ 16:41)  Procalcitonin, Serum: 0.62 (04-21-20 @ 23:30)  Procalcitonin, Serum: 0.75 (04-20-20 @ 23:00)  Procalcitonin, Serum: 1.13 (04-20-20 @ 01:00)  Procalcitonin, Serum: 1.49 (04-19-20 @ 01:00)  Procalcitonin, Serum: 1.92 (04-18-20 @ 17:11)  Procalcitonin, Serum: 1.58 (04-17-20 @ 16:00)  COVID-19 PCR: NotDetec (04-16-20 @ 19:05)  Procalcitonin, Serum: 1.38 (04-16-20 @ 16:21)  Procalcitonin, Serum: 1.37 (04-16-20 @ 00:30)  Procalcitonin, Serum: 0.83 (04-14-20 @ 00:25)  COVID-19 PCR: NotDetec (04-13-20 @ 14:10)  Procalcitonin, Serum: 1.87 (04-11-20 @ 01:12)  Procalcitonin, Serum: 1.53 (04-09-20 @ 16:07)  Procalcitonin, Serum: 1.41 (04-08-20 @ 23:20)  Procalcitonin, Serum: 1.17 (04-08-20 @ 00:00)  Procalcitonin, Serum: 0.91 (04-05-20 @ 23:00)  Procalcitonin, Serum: 2.33 (04-03-20 @ 06:50)  Procalcitonin, Serum: 2.55 (04-03-20 @ 00:05)  Procalcitonin, Serum: 3.25 (04-01-20 @ 23:35)  COVID-19 PCR: Detected (04-01-20 @ 16:53)  Procalcitonin, Serum: 3.11 (04-01-20 @ 12:40)  Procalcitonin, Serum: 0.23 (03-30-20 @ 22:04)  COVID-19 PCR: NotDetec (03-30-20 @ 11:48)      INFLAMMATORY MARKERS  C-Reactive Protein, Serum: 3.68 mg/dL (04-23-20 @ 00:00)  Sedimentation Rate, Erythrocyte: 128 mm/Hr (04-20-20 @ 23:00)  C-Reactive Protein, Serum: 6.43 mg/dL (04-20-20 @ 23:00)  C-Reactive Protein, Serum: 9.02 mg/dL (04-18-20 @ 17:11)      RADIOLOGY & ADDITIONAL TESTS:  I have personally reviewed the last available Chest xray  CXR      CT      CARDIOLOGY TESTING  12 Lead ECG:   Ventricular Rate 121 BPM    Atrial Rate 129 BPM    QRS Duration 96 ms    Q-T Interval 320 ms    QTC Calculation(Bezet) 454 ms    R Axis 24 degrees    T Axis 38 degrees    Diagnosis Line Atrial fibrillation with rapid ventricular response  Incomplete right bundle branch block  Nonspecific T wave abnormality  Abnormal ECG    Confirmed by SOBEIDA CRUZ MDFIM ([id number]) on 4/26/2020 4:39:53 PM (04-26-20 @ 04:48)  12 Lead ECG:   Ventricular Rate 147 BPM    Atrial Rate 202 BPM    QRS Duration 92 ms    Q-T Interval 296 ms    QTC Calculation(Bezet) 463 ms    R Axis 4 degrees    T Axis 41 degrees    Diagnosis Line Atrial fibrillation with rapid ventricular response with premature ventricular  or aberrantly conducted complexes  Incomplete right bundle branch block  Nonspecific ST and T wave abnormality  Abnormal ECG    Confirmed by Sukhwinder Winchester ([id number]) on 4/24/2020 5:58:50 AM (04-24-20 @ 05:32)      MEDICATIONS  aMIOdarone    Tablet   aMIOdarone    Tablet 400  BACItracin   Ointment 1  calcium acetate 1334  chlorhexidine 0.12% Liquid 15  chlorhexidine 4% Liquid 1  dextrose 50% Injectable 25  insulin glargine Injectable (LANTUS) 5  insulin lispro (HumaLOG) corrective regimen sliding scale   lactulose Syrup 20  levETIRAcetam  IVPB 250  morphine  Infusion. 1  mupirocin 2% Ointment 1  pantoprazole   Suspension 40  senna 2      WEIGHT  Weight (kg): 84.1 (04-28-20 @ 20:45)  Creatinine, Serum: 3.2 mg/dL (04-30-20 @ 08:17)      ANTIBIOTICS:      All available historical records have been reviewed

## 2020-05-01 NOTE — CONSULT NOTE ADULT - CONSULT REASON
Acute Respiratory Failure
Goals of care
LIZZIE  hyperK
Lip wound
RIG
SOB/intubated
multiple strokes on CTH
sacral wound
COVID-19

## 2020-05-01 NOTE — CONSULT NOTE ADULT - SUBJECTIVE AND OBJECTIVE BOX
74y  Male  HPI:  73 yo M w/ hx of Afib on Eliquis, CAD s/p PCI, DM, BPH, presents with 1 week of fever, cough, and progressive SOB. Admits to watery diarrhea for 3 days. Denies chest pain. 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 He has exposure to COVID positive family member at the wedding. No travel hx. Pt pulse ox in 80s in the field per EMS. Of note, per patient he had recent normal stress test recently.  COVID PCR sent, labs show lymphopenia and transaminitis, CXR shows b/l lung opacity, requiring NRB (30 Mar 2020 16:35)    BURN Consult requested for scaral pressure ulcer.     Allergies    Bactrim (Unknown)    PAST MEDICAL & SURGICAL HISTORY:  Afib  DM (diabetes mellitus)  BPH (benign prostatic hyperplasia)  CAD (coronary artery disease)      On physical exam:   sacral wound with area of necrotic tissue 74y  Male  HPI:  73 yo M w/ hx of Afib on Eliquis, CAD s/p PCI, DM, BPH, presents with 1 week of fever, cough, and progressive SOB. Admits to watery diarrhea for 3 days. Denies chest pain. 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 He has exposure to COVID positive family member at the wedding. No travel hx. Pt pulse ox in 80s in the field per EMS. Of note, per patient he had recent normal stress test recently.  COVID PCR sent, labs show lymphopenia and transaminitis, CXR shows b/l lung opacity, requiring NRB (30 Mar 2020 16:35)    BURN Consult requested for sacral pressure ulcer.   Admitted 3/30/2020. s/p intubation and trach.     Allergies    Bactrim (Unknown)    PAST MEDICAL & SURGICAL HISTORY:  Afib  DM (diabetes mellitus)  BPH (benign prostatic hyperplasia)  CAD (coronary artery disease)      On physical exam:   sacral wound ~ 7 x 6 cm with central gray black soft eschar ; no gross infection ;no drainage

## 2020-05-01 NOTE — PROGRESS NOTE ADULT - SUBJECTIVE AND OBJECTIVE BOX
KONSTANTIN WYATT  74y  Male    Patient is a 74y old  Male who presents with a chief complaint of suspected COVID-19 (01 May 2020 13:08)      SUBJECTIVE:    No overnight events    Vent: yes  Sedation: no  Pressors: no  Bowel Movements:      PAST MEDICAL & SURGICAL HISTORY:  Afib  DM (diabetes mellitus)  BPH (benign prostatic hyperplasia)  CAD (coronary artery disease)      OBJECTIVE:    Vital Signs Last 24 Hrs  T(C): 35.6 (01 May 2020 12:36), Max: 35.9 (01 May 2020 07:30)  T(F): 96 (01 May 2020 12:36), Max: 96.6 (01 May 2020 07:30)  HR: 125 (01 May 2020 12:36) (104 - 126)  BP: 121/64 (01 May 2020 12:36) (113/75 - 142/66)  BP(mean): --  RR: 18 (01 May 2020 12:36) (18 - 18)  SpO2: 98% (01 May 2020 12:36) (97% - 100%)    General: No apparent distress  HEENT: + trach               Neck: No JVD, No Cervical LN    Lungs: Bilateral BS, CTA  Cardiovascular: +S1 S2  Abdomen: Soft, nontender  Extremities: Pulses intact  Skin: sacrum decubiti   Neurological: non Focal     I&O's Summary    30 Apr 2020 07:01  -  01 May 2020 07:00  --------------------------------------------------------  IN: 720 mL / OUT: 1850 mL / NET: -1130 mL    01 May 2020 07:01  -  01 May 2020 15:00  --------------------------------------------------------  IN: 0 mL / OUT: 500 mL / NET: -500 mL          LABS:                          9.1    14.38 )-----------( 150      ( 30 Apr 2020 08:17 )             27.6                                               04-30    142  |  107  |  139<HH>  ----------------------------<  120<H>  4.1   |  23  |  3.2<H>    Ca    8.5      30 Apr 2020 08:17  Mg     2.1     04-30    TPro  4.9<L>  /  Alb  2.2<L>  /  TBili  0.5  /  DBili  x   /  AST  29  /  ALT  49<H>  /  AlkPhos  93  04-30                                                                                           LIVER FUNCTIONS - ( 30 Apr 2020 08:17 )  Alb: 2.2 g/dL / Pro: 4.9 g/dL / ALK PHOS: 93 U/L / ALT: 49 U/L / AST: 29 U/L / GGT: x                                                                                               Mode: AC/ CMV (Assist Control/ Continuous Mandatory Ventilation)  RR (machine): 20  TV (machine): 500  FiO2: 50  PEEP: 7  ITime: 1  MAP: 11  PIP: 24                                            MEDICATIONS  (STANDING):  aMIOdarone    Tablet   Oral   aMIOdarone    Tablet 400 milliGRAM(s) Oral every 12 hours  BACItracin   Ointment 1 Application(s) Topical two times a day  calcium acetate 1334 milliGRAM(s) Oral three times a day with meals  chlorhexidine 0.12% Liquid 15 milliLiter(s) Oral Mucosa every 12 hours  chlorhexidine 4% Liquid 1 Application(s) Topical <User Schedule>  dextrose 50% Injectable 25 Gram(s) IV Push once  insulin glargine Injectable (LANTUS) 5 Unit(s) SubCutaneous at bedtime  insulin lispro (HumaLOG) corrective regimen sliding scale   SubCutaneous three times a day before meals  lactulose Syrup 20 Gram(s) Oral every 8 hours  levETIRAcetam  IVPB 250 milliGRAM(s) IV Intermittent every 12 hours  morphine  Infusion. 1 mG/Hr (1 mL/Hr) IV Continuous <Continuous>  mupirocin 2% Ointment 1 Application(s) Topical every 12 hours  pantoprazole   Suspension 40 milliGRAM(s) Oral daily  senna 2 Tablet(s) Oral at bedtime    MEDICATIONS  (PRN):  acetaminophen  Suppository .. 650 milliGRAM(s) Rectal every 6 hours PRN Temp greater or equal to 38.5C (101.3F)  midazolam Injectable 2 milliGRAM(s) IV Push every 4 hours PRN agitation

## 2020-05-02 LAB
GLUCOSE BLDC GLUCOMTR-MCNC: 120 MG/DL — HIGH (ref 70–99)
GLUCOSE BLDC GLUCOMTR-MCNC: 181 MG/DL — HIGH (ref 70–99)
GLUCOSE BLDC GLUCOMTR-MCNC: 182 MG/DL — HIGH (ref 70–99)
GLUCOSE BLDC GLUCOMTR-MCNC: 207 MG/DL — HIGH (ref 70–99)

## 2020-05-02 PROCEDURE — 99291 CRITICAL CARE FIRST HOUR: CPT

## 2020-05-02 PROCEDURE — 70551 MRI BRAIN STEM W/O DYE: CPT | Mod: 26

## 2020-05-02 RX ORDER — SODIUM HYPOCHLORITE 0.125 %
1 SOLUTION, NON-ORAL MISCELLANEOUS
Refills: 0 | Status: DISCONTINUED | OUTPATIENT
Start: 2020-05-02 | End: 2020-05-05

## 2020-05-02 RX ORDER — COLLAGENASE CLOSTRIDIUM HIST. 250 UNIT/G
1 OINTMENT (GRAM) TOPICAL
Refills: 0 | Status: DISCONTINUED | OUTPATIENT
Start: 2020-05-02 | End: 2020-05-05

## 2020-05-02 RX ORDER — MORPHINE SULFATE 50 MG/1
2 CAPSULE, EXTENDED RELEASE ORAL ONCE
Refills: 0 | Status: DISCONTINUED | OUTPATIENT
Start: 2020-05-02 | End: 2020-05-02

## 2020-05-02 RX ADMIN — Medication 1 APPLICATION(S): at 19:20

## 2020-05-02 RX ADMIN — LACTULOSE 20 GRAM(S): 10 SOLUTION ORAL at 05:25

## 2020-05-02 RX ADMIN — MUPIROCIN 1 APPLICATION(S): 20 OINTMENT TOPICAL at 05:25

## 2020-05-02 RX ADMIN — AMIODARONE HYDROCHLORIDE 200 MILLIGRAM(S): 400 TABLET ORAL at 05:25

## 2020-05-02 RX ADMIN — LEVETIRACETAM 400 MILLIGRAM(S): 250 TABLET, FILM COATED ORAL at 17:22

## 2020-05-02 RX ADMIN — MORPHINE SULFATE 2 MILLIGRAM(S): 50 CAPSULE, EXTENDED RELEASE ORAL at 17:22

## 2020-05-02 RX ADMIN — INSULIN GLARGINE 5 UNIT(S): 100 INJECTION, SOLUTION SUBCUTANEOUS at 22:26

## 2020-05-02 RX ADMIN — Medication 1 APPLICATION(S): at 19:21

## 2020-05-02 RX ADMIN — Medication 4: at 06:33

## 2020-05-02 RX ADMIN — LEVETIRACETAM 400 MILLIGRAM(S): 250 TABLET, FILM COATED ORAL at 05:25

## 2020-05-02 RX ADMIN — SENNA PLUS 2 TABLET(S): 8.6 TABLET ORAL at 22:26

## 2020-05-02 RX ADMIN — Medication 1 APPLICATION(S): at 17:23

## 2020-05-02 RX ADMIN — CHLORHEXIDINE GLUCONATE 15 MILLILITER(S): 213 SOLUTION TOPICAL at 05:25

## 2020-05-02 RX ADMIN — CHLORHEXIDINE GLUCONATE 1 APPLICATION(S): 213 SOLUTION TOPICAL at 05:25

## 2020-05-02 RX ADMIN — LACTULOSE 20 GRAM(S): 10 SOLUTION ORAL at 13:02

## 2020-05-02 RX ADMIN — MUPIROCIN 1 APPLICATION(S): 20 OINTMENT TOPICAL at 19:21

## 2020-05-02 RX ADMIN — Medication 1334 MILLIGRAM(S): at 17:23

## 2020-05-02 RX ADMIN — LACTULOSE 20 GRAM(S): 10 SOLUTION ORAL at 22:26

## 2020-05-02 RX ADMIN — Medication 1 APPLICATION(S): at 05:25

## 2020-05-02 RX ADMIN — Medication 2: at 16:32

## 2020-05-02 RX ADMIN — Medication 1334 MILLIGRAM(S): at 11:20

## 2020-05-02 RX ADMIN — PANTOPRAZOLE SODIUM 40 MILLIGRAM(S): 20 TABLET, DELAYED RELEASE ORAL at 11:19

## 2020-05-02 RX ADMIN — CHLORHEXIDINE GLUCONATE 15 MILLILITER(S): 213 SOLUTION TOPICAL at 17:22

## 2020-05-02 NOTE — PROGRESS NOTE ADULT - ASSESSMENT
A 74 years old man w/ hx of Afib on Eliquis, CAD s/p PCI, DM presents with sx of COVID found to be positive and intubated since 3/31 and now s/p trached. Consulted for encephalopathy and unresponsive per primary team. CTH on 4/14 shows b/l acute ischemic strokes with hemorrhagic conversion in vascular territory which cannot explain his encephalopathy. REEG was completed 4/15th and reports of generalized slowing. Other encephalopathy etiologies should be further investigated and not limited to metabolic derangement and/or cytokine storm.  Repeat CTH reports of more prominent left sided brainstem acute ischemic changes and new small focus of left occipital lobe hemorrhage and new linear foci of hyperdensity in the occipital lobes and right posterior temporal occipital region likely representing thrombi in cortical vessels. MRA of intracranial vesels is unremarkable. Patient remains comatose, off sedation s/p tracheostomy and was downgraded to 3E floor.    SUGGESTIONS:   NEURO: b/l acute ischemic strokes with hemorrhagic conversion; possible CVST; encephalopathy   - Brain MRI without g  - Obtain rEEG   - C/w Keppra 250mg Q12  - Spinal tap with CSF WBC, RBC, protein, glucose, OCB, IgG Index, SARS-CoV2 PCR (will need to send this out). Add also Serological assays for all SARS-CoV2 cases: pro-inflammatory cytokines assay panel (i.e., IL-1ß, IL-2, IL-6, IL-10, IL-17, TNF- a, IFN-?, IP-10), in addition to serum inflammatory biomarkers ferritin, CRP, absolute lymphocyte counts  - Avoid fever & shivering and maintain core temp < 36C          Neuroattending note will follow

## 2020-05-02 NOTE — PROGRESS NOTE ADULT - ASSESSMENT
LIZZIE/ ATN/ hyperkalemia/ respiratory failure / covid positive / high BUN  # creatinine trending  up  # non-oliguric  # on binders, check ph   # s/p Rapid response on 4/28 , bleeding around PEG, s/p embolisation followed by IR trend hgb   #  change feed to nepro  # if creatinine continues to increase , start ns at 75 cc/h  # neurology notes appreciated   # no acute indication for RRT   # will follow

## 2020-05-02 NOTE — PROGRESS NOTE ADULT - SUBJECTIVE AND OBJECTIVE BOX
24 h events noted  trach/ventilated         PAST HISTORY  --------------------------------------------------------------------------------  No significant changes to PMH, PSH, FHx, SHx, unless otherwise noted    ALLERGIES & MEDICATIONS  --------------------------------------------------------------------------------  Allergies    Bactrim (Unknown)    Intolerances      Standing Inpatient Medications  aMIOdarone    Tablet   Oral   aMIOdarone    Tablet 200 milliGRAM(s) Oral daily  BACItracin   Ointment 1 Application(s) Topical two times a day  calcium acetate 1334 milliGRAM(s) Oral three times a day with meals  chlorhexidine 0.12% Liquid 15 milliLiter(s) Oral Mucosa every 12 hours  chlorhexidine 4% Liquid 1 Application(s) Topical <User Schedule>  dextrose 50% Injectable 25 Gram(s) IV Push once  insulin glargine Injectable (LANTUS) 5 Unit(s) SubCutaneous at bedtime  insulin lispro (HumaLOG) corrective regimen sliding scale   SubCutaneous three times a day before meals  lactulose Syrup 20 Gram(s) Oral every 8 hours  levETIRAcetam  IVPB 250 milliGRAM(s) IV Intermittent every 12 hours  morphine  Infusion. 2 mG/Hr IV Continuous <Continuous>  mupirocin 2% Ointment 1 Application(s) Topical every 12 hours  pantoprazole   Suspension 40 milliGRAM(s) Oral daily  senna 2 Tablet(s) Oral at bedtime    PRN Inpatient Medications  acetaminophen  Suppository .. 650 milliGRAM(s) Rectal every 6 hours PRN  LORazepam   Injectable 2 milliGRAM(s) IV Push every 6 hours PRN            VITALS/PHYSICAL EXAM  --------------------------------------------------------------------------------  T(C): 36.3 (05-02-20 @ 05:16), Max: 37.4 (05-01-20 @ 22:06)  HR: 120 (05-02-20 @ 05:16) (100 - 143)  BP: 106/56 (05-02-20 @ 05:16) (104/58 - 142/66)  RR: 20 (05-02-20 @ 05:16) (18 - 20)  SpO2: 97% (05-02-20 @ 01:45) (97% - 100%)  Wt(kg): --        05-01-20 @ 07:01  -  05-02-20 @ 07:00  --------------------------------------------------------  IN: 2674 mL / OUT: 1150 mL / NET: 1524 mL      Physical Exam:  	Gen: trach/ventilated     LABS/STUDIES  --------------------------------------------------------------------------------              9.1    14.38 >-----------<  150      [04-30-20 @ 08:17]              27.6     142  |  107  |  139  ----------------------------<  120      [04-30-20 @ 08:17]  4.1   |  23  |  3.2        Ca     8.5     [04-30-20 @ 08:17]      Mg     2.1     [04-30-20 @ 08:17]    TPro  4.9  /  Alb  2.2  /  TBili  0.5  /  DBili  x   /  AST  29  /  ALT  49  /  AlkPhos  93  [04-30-20 @ 08:17]          Creatinine Trend:  SCr 3.2 [04-30 @ 08:17]  SCr 2.9 [04-28 @ 07:08]  SCr 3.5 [04-27 @ 07:23]  SCr 3.7 [04-26 @ 18:55]  SCr 4.1 [04-26 @ 06:23]        Ferritin 1751      [04-23-20 @ 00:00]  HbA1c 7.7      [04-01-20 @ 04:30]  Lipid: chol --, , HDL --, LDL --      [04-23-20 @ 00:00]

## 2020-05-02 NOTE — PROGRESS NOTE ADULT - SUBJECTIVE AND OBJECTIVE BOX
Stroke Progress Note:    KONSTANTIN WYATT    1. Chief Complaint: unresponsiveness, off sedation    HPI:  73 yo M w/ hx of Afib on Eliquis, CAD s/p PCI, DM, BPH, presents with 1 week of fever, cough, and progressive SOB. Admits to watery diarrhea for 3 days. Denies chest pain. 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 He has exposure to COVID positive family member at the wedding. No travel hx. Pt pulse ox in 80s in the field per EMS. Of note, per patient he had recent normal stress test recently.    COVID PCR sent, labs show lymphopenia and transaminitis, CXR shows b/l lung opacity, requiring NRB (30 Mar 2020 16:35)      2. Relevant PMH:   Prior ischemic stroke/TIA[x ], Afib [ ], CAD [x ], HTN [ ], DLD [ ], DM [x ], PVD [ ], Obesity [ ],   Sedentary lifestyle [ ], CHF [ ], ELISABETH [ ], Cancer Hx [ ].    3. Social History: Smoking [ ], Drug Use [ ], Alcohol Use [ ], Other [ ]    4. Possible Location of Stroke: multifocal    5. Relevant Brain Tissue Imaging: < from: CT Head No Cont (20 @ 12:44) >  EXAM:  CT BRAIN            PROCEDURE DATE:  2020            INTERPRETATION:  Clinical History / Reason for exam: Change in mental status, history of multiple hemorrhagic infarcts.    CT SCAN OF THE BRAIN WITHOUT CONTRAST    TECHNIQUE:    Multiple transaxial noncontrast CT images of the brain were obtained from base to vertex. Sagittal and coronal reformatted images were submitted.    COMPARISON:    Noncontrast CT scan of the brain dated 2020.    FINDINGS:    The third and lateral ventricles are mildly enlarged as are the cortical  sulci consistent with a mild degree of cortical atrophy. The fourth  ventricle is normal in size and position.    There is a moderate-sized area of mixed attenuation in the left  frontal/temporal region consistent with an acute hemorrhagic infarct. There is mild mass effect upon the adjacent left lateral ventricle. Slight shift of the third and lateral ventricles to the right of midline. There is a similar finding in the right posterior parietal-occipital region and a similar smaller finding in the left posterior superior cerebellar  hemisphere.     Small focus of hemorrhage in the left occipital lobe.    There is a small focus of diminished attenuation in the left brainstem consistent withacute ischemic change.    Small linear foci of hyperdensity in the left occipital lobe, right occipital lobe, and right posterior temporal occipital region likely representing thrombi in cortical vessels.    Patchy foci of diminished attenuation in the periventricular white matter is nonspecific and differential diagnostic possibilities include ischemic  change, gliosis or demyelination.    The right frontal sinus is hypoplastic.    The opacification of the sphenoid sinuses.    Opacification of scattered mastoid air cells consistent with inflammation or infection.    There is no depressed skull fracture.    IMPRESSION:    In comparison with the prior noncontrast CT scan of the brain dated 2020:    Small focus of diminished attenuation in the left side of the brainstem consistent with acute ischemic change more prominent on the current study.      < end of copied text >      6. Relevant Cerebrovascular Imaging: < from: MR Angio Head No Cont (20 @ 17:22) >  EXAM:  MR ANGIO BRAIN            PROCEDURE DATE:  2020            INTERPRETATION:  Clinical History / Reason for exam: Stroke.    TECHNIQUE: Three-dimensional time of flight; axial  ; MIP reformatted images in multiple planes on the 1.5 Teslamagnet.    Comparison studies: MRI brain     FINDINGS:    Right carotid: There is normal signal and caliber within the petrous, cavernous and supraclinoid portions of the of the internal carotid artery. The ophthalmic artery is unremarkable. The posterior communicating artery is small and normal appearing. The middle and anterior cerebral arteries are patent.    Left ICA: There is normal signal and caliber within the petrous, cavernous and supraclinoid portions. The ophthalmic artery is unremarkable. The posterior commuting artery is patent. The anterior cerebral artery is patent. The middle cerebral artery is patent in its M1 and M2 segments. There is mild attenuation of vessels in the region of the anterior MCA hemorrhagic stroke.    Posterior fossa: The vertebral arteries are codominant and patent. The basilar artery and posterior cerebral arteries are patent. The anterior inferior cerebral arteries are visualized. The posterior inferior cerebellar arteries are not well visualized which can be congenital.    Hemorrhagic stroke are again noted within the left frontal lobe and right occipital lobes. There is mild associated edema. Weight time in about both cytology particularly part    Axial FLAIR sequence demonstrates hemorrhagic strokes in the left frontal and right occipital lobes. There is early signal changes down the cortical spinal tracts on the left side consistent with Wallerian degeneration.    IMPRESSION:    1.  Hemorrhagic strokes left MCA, right PCA and left PICA territories    2.  Major vessels appear patent    3.  Distal right PCA not well-defined. Can consider CTA follow-up if this is of clinical concern    4.  Posterior inferior cerebellar arteries may be congenitally diminutive.    < end of copied text >           7. Relevant blood tests:                        9.1    14.38 )-----------( 150      ( 2020 08:17 )             27.6         8. Relevant cardiac rhythm monitoring: < from: 12 Lead ECG (20 @ 04:48) >  Ventricular Rate 121 BPM    Atrial Rate 129 BPM    QRS Duration 96 ms    Q-T Interval 320 ms    QTC Calculation(Bezet) 454 ms    R Axis 24 degrees    T Axis 38 degrees    Diagnosis Line Atrial fibrillation with rapid ventricular response  Incomplete right bundle branch block  Nonspecific T wave abnormality  Abnormal ECG    Confirmed by NANCY MENJIVAR, [facility] ([id number]) on 2020 4:39:53 PM    < end of copied text >      9. Relevant Cardiac Structure: (TTE/ROLA +/-):[ ]No intracardiac thrombus/[ ] no vegetation/[ ]no [place]ia/EF:    Home Medications:  Cartia  mg/24 hours oral capsule, extended release: 1 cap(s) orally once a day (30 Mar 2020 18:04)  Eliquis 5 mg oral tablet: 1 tab(s) orally 2 times a day (30 Mar 2020 18:04)  Lipitor 40 mg oral tablet: 1 tab(s) orally once a day (30 Mar 2020 18:04)  lisinopril 40 mg oral tablet: 1 tab(s) orally once a day (30 Mar 2020 18:04)  metFORMIN 750 mg oral tablet, extended release: 1 tab(s) orally once a day (30 Mar 2020 18:04)      MEDICATIONS  (STANDING):  aMIOdarone    Tablet   Oral   aMIOdarone    Tablet 200 milliGRAM(s) Oral daily  BACItracin   Ointment 1 Application(s) Topical two times a day  calcium acetate 1334 milliGRAM(s) Oral three times a day with meals  chlorhexidine 0.12% Liquid 15 milliLiter(s) Oral Mucosa every 12 hours  chlorhexidine 4% Liquid 1 Application(s) Topical <User Schedule>  dextrose 50% Injectable 25 Gram(s) IV Push once  insulin glargine Injectable (LANTUS) 5 Unit(s) SubCutaneous at bedtime  insulin lispro (HumaLOG) corrective regimen sliding scale   SubCutaneous three times a day before meals  lactulose Syrup 20 Gram(s) Oral every 8 hours  levETIRAcetam  IVPB 250 milliGRAM(s) IV Intermittent every 12 hours  morphine  Infusion. 2 mG/Hr (2 mL/Hr) IV Continuous <Continuous>  mupirocin 2% Ointment 1 Application(s) Topical every 12 hours  pantoprazole   Suspension 40 milliGRAM(s) Oral daily  senna 2 Tablet(s) Oral at bedtime      10. PT/OT/Speech/Rehab/S&Sw/ Cognitive eval results and recommendations:    11. Exam:    Vital Signs Last 24 Hrs  T(C): 37.4 (01 May 2020 22:06), Max: 37.4 (01 May 2020 22:06)  T(F): 99.3 (01 May 2020 22:06), Max: 99.3 (01 May 2020 22:06)  HR: 143 (01 May 2020 22:06) (104 - 143)  BP: 104/58 (01 May 2020 22:06) (104/58 - 142/66)  BP(mean): --  RR: 20 (01 May 2020 22:06) (18 - 20)  SpO2: 98% (01 May 2020 12:36) (98% - 100%)    12.   Neuro exam:  Patient is unresponsive, he is trached, on Morphine gtt  Pupils are small and symmetric, reactive to light, no versive gaze, +dolls, +corneals, +gags  No spontaneous movement or posturing  No localizing or withdrawing to pain  Babinski is mute BL      mRS:  0 No symptoms at all  1 No significant disability despite symptoms; able to carry out all usual duties and activities without assistance  2 Slight disability; unable to carry out all previous activities, but able to look after own affairs  3 Moderate disability; requiring some help, but able to walk without assistance  4 Moderately severe disability; unable to walk without assistance and unable to attend to own bodily needs without assistance  5 Severe disability; bedridden, incontinent and requiring constant nursing care and attention  6 Dead      13. Impression:      14. Probable cause/s of Stroke:      15. Suggestions:   Routine stroke workup includin. Disposition:

## 2020-05-02 NOTE — PROGRESS NOTE ADULT - SUBJECTIVE AND OBJECTIVE BOX
YOSELIN KONSTANTIN  74y  Male    Patient is a 74y old  Male who presents with a chief complaint of suspected COVID-19 (02 May 2020 07:16)      SUBJECTIVE/OVERNIGHT EVENTS:  No acute event over night      PAST MEDICAL & SURGICAL HISTORY:  Afib  DM (diabetes mellitus)  BPH (benign prostatic hyperplasia)  CAD (coronary artery disease)    MEDICATIONS  (STANDING):  aMIOdarone    Tablet   Oral   aMIOdarone    Tablet 200 milliGRAM(s) Oral daily  BACItracin   Ointment 1 Application(s) Topical two times a day  calcium acetate 1334 milliGRAM(s) Oral three times a day with meals  chlorhexidine 0.12% Liquid 15 milliLiter(s) Oral Mucosa every 12 hours  chlorhexidine 4% Liquid 1 Application(s) Topical <User Schedule>  dextrose 50% Injectable 25 Gram(s) IV Push once  insulin glargine Injectable (LANTUS) 5 Unit(s) SubCutaneous at bedtime  insulin lispro (HumaLOG) corrective regimen sliding scale   SubCutaneous three times a day before meals  lactulose Syrup 20 Gram(s) Oral every 8 hours  levETIRAcetam  IVPB 250 milliGRAM(s) IV Intermittent every 12 hours  morphine  - Injectable 2 milliGRAM(s) IV Push once  morphine  Infusion. 2 mG/Hr (2 mL/Hr) IV Continuous <Continuous>  mupirocin 2% Ointment 1 Application(s) Topical every 12 hours  pantoprazole   Suspension 40 milliGRAM(s) Oral daily  senna 2 Tablet(s) Oral at bedtime    MEDICATIONS  (PRN):  acetaminophen  Suppository .. 650 milliGRAM(s) Rectal every 6 hours PRN Temp greater or equal to 38.5C (101.3F)  LORazepam   Injectable 2 milliGRAM(s) IV Push every 6 hours PRN Agitation      OBJECTIVE:    Vital Signs Last 24 Hrs  T(C): 36.3 (02 May 2020 05:16), Max: 37.4 (01 May 2020 22:06)  T(F): 97.3 (02 May 2020 05:16), Max: 99.3 (01 May 2020 22:06)  HR: 113 (02 May 2020 08:00) (100 - 143)  BP: 106/56 (02 May 2020 05:16) (104/58 - 121/64)  BP(mean): --  RR: 20 (02 May 2020 05:16) (18 - 20)  SpO2: 100% (02 May 2020 08:00) (97% - 100%)    General: No apparent distress  HEENT: + trach               Neck: No JVD, No Cervical LN    Lungs: Bilateral BS, CTA  Cardiovascular: +S1 S2  Abdomen: Soft, nontender  Extremities: Pulses intact  Skin: sacrum decubiti   Neurological: non Focal     I&O's Summary    01 May 2020 07:01  -  02 May 2020 07:00  --------------------------------------------------------  IN: 2676 mL / OUT: 1150 mL / NET: 1526 mL    02 May 2020 07:01  -  02 May 2020 10:12  --------------------------------------------------------  IN: 4 mL / OUT: 0 mL / NET: 4 mL            LABS:                                                                                                                                                                                                                                   Mode: AC/ CMV (Assist Control/ Continuous Mandatory Ventilation)  RR (machine): 20  TV (machine): 500  FiO2: 50  PEEP: 7  ITime: 1  MAP: 12  PIP: 28

## 2020-05-02 NOTE — PROGRESS NOTE ADULT - ASSESSMENT
IMPRESSION:    Right gastrocnemius & peroneal DVT  Acute respiratory failure secondary to COVID-19 SP Trach 4/18  Multiple ischemic CVAs  Left occipital lobe hemorrhagic CVA  MRSA PNA + 4/15 SP ABX therapy     PLAN:    CNS:  Continue Keppra bid. sedation for comfort. F/U neuro. MRA completed. MRI ordered     HEENT: Oral care. Trach Care.     PULMONARY: HOB @ 45 degrees. Keep POx > 92%. fio2 decreased to 50%     CARDIOVASCULAR: I=O    GI: PPI ppx. PEG feeds.     RENAL: Correct as needed. Nephrology following       INFECTIOUS DISEASE:  SP ABX therapy. f/u id recommendations as note is currently pending     HEMATOLOGICAL: hemoglobin stable f/u cbc     ENDOCRINE:  Follow up FS. Insulin protocol if needed.    MUSCULOSKELETAL: bed rest. Wound care. 4/26 ultrasound +right gastrocnemius vein and peroneal. burn note appreciated  for wound care     Full code    Poor prognosis

## 2020-05-03 LAB
ALBUMIN SERPL ELPH-MCNC: 2.3 G/DL — LOW (ref 3.5–5.2)
ALP SERPL-CCNC: 106 U/L — SIGNIFICANT CHANGE UP (ref 30–115)
ALT FLD-CCNC: 28 U/L — SIGNIFICANT CHANGE UP (ref 0–41)
ANION GAP SERPL CALC-SCNC: 16 MMOL/L — HIGH (ref 7–14)
AST SERPL-CCNC: 25 U/L — SIGNIFICANT CHANGE UP (ref 0–41)
BASOPHILS # BLD AUTO: 0.03 K/UL — SIGNIFICANT CHANGE UP (ref 0–0.2)
BASOPHILS NFR BLD AUTO: 0.2 % — SIGNIFICANT CHANGE UP (ref 0–1)
BILIRUB SERPL-MCNC: 0.4 MG/DL — SIGNIFICANT CHANGE UP (ref 0.2–1.2)
BLD GP AB SCN SERPL QL: SIGNIFICANT CHANGE UP
BUN SERPL-MCNC: 121 MG/DL — CRITICAL HIGH (ref 10–20)
CALCIUM SERPL-MCNC: 8.4 MG/DL — LOW (ref 8.5–10.1)
CHLORIDE SERPL-SCNC: 113 MMOL/L — HIGH (ref 98–110)
CO2 SERPL-SCNC: 22 MMOL/L — SIGNIFICANT CHANGE UP (ref 17–32)
CREAT SERPL-MCNC: 3 MG/DL — HIGH (ref 0.7–1.5)
EOSINOPHIL # BLD AUTO: 0.09 K/UL — SIGNIFICANT CHANGE UP (ref 0–0.7)
EOSINOPHIL NFR BLD AUTO: 0.5 % — SIGNIFICANT CHANGE UP (ref 0–8)
GLUCOSE BLDC GLUCOMTR-MCNC: 138 MG/DL — HIGH (ref 70–99)
GLUCOSE BLDC GLUCOMTR-MCNC: 143 MG/DL — HIGH (ref 70–99)
GLUCOSE BLDC GLUCOMTR-MCNC: 150 MG/DL — HIGH (ref 70–99)
GLUCOSE BLDC GLUCOMTR-MCNC: 169 MG/DL — HIGH (ref 70–99)
GLUCOSE BLDC GLUCOMTR-MCNC: 175 MG/DL — HIGH (ref 70–99)
GLUCOSE SERPL-MCNC: 142 MG/DL — HIGH (ref 70–99)
HCT VFR BLD CALC: 26.8 % — LOW (ref 42–52)
HGB BLD-MCNC: 8.6 G/DL — LOW (ref 14–18)
IMM GRANULOCYTES NFR BLD AUTO: 2.1 % — HIGH (ref 0.1–0.3)
LYMPHOCYTES # BLD AUTO: 0.53 K/UL — LOW (ref 1.2–3.4)
LYMPHOCYTES # BLD AUTO: 3.1 % — LOW (ref 20.5–51.1)
MAGNESIUM SERPL-MCNC: 2.3 MG/DL — SIGNIFICANT CHANGE UP (ref 1.8–2.4)
MCHC RBC-ENTMCNC: 30.9 PG — SIGNIFICANT CHANGE UP (ref 27–31)
MCHC RBC-ENTMCNC: 32.1 G/DL — SIGNIFICANT CHANGE UP (ref 32–37)
MCV RBC AUTO: 96.4 FL — HIGH (ref 80–94)
MONOCYTES # BLD AUTO: 0.88 K/UL — HIGH (ref 0.1–0.6)
MONOCYTES NFR BLD AUTO: 5.1 % — SIGNIFICANT CHANGE UP (ref 1.7–9.3)
NEUTROPHILS # BLD AUTO: 15.4 K/UL — HIGH (ref 1.4–6.5)
NEUTROPHILS NFR BLD AUTO: 89 % — HIGH (ref 42.2–75.2)
NRBC # BLD: 0 /100 WBCS — SIGNIFICANT CHANGE UP (ref 0–0)
PLATELET # BLD AUTO: 147 K/UL — SIGNIFICANT CHANGE UP (ref 130–400)
POTASSIUM SERPL-MCNC: 4.9 MMOL/L — SIGNIFICANT CHANGE UP (ref 3.5–5)
POTASSIUM SERPL-SCNC: 4.9 MMOL/L — SIGNIFICANT CHANGE UP (ref 3.5–5)
PROT SERPL-MCNC: 5.5 G/DL — LOW (ref 6–8)
RBC # BLD: 2.78 M/UL — LOW (ref 4.7–6.1)
RBC # FLD: 18.6 % — HIGH (ref 11.5–14.5)
SODIUM SERPL-SCNC: 151 MMOL/L — HIGH (ref 135–146)
WBC # BLD: 17.3 K/UL — HIGH (ref 4.8–10.8)
WBC # FLD AUTO: 17.3 K/UL — HIGH (ref 4.8–10.8)

## 2020-05-03 PROCEDURE — 71045 X-RAY EXAM CHEST 1 VIEW: CPT | Mod: 26

## 2020-05-03 RX ADMIN — INSULIN GLARGINE 5 UNIT(S): 100 INJECTION, SOLUTION SUBCUTANEOUS at 22:56

## 2020-05-03 RX ADMIN — AMIODARONE HYDROCHLORIDE 200 MILLIGRAM(S): 400 TABLET ORAL at 06:33

## 2020-05-03 RX ADMIN — Medication 1 APPLICATION(S): at 17:55

## 2020-05-03 RX ADMIN — Medication 1 APPLICATION(S): at 06:34

## 2020-05-03 RX ADMIN — Medication 1334 MILLIGRAM(S): at 12:21

## 2020-05-03 RX ADMIN — SENNA PLUS 2 TABLET(S): 8.6 TABLET ORAL at 22:56

## 2020-05-03 RX ADMIN — CHLORHEXIDINE GLUCONATE 15 MILLILITER(S): 213 SOLUTION TOPICAL at 06:32

## 2020-05-03 RX ADMIN — Medication 1 APPLICATION(S): at 06:33

## 2020-05-03 RX ADMIN — CHLORHEXIDINE GLUCONATE 1 APPLICATION(S): 213 SOLUTION TOPICAL at 06:34

## 2020-05-03 RX ADMIN — LEVETIRACETAM 400 MILLIGRAM(S): 250 TABLET, FILM COATED ORAL at 17:55

## 2020-05-03 RX ADMIN — MUPIROCIN 1 APPLICATION(S): 20 OINTMENT TOPICAL at 17:56

## 2020-05-03 RX ADMIN — Medication 1334 MILLIGRAM(S): at 06:33

## 2020-05-03 RX ADMIN — CHLORHEXIDINE GLUCONATE 15 MILLILITER(S): 213 SOLUTION TOPICAL at 17:50

## 2020-05-03 RX ADMIN — LACTULOSE 20 GRAM(S): 10 SOLUTION ORAL at 22:56

## 2020-05-03 RX ADMIN — PANTOPRAZOLE SODIUM 40 MILLIGRAM(S): 20 TABLET, DELAYED RELEASE ORAL at 12:21

## 2020-05-03 RX ADMIN — Medication 1334 MILLIGRAM(S): at 17:50

## 2020-05-03 RX ADMIN — MUPIROCIN 1 APPLICATION(S): 20 OINTMENT TOPICAL at 06:35

## 2020-05-03 RX ADMIN — LEVETIRACETAM 400 MILLIGRAM(S): 250 TABLET, FILM COATED ORAL at 06:32

## 2020-05-03 RX ADMIN — Medication 1 APPLICATION(S): at 17:51

## 2020-05-03 RX ADMIN — Medication 1 APPLICATION(S): at 17:50

## 2020-05-03 NOTE — PROGRESS NOTE ADULT - ASSESSMENT
73 yo M w/ hx of Afib on Eliquis, CAD s/p PCI, DM, BPH, presents with 1 week of fever, cough, and progressive SOB.    IMPRESSION:  Right gastrocnemius & peroneal DVT  Acute respiratory failure, Acute Respiratory Distress Syndrome and Severe Septic shock secondary to COVID-19 s/p percutaneous Tracheostomy 4/18  Cytokine Release Syndrome  PEG site bleeding s/p embolization by IR, stable  Multiple ischemic CVAs  Left occipital lobe hemorrhagic CVA  Encephalopathy  MRSA PNA + 4/15 s/p antibiotic therapy  Sacral Ulcer  LIZZIE/ATN  Afib  DM  CAD s/p PCI  BPH      PLAN:    CNS:  Continue Keppra 250mg bid. EEG showing generalized slowing. Off sedation, Morphine prn for comfort. F/u Neurology. MRA completed. MRI ordered.     HEENT: Oral care. Trach Care.     PULMONARY: HOB @ 45 degrees. Keep POx > 92%. fio2 decreased to 50%     CARDIOVASCULAR: I=O    GI: PPI ppx. PEG feeds.    RENAL: F/u lytes. Correct as needed. Nephrology following.    INFECTIOUS DISEASE: s/p 6 weeks of Antibiotic therapy, currently off Antibiotics. F/u ID recommendations.    HEMATOLOGICAL: Off anticoagulation due to hemorrhagic CVA & PEG site bleed, CBC stable. Monitor platelets.    ENDOCRINE: Insulin protocol. Fingerstick per nursing protocol. Avoid hypoglycemia.    MUSCULOSKELETAL: Bed rest. Wound care, Burn following. 4/26 ultrasound +right gastrocnemius vein and peroneal.    Very poor prognosis  Full code 73 yo M w/ hx of Afib on Eliquis, CAD s/p PCI, DM, BPH, presents with 1 week of fever, cough, and progressive SOB.    IMPRESSION:  Right gastrocnemius & peroneal DVT  Acute respiratory failure, Acute Respiratory Distress Syndrome and Severe Septic shock secondary to COVID-19 s/p percutaneous Tracheostomy 4/18  Cytokine Release Syndrome  PEG site bleeding s/p embolization by IR, stable  Multiple ischemic CVAs  Left occipital lobe hemorrhagic CVA  Encephalopathy  MRSA PNA + 4/15 s/p antibiotic therapy  Sacral Ulcer  LIZZIE/ATN  Hypernatremia  Afib not on AC due to bleeding  DM  CAD s/p PCI  BPH      PLAN:    CNS:  Continue Keppra 250mg bid. EEG showing generalized slowing. Off sedation, Morphine prn for comfort. F/u Neurology. MRA completed. MRI ordered.     HEENT: Oral care. Trach Care.     PULMONARY: HOB @ 45 degrees. Keep POx > 92%. fio2 decreased to 50%     CARDIOVASCULAR: I=O    GI: PPI ppx. PEG feeds.    RENAL: F/u lytes. Increase free water to 400cc q6h. Correct as needed. Nephrology following.    INFECTIOUS DISEASE: s/p 6 weeks of Antibiotic therapy, currently off Antibiotics. F/u ID recommendations.    HEMATOLOGICAL: Off anticoagulation due to hemorrhagic CVA & PEG site bleed, CBC stable. Monitor platelets.    ENDOCRINE: Insulin protocol. Fingerstick per nursing protocol. Avoid hypoglycemia.    MUSCULOSKELETAL: Bed rest. Wound care, Burn following. 4/26 ultrasound +right gastrocnemius vein and peroneal.    Very poor prognosis  Full code

## 2020-05-03 NOTE — PROGRESS NOTE ADULT - SUBJECTIVE AND OBJECTIVE BOX
NORMASAMIKUSHAL KONSTANTIN  74y  Male    Patient is a 74y old  Male who presents with a chief complaint of suspected COVID-19 (03 May 2020 13:05)    SUBJECTIVE/OVERNIGHT EVENTS:  No acute events over night      PAST MEDICAL & SURGICAL HISTORY:  Afib  DM (diabetes mellitus)  BPH (benign prostatic hyperplasia)  CAD (coronary artery disease)      MEDICATIONS  (STANDING):  aMIOdarone    Tablet   Oral   aMIOdarone    Tablet 200 milliGRAM(s) Oral daily  BACItracin   Ointment 1 Application(s) Topical two times a day  calcium acetate 1334 milliGRAM(s) Oral three times a day with meals  chlorhexidine 0.12% Liquid 15 milliLiter(s) Oral Mucosa every 12 hours  chlorhexidine 4% Liquid 1 Application(s) Topical <User Schedule>  collagenase Ointment 1 Application(s) Topical two times a day  Dakins Solution - Full Strength 1 Application(s) Topical two times a day  dextrose 50% Injectable 25 Gram(s) IV Push once  insulin glargine Injectable (LANTUS) 5 Unit(s) SubCutaneous at bedtime  insulin lispro (HumaLOG) corrective regimen sliding scale   SubCutaneous three times a day before meals  lactulose Syrup 20 Gram(s) Oral every 8 hours  levETIRAcetam  IVPB 250 milliGRAM(s) IV Intermittent every 12 hours  morphine  Infusion. 2 mG/Hr (2 mL/Hr) IV Continuous <Continuous>  mupirocin 2% Ointment 1 Application(s) Topical every 12 hours  pantoprazole   Suspension 40 milliGRAM(s) Oral daily  senna 2 Tablet(s) Oral at bedtime    MEDICATIONS  (PRN):  acetaminophen  Suppository .. 650 milliGRAM(s) Rectal every 6 hours PRN Temp greater or equal to 38.5C (101.3F)  LORazepam   Injectable 2 milliGRAM(s) IV Push every 6 hours PRN Agitation        OBJECTIVE:    Vital Signs Last 24 Hrs  T(C): 35.6 (03 May 2020 07:02), Max: 37 (02 May 2020 20:48)  T(F): 96 (03 May 2020 07:02), Max: 98.6 (02 May 2020 20:48)  HR: 101 (03 May 2020 07:02) (77 - 112)  BP: 123/67 (03 May 2020 07:02) (106/54 - 123/67)  BP(mean): --  RR: 16 (03 May 2020 07:02) (16 - 20)  SpO2: 100% (02 May 2020 20:59) (98% - 100%)      General: No apparent distress  HEENT: + trach  Neck: No JVD, No Cervical LN  Lungs: Bilateral BS, CTA  Cardiovascular: +S1 S2  Abdomen: Soft, nontender  Extremities: Pulses intact  Skin: sacrum decubiti   Neurological: non Focal +dolls, +corneals, +gags No spontaneous movement or posturing. No localizing or withdrawing to pain. Babinski is mute BL.      I&O's Detail    02 May 2020 07:01  -  03 May 2020 07:00  --------------------------------------------------------  IN:    Enteral Tube Flush: 100 mL    Glucerna: 1440 mL    IV PiggyBack: 100 mL    morphine  Infusion.: 46 mL  Total IN: 1686 mL    OUT:    Indwelling Catheter - Urethral: 1200 mL  Total OUT: 1200 mL  Total NET: 486 mL      LABS:                                                                   8.6    17.30 )-----------( 147      ( 03 May 2020 12:05 )             26.8       Mode: AC/ CMV (Assist Control/ Continuous Mandatory Ventilation)  RR (machine): 14  TV (machine): 400  FiO2: 50  PEEP: 7  ITime: 1  MAP: 12  PIP: 28      Radiology  < from: Xray Chest 1 View-PORTABLE IMMEDIATE (05.03.20 @ 11:25) >Impression:  Stable interstitial opacities  Findings: Support devices: Tracheostomy tube is in good position. Feeding tube is not identified  < end of copied text >    < from: MR Head No Cont (05.02.20 @ 18:48) >  IMPRESSION: Previously noted hemorrhagic infarcts involving the left MCA and right PCA territories are again identified.  < end of copied text >

## 2020-05-04 LAB
ALBUMIN SERPL ELPH-MCNC: 2.4 G/DL — LOW (ref 3.5–5.2)
ALP SERPL-CCNC: 105 U/L — SIGNIFICANT CHANGE UP (ref 30–115)
ALT FLD-CCNC: 25 U/L — SIGNIFICANT CHANGE UP (ref 0–41)
ANION GAP SERPL CALC-SCNC: 15 MMOL/L — HIGH (ref 7–14)
AST SERPL-CCNC: 23 U/L — SIGNIFICANT CHANGE UP (ref 0–41)
BASOPHILS # BLD AUTO: 0.04 K/UL — SIGNIFICANT CHANGE UP (ref 0–0.2)
BASOPHILS NFR BLD AUTO: 0.3 % — SIGNIFICANT CHANGE UP (ref 0–1)
BILIRUB SERPL-MCNC: 0.5 MG/DL — SIGNIFICANT CHANGE UP (ref 0.2–1.2)
BUN SERPL-MCNC: 105 MG/DL — CRITICAL HIGH (ref 10–20)
CALCIUM SERPL-MCNC: 8.5 MG/DL — SIGNIFICANT CHANGE UP (ref 8.5–10.1)
CHLORIDE SERPL-SCNC: 112 MMOL/L — HIGH (ref 98–110)
CO2 SERPL-SCNC: 24 MMOL/L — SIGNIFICANT CHANGE UP (ref 17–32)
CREAT SERPL-MCNC: 2.6 MG/DL — HIGH (ref 0.7–1.5)
EOSINOPHIL # BLD AUTO: 0.1 K/UL — SIGNIFICANT CHANGE UP (ref 0–0.7)
EOSINOPHIL NFR BLD AUTO: 0.8 % — SIGNIFICANT CHANGE UP (ref 0–8)
GLUCOSE BLDC GLUCOMTR-MCNC: 124 MG/DL — HIGH (ref 70–99)
GLUCOSE BLDC GLUCOMTR-MCNC: 138 MG/DL — HIGH (ref 70–99)
GLUCOSE BLDC GLUCOMTR-MCNC: 143 MG/DL — HIGH (ref 70–99)
GLUCOSE BLDC GLUCOMTR-MCNC: 166 MG/DL — HIGH (ref 70–99)
GLUCOSE BLDC GLUCOMTR-MCNC: 172 MG/DL — HIGH (ref 70–99)
GLUCOSE BLDC GLUCOMTR-MCNC: 190 MG/DL — HIGH (ref 70–99)
GLUCOSE BLDC GLUCOMTR-MCNC: 214 MG/DL — HIGH (ref 70–99)
GLUCOSE SERPL-MCNC: 167 MG/DL — HIGH (ref 70–99)
HCT VFR BLD CALC: 27.4 % — LOW (ref 42–52)
HGB BLD-MCNC: 8.9 G/DL — LOW (ref 14–18)
IMM GRANULOCYTES NFR BLD AUTO: 1.7 % — HIGH (ref 0.1–0.3)
LYMPHOCYTES # BLD AUTO: 0.48 K/UL — LOW (ref 1.2–3.4)
LYMPHOCYTES # BLD AUTO: 4 % — LOW (ref 20.5–51.1)
MAGNESIUM SERPL-MCNC: 2.1 MG/DL — SIGNIFICANT CHANGE UP (ref 1.8–2.4)
MCHC RBC-ENTMCNC: 31 PG — SIGNIFICANT CHANGE UP (ref 27–31)
MCHC RBC-ENTMCNC: 32.5 G/DL — SIGNIFICANT CHANGE UP (ref 32–37)
MCV RBC AUTO: 95.5 FL — HIGH (ref 80–94)
MONOCYTES # BLD AUTO: 0.57 K/UL — SIGNIFICANT CHANGE UP (ref 0.1–0.6)
MONOCYTES NFR BLD AUTO: 4.8 % — SIGNIFICANT CHANGE UP (ref 1.7–9.3)
NEUTROPHILS # BLD AUTO: 10.58 K/UL — HIGH (ref 1.4–6.5)
NEUTROPHILS NFR BLD AUTO: 88.4 % — HIGH (ref 42.2–75.2)
NRBC # BLD: 0 /100 WBCS — SIGNIFICANT CHANGE UP (ref 0–0)
PLATELET # BLD AUTO: 132 K/UL — SIGNIFICANT CHANGE UP (ref 130–400)
POTASSIUM SERPL-MCNC: 4.3 MMOL/L — SIGNIFICANT CHANGE UP (ref 3.5–5)
POTASSIUM SERPL-SCNC: 4.3 MMOL/L — SIGNIFICANT CHANGE UP (ref 3.5–5)
PROT SERPL-MCNC: 5.4 G/DL — LOW (ref 6–8)
RBC # BLD: 2.87 M/UL — LOW (ref 4.7–6.1)
RBC # FLD: 18.7 % — HIGH (ref 11.5–14.5)
SODIUM SERPL-SCNC: 151 MMOL/L — HIGH (ref 135–146)
WBC # BLD: 11.97 K/UL — HIGH (ref 4.8–10.8)
WBC # FLD AUTO: 11.97 K/UL — HIGH (ref 4.8–10.8)

## 2020-05-04 PROCEDURE — 71045 X-RAY EXAM CHEST 1 VIEW: CPT | Mod: 26

## 2020-05-04 PROCEDURE — 11043 DBRDMT MUSC&/FSCA 1ST 20/<: CPT

## 2020-05-04 PROCEDURE — 11046 DBRDMT MUSC&/FSCA EA ADDL: CPT

## 2020-05-04 RX ORDER — SODIUM CHLORIDE 9 MG/ML
1000 INJECTION, SOLUTION INTRAVENOUS
Refills: 0 | Status: DISCONTINUED | OUTPATIENT
Start: 2020-05-04 | End: 2020-05-05

## 2020-05-04 RX ORDER — LIDOCAINE HYDROCHLORIDE AND EPINEPHRINE 10; 10 MG/ML; UG/ML
10 INJECTION, SOLUTION INFILTRATION; PERINEURAL ONCE
Refills: 0 | Status: COMPLETED | OUTPATIENT
Start: 2020-05-04 | End: 2020-05-04

## 2020-05-04 RX ADMIN — Medication 2: at 12:56

## 2020-05-04 RX ADMIN — LIDOCAINE HYDROCHLORIDE AND EPINEPHRINE 10 MILLILITER(S): 10; 10 INJECTION, SOLUTION INFILTRATION; PERINEURAL at 15:34

## 2020-05-04 RX ADMIN — INSULIN GLARGINE 5 UNIT(S): 100 INJECTION, SOLUTION SUBCUTANEOUS at 21:37

## 2020-05-04 RX ADMIN — MUPIROCIN 1 APPLICATION(S): 20 OINTMENT TOPICAL at 17:19

## 2020-05-04 RX ADMIN — Medication 1334 MILLIGRAM(S): at 05:10

## 2020-05-04 RX ADMIN — LACTULOSE 20 GRAM(S): 10 SOLUTION ORAL at 21:31

## 2020-05-04 RX ADMIN — SODIUM CHLORIDE 75 MILLILITER(S): 9 INJECTION, SOLUTION INTRAVENOUS at 11:24

## 2020-05-04 RX ADMIN — LACTULOSE 20 GRAM(S): 10 SOLUTION ORAL at 05:10

## 2020-05-04 RX ADMIN — Medication 2: at 08:44

## 2020-05-04 RX ADMIN — AMIODARONE HYDROCHLORIDE 200 MILLIGRAM(S): 400 TABLET ORAL at 05:12

## 2020-05-04 RX ADMIN — PANTOPRAZOLE SODIUM 40 MILLIGRAM(S): 20 TABLET, DELAYED RELEASE ORAL at 12:56

## 2020-05-04 RX ADMIN — CHLORHEXIDINE GLUCONATE 15 MILLILITER(S): 213 SOLUTION TOPICAL at 17:13

## 2020-05-04 RX ADMIN — Medication 1 APPLICATION(S): at 05:11

## 2020-05-04 RX ADMIN — CHLORHEXIDINE GLUCONATE 1 APPLICATION(S): 213 SOLUTION TOPICAL at 05:15

## 2020-05-04 RX ADMIN — MUPIROCIN 1 APPLICATION(S): 20 OINTMENT TOPICAL at 05:14

## 2020-05-04 RX ADMIN — Medication 1334 MILLIGRAM(S): at 12:56

## 2020-05-04 RX ADMIN — CHLORHEXIDINE GLUCONATE 15 MILLILITER(S): 213 SOLUTION TOPICAL at 05:10

## 2020-05-04 RX ADMIN — MORPHINE SULFATE 2 MG/HR: 50 CAPSULE, EXTENDED RELEASE ORAL at 05:10

## 2020-05-04 RX ADMIN — Medication 1334 MILLIGRAM(S): at 17:12

## 2020-05-04 RX ADMIN — Medication 4: at 17:11

## 2020-05-04 RX ADMIN — LEVETIRACETAM 400 MILLIGRAM(S): 250 TABLET, FILM COATED ORAL at 05:15

## 2020-05-04 RX ADMIN — LEVETIRACETAM 400 MILLIGRAM(S): 250 TABLET, FILM COATED ORAL at 17:13

## 2020-05-04 RX ADMIN — SENNA PLUS 2 TABLET(S): 8.6 TABLET ORAL at 21:31

## 2020-05-04 RX ADMIN — Medication 1 APPLICATION(S): at 17:17

## 2020-05-04 RX ADMIN — LACTULOSE 20 GRAM(S): 10 SOLUTION ORAL at 13:54

## 2020-05-04 NOTE — PROCEDURE NOTE - NSICDXPROCEDURE_GEN_ALL_CORE_FT
PROCEDURES:  Debridement of ulcer of sacrum or ulcer of ischium 04-May-2020 16:03:18 and buttocks 8 x 7 cm to and including fascia LongSkylar

## 2020-05-04 NOTE — PROCEDURE NOTE - NSINDICATIONS_GEN_A_CORE
devitalized or nonviable tissue at the level of:
critical illness/dialysis/CRRT/emergency venous access/venous access
dialysis/CRRT/critical illness

## 2020-05-04 NOTE — PROGRESS NOTE ADULT - NSHPATTENDINGPLANDISCUSS_GEN_ALL_CORE
Housestaff
ICU team
PACU team
team
team
ICU Team
team
75@[facility]
ICU Care
ICU Team
team
team
ICU Care
ICU Team
ICU Team
team
ICU Team
ICU Team
team
team
ICU Team
Our team and Primary team

## 2020-05-04 NOTE — PROGRESS NOTE ADULT - ASSESSMENT
LIZZIE/ ATN/ hyperkalemia/ respiratory failure / covid positive / high BUN  # creatinine trending  down  # non-oliguric  # on binders, check ph   # s/p Rapid response on 4/28 , bleeding around PEG, s/p embolisation followed by IR trend hgb   # change feed to nepro  # hypernatremia noted. give free water via PEG tube 200 cc q 6 hours. trend Na level.  # neurology notes appreciated   # no acute indication for RRT   # will follow

## 2020-05-04 NOTE — CHART NOTE - NSCHARTNOTEFT_GEN_A_CORE
Registered Dietitian Follow-Up     Patient Profile Reviewed                           Yes [x]   No []     Nutrition History Previously Obtained        Yes []  No [x]       Pertinent Subjective Information: TF provided at goal and pt. tolerating per RN flow sheets, + BM yesterday. Trached to vent.     Pertinent Medical Interventions: Acute respiratory failure, Acute Respiratory Distress Syndrome and Severe Septic shock secondary to COVID-19 s/p percutaneous Tracheostomy 4/18. PEG site bleeding s/p embolization by IR, stable. ID following, s/p abx. Wound care, burn following.        Diet order: Glucerna 1.2 @360ml q4h      Anthropometrics:  - Ht. 182.9cm  - Wt. 84.1kg on 4/28 vs. 86.9kg on 4/22 vs. (4/19): 89kg vs. (4/17): 91.9kg vs. (4/13): 95.4kg ?trending down, fluid shifts noted as pt. with 4+ edema earlier during LOS, will continue to monitor   - %wt change  - BMI  25.1 using lowest doc wt 84.1kg  -  lbs      Pertinent Lab Data: (5/3) WBC 17.30, RBC 2.78, Hg 8.6, Hct 26.8, Na 151, , glu 142, eGFR 20, creat 3.0     Pertinent Meds: Lactulose, Protonix, Phoslo, Senna      Physical Findings:  - Appearance: trached,  unresponsive, 1+ generalized, 2+ L, R arm edema  - GI function: no symptoms noted, last BM 5/3  - Tubes: PEG  - Oral/Mouth cavity: NPO  - Skin: unstageable pressure ulcer to R buttocks, stg II to lower lip      Nutrition Requirements (from RD note on 4/30)   Weight Used: 84.1 lowest doc weight      Estimated Energy Needs    Continue []  Adjust [x] 2277-5871 kcal/day (MSJ x 1.2-1.5 AF) adjusted for pressure ulcer  Adjusted Energy Recommendations:   kcal/day        Estimated Protein Needs    Continue []  Adjust [x]  101-109 gm/day (1.2-1.3 g/kg CBW) as above + poor renal function considered   Adjusted Protein Recommendations:   gm/day        Estimated Fluid Needs        Continue [x]  Adjust [] 1 ml/kcal or per LIP   Adjusted Fluid Recommendations:   mL/day     Nutrient Intake: current TF regimen provides 2592kcal, 128g protein, 1750ml free H2O (106% est calorie needs, 117% est protein needs)         [] Previous Nutrition Diagnosis:  inadequate energy intake - ongoing (excessive protein energy intake)     Nutrition Intervention: enteral and parenteral nutrition    Rec: Continue Glucerna 1.2 adjust rate to 280ml q6h for 2016kcal, 99g protein, 1361ml free H2O (100% est calorie needs, 98% est protein needs). Maintain all aspiration precautions. Additional free H2O per LIP.      Goal/Expected Outcome: In 3 days TF to provide >85% est energy needs, but not exceed 105%     Indicator/Monitoring: diet order, energy intake, body composition, NFPF, electrolyte/renal profile, glucose profile

## 2020-05-04 NOTE — PROCEDURE NOTE - NSPROCDETAILS_GEN_ALL_CORE
guidewire recovered/lumen(s) aspirated and flushed/ultrasound guidance/sterile technique, catheter placed
incision left open, packing placed
sterile technique, catheter placed/ultrasound guidance/sterile dressing applied/guidewire recovered/lumen(s) aspirated and flushed

## 2020-05-04 NOTE — PROCEDURE NOTE - ADDITIONAL PROCEDURE DETAILS
Malfunctioning left IJ UDALL. removal of left IJ UDALL. insertion of right femoral UDALL. 16 cm UDALL catheter used.
black necrotic skin , gray brown subcutis and deeper tissue to level of fascia overlying sacrum
16 [place] used. Left IJ. both lumen flushed and aspirated w/o resistance

## 2020-05-04 NOTE — PROGRESS NOTE ADULT - SUBJECTIVE AND OBJECTIVE BOX
Nephrology progress note    Patient is seen and examined, events over the last 24 h noted .    Allergies:  Bactrim (Unknown)    Hospital Medications:   MEDICATIONS  (STANDING):  aMIOdarone    Tablet   Oral   aMIOdarone    Tablet 200 milliGRAM(s) Oral daily  BACItracin   Ointment 1 Application(s) Topical two times a day  calcium acetate 1334 milliGRAM(s) Oral three times a day with meals  chlorhexidine 0.12% Liquid 15 milliLiter(s) Oral Mucosa every 12 hours  chlorhexidine 4% Liquid 1 Application(s) Topical <User Schedule>  collagenase Ointment 1 Application(s) Topical two times a day  Dakins Solution - Full Strength 1 Application(s) Topical two times a day  dextrose 50% Injectable 25 Gram(s) IV Push once  insulin glargine Injectable (LANTUS) 5 Unit(s) SubCutaneous at bedtime  insulin lispro (HumaLOG) corrective regimen sliding scale   SubCutaneous three times a day before meals  lactulose Syrup 20 Gram(s) Oral every 8 hours  levETIRAcetam  IVPB 250 milliGRAM(s) IV Intermittent every 12 hours  morphine  Infusion. 2 mG/Hr (2 mL/Hr) IV Continuous <Continuous>  mupirocin 2% Ointment 1 Application(s) Topical every 12 hours  pantoprazole   Suspension 40 milliGRAM(s) Oral daily  senna 2 Tablet(s) Oral at bedtime        VITALS:  T(F): 98.1 (05-04-20 @ 04:59), Max: 98.8 (05-03-20 @ 20:53)  HR: 95 (05-04-20 @ 04:59)  BP: 113/68 (05-04-20 @ 04:59)  RR: 18 (05-04-20 @ 04:59)  SpO2: 96% (05-03-20 @ 20:38)      05-02 @ 07:01  -  05-03 @ 07:00  --------------------------------------------------------  IN: 1686 mL / OUT: 1200 mL / NET: 486 mL    05-03 @ 07:01  -  05-04 @ 07:00  --------------------------------------------------------  IN: 2554 mL / OUT: 1901 mL / NET: 653 mL          PHYSICAL EXAM:  Constitutional: NAD  HEENT: anicteric sclera, oropharynx clear, MMM  Neck: No JVD  Respiratory: CTAB, no wheezes, rales or rhonchi  Cardiovascular: S1, S2, RRR  Gastrointestinal: BS+, soft, NT/ND  Extremities: No cyanosis or clubbing. No peripheral edema  :  No santana.   Skin: No rashes    LABS:  05-03    151<H>  |  113<H>  |  121<HH>  ----------------------------<  142<H>  4.9   |  22  |  3.0<H>    Creatinine Trend: 3.0<--, 3.2<--, 2.9<--, 3.5<--, 3.7<--, 4.1<--    Ca    8.4<L>      03 May 2020 12:05  Mg     2.3     05-03    TPro  5.5<L>  /  Alb  2.3<L>  /  TBili  0.4  /  DBili      /  AST  25  /  ALT  28  /  AlkPhos  106  05-03                          8.6    17.30 )-----------( 147      ( 03 May 2020 12:05 )             26.8       Urine Studies:      RADIOLOGY & ADDITIONAL STUDIES: Nephrology progress note    Patient is seen and examined, events over the last 24 h noted .    Allergies:  Bactrim (Unknown)    Hospital Medications:   MEDICATIONS  (STANDING):  aMIOdarone    Tablet   Oral   aMIOdarone    Tablet 200 milliGRAM(s) Oral daily  BACItracin   Ointment 1 Application(s) Topical two times a day  calcium acetate 1334 milliGRAM(s) Oral three times a day with meals  chlorhexidine 0.12% Liquid 15 milliLiter(s) Oral Mucosa every 12 hours  chlorhexidine 4% Liquid 1 Application(s) Topical <User Schedule>  collagenase Ointment 1 Application(s) Topical two times a day  Dakins Solution - Full Strength 1 Application(s) Topical two times a day  dextrose 50% Injectable 25 Gram(s) IV Push once  insulin glargine Injectable (LANTUS) 5 Unit(s) SubCutaneous at bedtime  insulin lispro (HumaLOG) corrective regimen sliding scale   SubCutaneous three times a day before meals  lactulose Syrup 20 Gram(s) Oral every 8 hours  levETIRAcetam  IVPB 250 milliGRAM(s) IV Intermittent every 12 hours  morphine  Infusion. 2 mG/Hr (2 mL/Hr) IV Continuous <Continuous>  mupirocin 2% Ointment 1 Application(s) Topical every 12 hours  pantoprazole   Suspension 40 milliGRAM(s) Oral daily  senna 2 Tablet(s) Oral at bedtime        VITALS:  T(F): 98.1 (05-04-20 @ 04:59), Max: 98.8 (05-03-20 @ 20:53)  HR: 95 (05-04-20 @ 04:59)  BP: 113/68 (05-04-20 @ 04:59)  RR: 18 (05-04-20 @ 04:59)  SpO2: 96% (05-03-20 @ 20:38)      05-02 @ 07:01  -  05-03 @ 07:00  --------------------------------------------------------  IN: 1686 mL / OUT: 1200 mL / NET: 486 mL    05-03 @ 07:01  -  05-04 @ 07:00  --------------------------------------------------------  IN: 2554 mL / OUT: 1901 mL / NET: 653 mL          PHYSICAL EXAM:  Constitutional: trached on vent, unresponsive  Respiratory: BS +  Cardiovascular: S1, S2, RRR  Gastrointestinal: BS+, soft, NT/ND  Extremities: ++ edema  :  + santana.       LABS:  05-04    151<H>  |  112<H>  |  105<HH>  ----------------------------<  167<H>  4.3   |  24  |  2.6<H>    Creatinine Trend: 2.6<--, 3.0<--, 3.2<--, 2.9<--, 3.5<--, 3.7<--    Ca    8.5      04 May 2020 08:33  Mg     2.1     05-04    TPro  5.4<L>  /  Alb  2.4<L>  /  TBili  0.5  /  DBili  x   /  AST  23  /  ALT  25  /  AlkPhos  105  05-04                        8.9    11.97 )-----------( 132      ( 04 May 2020 08:33 )             27.4       Urine Studies:      RADIOLOGY & ADDITIONAL STUDIES:

## 2020-05-04 NOTE — PROGRESS NOTE ADULT - SUBJECTIVE AND OBJECTIVE BOX
Patient is a 74y old  Male who presents with a chief complaint of suspected COVID-19 (04 May 2020 08:55)      HPI:  73 yo M w/ hx of Afib on Eliquis, CAD s/p PCI, DM, BPH, presents with 1 week of fever, cough, and progressive SOB. Admits to watery diarrhea for 3 days. Denies chest pain. 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 He has exposure to COVID positive family member at the wedding. No travel hx. Pt pulse ox in 80s in the field per EMS. Of note, per patient he had recent normal stress test recently.    COVID PCR sent, labs show lymphopenia and transaminitis, CXR shows b/l lung opacity, requiring NRB (30 Mar 2020 16:35)      HOSPITAL DAY NO: 35d    Overnight events:     PAST MEDICAL & SURGICAL HISTORY:  Afib  DM (diabetes mellitus)  BPH (benign prostatic hyperplasia)  CAD (coronary artery disease)      Vital Signs Last 24 Hrs  T(C): 36.7 (04 May 2020 04:59), Max: 37.1 (03 May 2020 20:53)  T(F): 98.1 (04 May 2020 04:59), Max: 98.8 (03 May 2020 20:53)  HR: 95 (04 May 2020 04:59) (76 - 95)  BP: 113/68 (04 May 2020 04:59) (112/59 - 121/58)  BP(mean): --  RR: 18 (04 May 2020 04:59) (18 - 20)  SpO2: 96% (03 May 2020 20:38) (96% - 96%)    use of pressors:     use of sedation:     Vent dependent:     Mode: AC/ CMV (Assist Control/ Continuous Mandatory Ventilation)  RR (machine): 20  TV (machine): 500  FiO2: 40  PEEP: 5  ITime: 1  MAP: 9  PIP: 23                                          Weaning time:     Significant exam findings:     Gen :   MS:  CHEST: B/L breath sounds   ABDOMEN: soft   HEART:S1/S2 regular  SKIN:   Extremities    No of BMs:       Nutrition:   via                   05-03-20 @ 07:01  -  05-04-20 @ 07:00  --------------------------------------------------------  IN:  Total IN: 0 mL          LABS:                          8.9    11.97 )-----------( 132      ( 04 May 2020 08:33 )             27.4                                               05-04    151<H>  |  112<H>  |  105<HH>  ----------------------------<  167<H>  4.3   |  24  |  2.6<H>    Ca    8.5      04 May 2020 08:33  Mg     2.1     05-04    TPro  5.4<L>  /  Alb  2.4<L>  /  TBili  0.5  /  DBili  x   /  AST  23  /  ALT  25  /  AlkPhos  105  05-04                                                LIVER FUNCTIONS - ( 04 May 2020 08:33 )  Alb: 2.4 g/dL / Pro: 5.4 g/dL / ALK PHOS: 105 U/L / ALT: 25 U/L / AST: 23 U/L / GGT: x                                                                                                           MEDICATIONS  (STANDING):  aMIOdarone    Tablet   Oral   aMIOdarone    Tablet 200 milliGRAM(s) Oral daily  BACItracin   Ointment 1 Application(s) Topical two times a day  calcium acetate 1334 milliGRAM(s) Oral three times a day with meals  chlorhexidine 0.12% Liquid 15 milliLiter(s) Oral Mucosa every 12 hours  chlorhexidine 4% Liquid 1 Application(s) Topical <User Schedule>  collagenase Ointment 1 Application(s) Topical two times a day  Dakins Solution - Full Strength 1 Application(s) Topical two times a day  dextrose 5%. 1000 milliLiter(s) (75 mL/Hr) IV Continuous <Continuous>  dextrose 50% Injectable 25 Gram(s) IV Push once  insulin glargine Injectable (LANTUS) 5 Unit(s) SubCutaneous at bedtime  insulin lispro (HumaLOG) corrective regimen sliding scale   SubCutaneous three times a day before meals  lactulose Syrup 20 Gram(s) Oral every 8 hours  levETIRAcetam  IVPB 250 milliGRAM(s) IV Intermittent every 12 hours  morphine  Infusion. 2 mG/Hr (2 mL/Hr) IV Continuous <Continuous>  mupirocin 2% Ointment 1 Application(s) Topical every 12 hours  pantoprazole   Suspension 40 milliGRAM(s) Oral daily  senna 2 Tablet(s) Oral at bedtime    MEDICATIONS  (PRN):  acetaminophen  Suppository .. 650 milliGRAM(s) Rectal every 6 hours PRN Temp greater or equal to 38.5C (101.3F)  LORazepam   Injectable 2 milliGRAM(s) IV Push every 6 hours PRN Agitation      05-03-20 @ 07:01  -  05-04-20 @ 07:00  --------------------------------------------------------  IN: 0.01 mL/kg/hr / OUT: 0 mL/kg/hr / NET: 0.01 mL/kg/hr        Radiology       Case discussed with staff and family at the bedside Patient is a 74y old  Male who presents with a chief complaint of suspected COVID-19 (04 May 2020 08:55)      HPI:  73 yo M w/ hx of Afib on Eliquis, CAD s/p PCI, DM, BPH, presents with 1 week of fever, cough, and progressive SOB. Admits to watery diarrhea for 3 days. Denies chest pain. 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 He has exposure to COVID positive family member at the wedding. No travel hx. Pt pulse ox in 80s in the field per EMS. Of note, per patient he had recent normal stress test recently.    COVID PCR sent, labs show lymphopenia and transaminitis, CXR shows b/l lung opacity, requiring NRB (30 Mar 2020 16:35)      HOSPITAL DAY NO: 35d    Overnight events: MRITA    PAST MEDICAL & SURGICAL HISTORY:  Afib  DM (diabetes mellitus)  BPH (benign prostatic hyperplasia)  CAD (coronary artery disease)      Vital Signs Last 24 Hrs  T(C): 36.7 (04 May 2020 04:59), Max: 37.1 (03 May 2020 20:53)  T(F): 98.1 (04 May 2020 04:59), Max: 98.8 (03 May 2020 20:53)  HR: 95 (04 May 2020 04:59) (76 - 95)  BP: 113/68 (04 May 2020 04:59) (112/59 - 121/58)  BP(mean): --  RR: 18 (04 May 2020 04:59) (18 - 20)  SpO2: 96% (03 May 2020 20:38) (96% - 96%)    use of pressors: none    use of sedation: none    Vent dependent: yes    Mode: AC/ CMV (Assist Control/ Continuous Mandatory Ventilation)  RR (machine): 20  TV (machine): 500  FiO2: 40  PEEP: 5  ITime: 1  MAP: 9  PIP: 23    Significant exam findings:     Gen: NAD   MS: not resopnsive to vocal or tactile stimuli  CHEST: B/L breath sounds   ABDOMEN: soft   HEART:S1/S2 regular  SKIN: sacral decubitus  Extremities: warm and well perfused bilaterally    Nutrition:   TF via NGT                 05-03-20 @ 07:01  -  05-04-20 @ 07:00  --------------------------------------------------------  IN:  Total IN: 0 mL    LABS:                    8.9    11.97 )-----------( 132      ( 04 May 2020 08:33 )             27.4                                               05-04    151<H>  |  112<H>  |  105<HH>  ----------------------------<  167<H>  4.3   |  24  |  2.6<H>    Ca    8.5      04 May 2020 08:33  Mg     2.1     05-04    TPro  5.4<L>  /  Alb  2.4<L>  /  TBili  0.5  /  DBili  x   /  AST  23  /  ALT  25  /  AlkPhos  105  05-04                                        LIVER FUNCTIONS - ( 04 May 2020 08:33 )  Alb: 2.4 g/dL / Pro: 5.4 g/dL / ALK PHOS: 105 U/L / ALT: 25 U/L / AST: 23 U/L / GGT: x                                                       MEDICATIONS  (STANDING):  aMIOdarone    Tablet   Oral   aMIOdarone    Tablet 200 milliGRAM(s) Oral daily  BACItracin   Ointment 1 Application(s) Topical two times a day  calcium acetate 1334 milliGRAM(s) Oral three times a day with meals  chlorhexidine 0.12% Liquid 15 milliLiter(s) Oral Mucosa every 12 hours  chlorhexidine 4% Liquid 1 Application(s) Topical <User Schedule>  collagenase Ointment 1 Application(s) Topical two times a day  Dakins Solution - Full Strength 1 Application(s) Topical two times a day  dextrose 5%. 1000 milliLiter(s) (75 mL/Hr) IV Continuous <Continuous>  dextrose 50% Injectable 25 Gram(s) IV Push once  insulin glargine Injectable (LANTUS) 5 Unit(s) SubCutaneous at bedtime  insulin lispro (HumaLOG) corrective regimen sliding scale   SubCutaneous three times a day before meals  lactulose Syrup 20 Gram(s) Oral every 8 hours  levETIRAcetam  IVPB 250 milliGRAM(s) IV Intermittent every 12 hours  morphine  Infusion. 2 mG/Hr (2 mL/Hr) IV Continuous <Continuous>  mupirocin 2% Ointment 1 Application(s) Topical every 12 hours  pantoprazole   Suspension 40 milliGRAM(s) Oral daily  senna 2 Tablet(s) Oral at bedtime    MEDICATIONS  (PRN):  acetaminophen  Suppository .. 650 milliGRAM(s) Rectal every 6 hours PRN Temp greater or equal to 38.5C (101.3F)  LORazepam   Injectable 2 milliGRAM(s) IV Push every 6 hours PRN Agitation      05-03-20 @ 07:01  -  05-04-20 @ 07:00  --------------------------------------------------------  IN: 0.01 mL/kg/hr / OUT: 0 mL/kg/hr / NET: 0.01 mL/kg/hr    Radiology: no effusions/infiltrates/PTX    Case discussed with staff and family at the bedside

## 2020-05-04 NOTE — PROGRESS NOTE ADULT - ASSESSMENT
73 yo M w/ hx of Afib on Eliquis, CAD s/p PCI, DM, BPH, presents with 1 week of fever, cough, and progressive SOB.    IMPRESSION:  Right gastrocnemius & peroneal DVT  Acute respiratory failure, Acute Respiratory Distress Syndrome and Severe Septic shock secondary to COVID-19 s/p percutaneous Tracheostomy 4/18  Cytokine Release Syndrome  PEG site bleeding s/p embolization by IR, stable  Multiple ischemic CVAs  Left occipital lobe hemorrhagic CVA  Encephalopathy  MRSA PNA + 4/15 s/p antibiotic therapy  Sacral Ulcer  LIZZIE/ATN  Hypernatremia  Afib not on AC due to bleeding  DM  CAD s/p PCI  BPH      PLAN:    CNS:  Continue Keppra 250mg bid. EEG showing generalized slowing. Off sedation, Morphine prn for comfort. F/u Neurology. MRA completed.   - stable CVA/hemorraghe, no new areas of infarct/bleeding   - neuro recs from 5/1 appreciated, discussed with team today regarding diagnostic yield of LP/EEG, will hold off for now and cont to follow up recs     HEENT: Oral care. Trach Care.     PULMONARY: HOB @ 45 degrees. Keep POx > 92%. fio2 decreased to 50%     CARDIOVASCULAR: I=O    GI: PPI ppx. PEG feeds.    RENAL: Hypernatremia despite increased free water of 400cc q6h. Will add D5 @75 cc for 1L today. Nephrology following, will f/u recs    INFECTIOUS DISEASE: s/p 6 weeks of Antibiotic therapy, currently off Antibiotics. Afebrile.  F/u ID recommendations.    HEMATOLOGICAL: 4/26 ultrasound +right gastrocnemius vein and peroneal.   - Off anticoagulation due to hemorrhagic CVA & PEG site bleed    - CBC stable. Monitor platelets.    ENDOCRINE: Insulin protocol. Fingerstick per nursing protocol. Avoid hypoglycemia.    MUSCULOSKELETAL: Bed rest. Wound care, Burn following.    - Burn team to perform bedside debridement today.    Very poor prognosis  Full code

## 2020-05-04 NOTE — PROCEDURE NOTE - NSPOSTCAREGUIDE_GEN_A_CORE
Instructed patient/caregiver to follow-up with primary care physician/Instructed patient/caregiver regarding signs and symptoms of infection/Care for catheter as per unit/ICU protocols/Verbal/written post procedure instructions were given to patient/caregiver/Keep the cast/splint/dressing clean and dry
Verbal/written post procedure instructions were given to patient/caregiver
Keep the cast/splint/dressing clean and dry/Care for catheter as per unit/ICU protocols/Instructed patient/caregiver regarding signs and symptoms of infection/Verbal/written post procedure instructions were given to patient/caregiver/Instructed patient/caregiver to follow-up with primary care physician

## 2020-05-05 VITALS
SYSTOLIC BLOOD PRESSURE: 150 MMHG | OXYGEN SATURATION: 98 % | HEART RATE: 118 BPM | TEMPERATURE: 98 F | DIASTOLIC BLOOD PRESSURE: 77 MMHG | RESPIRATION RATE: 25 BRPM

## 2020-05-05 LAB
ANION GAP SERPL CALC-SCNC: 9 MMOL/L — SIGNIFICANT CHANGE UP (ref 7–14)
BUN SERPL-MCNC: 97 MG/DL — CRITICAL HIGH (ref 10–20)
CALCIUM SERPL-MCNC: 7.9 MG/DL — LOW (ref 8.5–10.1)
CHLORIDE SERPL-SCNC: 114 MMOL/L — HIGH (ref 98–110)
CO2 SERPL-SCNC: 26 MMOL/L — SIGNIFICANT CHANGE UP (ref 17–32)
CREAT SERPL-MCNC: 2.3 MG/DL — HIGH (ref 0.7–1.5)
GLUCOSE BLDC GLUCOMTR-MCNC: 130 MG/DL — HIGH (ref 70–99)
GLUCOSE BLDC GLUCOMTR-MCNC: 163 MG/DL — HIGH (ref 70–99)
GLUCOSE BLDC GLUCOMTR-MCNC: 188 MG/DL — HIGH (ref 70–99)
GLUCOSE SERPL-MCNC: 216 MG/DL — HIGH (ref 70–99)
HCT VFR BLD CALC: 25.5 % — LOW (ref 42–52)
HGB BLD-MCNC: 7.8 G/DL — LOW (ref 14–18)
MAGNESIUM SERPL-MCNC: 1.9 MG/DL — SIGNIFICANT CHANGE UP (ref 1.8–2.4)
MCHC RBC-ENTMCNC: 29.5 PG — SIGNIFICANT CHANGE UP (ref 27–31)
MCHC RBC-ENTMCNC: 30.6 G/DL — LOW (ref 32–37)
MCV RBC AUTO: 96.6 FL — HIGH (ref 80–94)
NRBC # BLD: 0 /100 WBCS — SIGNIFICANT CHANGE UP (ref 0–0)
PLATELET # BLD AUTO: 156 K/UL — SIGNIFICANT CHANGE UP (ref 130–400)
POTASSIUM SERPL-MCNC: 3.9 MMOL/L — SIGNIFICANT CHANGE UP (ref 3.5–5)
POTASSIUM SERPL-SCNC: 3.9 MMOL/L — SIGNIFICANT CHANGE UP (ref 3.5–5)
RBC # BLD: 2.64 M/UL — LOW (ref 4.7–6.1)
RBC # FLD: 18.4 % — HIGH (ref 11.5–14.5)
SODIUM SERPL-SCNC: 149 MMOL/L — HIGH (ref 135–146)
WBC # BLD: 9.61 K/UL — SIGNIFICANT CHANGE UP (ref 4.8–10.8)
WBC # FLD AUTO: 9.61 K/UL — SIGNIFICANT CHANGE UP (ref 4.8–10.8)

## 2020-05-05 PROCEDURE — 71045 X-RAY EXAM CHEST 1 VIEW: CPT | Mod: 26

## 2020-05-05 PROCEDURE — 99231 SBSQ HOSP IP/OBS SF/LOW 25: CPT

## 2020-05-05 RX ORDER — MUPIROCIN 20 MG/G
1 OINTMENT TOPICAL
Qty: 0 | Refills: 0 | DISCHARGE
Start: 2020-05-05

## 2020-05-05 RX ORDER — INSULIN GLARGINE 100 [IU]/ML
5 INJECTION, SOLUTION SUBCUTANEOUS
Qty: 0 | Refills: 0 | DISCHARGE
Start: 2020-05-05

## 2020-05-05 RX ORDER — METOPROLOL TARTRATE 50 MG
25 TABLET ORAL
Refills: 0 | Status: DISCONTINUED | OUTPATIENT
Start: 2020-05-05 | End: 2020-05-05

## 2020-05-05 RX ORDER — CALCIUM ACETATE 667 MG
2 TABLET ORAL
Qty: 0 | Refills: 0 | DISCHARGE
Start: 2020-05-05

## 2020-05-05 RX ORDER — ATORVASTATIN CALCIUM 80 MG/1
1 TABLET, FILM COATED ORAL
Qty: 0 | Refills: 0 | DISCHARGE
Start: 2020-05-05

## 2020-05-05 RX ORDER — SENNA PLUS 8.6 MG/1
2 TABLET ORAL
Qty: 0 | Refills: 0 | DISCHARGE
Start: 2020-05-05

## 2020-05-05 RX ORDER — MORPHINE SULFATE 50 MG/1
4 CAPSULE, EXTENDED RELEASE ORAL
Qty: 0 | Refills: 0 | DISCHARGE
Start: 2020-05-05

## 2020-05-05 RX ORDER — LEVETIRACETAM 250 MG/1
25 TABLET, FILM COATED ORAL
Qty: 0 | Refills: 0 | DISCHARGE
Start: 2020-05-05

## 2020-05-05 RX ORDER — METOPROLOL TARTRATE 50 MG
1 TABLET ORAL
Qty: 0 | Refills: 0 | DISCHARGE
Start: 2020-05-05

## 2020-05-05 RX ORDER — DILTIAZEM HCL 120 MG
1 CAPSULE, EXT RELEASE 24 HR ORAL
Qty: 0 | Refills: 0 | DISCHARGE

## 2020-05-05 RX ORDER — PANTOPRAZOLE SODIUM 20 MG/1
1 TABLET, DELAYED RELEASE ORAL
Qty: 0 | Refills: 0 | DISCHARGE
Start: 2020-05-05

## 2020-05-05 RX ORDER — ATORVASTATIN CALCIUM 80 MG/1
1 TABLET, FILM COATED ORAL
Qty: 0 | Refills: 0 | DISCHARGE

## 2020-05-05 RX ORDER — MORPHINE SULFATE 50 MG/1
2 CAPSULE, EXTENDED RELEASE ORAL
Qty: 0 | Refills: 0 | DISCHARGE
Start: 2020-05-05

## 2020-05-05 RX ORDER — ACETAMINOPHEN 500 MG
1 TABLET ORAL
Qty: 0 | Refills: 0 | DISCHARGE
Start: 2020-05-05

## 2020-05-05 RX ORDER — INSULIN LISPRO 100/ML
2 VIAL (ML) SUBCUTANEOUS
Qty: 0 | Refills: 0 | DISCHARGE
Start: 2020-05-05

## 2020-05-05 RX ORDER — MORPHINE SULFATE 50 MG/1
4 CAPSULE, EXTENDED RELEASE ORAL EVERY 4 HOURS
Refills: 0 | Status: DISCONTINUED | OUTPATIENT
Start: 2020-05-05 | End: 2020-05-05

## 2020-05-05 RX ORDER — LISINOPRIL 2.5 MG/1
1 TABLET ORAL
Qty: 0 | Refills: 0 | DISCHARGE

## 2020-05-05 RX ORDER — COLLAGENASE CLOSTRIDIUM HIST. 250 UNIT/G
1 OINTMENT (GRAM) TOPICAL
Qty: 0 | Refills: 0 | DISCHARGE
Start: 2020-05-05

## 2020-05-05 RX ORDER — APIXABAN 2.5 MG/1
1 TABLET, FILM COATED ORAL
Qty: 0 | Refills: 0 | DISCHARGE

## 2020-05-05 RX ORDER — BACITRACIN ZINC 500 UNIT/G
1 OINTMENT IN PACKET (EA) TOPICAL
Qty: 0 | Refills: 0 | DISCHARGE
Start: 2020-05-05

## 2020-05-05 RX ORDER — METFORMIN HYDROCHLORIDE 850 MG/1
1 TABLET ORAL
Qty: 0 | Refills: 0 | DISCHARGE

## 2020-05-05 RX ADMIN — LACTULOSE 20 GRAM(S): 10 SOLUTION ORAL at 05:11

## 2020-05-05 RX ADMIN — MORPHINE SULFATE 4 MILLIGRAM(S): 50 CAPSULE, EXTENDED RELEASE ORAL at 16:58

## 2020-05-05 RX ADMIN — AMIODARONE HYDROCHLORIDE 200 MILLIGRAM(S): 400 TABLET ORAL at 05:09

## 2020-05-05 RX ADMIN — LEVETIRACETAM 400 MILLIGRAM(S): 250 TABLET, FILM COATED ORAL at 05:11

## 2020-05-05 RX ADMIN — Medication 1 APPLICATION(S): at 05:10

## 2020-05-05 RX ADMIN — PANTOPRAZOLE SODIUM 40 MILLIGRAM(S): 20 TABLET, DELAYED RELEASE ORAL at 11:04

## 2020-05-05 RX ADMIN — Medication 1 APPLICATION(S): at 05:13

## 2020-05-05 RX ADMIN — CHLORHEXIDINE GLUCONATE 1 APPLICATION(S): 213 SOLUTION TOPICAL at 05:10

## 2020-05-05 RX ADMIN — Medication 1334 MILLIGRAM(S): at 11:04

## 2020-05-05 RX ADMIN — Medication 2: at 05:42

## 2020-05-05 RX ADMIN — MUPIROCIN 1 APPLICATION(S): 20 OINTMENT TOPICAL at 05:13

## 2020-05-05 RX ADMIN — Medication 25 MILLIGRAM(S): at 12:23

## 2020-05-05 RX ADMIN — Medication 1334 MILLIGRAM(S): at 05:10

## 2020-05-05 RX ADMIN — CHLORHEXIDINE GLUCONATE 15 MILLILITER(S): 213 SOLUTION TOPICAL at 05:10

## 2020-05-05 NOTE — DISCHARGE NOTE PROVIDER - NSDCMRMEDTOKEN_GEN_ALL_CORE_FT
acetaminophen 650 mg rectal suppository: 1 suppository(ies) rectal every 6 hours, As needed, Temp greater or equal to 38.5C (101.3F)  bacitracin 500 units/g topical ointment: 1 application topically 2 times a day  calcium acetate 667 mg oral tablet: 2 tab(s) orally 3 times a day  collagenase 250 units/g topical ointment: 1 application topically 2 times a day  HumaLOG 100 units/mL injectable solution: 2 Unit(s) if Glucose 151 - 200  4 Unit(s) if Glucose 201 - 250  6 Unit(s) if Glucose 251 - 300  8 Unit(s) if Glucose 301 - 350  10 Unit(s) if Glucose 351 - 400  12 Unit(s) if Glucose Greater Than 400  3 times a day with meals  insulin glargine: 5 unit(s) subcutaneous once a day (at bedtime)  levETIRAcetam 1000 mg/100 mL-NaCl 0.75% intravenous solution: 25 milliliter(s) intravenous every 12 hours  LORazepam: 2 milligram(s) intravenously every 6 hours, As Needed for agitation  morphine: 2 mg/hr by continuous intravenous infusion  mupirocin 2% topical ointment: 1 application topically every 12 hours  pantoprazole 40 mg oral granule, delayed release: 1 tab(s) orally once a day  senna oral tablet: 2 tab(s) orally once a day (at bedtime) acetaminophen 650 mg rectal suppository: 1 suppository(ies) rectal every 6 hours, As needed, Temp greater or equal to 38.5C (101.3F)  bacitracin 500 units/g topical ointment: 1 application topically 2 times a day  calcium acetate 667 mg oral tablet: 2 tab(s) orally 3 times a day  collagenase 250 units/g topical ointment: 1 application topically 2 times a day  HumaLOG 100 units/mL injectable solution: 2 Unit(s) if Glucose 151 - 200  4 Unit(s) if Glucose 201 - 250  6 Unit(s) if Glucose 251 - 300  8 Unit(s) if Glucose 301 - 350  10 Unit(s) if Glucose 351 - 400  12 Unit(s) if Glucose Greater Than 400  3 times a day with meals  insulin glargine: 5 unit(s) subcutaneous once a day (at bedtime)  levETIRAcetam 1000 mg/100 mL-NaCl 0.75% intravenous solution: 25 milliliter(s) intravenous every 12 hours  Lipitor 40 mg oral tablet: 1 tab(s) orally once a day (at bedtime)  LORazepam: 2 milligram(s) intravenously every 6 hours, As Needed for agitation  metoprolol tartrate 25 mg oral tablet: 1 tab(s) orally 2 times a day  morphine: 2 mg/hr by continuous intravenous infusion  mupirocin 2% topical ointment: 1 application topically every 12 hours  pantoprazole 40 mg oral granule, delayed release: 1 tab(s) orally once a day  senna oral tablet: 2 tab(s) orally once a day (at bedtime) acetaminophen 650 mg rectal suppository: 1 suppository(ies) rectal every 6 hours, As needed, Temp greater or equal to 38.5C (101.3F)  bacitracin 500 units/g topical ointment: 1 application topically 2 times a day  calcium acetate 667 mg oral tablet: 2 tab(s) orally 3 times a day  collagenase 250 units/g topical ointment: 1 application topically 2 times a day  HumaLOG 100 units/mL injectable solution: 2 Unit(s) if Glucose 151 - 200  4 Unit(s) if Glucose 201 - 250  6 Unit(s) if Glucose 251 - 300  8 Unit(s) if Glucose 301 - 350  10 Unit(s) if Glucose 351 - 400  12 Unit(s) if Glucose Greater Than 400  3 times a day with meals  insulin glargine: 5 unit(s) subcutaneous once a day (at bedtime)  levETIRAcetam 1000 mg/100 mL-NaCl 0.75% intravenous solution: 25 milliliter(s) intravenous every 12 hours  Lipitor 40 mg oral tablet: 1 tab(s) orally once a day (at bedtime)  LORazepam: 2 milligram(s) intravenously every 6 hours, As Needed for agitation  metoprolol tartrate 25 mg oral tablet: 1 tab(s) orally 2 times a day  morphine: 4 milligram(s) intravenously every 4 hours, As Needed for pain/agitation  mupirocin 2% topical ointment: 1 application topically every 12 hours  pantoprazole 40 mg oral granule, delayed release: 1 tab(s) orally once a day  senna oral tablet: 2 tab(s) orally once a day (at bedtime)

## 2020-05-05 NOTE — DISCHARGE NOTE PROVIDER - HOSPITAL COURSE
75 yo M w/ hx of Afib on Eliquis, CAD s/p PCI, DM, BPH, presented on 3/30 with c/o 1 week of fever, cough, and progressive SOB associated with watery diarrhea for 3 days.  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 He has exposure to COVID positive family member at the wedding. Pt pulse ox in 80s in the field per EMS. Of note, per patient he had recent normal stress test recently.  In the ED he initially tested negative for COVID, but CXR showed bilateral patchy infiltrates suggestive of COVID PNA.  On 3/31 he desaturated despite 100% FiO2 via NRB and was intubated and transferred to ICU.  Repeat COVID swab on 4/1 was positive.  On 4/8 he developed LIZZIE requiring a session of HD on 4/10 for uremia and hyperkalemia.  He was weaned off sedation after HD but remained unresponsive which prompted a CT head on 4/14 which revealed hemorrhagic CVA for which neurology was consulted.  Eliquis was held and his mental status slowly improved over the following weeks.  On 4/18 he had a bed- trach which was uncomplicated and on 4/28 he had a PEG placed by IR which was complicated by a right gastroepiploic artery bleed requiring an urgent celiac arteriography with coil embolization.  Burn team was consulted for sacral decubitus ulcer on 5/1 which was managed with enzymatic debridement.  A bedside surgical debridement was performed on 5/4.  He tolerated the procedure well.  An MRI of the head on 5/2 showed normal progression of previously noted hemorraghic CVA's with no new areas of infarct or bleeding.  His oxygen requirements have significantly improved, but he remains vent dependent with settings of TV - 500, RR-20, FIO2 -40%, and PEEP - 5.  He is presently stable and ready to transfer to LTAC for continued vent weaning.

## 2020-05-05 NOTE — CHART NOTE - NSCHARTNOTEFT_GEN_A_CORE
Neurology addendum:  Please do not delay discharge for LP or EEG.   Discussed with Dr. Bean Cortes NP  [id number]

## 2020-05-05 NOTE — PROGRESS NOTE ADULT - ASSESSMENT
LIZZIE/ ATN/ hyperkalemia/ respiratory failure / covid positive / high BUN  # creatinine trending  down  # non-oliguric  # on binders, check ph   # s/p Rapid response on 4/28 , bleeding around PEG, s/p embolisation followed by IR trend hgb   # change feed to nepro  # hypernatremia noted. give free water via  cc q6h on D5w continue till AM   # neurology notes appreciated   # no acute indication for RRT   # will follow

## 2020-05-05 NOTE — PROGRESS NOTE ADULT - REASON FOR ADMISSION
suspected COVID-19
suspected COVID-19
COVID-19
Suspected COVID-19
COVID-19
Suspected COVID-19

## 2020-05-05 NOTE — DISCHARGE NOTE PROVIDER - NSDCCPGOAL_GEN_ALL_CORE_FT
You have tested POSITIVE for the novel coronavirus (COVID-19). Please wear a face mask if you are around other individuals. Please follow up with your primary care physician within 2-3 weeks of your discharge from the hospital. Please take all medications as prescribed. If you experience any worsening or recurrence of your symptoms, particularly worsening or high fever, shortness of breathe, extreme fatigue, or bloody cough please call [phone] immediately or report to the nearest Emergency Department. If you have any questions or concerns, please do not hesitate to call the hospital at [phone].  To get better and follow your care plan as instructed.

## 2020-05-05 NOTE — PROGRESS NOTE ADULT - ATTENDING COMMENTS
remains on minimal vent support   continue to hold sedation   continue TF   continue ICU care
improved oxygenation   continue vent support   Stable LIZZIE , will diurese   continue TF   continue ICU Care
improved oxygenation   improved kidney function   continue tube feeds   continue IV antibiotics   continue ICU Care
improved u/o   continue vent support   continue TF   continue ICU Care
large ledt hemispheric stroke   non oliguric renal failure   minimal response   on minimal vent support   neurology following   poor outcome   Palliative care consult
minimal response   will scan Head   neurology evaluation   for HD today   continue TF   continue to hold sedation   continue ICU Care
off all sedation   minimally responsive   on minimal vent support   for HD today   will continue to hold sedation   continue ICU Care
on minimal vent support   continue diureses   continue to hold sedation   TF   continue ICU Care
opens eyes and at times moves spontaneously   on minimal vent support   off Cardizem drip   continue Tf   s/p trache   for transfer to Vent unit   may need  PEG
opens eyes and moving spontaneously at times   PSV   continue TF   continue to monitor bun /creat   head CT today and possible MRI   continue ICU care   possible transfer to [facility] unit
responsive to painful stimuli   will continue to hold sedation   on minimal vent support   continue trickle feeds   continue ICU Care
s/p HD   off all sedation   still unresponsive   on minimal vent support   for HD today   continue ICU care
stable overnight   on minimal vent support   off all sedation   uremic   will dialyze today   continue TF   continue ICU Care t
unchande neuro exam   HR control with CCB, BB will add amio   continue TF   continue to wean cardizem drip   possible transfer to Vent unit
Hemorrhagic areas appears to be associated with underlying infarction. Due to atypical locations, venous stroke due to CVST may be a possibility. Suggests MRV or CTV to r/o CVST.
I have personally seen and examined this patient on 5/2  I have fully participated in the care of this patient.  I have reviewed all pertinent clinical information, including history, physical exam, plan and note.   I have reviewed all pertinent clinical information and reviewed all relevant imaging and diagnostic studies personally.  Recommendations as above. Please obtain Brain MRI and CSF study.  Agree with above assessment except as noted.
I was Physically Present for the key portions of the evaluation   I agree with the above History  , Physical examination Assessment and plan   I have Reviewed , Modified or appended where appropriate.  Please check A and P as above   1- LIZZIE / ATN/ respiratory failure/ covid 19 infection/ HYpernatremia  no need for RRT today  agree with D5 w  follow BMP  follow HB and transfuse if Hb <7
continue to improve   will continue to wean vent support   continue to monitor kidney function   continue ICU
note reviwed  discussed w/ fellow  Cr slightly improved   plan as above
opens eyes   moves extremities spontaneously   just had perc trache   will continue to wean vent   continue TF   will continue to monitor kidney function and asses for HD   continue ICU care   candidate for Vent unit
opens eyes   moves extrimities spontaneously at times   on minimal vent support   for HD today   awaiting PCR results   for possible trache   continue ICU Care
Patient seen and examined, agree with above, CT A/P done and reviewed, for PEG, very poor prognosis
Patient seen and examined, agree with above, worsening renal function, hyperk, start bicarb drip, repeat CMP, brain MRI, for PEG ?, very poor prognosis
on minimal vent support   high bun/ creat   will need HD   unresponsive   continue ICU care   nephrology and palliative care .
stable O2 saturation   continue vent support   continue BP support   trickle feeds   continue icu Care
EEG shows no seizure and generalized slowing   withdraws to painful stimuli   open eyes intermittently  on minimal vent support   Off all pressors   BUN? Creat still high with adequate u/o   continue ICU support   may need Trache
discussed with PA
improved oxygenation and urine output   continue vent support   will attempt diuresis   continue trickle feeds   continue ICU Care
s/p trache   more responsive , moving spontaneously at times   continue to hold sedation   continue vent support   continue ICU care   possible transfer to SDU

## 2020-05-05 NOTE — DISCHARGE NOTE PROVIDER - NSDCCPCAREPLAN_GEN_ALL_CORE_FT
PRINCIPAL DISCHARGE DIAGNOSIS  Diagnosis: Acute hypoxemic respiratory failure  Assessment and Plan of Treatment:       SECONDARY DISCHARGE DIAGNOSES  Diagnosis: Pulmonary infiltrates on CXR  Assessment and Plan of Treatment:

## 2020-05-05 NOTE — PROGRESS NOTE ADULT - ASSESSMENT
Sacral pressure wound - POD#1 s/p debridement    PLAN :   Continue wound care/ dressing changes twice a day: wash wound with soap and water, apply santyl/hydrogel, then cover with gauze and tape  Continue pain mgmt, VTE prophylaxis  PT/OT Sacral pressure wound - POD#1 s/p debridement    PLAN :   Continue wound care/ dressing changes twice a day: wash wound with soap and water, apply santyl/hydrogel, then cover with gauze and tape  Will reeval for need for further debridement   Continue pain mgmt, VTE prophylaxis  Increase offloading measures   PT/OT

## 2020-05-05 NOTE — PROGRESS NOTE ADULT - SUBJECTIVE AND OBJECTIVE BOX
Nephrology progress note    THIS IS AN INCOMPLETE NOTE . FULL NOTE TO FOLLOW SHORTLY    Patient is seen and examined, events over the last 24 h noted .    Allergies:  Bactrim (Unknown)    Hospital Medications:   MEDICATIONS  (STANDING):  aMIOdarone    Tablet   Oral   aMIOdarone    Tablet 200 milliGRAM(s) Oral daily  BACItracin   Ointment 1 Application(s) Topical two times a day  calcium acetate 1334 milliGRAM(s) Oral three times a day with meals  chlorhexidine 0.12% Liquid 15 milliLiter(s) Oral Mucosa every 12 hours  chlorhexidine 4% Liquid 1 Application(s) Topical <User Schedule>  collagenase Ointment 1 Application(s) Topical two times a day  Dakins Solution - Full Strength 1 Application(s) Topical two times a day  dextrose 5%. 1000 milliLiter(s) (75 mL/Hr) IV Continuous <Continuous>  dextrose 50% Injectable 25 Gram(s) IV Push once  insulin glargine Injectable (LANTUS) 5 Unit(s) SubCutaneous at bedtime  insulin lispro (HumaLOG) corrective regimen sliding scale   SubCutaneous three times a day before meals  lactulose Syrup 20 Gram(s) Oral every 8 hours  levETIRAcetam  IVPB 250 milliGRAM(s) IV Intermittent every 12 hours  morphine  Infusion. 2 mG/Hr (2 mL/Hr) IV Continuous <Continuous>  mupirocin 2% Ointment 1 Application(s) Topical every 12 hours  pantoprazole   Suspension 40 milliGRAM(s) Oral daily  senna 2 Tablet(s) Oral at bedtime        VITALS:  T(F): 98 (05-05-20 @ 05:38), Max: 98 (05-05-20 @ 05:38)  HR: 105 (05-05-20 @ 08:00)  BP: 121/64 (05-04-20 @ 21:25)  RR: 20 (05-05-20 @ 05:38)  SpO2: 99% (05-05-20 @ 08:00)  Wt(kg): --    05-03 @ 07:01  -  05-04 @ 07:00  --------------------------------------------------------  IN: 2556 mL / OUT: 1901 mL / NET: 655 mL    05-04 @ 07:01  -  05-05 @ 07:00  --------------------------------------------------------  IN: 3489.5 mL / OUT: 2200 mL / NET: 1289.5 mL          PHYSICAL EXAM:  Constitutional: NAD  HEENT: anicteric sclera, oropharynx clear, MMM  Neck: No JVD  Respiratory: CTAB, no wheezes, rales or rhonchi  Cardiovascular: S1, S2, RRR  Gastrointestinal: BS+, soft, NT/ND  Extremities: No cyanosis or clubbing. No peripheral edema  :  No santana.   Skin: No rashes    LABS:  05-04    151<H>  |  112<H>  |  105<HH>  ----------------------------<  167<H>  4.3   |  24  |  2.6<H>    Ca    8.5      04 May 2020 08:33  Mg     1.9     05-05    TPro  5.4<L>  /  Alb  2.4<L>  /  TBili  0.5  /  DBili      /  AST  23  /  ALT  25  /  AlkPhos  105  05-04                          7.8    9.61  )-----------( 156      ( 05 May 2020 08:07 )             25.5       Urine Studies:      RADIOLOGY & ADDITIONAL STUDIES: Nephrology progress note    Patient is seen and examined, events over the last 24 h noted .  unresponsive on MV   positive santana     Allergies:  Bactrim (Unknown)    Hospital Medications:   MEDICATIONS  (STANDING):  aMIOdarone    Tablet   Oral   aMIOdarone    Tablet 200 milliGRAM(s) Oral daily  BACItracin   Ointment 1 Application(s) Topical two times a day  calcium acetate 1334 milliGRAM(s) Oral three times a day with meals  collagenase Ointment 1 Application(s) Topical two times a day  Dakins Solution - Full Strength 1 Application(s) Topical two times a day  dextrose 5%. 1000 milliLiter(s) (75 mL/Hr) IV Continuous <Continuous>  dextrose 50% Injectable 25 Gram(s) IV Push once  insulin glargine Injectable (LANTUS) 5 Unit(s) SubCutaneous at bedtime  insulin lispro (HumaLOG) corrective regimen sliding scale   SubCutaneous three times a day before meals  lactulose Syrup 20 Gram(s) Oral every 8 hours  levETIRAcetam  IVPB 250 milliGRAM(s) IV Intermittent every 12 hours  morphine  Infusion. 2 mG/Hr (2 mL/Hr) IV Continuous <Continuous>  mupirocin 2% Ointment 1 Application(s) Topical every 12 hours  pantoprazole   Suspension 40 milliGRAM(s) Oral daily  senna 2 Tablet(s) Oral at bedtime        VITALS:  T(F): 98 (05-05-20 @ 05:38), Max: 98 (05-05-20 @ 05:38)  HR: 105 (05-05-20 @ 08:00)  BP: 121/64 (05-04-20 @ 21:25)  RR: 20 (05-05-20 @ 05:38)  SpO2: 99% (05-05-20 @ 08:00)      05-03 @ 07:01  -  05-04 @ 07:00  --------------------------------------------------------  IN: 2556 mL / OUT: 1901 mL / NET: 655 mL    05-04 @ 07:01  -  05-05 @ 07:00  --------------------------------------------------------  IN: 3489.5 mL / OUT: 2200 mL / NET: 1289.5 mL          PHYSICAL EXAM:  Constitutional: intubated on MV   HEENT: anicteric sclera, oropharynx clear, MMM  Neck: No JVD  Respiratory: CTAB, no wheezes, rales or rhonchi  Cardiovascular: S1, S2, RRR  Gastrointestinal: BS+, soft, NT/ND  Extremities: No cyanosis or clubbing. No peripheral edema  :  No santana.   Skin: No rashes    LABS:  05-05    149<H>  |  114<H>  |  97<HH>  ----------------------------<  216<H>  3.9   |  26  |  2.3<H>    Ca    7.9<L>      05 May 2020 08:07  Mg     1.9     05-05    TPro  5.4<L>  /  Alb  2.4<L>  /  TBili  0.5  /  DBili  x   /  AST  23  /  ALT  25  /  AlkPhos  105  05-04    05-04    151<H>  |  112<H>  |  105<HH>  ----------------------------<  167<H>  4.3   |  24  |  2.6<H>    Ca    8.5      04 May 2020 08:33  Mg     1.9     05-05    TPro  5.4<L>  /  Alb  2.4<L>  /  TBili  0.5  /  DBili      /  AST  23  /  ALT  25  /  AlkPhos  105  05-04                          7.8    9.61  )-----------( 156      ( 05 May 2020 08:07 )             25.5       Urine Studies:      RADIOLOGY & ADDITIONAL STUDIES:

## 2020-05-07 LAB — LEVETIRACETAM SERPL-MCNC: 16.6 MCG/ML — SIGNIFICANT CHANGE UP (ref 12–46)

## 2020-05-08 DIAGNOSIS — I48.20 CHRONIC ATRIAL FIBRILLATION, UNSPECIFIED: ICD-10-CM

## 2020-05-08 DIAGNOSIS — Z79.84 LONG TERM (CURRENT) USE OF ORAL HYPOGLYCEMIC DRUGS: ICD-10-CM

## 2020-05-08 DIAGNOSIS — N17.0 ACUTE KIDNEY FAILURE WITH TUBULAR NECROSIS: ICD-10-CM

## 2020-05-08 DIAGNOSIS — I25.10 ATHEROSCLEROTIC HEART DISEASE OF NATIVE CORONARY ARTERY WITHOUT ANGINA PECTORIS: ICD-10-CM

## 2020-05-08 DIAGNOSIS — R65.21 SEVERE SEPSIS WITH SEPTIC SHOCK: ICD-10-CM
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
## 2020-05-08 DIAGNOSIS — R33.9 RETENTION OF URINE, UNSPECIFIED: ICD-10-CM

## 2020-05-08 DIAGNOSIS — I63.9 CEREBRAL INFARCTION, UNSPECIFIED: ICD-10-CM

## 2020-05-08 DIAGNOSIS — Z88.1 ALLERGY STATUS TO OTHER ANTIBIOTIC AGENTS STATUS: ICD-10-CM

## 2020-05-08 DIAGNOSIS — L89.812 PRESSURE ULCER OF HEAD, STAGE 2: ICD-10-CM

## 2020-05-08 DIAGNOSIS — A41.9 SEPSIS, UNSPECIFIED ORGANISM: ICD-10-CM

## 2020-05-08 DIAGNOSIS — Z79.01 LONG TERM (CURRENT) USE OF ANTICOAGULANTS: ICD-10-CM

## 2020-05-08 DIAGNOSIS — E87.5 HYPERKALEMIA: ICD-10-CM
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
## 2020-05-08 DIAGNOSIS — N40.1 BENIGN PROSTATIC HYPERPLASIA WITH LOWER URINARY TRACT SYMPTOMS: ICD-10-CM

## 2020-05-08 DIAGNOSIS — I82.461 ACUTE EMBOLISM AND THROMBOSIS OF RIGHT CALF MUSCULAR VEIN: ICD-10-CM

## 2020-05-08 DIAGNOSIS — I61.8 OTHER NONTRAUMATIC INTRACEREBRAL HEMORRHAGE: ICD-10-CM

## 2020-05-08 DIAGNOSIS — K91.840 POSTPROCEDURAL HEMORRHAGE OF A DIGESTIVE SYSTEM ORGAN OR STRUCTURE FOLLOWING A DIGESTIVE SYSTEM PROCEDURE: ICD-10-CM

## 2020-05-08 DIAGNOSIS — U07.1 COVID-19: ICD-10-CM

## 2020-05-08 DIAGNOSIS — J12.89 OTHER VIRAL PNEUMONIA: ICD-10-CM

## 2020-05-08 DIAGNOSIS — L89.152 PRESSURE ULCER OF SACRAL REGION, STAGE 2: ICD-10-CM

## 2020-05-08 DIAGNOSIS — R74.0 NONSPECIFIC ELEVATION OF LEVELS OF TRANSAMINASE AND LACTIC ACID DEHYDROGENASE [LDH]: ICD-10-CM

## 2020-05-08 DIAGNOSIS — I82.451 ACUTE EMBOLISM AND THROMBOSIS OF RIGHT PERONEAL VEIN: ICD-10-CM

## 2020-05-08 DIAGNOSIS — J15.212 PNEUMONIA DUE TO METHICILLIN RESISTANT STAPHYLOCOCCUS AUREUS: ICD-10-CM

## 2020-05-08 DIAGNOSIS — J80 ACUTE RESPIRATORY DISTRESS SYNDROME: ICD-10-CM

## 2020-05-08 DIAGNOSIS — Z98.61 CORONARY ANGIOPLASTY STATUS: ICD-10-CM

## 2020-05-08 DIAGNOSIS — E11.9 TYPE 2 DIABETES MELLITUS WITHOUT COMPLICATIONS: ICD-10-CM

## 2020-05-08 DIAGNOSIS — E88.09 OTHER DISORDERS OF PLASMA-PROTEIN METABOLISM, NOT ELSEWHERE CLASSIFIED: ICD-10-CM

## 2020-05-08 DIAGNOSIS — G93.40 ENCEPHALOPATHY, UNSPECIFIED: ICD-10-CM

## 2020-05-08 DIAGNOSIS — D72.810 LYMPHOCYTOPENIA: ICD-10-CM

## 2022-11-08 NOTE — DISCHARGE NOTE NURSING/CASE MANAGEMENT/SOCIAL WORK - PATIENT PORTAL LINK FT
How Severe Are Your Warts?: moderate
Is This A New Presentation, Or A Follow-Up?: Wart
You can access the FollowMyHealth Patient Portal offered by [facility] by registering at the following website: [email or web address]. By joining [facility]’s FollowMyHealth portal, you will also be able to view your health information using other applications (apps) compatible with our system.

## 2023-01-11 NOTE — PROGRESS NOTE ADULT - SUBJECTIVE AND OBJECTIVE BOX
73 yo M w/ hx of Afib on Eliquis, CAD s/p PCI, DM, BPH, presents with 1 week of fever, cough, and progressive SOB. COVID positive, labs show lymphopenia and transaminitis, CXR shows b/l lung opacity, requiring NRB (30 Mar 2020 16:35)  Admitted 3/30/2020. s/p intubation and trach.     POD#1 debridement of sacral pressure wound    Vital Signs Last 24 Hrs  T(C): 36.7 (05 May 2020 05:38), Max: 36.7 (05 May 2020 05:38)  T(F): 98 (05 May 2020 05:38), Max: 98 (05 May 2020 05:38)  HR: 117 (05 May 2020 12:29) (73 - 117)  BP: 121/64 (04 May 2020 21:25) (121/64 - 124/70)  RR: 20 (05 May 2020 05:38) (18 - 20)  SpO2: 98% (05 May 2020 12:29) (98% - 99%)    I&O's Summary    04 May 2020 07:01  -  05 May 2020 07:00  --------------------------------------------------------  IN: 3489.5 mL / OUT: 2200 mL / NET: 1289.5 mL        05-05    149<H>  |  114<H>  |  97<HH>  ----------------------------<  216<H>  3.9   |  26  |  2.3<H>    Ca    7.9<L>      05 May 2020 08:07  Mg     1.9     05-05    TPro  5.4<L>  /  Alb  2.4<L>  /  TBili  0.5  /  DBili  x   /  AST  23  /  ALT  25  /  AlkPhos  105  05-04                          7.8    9.61  )-----------( 156      ( 05 May 2020 08:07 )             25.5     CAPILLARY BLOOD GLUCOSE      POCT Blood Glucose.: 130 mg/dL (05 May 2020 11:49)  POCT Blood Glucose.: 163 mg/dL (05 May 2020 05:22)  POCT Blood Glucose.: 124 mg/dL (04 May 2020 23:13)  POCT Blood Glucose.: 166 mg/dL (04 May 2020 21:33)  POCT Blood Glucose.: 214 mg/dL (04 May 2020 16:30)      Wound:  sacral wound s/p debridement: 7 x 6 cm, pink base, no infection, no drainage 75 yo M w/ hx of Afib on Eliquis, CAD s/p PCI, DM, BPH, presents with 1 week of fever, cough, and progressive SOB. COVID positive, labs show lymphopenia and transaminitis, CXR shows b/l lung opacity, requiring NRB (30 Mar 2020 16:35)  Admitted 3/30/2020. s/p intubation and trach.     POD#1 debridement of sacral pressure wound    Vital Signs Last 24 Hrs  T(C): 36.7 (05 May 2020 05:38), Max: 36.7 (05 May 2020 05:38)  T(F): 98 (05 May 2020 05:38), Max: 98 (05 May 2020 05:38)  HR: 117 (05 May 2020 12:29) (73 - 117)  BP: 121/64 (04 May 2020 21:25) (121/64 - 124/70)  RR: 20 (05 May 2020 05:38) (18 - 20)  SpO2: 98% (05 May 2020 12:29) (98% - 99%)    I&O's Summary    04 May 2020 07:01  -  05 May 2020 07:00  --------------------------------------------------------  IN: 3489.5 mL / OUT: 2200 mL / NET: 1289.5 mL        05-05    149<H>  |  114<H>  |  97<HH>  ----------------------------<  216<H>  3.9   |  26  |  2.3<H>    Ca    7.9<L>      05 May 2020 08:07  Mg     1.9     05-05    TPro  5.4<L>  /  Alb  2.4<L>  /  TBili  0.5  /  DBili  x   /  AST  23  /  ALT  25  /  AlkPhos  105  05-04                          7.8    9.61  )-----------( 156      ( 05 May 2020 08:07 )             25.5     CAPILLARY BLOOD GLUCOSE      POCT Blood Glucose.: 130 mg/dL (05 May 2020 11:49)  POCT Blood Glucose.: 163 mg/dL (05 May 2020 05:22)  POCT Blood Glucose.: 124 mg/dL (04 May 2020 23:13)  POCT Blood Glucose.: 166 mg/dL (04 May 2020 21:33)  POCT Blood Glucose.: 214 mg/dL (04 May 2020 16:30)      Wound:  sacral wound s/p debridement: 7 x 6 cm, pink base with areas of yellowish adipose tissue, no infection, no drainage 75 yo M w/ hx of Afib on Eliquis, CAD s/p PCI, DM, BPH, presents with 1 week of fever, cough, and progressive SOB. COVID positive, labs show lymphopenia and transaminitis, CXR shows b/l lung opacity, requiring NRB (30 Mar 2020 16:35)  Admitted 3/30/2020. s/p intubation and trach.     POD#1 debridement of sacral pressure wound    Vital Signs Last 24 Hrs  T(C): 36.7 (05 May 2020 05:38), Max: 36.7 (05 May 2020 05:38)  T(F): 98 (05 May 2020 05:38), Max: 98 (05 May 2020 05:38)  HR: 117 (05 May 2020 12:29) (73 - 117)  BP: 121/64 (04 May 2020 21:25) (121/64 - 124/70)  RR: 20 (05 May 2020 05:38) (18 - 20)  SpO2: 98% (05 May 2020 12:29) (98% - 99%)    I&O's Summary    04 May 2020 07:01  -  05 May 2020 07:00  --------------------------------------------------------  IN: 3489.5 mL / OUT: 2200 mL / NET: 1289.5 mL        05-05    149<H>  |  114<H>  |  97<HH>  ----------------------------<  216<H>  3.9   |  26  |  2.3<H>    Ca    7.9<L>      05 May 2020 08:07  Mg     1.9     05-05    TPro  5.4<L>  /  Alb  2.4<L>  /  TBili  0.5  /  DBili  x   /  AST  23  /  ALT  25  /  AlkPhos  105  05-04                          7.8    9.61  )-----------( 156      ( 05 May 2020 08:07 )             25.5     CAPILLARY BLOOD GLUCOSE      POCT Blood Glucose.: 130 mg/dL (05 May 2020 11:49)  POCT Blood Glucose.: 163 mg/dL (05 May 2020 05:22)  POCT Blood Glucose.: 124 mg/dL (04 May 2020 23:13)  POCT Blood Glucose.: 166 mg/dL (04 May 2020 21:33)  POCT Blood Glucose.: 214 mg/dL (04 May 2020 16:30)      Wound:  sacral wound s/p debridement: 7 x 6 cm, pink base with areas of yellow brown skin and adipose tissue ;  small amt of clot   no active bleeding , no infection, no drainage Cimzia Counseling:  I discussed with the patient the risks of Cimzia including but not limited to immunosuppression, allergic reactions and infections.  The patient understands that monitoring is required including a PPD at baseline and must alert us or the primary physician if symptoms of infection or other concerning signs are noted.

## 2025-03-02 NOTE — PROGRESS NOTE ADULT - ASSESSMENT
IMPRESSION:    Right gastrocnemius & peroneal DVT  Acute respiratory failure secondary to COVID-19 SP Trach 4/18  Multiple ischemic CVAs  Left occipital lobe hemorrhagic CVA  MRSA PNA + 4/15 SP ABX therapy       PLAN:    CNS:  Continue Keppra bid. sedation for comfort     HEENT: Oral care. Trach Care.     PULMONARY: HOB @ 45 degrees. Keep POx > 92%. Wean O2.  CXR     CARDIOVASCULAR: DC IV Cardizem  Wean Levophed.      GI: PPI ppx. Start Using PEG pm.  DW IR     RENAL: Correct as needed. FU with Renal      INFECTIOUS DISEASE:  SP ABX therapy     HEMATOLOGICAL: FU CBC>  Repeat Duplex.      ENDOCRINE:  Follow up FS. Insulin protocol if needed.    MUSCULOSKELETAL: bed rest. Wound care.    Full code    Transfer back to [facility] Unit     Poor prognosis Patient/Caregiver provided printed discharge information.

## 2025-03-21 NOTE — ANESTHESIA FOLLOW-UP NOTE - NSRECOMMENDFT_GEN_ALL_CORE
03/21/25                            Akil Blanco  [address] [facility] [address]    To Whom It May Concern:    This is to certify Akil Blanco was evaluated in the urgent care on 03/21/25 and is excused from school on that day.     RESTRICTIONS: None        Electronically signed by:  EFREN Lara  [place] [id number] [facility] [address]  Dept Phone: [phone]        vitals stable on original vent settings via trache